# Patient Record
Sex: MALE | Race: BLACK OR AFRICAN AMERICAN | Employment: OTHER | URBAN - METROPOLITAN AREA
[De-identification: names, ages, dates, MRNs, and addresses within clinical notes are randomized per-mention and may not be internally consistent; named-entity substitution may affect disease eponyms.]

---

## 2017-04-24 ENCOUNTER — APPOINTMENT (OUTPATIENT)
Dept: GENERAL RADIOLOGY | Age: 47
End: 2017-04-24
Attending: EMERGENCY MEDICINE
Payer: MEDICARE

## 2017-04-24 ENCOUNTER — HOSPITAL ENCOUNTER (EMERGENCY)
Age: 47
Discharge: HOME | End: 2017-04-24
Attending: EMERGENCY MEDICINE | Admitting: EMERGENCY MEDICINE
Payer: MEDICARE

## 2017-04-24 VITALS
DIASTOLIC BLOOD PRESSURE: 79 MMHG | TEMPERATURE: 98.7 F | RESPIRATION RATE: 16 BRPM | HEART RATE: 72 BPM | OXYGEN SATURATION: 99 % | BODY MASS INDEX: 37.09 KG/M2 | HEIGHT: 74 IN | WEIGHT: 289 LBS | SYSTOLIC BLOOD PRESSURE: 159 MMHG

## 2017-04-24 DIAGNOSIS — R07.9 CHEST PAIN, UNSPECIFIED TYPE: ICD-10-CM

## 2017-04-24 DIAGNOSIS — R56.9 SEIZURE (HCC): Primary | ICD-10-CM

## 2017-04-24 LAB
ALBUMIN SERPL BCP-MCNC: 3.5 G/DL (ref 3.4–5)
ALBUMIN/GLOB SERPL: 0.9 {RATIO} (ref 0.8–1.7)
ALP SERPL-CCNC: 118 U/L (ref 45–117)
ALT SERPL-CCNC: 29 U/L (ref 16–61)
ANION GAP BLD CALC-SCNC: 5 MMOL/L (ref 3–18)
AST SERPL W P-5'-P-CCNC: 28 U/L (ref 15–37)
ATRIAL RATE: 68 BPM
BASOPHILS # BLD AUTO: 0 K/UL (ref 0–0.06)
BASOPHILS # BLD: 0 % (ref 0–2)
BILIRUB SERPL-MCNC: 0.4 MG/DL (ref 0.2–1)
BUN SERPL-MCNC: 10 MG/DL (ref 7–18)
BUN/CREAT SERPL: 12 (ref 12–20)
CALCIUM SERPL-MCNC: 8.4 MG/DL (ref 8.5–10.1)
CALCULATED P AXIS, ECG09: 12 DEGREES
CALCULATED R AXIS, ECG10: -4 DEGREES
CALCULATED T AXIS, ECG11: 2 DEGREES
CHLORIDE SERPL-SCNC: 107 MMOL/L (ref 100–108)
CK MB CFR SERPL CALC: 0.4 % (ref 0–4)
CK MB SERPL-MCNC: 1.1 NG/ML (ref 5–25)
CK SERPL-CCNC: 264 U/L (ref 39–308)
CO2 SERPL-SCNC: 27 MMOL/L (ref 21–32)
CREAT SERPL-MCNC: 0.83 MG/DL (ref 0.6–1.3)
DIAGNOSIS, 93000: NORMAL
DIFFERENTIAL METHOD BLD: ABNORMAL
EOSINOPHIL # BLD: 0.1 K/UL (ref 0–0.4)
EOSINOPHIL NFR BLD: 2 % (ref 0–5)
ERYTHROCYTE [DISTWIDTH] IN BLOOD BY AUTOMATED COUNT: 12.3 % (ref 11.6–14.5)
GLOBULIN SER CALC-MCNC: 3.8 G/DL (ref 2–4)
GLUCOSE SERPL-MCNC: 91 MG/DL (ref 74–99)
HCT VFR BLD AUTO: 41.4 % (ref 36–48)
HGB BLD-MCNC: 13.8 G/DL (ref 13–16)
LITHIUM SERPL-SCNC: <0.2 MMOL/L (ref 0.6–1.2)
LYMPHOCYTES # BLD AUTO: 40 % (ref 21–52)
LYMPHOCYTES # BLD: 1.8 K/UL (ref 0.9–3.6)
MCH RBC QN AUTO: 31.7 PG (ref 24–34)
MCHC RBC AUTO-ENTMCNC: 33.3 G/DL (ref 31–37)
MCV RBC AUTO: 95 FL (ref 74–97)
MONOCYTES # BLD: 0.4 K/UL (ref 0.05–1.2)
MONOCYTES NFR BLD AUTO: 8 % (ref 3–10)
NEUTS SEG # BLD: 2.2 K/UL (ref 1.8–8)
NEUTS SEG NFR BLD AUTO: 50 % (ref 40–73)
P-R INTERVAL, ECG05: 164 MS
PHENYTOIN SERPL-MCNC: 7.2 UG/ML (ref 10–20)
PLATELET # BLD AUTO: 196 K/UL (ref 135–420)
PMV BLD AUTO: 10.7 FL (ref 9.2–11.8)
POTASSIUM SERPL-SCNC: 4 MMOL/L (ref 3.5–5.5)
PROT SERPL-MCNC: 7.3 G/DL (ref 6.4–8.2)
Q-T INTERVAL, ECG07: 408 MS
QRS DURATION, ECG06: 90 MS
QTC CALCULATION (BEZET), ECG08: 433 MS
RBC # BLD AUTO: 4.36 M/UL (ref 4.7–5.5)
SODIUM SERPL-SCNC: 139 MMOL/L (ref 136–145)
TROPONIN I SERPL-MCNC: <0.02 NG/ML (ref 0–0.04)
VENTRICULAR RATE, ECG03: 68 BPM
WBC # BLD AUTO: 4.5 K/UL (ref 4.6–13.2)

## 2017-04-24 PROCEDURE — 96375 TX/PRO/DX INJ NEW DRUG ADDON: CPT

## 2017-04-24 PROCEDURE — 71010 XR CHEST PORT: CPT

## 2017-04-24 PROCEDURE — 93005 ELECTROCARDIOGRAM TRACING: CPT

## 2017-04-24 PROCEDURE — 96366 THER/PROPH/DIAG IV INF ADDON: CPT

## 2017-04-24 PROCEDURE — 82550 ASSAY OF CK (CPK): CPT | Performed by: EMERGENCY MEDICINE

## 2017-04-24 PROCEDURE — 96365 THER/PROPH/DIAG IV INF INIT: CPT

## 2017-04-24 PROCEDURE — 80185 ASSAY OF PHENYTOIN TOTAL: CPT | Performed by: EMERGENCY MEDICINE

## 2017-04-24 PROCEDURE — 80178 ASSAY OF LITHIUM: CPT | Performed by: EMERGENCY MEDICINE

## 2017-04-24 PROCEDURE — 85025 COMPLETE CBC W/AUTO DIFF WBC: CPT | Performed by: EMERGENCY MEDICINE

## 2017-04-24 PROCEDURE — 99283 EMERGENCY DEPT VISIT LOW MDM: CPT

## 2017-04-24 PROCEDURE — 80053 COMPREHEN METABOLIC PANEL: CPT | Performed by: EMERGENCY MEDICINE

## 2017-04-24 PROCEDURE — 74011250636 HC RX REV CODE- 250/636: Performed by: EMERGENCY MEDICINE

## 2017-04-24 PROCEDURE — 74011000258 HC RX REV CODE- 258: Performed by: EMERGENCY MEDICINE

## 2017-04-24 RX ORDER — PHENYTOIN SODIUM 50 MG/ML
500 INJECTION, SOLUTION INTRAMUSCULAR; INTRAVENOUS ONCE
Status: DISCONTINUED | OUTPATIENT
Start: 2017-04-24 | End: 2017-04-24

## 2017-04-24 RX ORDER — KETOROLAC TROMETHAMINE 30 MG/ML
30 INJECTION, SOLUTION INTRAMUSCULAR; INTRAVENOUS
Status: COMPLETED | OUTPATIENT
Start: 2017-04-24 | End: 2017-04-24

## 2017-04-24 RX ORDER — PHENYTOIN SODIUM 100 MG/1
300 CAPSULE, EXTENDED RELEASE ORAL 2 TIMES DAILY
Qty: 60 CAP | Refills: 0 | Status: SHIPPED | OUTPATIENT
Start: 2017-04-24 | End: 2017-05-09 | Stop reason: SDUPTHER

## 2017-04-24 RX ORDER — NAPROXEN 500 MG/1
500 TABLET ORAL 2 TIMES DAILY WITH MEALS
Qty: 20 TAB | Refills: 0 | Status: SHIPPED | OUTPATIENT
Start: 2017-04-24 | End: 2017-05-09 | Stop reason: SDUPTHER

## 2017-04-24 RX ADMIN — KETOROLAC TROMETHAMINE 30 MG: 30 INJECTION, SOLUTION INTRAMUSCULAR at 13:31

## 2017-04-24 RX ADMIN — PHENYTOIN SODIUM 500 MG: 50 INJECTION INTRAMUSCULAR; INTRAVENOUS at 13:31

## 2017-04-24 NOTE — ED TRIAGE NOTES
Witnessed seizure last night lasting a minute last night. Takes medication for seizures daily. C/o of right chest pain.

## 2017-04-24 NOTE — DISCHARGE INSTRUCTIONS
Chest Pain: Care Instructions  Your Care Instructions  There are many things that can cause chest pain. Some are not serious and will get better on their own in a few days. But some kinds of chest pain need more testing and treatment. Your doctor may have recommended a follow-up visit in the next 8 to 12 hours. If you are not getting better, you may need more tests or treatment. Even though your doctor has released you, you still need to watch for any problems. The doctor carefully checked you, but sometimes problems can develop later. If you have new symptoms or if your symptoms do not get better, get medical care right away. If you have worse or different chest pain or pressure that lasts more than 5 minutes or you passed out (lost consciousness), call 911 or seek other emergency help right away. A medical visit is only one step in your treatment. Even if you feel better, you still need to do what your doctor recommends, such as going to all suggested follow-up appointments and taking medicines exactly as directed. This will help you recover and help prevent future problems. How can you care for yourself at home? · Rest until you feel better. · Take your medicine exactly as prescribed. Call your doctor if you think you are having a problem with your medicine. · Do not drive after taking a prescription pain medicine. When should you call for help? Call 911 if:  · You passed out (lost consciousness). · You have severe difficulty breathing. · You have symptoms of a heart attack. These may include:  ¨ Chest pain or pressure, or a strange feeling in your chest.  ¨ Sweating. ¨ Shortness of breath. ¨ Nausea or vomiting. ¨ Pain, pressure, or a strange feeling in your back, neck, jaw, or upper belly or in one or both shoulders or arms. ¨ Lightheadedness or sudden weakness. ¨ A fast or irregular heartbeat.   After you call 911, the  may tell you to chew 1 adult-strength or 2 to 4 low-dose aspirin. Wait for an ambulance. Do not try to drive yourself. Call your doctor today if:  · You have any trouble breathing. · Your chest pain gets worse. · You are dizzy or lightheaded, or you feel like you may faint. · You are not getting better as expected. · You are having new or different chest pain. Where can you learn more? Go to http://richard-luis enrique.info/. Enter A120 in the search box to learn more about \"Chest Pain: Care Instructions. \"  Current as of: May 27, 2016  Content Version: 11.2  © 2949-4779 [employer]. Care instructions adapted under license by [employer] (which disclaims liability or warranty for this information). If you have questions about a medical condition or this instruction, always ask your healthcare professional. [address] any warranty or liability for your use of this information. Seizure: Care Instructions  Your Care Instructions    Seizures are caused by abnormal patterns of electrical signals in the brain. They are different for each person. Seizures can affect movement, speech, vision, or awareness. Some people have only slight shaking of a hand and do not pass out. Other people may pass out and have violent shaking of the whole body. Some people appear to stare into space. They are awake, but they can't respond normally. Later, they may not remember what happened. You may need tests to identify the type and cause of the seizures. A seizure may occur only once, or you may have them more than one time. Taking medicines as directed and following up with your doctor may help keep you from having more seizures. The doctor has checked you carefully, but problems can develop later. If you notice any problems or new symptoms, get medical treatment right away. Follow-up care is a key part of your treatment and safety.  Be sure to make and go to all appointments, and call your doctor if you are having problems. It's also a good idea to know your test results and keep a list of the medicines you take. How can you care for yourself at home? · Be safe with medicines. Take your medicines exactly as prescribed. Call your doctor if you think you are having a problem with your medicine. · Do not do any activity that could be dangerous to you or others until your doctor says it is safe to do so. For example, do not drive a car, operate machinery, swim, or climb ladders. · Be sure that anyone treating you for any health problem knows that you have had a seizure and what medicines you are taking for it. · Identify and avoid things that may make you more likely to have a seizure. These may include lack of sleep, alcohol or drug use, stress, or not eating. · Make sure you go to your follow-up appointment. When should you call for help? Call 911 anytime you think you may need emergency care. For example, call if:  · You have another seizure. · You have more than one seizure in 24 hours. · You have new symptoms, such as trouble walking, speaking, or thinking clearly. Call your doctor now or seek immediate medical care if:  · You are not acting normally. Watch closely for changes in your health, and be sure to contact your doctor if you have any problems. Where can you learn more? Go to http://richard-luis enrique.info/. Enter S966 in the search box to learn more about \"Seizure: Care Instructions. \"  Current as of: October 14, 2016  Content Version: 11.2  © 0295-9938 [employer]. Care instructions adapted under license by [employer] (which disclaims liability or warranty for this information). If you have questions about a medical condition or this instruction, always ask your healthcare professional. [address] any warranty or liability for your use of this information.

## 2017-04-24 NOTE — ED NOTES
Pt reports 9/10 constant, sharp right-sided chest pain that radiates to left rib cage that started yesterday morning  Pt states comfortable level of pain for him is 7/10  Pt reports hx of PE several years ago and was taking lovenox but stopped last year due to \"bleeding\" in his brain     Pt's family at bedside reports seizure yesterday morning and being unbalanced and disoriented after. She reports episode lasted approximately 5 minutes.      She reports last seizure was approximately 2-3 weeks ago but seizure yesterday lasted longer than usual.

## 2017-05-09 ENCOUNTER — OFFICE VISIT (OUTPATIENT)
Dept: NEUROLOGY | Age: 47
End: 2017-05-09

## 2017-05-09 VITALS
TEMPERATURE: 98.7 F | WEIGHT: 301.6 LBS | HEART RATE: 70 BPM | DIASTOLIC BLOOD PRESSURE: 104 MMHG | BODY MASS INDEX: 38.71 KG/M2 | OXYGEN SATURATION: 96 % | RESPIRATION RATE: 14 BRPM | SYSTOLIC BLOOD PRESSURE: 150 MMHG | HEIGHT: 74 IN

## 2017-05-09 DIAGNOSIS — G40.109 PARTIAL SYMPTOMATIC EPILEPSY WITH SIMPLE PARTIAL SEIZURES, NOT INTRACTABLE, WITHOUT STATUS EPILEPTICUS (HCC): Primary | ICD-10-CM

## 2017-05-09 RX ORDER — LITHIUM CARBONATE 600 MG/1
600 CAPSULE ORAL 2 TIMES DAILY WITH MEALS
Qty: 60 CAP | Refills: 0 | Status: SHIPPED | OUTPATIENT
Start: 2017-05-09 | End: 2017-05-10 | Stop reason: SDUPTHER

## 2017-05-09 RX ORDER — LISINOPRIL 10 MG/1
20 TABLET ORAL DAILY
Qty: 60 TAB | Refills: 0 | Status: SHIPPED | OUTPATIENT
Start: 2017-05-09 | End: 2017-05-10 | Stop reason: SDUPTHER

## 2017-05-09 RX ORDER — NAPROXEN 500 MG/1
500 TABLET ORAL 2 TIMES DAILY WITH MEALS
Qty: 20 TAB | Refills: 0 | Status: SHIPPED | OUTPATIENT
Start: 2017-05-09 | End: 2017-05-10 | Stop reason: SDUPTHER

## 2017-05-09 RX ORDER — PHENYTOIN SODIUM 100 MG/1
300 CAPSULE, EXTENDED RELEASE ORAL 2 TIMES DAILY
Qty: 180 CAP | Refills: 11 | Status: SHIPPED | OUTPATIENT
Start: 2017-05-09 | End: 2017-05-10 | Stop reason: SDUPTHER

## 2017-05-09 NOTE — MR AVS SNAPSHOT
Visit Information Date & Time Provider Department Dept. Phone Encounter #  
 5/9/2017  1[phone] [id number] Follow-up Instructions Return in about 4 weeks (around 6/6/2017). Follow-up and Disposition History Upcoming Health Maintenance Date Due DTaP/Tdap/Td series (1 - Tdap) 5/7/1989 INFLUENZA AGE 9 TO ADULT 8/1/2017 Allergies as of 5/9/2017  Review Complete On: 5/9/2017 By: Lydia Trinidad MD  
  
 Severity Noted Reaction Type Reactions Adhesive Tape-silicones  24/90/2208    Rash Haldol [Haloperidol Lactate]  09/07/2012    Unknown (comments) Tylenol [Acetaminophen]  09/07/2012    Nausea and Vomiting Current Immunizations  Never Reviewed No immunizations on file. Not reviewed this visit You Were Diagnosed With   
  
 Codes Comments Partial symptomatic epilepsy with simple partial seizures, not intractable, without status epilepticus ([facility]    -  Primary ICD-10-CM: G40.109 ICD-9-CM: 345.50 Vitals BP Pulse Temp Resp Height(growth percentile) Weight(growth percentile) (!) 150/104 (BP 1 Location: Left arm, BP Patient Position: Sitting) 70 98.7 °F (37.1 °C) (Oral) 14 6' 2\" (1.88 m) 301 lb 9.6 oz (136.8 kg) SpO2 BMI Smoking Status 96% 38.72 kg/m2 Never Smoker Vitals History BMI and BSA Data Body Mass Index Body Surface Area 38.72 kg/m 2 2.67 m 2 Preferred Pharmacy Pharmacy Name Phone [facility] [address] [place] [address] [place] [address] [place] [address] [phone] Your Updated Medication List  
  
   
This list is accurate as of: 5/9/17  1:53 PM.  Always use your most recent med list.  
  
  
  
  
 DILANTIN EXTENDED 100 mg ER capsule Generic drug:  phenytoin ER Take 3 Caps by mouth two (2) times a day. Indications: PREVENT SEIZURES AFTER HEAD TRAUMA OR SURGERY HYDROcodone-acetaminophen 5-325 mg per tablet Commonly known as:  Karole Dakins Take 1-2 tablets PO every 4-6 hours as needed for pain control. If over the counter ibuprofen or acetaminophen was suggested, then only take the vicodin for pain not well controlled with the over the counter medication. lisinopril 10 mg tablet Commonly known as:  Torrie Feil Take 2 Tabs by mouth daily. lithium carbonate 600 mg capsule Take 1 Cap by mouth two (2) times daily (with meals). naproxen 500 mg tablet Commonly known as:  NAPROSYN Take 1 Tab by mouth two (2) times daily (with meals). Prescriptions Sent to Pharmacy Refills DILANTIN EXTENDED 100 mg ER capsule 11 Sig: Take 3 Caps by mouth two (2) times a day. Indications: PREVENT SEIZURES AFTER HEAD TRAUMA OR SURGERY Class: Normal  
 Pharmacy: [address] Ph #: [phone] Route: Oral  
 lisinopril (PRINIVIL, ZESTRIL) 10 mg tablet 0 Sig: Take 2 Tabs by mouth daily. Class: Normal  
 Pharmacy: [address] Ph #: [phone] Route: Oral  
 lithium carbonate 600 mg capsule 0 Sig: Take 1 Cap by mouth two (2) times daily (with meals). Class: Normal  
 Pharmacy: [address] Ph #: [phone] Route: Oral  
 naproxen (NAPROSYN) 500 mg tablet 0 Sig: Take 1 Tab by mouth two (2) times daily (with meals). Class: Normal  
 Pharmacy: [address] Ph #: [phone] Route: Oral  
  
Follow-up Instructions Return in about 4 weeks (around 6/6/2017). To-Do List   
 05/09/2017 Neurology:  EEG AWAKE AND ASLEEP Introducing [facility]! Frances Hemp introduces Imaging3 patient portal. Now you can access parts of your medical record, email your doctor's office, and request medication refills online. 1. In your internet browser, go to [email or web address] 2. Click on the First Time User? Click Here link in the Sign In box. You will see the New Member Sign Up page. 3. Enter your Imaging3 Access Code exactly as it appears below. You will not need to use this code after youve completed the sign-up process. If you do not sign up before the expiration date, you must request a new code. · Imaging3 Access Code: [email or web address] Expires: 7/23/2017 12:15 PM 
 
4. Enter the last four digits of your Social Security Number (xxxx) and Date of Birth (mm/dd/yyyy) as indicated and click Submit. You will be taken to the next sign-up page. 5. Create a Imaging3 ID. This will be your Imaging3 login ID and cannot be changed, so think of one that is secure and easy to remember. 6. Create a Imaging3 password. You can change your password at any time. 7. Enter your Password Reset Question and Answer. This can be used at a later time if you forget your password. 8. Enter your e-mail address. You will receive e-mail notification when new information is available in [address]. 9. Click Sign Up. You can now view and download portions of your medical record. 10. Click the Download Summary menu link to download a portable copy of your medical information. If you have questions, please visit the Frequently Asked Questions section of the Imaging3 website. Remember, Imaging3 is NOT to be used for urgent needs. For medical emergencies, dial 911. Now available from your iPhone and Android! Please provide this summary of care documentation to your next provider. Your primary care clinician is listed as Iraida Booth. If you have any questions after today's visit, please call [phone].
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
## 2017-05-15 ENCOUNTER — HOSPITAL ENCOUNTER (OUTPATIENT)
Dept: NEUROLOGY | Age: 47
Discharge: HOME | End: 2017-05-15
Attending: PSYCHIATRY & NEUROLOGY
Payer: MEDICARE

## 2017-05-15 DIAGNOSIS — G40.109 PARTIAL SYMPTOMATIC EPILEPSY WITH SIMPLE PARTIAL SEIZURES, NOT INTRACTABLE, WITHOUT STATUS EPILEPTICUS (HCC): ICD-10-CM

## 2017-05-15 PROCEDURE — 95819 EEG AWAKE AND ASLEEP: CPT

## 2017-05-17 NOTE — PROCEDURES
[place]    Name:  Jaycee Salcido  MR#:  [id number]  :  1970  Account #:  [de-identified]  Date of Adm:  05/15/2017  Date of Service:  05/15/2017      ELECTROENCEPHALOGRAM NUMBER . CLINICAL: This is an apparently wakeful EEG on this 24-year-old man  with an established history of epilepsy since childhood. This is a result  of trauma and resulting brain surgery, who has generalized tonic-clonic  seizures starting in high school. He apparently had his last seizure a  couple of weeks ago. MEDICATIONS: Include:  1. Dilantin. 2. Naprosyn. 3. Norco.  4. Lithobid. EEG REPORT: The predominant wakeful background consists of 15-  25 microvolts, irregular but symmetrical, 10-11 Hz waves which  attenuate well with eye opening. Bifrontal 3 to 5 microvolts, 16 to 20 Hz  activity is seen which is symmetrical in its occurrence. Step-flash photic stimulation was performed that caused symmetrical  driving between 5 and 12 flashes per second. IMPRESSION: This is a normal wakeful electroencephalogram. No  epileptiform or focal abnormality was appreciated.         MD JUSTUS Garcia / Kiana Valle  D:  2017   13:14  T:  2017   20:19  Job #:  [id number]

## 2017-06-06 ENCOUNTER — TELEPHONE (OUTPATIENT)
Dept: NEUROLOGY | Age: 47
End: 2017-06-06

## 2017-06-06 NOTE — TELEPHONE ENCOUNTER
Patient had to cancel tiffanie'd appt that was 6/7 due to insurance. However he would like to know if he can receive his results of his EEG until he is seen in the office.  Please advise patient at earliest.

## 2017-06-07 NOTE — TELEPHONE ENCOUNTER
Eliezer Navarro MD                  EEG was normal (Routing comment)            Patient informed and verbalized understanding.

## 2018-06-26 ENCOUNTER — HOSPITAL ENCOUNTER (INPATIENT)
Age: 48
LOS: 6 days | Discharge: HOME | DRG: 885 | End: 2018-07-02
Attending: EMERGENCY MEDICINE | Admitting: PSYCHIATRY & NEUROLOGY
Payer: MEDICARE

## 2018-06-26 DIAGNOSIS — G40.109 PARTIAL SYMPTOMATIC EPILEPSY WITH SIMPLE PARTIAL SEIZURES, NOT INTRACTABLE, WITHOUT STATUS EPILEPTICUS (HCC): ICD-10-CM

## 2018-06-26 DIAGNOSIS — R45.851 SUICIDAL IDEATION: Primary | ICD-10-CM

## 2018-06-26 LAB
AMPHET UR QL SCN: NEGATIVE
ANION GAP SERPL CALC-SCNC: 6 MMOL/L (ref 3–18)
APAP SERPL-MCNC: <2 UG/ML (ref 10–30)
APPEARANCE UR: CLEAR
BARBITURATES UR QL SCN: NEGATIVE
BASOPHILS # BLD: 0 K/UL (ref 0–0.06)
BASOPHILS NFR BLD: 1 % (ref 0–2)
BENZODIAZ UR QL: NEGATIVE
BILIRUB UR QL: NEGATIVE
BUN SERPL-MCNC: 13 MG/DL (ref 7–18)
BUN/CREAT SERPL: 17 (ref 12–20)
CALCIUM SERPL-MCNC: 8.2 MG/DL (ref 8.5–10.1)
CANNABINOIDS UR QL SCN: NEGATIVE
CHLORIDE SERPL-SCNC: 108 MMOL/L (ref 100–108)
CO2 SERPL-SCNC: 28 MMOL/L (ref 21–32)
COCAINE UR QL SCN: NEGATIVE
COLOR UR: ABNORMAL
CREAT SERPL-MCNC: 0.78 MG/DL (ref 0.6–1.3)
DIFFERENTIAL METHOD BLD: ABNORMAL
EOSINOPHIL # BLD: 0.1 K/UL (ref 0–0.4)
EOSINOPHIL NFR BLD: 2 % (ref 0–5)
ERYTHROCYTE [DISTWIDTH] IN BLOOD BY AUTOMATED COUNT: 12.5 % (ref 11.6–14.5)
ETHANOL SERPL-MCNC: <3 MG/DL (ref 0–3)
GLUCOSE SERPL-MCNC: 87 MG/DL (ref 74–99)
GLUCOSE UR STRIP.AUTO-MCNC: NEGATIVE MG/DL
HCT VFR BLD AUTO: 40 % (ref 36–48)
HDSCOM,HDSCOM: NORMAL
HGB BLD-MCNC: 13.2 G/DL (ref 13–16)
HGB UR QL STRIP: NEGATIVE
KETONES UR QL STRIP.AUTO: NEGATIVE MG/DL
LEUKOCYTE ESTERASE UR QL STRIP.AUTO: NEGATIVE
LYMPHOCYTES # BLD: 2.1 K/UL (ref 0.9–3.6)
LYMPHOCYTES NFR BLD: 47 % (ref 21–52)
MCH RBC QN AUTO: 31.6 PG (ref 24–34)
MCHC RBC AUTO-ENTMCNC: 33 G/DL (ref 31–37)
MCV RBC AUTO: 95.7 FL (ref 74–97)
METHADONE UR QL: NEGATIVE
MONOCYTES # BLD: 0.3 K/UL (ref 0.05–1.2)
MONOCYTES NFR BLD: 6 % (ref 3–10)
NEUTS SEG # BLD: 1.9 K/UL (ref 1.8–8)
NEUTS SEG NFR BLD: 44 % (ref 40–73)
NITRITE UR QL STRIP.AUTO: NEGATIVE
OPIATES UR QL: NEGATIVE
PCP UR QL: NEGATIVE
PH UR STRIP: 5 [PH] (ref 5–8)
PHENYTOIN SERPL-MCNC: 20.8 UG/ML (ref 10–20)
PLATELET # BLD AUTO: 174 K/UL (ref 135–420)
PMV BLD AUTO: 10.2 FL (ref 9.2–11.8)
POTASSIUM SERPL-SCNC: 4.1 MMOL/L (ref 3.5–5.5)
PROT UR STRIP-MCNC: NEGATIVE MG/DL
RBC # BLD AUTO: 4.18 M/UL (ref 4.7–5.5)
SALICYLATES SERPL-MCNC: <2.8 MG/DL (ref 2.8–20)
SODIUM SERPL-SCNC: 142 MMOL/L (ref 136–145)
SP GR UR REFRACTOMETRY: >1.03 (ref 1–1.03)
UROBILINOGEN UR QL STRIP.AUTO: 1 EU/DL (ref 0.2–1)
WBC # BLD AUTO: 4.3 K/UL (ref 4.6–13.2)

## 2018-06-26 PROCEDURE — 85025 COMPLETE CBC W/AUTO DIFF WBC: CPT | Performed by: PHYSICIAN ASSISTANT

## 2018-06-26 PROCEDURE — 80307 DRUG TEST PRSMV CHEM ANLYZR: CPT | Performed by: EMERGENCY MEDICINE

## 2018-06-26 PROCEDURE — 80048 BASIC METABOLIC PNL TOTAL CA: CPT | Performed by: PHYSICIAN ASSISTANT

## 2018-06-26 PROCEDURE — 80185 ASSAY OF PHENYTOIN TOTAL: CPT | Performed by: EMERGENCY MEDICINE

## 2018-06-26 PROCEDURE — 99284 EMERGENCY DEPT VISIT MOD MDM: CPT

## 2018-06-26 PROCEDURE — 80307 DRUG TEST PRSMV CHEM ANLYZR: CPT | Performed by: PHYSICIAN ASSISTANT

## 2018-06-26 PROCEDURE — 81003 URINALYSIS AUTO W/O SCOPE: CPT | Performed by: PHYSICIAN ASSISTANT

## 2018-06-26 PROCEDURE — 65220000005 HC RM SEMIPRIVATE PSYCH 3 OR 4 BED

## 2018-06-26 RX ORDER — BENZTROPINE MESYLATE 1 MG/1
1 TABLET ORAL
Status: DISCONTINUED | OUTPATIENT
Start: 2018-06-26 | End: 2018-07-02 | Stop reason: HOSPADM

## 2018-06-26 RX ORDER — FLUPHENAZINE HYDROCHLORIDE 5 MG/1
5 TABLET ORAL
Status: DISCONTINUED | OUTPATIENT
Start: 2018-06-26 | End: 2018-07-02 | Stop reason: HOSPADM

## 2018-06-26 RX ORDER — IBUPROFEN 400 MG/1
400 TABLET ORAL
Status: DISCONTINUED | OUTPATIENT
Start: 2018-06-26 | End: 2018-06-27

## 2018-06-26 RX ORDER — TRAZODONE HYDROCHLORIDE 50 MG/1
50 TABLET ORAL
Status: DISCONTINUED | OUTPATIENT
Start: 2018-06-26 | End: 2018-06-29

## 2018-06-26 RX ORDER — HYDROXYZINE PAMOATE 25 MG/1
25 CAPSULE ORAL
Status: DISCONTINUED | OUTPATIENT
Start: 2018-06-26 | End: 2018-07-02 | Stop reason: HOSPADM

## 2018-06-26 RX ORDER — BENZTROPINE MESYLATE 1 MG/ML
1 INJECTION INTRAMUSCULAR; INTRAVENOUS
Status: DISCONTINUED | OUTPATIENT
Start: 2018-06-26 | End: 2018-07-02 | Stop reason: HOSPADM

## 2018-06-26 NOTE — IP AVS SNAPSHOT
[address] [id number] [address] Patient: Merline Velasquez MRN: [id number] XNU:1[birth year]49 About your hospitalization You were admitted on:  June 26, 2018 You last received care in the:  [facility] You were discharged on:  July 2, 2018 Why you were hospitalized Your primary diagnosis was:  Bipolar Disorder, Unspecified (Hcc) Follow-up Information Follow up With Details Comments Contact Info [facility] [id number] 
[phone] Patient has an intake appointment on 8/2/18 @ 1:00pm 
[facility] [address], [address] 
[phone]:VTTI  [phone]:IQJ Discharge Orders None A check brooks indicates which time of day the medication should be taken. My Medications START taking these medications Instructions Each Dose to Equal  
 Morning Noon Evening Bedtime ARIPiprazole 10 mg tablet Commonly known as:  ABILIFY Your last dose was: Your next dose is: Take 1 Tab by mouth daily. Indications: BIPOLAR DISORDER IN REMISSION 10 mg  
    
   
   
   
  
 divalproex  mg tablet Commonly known as:  DEPAKOTE Your last dose was: Your next dose is: Take 3 Tabs by mouth two (2) times a day. Indications: Epilepsy 750 mg  
    
   
   
   
  
 melatonin 3 mg tablet Your last dose was: Your next dose is: Take 2 Tabs by mouth nightly as needed. Indications: Insomnia  
 6 mg CHANGE how you take these medications Instructions Each Dose to Equal  
 Morning Noon Evening Bedtime  
 lisinopril 10 mg tablet Commonly known as:  Ivone Reasons What changed:  when to take this Your last dose was: Your next dose is: Take 2 Tabs by mouth. Indications: hypertension 20 mg CONTINUE taking these medications Instructions Each Dose to Equal  
 Morning Noon Evening Bedtime HYDROcodone-acetaminophen 5-325 mg per tablet Commonly known as:  Hema [place] Your last dose was: Your next dose is: Take 1-2 tablets PO every 4-6 hours as needed for pain control. If over the counter ibuprofen or acetaminophen was suggested, then only take the vicodin for pain not well controlled with the over the counter medication. naproxen 500 mg tablet Commonly known as:  NAPROSYN Your last dose was: Your next dose is: Take 1 Tab by mouth two (2) times daily (with meals). 500 mg  
    
   
   
   
  
  
STOP taking these medications DILANTIN EXTENDED 100 mg ER capsule Generic drug:  phenytoin ER  
   
  
 lithium carbonate 600 mg capsule Where to Get Your Medications Information on where to get these meds will be given to you by the nurse or doctor. ! Ask your nurse or doctor about these medications ARIPiprazole 10 mg tablet  
 divalproex  mg tablet  
 melatonin 3 mg tablet Opioid Education Prescription Opioids: What You Need to Know: 
 
 
 
PATIENT INSTRUCTIONS: 
 
 
  
 
 
The discharge information has been reviewed with the patient. The patient verbalized understanding. Patient armband removed and shredded Xspand Announcement We are excited to announce that we are making your provider's discharge notes available to you in Xspand. You will see these notes when they are completed and signed by the physician that discharged you from your recent hospital stay. If you have any questions or concerns about any information you see in Xspand, please call the Health Information Department where you were seen or reach out to your Primary Care Provider for more information about your plan of care. Introducing [facility]! Griselda Argueta introduces Xspand patient portal. Now you can access parts of your medical record, email your doctor's office, and request medication refills online. 1. In your internet browser, go to [email or web address] 2. Click on the First Time User? Click Here link in the Sign In box. You will see the New Member Sign Up page. 3. Enter your Xspand Access Code exactly as it appears below. You will not need to use this code after youve completed the sign-up process. If you do not sign up before the expiration date, you must request a new code. · Xspand Access Code: [email or web address] Expires: 9/24/2018 11:20 AM 
 
4. Enter the last four digits of your Social Security Number (xxxx) and Date of Birth (mm/dd/yyyy) as indicated and click Submit. You will be taken to the next sign-up page. 5. Create a Xspand ID. This will be your Xspand login ID and cannot be changed, so think of one that is secure and easy to remember. 6. Create a Xspand password. You can change your password at any time. 7. Enter your Password Reset Question and Answer. This can be used at a later time if you forget your password. 8. Enter your e-mail address. You will receive e-mail notification when new information is available in [address] 9. Click Sign Up. You can now view and download portions of your medical record. 10. Click the Download Summary menu link to download a portable copy of your medical information. If you have questions, please visit the Frequently Asked Questions section of the Inte[employer] website. Remember, Lamahui is NOT to be used for urgent needs. For medical emergencies, dial 911. Now available from your iPhone and Android! Introducing Pancho Dutta As a Cherl Grain patient, I wanted to make you aware of our electronic visit tool called Pancho Dutta. Capigami 24/7 allows you to connect within minutes with a medical provider 24 hours a day, seven days a week via a mobile device or tablet or logging into a secure website from your computer. You can access Pancho Tinybopterryfin from anywhere in the [place]. A virtual visit might be right for you when you have a simple condition and feel like you just dont want to get out of bed, or cant get away from work for an appointment, when your regular [facility] provider is not available (evenings, weekends or holidays), or when youre out of town and need minor care. Electronic visits cost only $49 and if the Capigami 24/7 provider determines a prescription is needed to treat your condition, one can be electronically transmitted to a nearby pharmacy*. Please take a moment to enroll today if you have not already done so. The enrollment process is free and takes just a few minutes. To enroll, please download the Cherl Grain 24/NEMOPTIC misbah to your tablet or phone, or visit [email or web address] to enroll on your computer. And, as an [address] [place] [address] patient with a [facility] account, the results of your visits will be scanned into your electronic medical record and your primary care provider will be able to view the scanned results. We urge you to continue to see your regular [facility] provider for your ongoing medical care.   And while your primary care provider may not be the one available when you seek a Pancho Whitlockterryfin virtual visit, the peace of mind you get from getting a real diagnosis real time can be priceless. For more information on Pancho Garciafin, view our Frequently Asked Questions (FAQs) at [email or web address]. Sincerely, 
 
Leydi Cassidy MD 
Chief Medical Officer Andrew Rosales *:  certain medications cannot be prescribed via Pancho Kamleshterryfin Providers Seen During Your Hospitalization Provider Specialty Primary office phone Scottie Cuevas MD Emergency Medicine [phone] Yoseph Brennan MD Emergency Medicine [phone] Heather Chang MD Psychiatry [phone] Your Primary Care Physician (PCP) Primary Care Physician Office Phone Office Fax Marquis Yadav [phone] You are allergic to the following Allergen Reactions Trazodone Other (comments) Priapism Adhesive Tape-Silicones Rash Haldol (Haloperidol Lactate) Unknown (comments) Tylenol (Acetaminophen) Nausea and Vomiting Recent Documentation Height Weight BMI Smoking Status 1.88 m 128.4 kg 36.34 kg/m2 Never Smoker Emergency Contacts Name Discharge Info Relation Home Work Mobile Arina Nuñez DISCHARGE CAREGIVER [3] Daughter [21] [phone] Ann Marie Burden [5] [phone] Patient Belongings The following personal items are in your possession at time of discharge: 
  Dental Appliances: None  Visual Aid: None      Home Medications: None   Jewelry: None       Other Valuables:  (wallet,keys,cellphone,hearphone,,hat,laces) Please provide this summary of care documentation to your next provider. Signatures-by signing, you are acknowledging that this After Visit Summary has been reviewed with you and you have received a copy.   
  
 
  
    
    
 Patient Signature: ____________________________________________________________ Date:  ____________________________________________________________  
  
Aleah Tati Provider Signature:  ____________________________________________________________ Date:  ____________________________________________________________
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
## 2018-06-26 NOTE — ED NOTES
I performed a brief evaluation, including history and physical, of the patient here in triage and I have determined that pt will need further treatment and evaluation from the main side ER physician. I have placed initial orders to help in expediting patients care.      June 26, 2018 at 11:18 AM - EVELIA Duarte

## 2018-06-26 NOTE — BH NOTES
Comprehensive Assessment Form Part 1    Section I - Disposition      The Medical Doctor is in agreement with Psychiatrist disposition because patient is suicidal and depressed. [facility]  The on-call Psychiatrist consulted was Dr. Hao Malcolm  The admitting Psychiatrist will be Dr. Hao Malcolm. The admitting Diagnosis is suicidal ideations and depression. Section II - Integrated Summary  Summary:  Patient is 46year old male presenting to the ED with suicidal ideations, anxiety and depression. Has a medical history of TBI r/t childhood injury, Seizures and PTSD. Patient is alert and oriented x 3. Reports he recently moved back to [place] from [place] and is living with his daughter in a stress situation. He states, \"I have not eaten in 3 days and unable to sleep. I feel anxious and when I get like this I just want to shoot myself. \" Patient does not access to a gun but reports 'I really think I need to be in here. \" Denies homicidal ideations and no hallucinations. Reports no desire to live and then started to list all the family members who have passed away in his life. Denies substance abuse and UDS negative. He says he been well for a while and last inpatient admission was 10 years ago, possibly here. He would like to be admitted to get started back on his medications and reports he has been trying to reconnect to the [facility] since his return to [place]. The patient is deemed competent to provide informed consent. The information is given by the patient. The Chief Complaint is suicidal thoughts , anxiety and depression. The Precipitant Factors are suicidal thoughts and anxiety. .  Previous Hospitalizations: yes, last admission here in 2012. The patient has not previously been in restraints. Current Psychiatrist and/or  is unknown. Lethality Assessment:    The potential for suicide is noted by the following: noted by the following;  vague plan and ideation.   The potential for homicide is not noted.  The patient has not been a perpetrator of sexual or physical abuse. There are not pending charges. The patient is felt to be at risk for self harm or harm to others. The attending nurse was advised . Section III - Psychosocial  The patient's overall mood and attitude is dull and flat. Feelings of helplessness and hopelessness are not observed. Generalized anxiety is not observed. Panic is not observed. Phobias are not observed. Obsessive compulsive tendencies are not observed. Section IV - Mental Status Exam  The patient's appearance shows no evidence of impairment. The patient's behavior shows poor eye contact and is restless. The patient is oriented to time, place, person and situation. The patient's speech shows no evidence of impairment. The patient's mood  is depressed, is anxious and is sad. The range of affect is flat. The patient's thought content  demonstrates no evidence of impairment. The thought process is circumstantial.  The patient's perception demonstrated no changes. The patient's memory shows no evidence of impairment. The patient's appetite shows no evidence of impairment. The patient's sleep has evidence of insomnia. The patient's insight shows no evidence of impairment. The patient's judgement shows no evidence of impairment. Section V - Substance Abuse  The patient is not using substances. The patient has experienced the following withdrawal symptoms,  N/A. Section VI - Living Arrangements  The patient is single. The patient lives with a child/children. The patient has one child age 32. The patient does plan to return home upon discharge. The patient does not have legal issues pending. The patient's source of income comes from disability. Latter day and cultural practices have not been voiced at this time. The patient's greatest support comes from [facility] and this person will be involved with the treatment.     The patient has been in an event described as horrible or outside the realm of ordinary life experience either currently or in the past.  The patient has not been a victim of sexual/physical abuse. Section VII - Other Areas of Clinical Concern  The highest grade achieved is 12th with the overall quality of school experience being described as good. The patient is currently  disabled and speaks [place] as a primary language. The patient has no communication impairments affecting communication. The patient's preference for learning can be described as: can read and write adequately.   The patient's hearing is normal.  The patient's vision is normal .      Megan Sheth RN

## 2018-06-26 NOTE — ED NOTES
TRANSFER - OUT REPORT:    Verbal report given to Linda Salguero RN(name) on Dirk Roll  being transferred to room 106(unit) for routine progression of care       Report consisted of patients Situation, Background, Assessment and   Recommendations(SBAR). Information from the following report(s) ED Summary, MAR and Recent Results was reviewed with the receiving nurse. Lines:       Opportunity for questions and clarification was provided.       Patient transported with:   Registered Nurse
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
## 2018-06-26 NOTE — IP AVS SNAPSHOT
[facility] 
 
 
 [address] Patient: Francie Dunn MRN: [id number] MYY:[birth year] A check brooks indicates which time of day the medication should be taken. My Medications START taking these medications Instructions Each Dose to Equal  
 Morning Noon Evening Bedtime ARIPiprazole 10 mg tablet Commonly known as:  ABILIFY Your last dose was: Your next dose is: Take 1 Tab by mouth daily. Indications: BIPOLAR DISORDER IN REMISSION 10 mg  
    
   
   
   
  
 divalproex  mg tablet Commonly known as:  DEPAKOTE Your last dose was: Your next dose is: Take 3 Tabs by mouth two (2) times a day. Indications: Epilepsy 750 mg  
    
   
   
   
  
 melatonin 3 mg tablet Your last dose was: Your next dose is: Take 2 Tabs by mouth nightly as needed. Indications: Insomnia  
 6 mg CHANGE how you take these medications Instructions Each Dose to Equal  
 Morning Noon Evening Bedtime  
 lisinopril 10 mg tablet Commonly known as:  Carli Calderon What changed:  when to take this Your last dose was: Your next dose is: Take 2 Tabs by mouth. Indications: hypertension 20 mg CONTINUE taking these medications Instructions Each Dose to Equal  
 Morning Noon Evening Bedtime HYDROcodone-acetaminophen 5-325 mg per tablet Commonly known as:  Deepthi Flores Your last dose was: Your next dose is: Take 1-2 tablets PO every 4-6 hours as needed for pain control. If over the counter ibuprofen or acetaminophen was suggested, then only take the vicodin for pain not well controlled with the over the counter medication. naproxen 500 mg tablet Commonly known as:  NAPROSYN Your last dose was: Your next dose is: Take 1 Tab by mouth two (2) times daily (with meals). 500 mg  
    
   
   
   
  
  
STOP taking these medications DILANTIN EXTENDED 100 mg ER capsule Generic drug:  phenytoin ER  
   
  
 lithium carbonate 600 mg capsule Where to Get Your Medications Information on where to get these meds will be given to you by the nurse or doctor. ! Ask your nurse or doctor about these medications ARIPiprazole 10 mg tablet  
 divalproex  mg tablet  
 melatonin 3 mg tablet

## 2018-06-26 NOTE — ED TRIAGE NOTES
Suicidal ideation started a couple of days ago. Has no plan. Living with family members who have mental health issues also.

## 2018-06-27 PROBLEM — F31.9 BIPOLAR DISORDER, UNSPECIFIED (HCC): Chronic | Status: ACTIVE | Noted: 2018-06-26

## 2018-06-27 LAB — PHENYTOIN SERPL-MCNC: 19.9 UG/ML (ref 10–20)

## 2018-06-27 PROCEDURE — 74011250637 HC RX REV CODE- 250/637: Performed by: PSYCHIATRY & NEUROLOGY

## 2018-06-27 PROCEDURE — 80185 ASSAY OF PHENYTOIN TOTAL: CPT | Performed by: PSYCHIATRY & NEUROLOGY

## 2018-06-27 PROCEDURE — 65220000005 HC RM SEMIPRIVATE PSYCH 3 OR 4 BED

## 2018-06-27 PROCEDURE — 36415 COLL VENOUS BLD VENIPUNCTURE: CPT | Performed by: PSYCHIATRY & NEUROLOGY

## 2018-06-27 RX ORDER — ARIPIPRAZOLE 5 MG/1
5 TABLET ORAL DAILY
Status: DISCONTINUED | OUTPATIENT
Start: 2018-06-27 | End: 2018-06-29

## 2018-06-27 RX ORDER — NAPROXEN 250 MG/1
500 TABLET ORAL 2 TIMES DAILY WITH MEALS
Status: DISCONTINUED | OUTPATIENT
Start: 2018-06-27 | End: 2018-07-02 | Stop reason: HOSPADM

## 2018-06-27 RX ORDER — PHENYTOIN SODIUM 100 MG/1
200 CAPSULE, EXTENDED RELEASE ORAL 2 TIMES DAILY
Status: DISCONTINUED | OUTPATIENT
Start: 2018-06-27 | End: 2018-06-29

## 2018-06-27 RX ORDER — PHENYTOIN SODIUM 100 MG/1
100 CAPSULE, EXTENDED RELEASE ORAL 2 TIMES DAILY
Status: DISCONTINUED | OUTPATIENT
Start: 2018-06-27 | End: 2018-06-27

## 2018-06-27 RX ORDER — PHENYTOIN SODIUM 100 MG/1
300 CAPSULE, EXTENDED RELEASE ORAL 2 TIMES DAILY
Status: DISCONTINUED | OUTPATIENT
Start: 2018-06-27 | End: 2018-06-27

## 2018-06-27 RX ADMIN — NAPROXEN 500 MG: 250 TABLET ORAL at 16:48

## 2018-06-27 RX ADMIN — PHENYTOIN SODIUM 200 MG: 100 CAPSULE ORAL at 20:23

## 2018-06-27 RX ADMIN — ARIPIPRAZOLE 5 MG: 5 TABLET ORAL at 13:19

## 2018-06-27 RX ADMIN — Medication: at 20:43

## 2018-06-27 RX ADMIN — NAPROXEN 500 MG: 250 TABLET ORAL at 08:28

## 2018-06-27 NOTE — BH NOTES
Edwinteresita Dollez is not participating in Recreational Therapy. Group time: 1700    Personal goal for participation: fresh air break    Goal orientation: social    Group therapy participation: refuse    Therapeutic interventions reviewed and discussed: Staff encouraged Pt.  To participate in group    Impression of participation: Pt refuse, chose to rest in bed despite staff encouragement
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
## 2018-06-27 NOTE — H&P
[address] [place] [address] Dr PEREZ ADMIT NOTE      Margy Frazier  MR#: [id number]  : 1970  ACCOUNT #: [de-identified]   ADMIT DATE: 2018    REFERRAL SOURCE:  Voluntary. CHIEF COMPLAINT:  Depression and suicidal ideation. 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 The patient states he is currently living with his daughter in a stressful situation. The patient notes that he does not like the location of his daughter's home. The patient denies access to an ownership of firearms. The patient expressed to crisis nursing at the time of admission that he had no desire to live and then began to list the family members who passed away. The patient does have a past history of suicide attempts. The patient endorses depressed mood, anhedonia, guilt, and hopelessness. The patient denies episodic tearfulness, helplessness, and worthlessness. The patient notes his energy level is decreased. The patient currently endorses SI without a plan. The patient discussed past medications which included Prozac and lithium. The patient is uncertain if lithium was helpful because he stated he took that more than 20 years ago. The patient is uncertain if Prozac was helpful. The patient would like to start medications to improve his mood. The patient also states he has anxiety, which he would like treated as well. The patient states protective factors against suicide include, \"life. \"  The patient also states he is no longer using drugs any longer. He notes he has been clean from crack cocaine for 10 years. The patient denies access to and ownership of firearms and weapons.   The patient states supports external to the hospital include the [employer]. The patient denies homicidal ideation, auditory and visual hallucinations. PSYCHIATRIC REVIEW OF SYSTEMS:  DEPRESSION:  Endorses, see HPI. ANXIETY:  The patient endorses excessive worry. IRRITABILITY:  Denies a low threshold for frustration and anger. BIPOLAR SYMPTOMS:  The patient endorses a history of decreased need for sleep associated with increased energy, racing thoughts, rapid speech, and risky behavior. ABUSE/TRAUMA/PTSD:  The patient denied a history of verbal, physical, or sexual abuse. The patient endorses hypervigilance. The patient denies avoidant behavior related to trauma triggers, flashbacks, or nightmares. PSYCHOSIS:  Again, the patient denies AVH. MEDICAL REVIEW OF SYSTEMS:  SLEEP:  Decreased as the patient notes he has sleep apnea and has been without his \"machine. \"  APPETITE:  Decreased. A 10-point review of systems was completed with significant findings in the HPI and mental status exam.    PSYCHIATRIC TREATMENT HISTORY:    SELF-INJURIOUS BEHAVIOR:  The patient endorses a past history of suicidal actions. VIOLENCE/RISK TO OTHERS:  Denies. PREVIOUS PSYCHIATRIC MEDICATION TRIALS:  Prozac, lithium, and trazodone. TRAZODONE CAUSED PRIAPISM AND THE PATIENT STATES HE IS ALLERGIC TO TRAZODONE. PREVIOUS PSYCHIATRIC HOSPITALIZATIONS:  Endorses. CURRENT THERAPIES:  Denies. CURRENT PSYCHIATRIC PROVIDER:  Denies. ALLERGIES:    Allergies   Allergen Reactions    Trazodone Other (comments)     Priapism     Adhesive Tape-Silicones Rash    Haldol [Haloperidol Lactate] Unknown (comments)    Tylenol [Acetaminophen] Nausea and Vomiting        MEDICAL HISTORY:    HISTORY OF NEUROLOGICAL ILLNESSES:  Seizures and a history of brain surgery due to injury at 9years old. HISTORY OF HEAD INJURIES:  Endorses.     MEDICATIONS:    No current facility-administered medications on file prior to encounter. Current Outpatient Prescriptions on File Prior to Encounter   Medication Sig Dispense Refill    DILANTIN EXTENDED 100 mg ER capsule Take 3 Caps by mouth two (2) times a day. Indications: PREVENT SEIZURES AFTER HEAD TRAUMA OR SURGERY 180 Cap 11    naproxen (NAPROSYN) 500 mg tablet Take 1 Tab by mouth two (2) times daily (with meals). 20 Tab 0    lisinopril (PRINIVIL, ZESTRIL) 10 mg tablet Take 2 Tabs by mouth daily. 60 Tab 0    lithium carbonate 600 mg capsule Take 1 Cap by mouth two (2) times daily (with meals). 60 Cap 0    HYDROcodone-acetaminophen (NORCO) 5-325 mg per tablet Take 1-2 tablets PO every 4-6 hours as needed for pain control. If over the counter ibuprofen or acetaminophen was suggested, then only take the vicodin for pain not well controlled with the over the counter medication. 20 Tab 0        SUBSTANCE ABUSE HISTORY:  The patient denies any current substance usage. The patient states he is a recovering cocaine addict and has been clean for 10 years. CONSEQUENCES:  The patient endorses relationship issues due to drug use in the past.    HISTORY OF DETOXIFICATION:  Denies. HISTORY OF SUBSTANCE ABUSE TREATMENT:  The patient has attended substance abuse treatment at [address] [place] [address] in [facility] and [facility]. PSYCHIATRIC FAMILY HISTORY:    Maternal:  Denies. Paternal:  Denies. Alcohol:  Endorses. Drugs:  \"I don't know. \"    FAMILY HISTORY OF SUICIDE:  No.    SOCIAL HISTORY:  LIVING SITUATION:  The patient currently lives with his daughter. EMPLOYMENT:  The patient is currently on disability and not currently employed. EDUCATION:  The patient completed 12th grade. RELATIONSHIPS/CHILDREN:  The patient has 1 daughter and 2 grandchildren. The patient is currently single. LEGAL:  Denies.     SPIRITUALITY/Anglican:  Baptism.    VITAL/LABS:   Vitals:    06/26/18 1313 06/26/18 1629 06/26/18 1900 06/27/18 0924   BP:  (!) 130/91 106/68 (!) 131/92   Pulse: 61 67 60   Resp:  18 20    Temp:  98.2 °F (36.8 °C) 97.7 °F (36.5 °C) 98.6 °F (37 °C)   SpO2: 98%      Weight:       Height:            Results for orders placed or performed during the hospital encounter of 06/26/18   CBC WITH AUTOMATED DIFF   Result Value Ref Range    WBC 4.3 (L) 4.6 - 13.2 K/uL    RBC 4.18 (L) 4.70 - 5.50 M/uL    HGB 13.2 13.0 - 16.0 g/dL    HCT 40.0 36.0 - 48.0 %    MCV 95.7 74.0 - 97.0 FL    MCH 31.6 24.0 - 34.0 PG    MCHC 33.0 31.0 - 37.0 g/dL    RDW 12.5 11.6 - 14.5 %    PLATELET 684 840 - 698 K/uL    MPV 10.2 9.2 - 11.8 FL    NEUTROPHILS 44 40 - 73 %    LYMPHOCYTES 47 21 - 52 %    MONOCYTES 6 3 - 10 %    EOSINOPHILS 2 0 - 5 %    BASOPHILS 1 0 - 2 %    ABS. NEUTROPHILS 1.9 1.8 - 8.0 K/UL    ABS. LYMPHOCYTES 2.1 0.9 - 3.6 K/UL    ABS. MONOCYTES 0.3 0.05 - 1.2 K/UL    ABS. EOSINOPHILS 0.1 0.0 - 0.4 K/UL    ABS.  BASOPHILS 0.0 0.0 - 0.06 K/UL    DF AUTOMATED     METABOLIC PANEL, BASIC   Result Value Ref Range    Sodium 142 136 - 145 mmol/L    Potassium 4.1 3.5 - 5.5 mmol/L    Chloride 108 100 - 108 mmol/L    CO2 28 21 - 32 mmol/L    Anion gap 6 3.0 - 18 mmol/L    Glucose 87 74 - 99 mg/dL    BUN 13 7.0 - 18 MG/DL    Creatinine 0.78 0.6 - 1.3 MG/DL    BUN/Creatinine ratio 17 12 - 20      GFR est AA >60 >60 ml/min/1.73m2    GFR est non-AA >60 >60 ml/min/1.73m2    Calcium 8.2 (L) 8.5 - 10.1 MG/DL   DRUG SCREEN, URINE   Result Value Ref Range    BENZODIAZEPINES NEGATIVE  NEG      BARBITURATES NEGATIVE  NEG      THC (TH-CANNABINOL) NEGATIVE  NEG      OPIATES NEGATIVE  NEG      PCP(PHENCYCLIDINE) NEGATIVE  NEG      COCAINE NEGATIVE  NEG      AMPHETAMINES NEGATIVE  NEG      METHADONE NEGATIVE  NEG      HDSCOM (NOTE)    URINALYSIS W/ RFLX MICROSCOPIC   Result Value Ref Range    Color DARK YELLOW      Appearance CLEAR      Specific gravity >1.030 (H) 1.005 - 1.030    pH (UA) 5.0 5.0 - 8.0      Protein NEGATIVE  NEG mg/dL    Glucose NEGATIVE  NEG mg/dL    Ketone NEGATIVE  NEG mg/dL    Bilirubin NEGATIVE NEG      Blood NEGATIVE  NEG      Urobilinogen 1.0 0.2 - 1.0 EU/dL    Nitrites NEGATIVE  NEG      Leukocyte Esterase NEGATIVE  NEG     ACETAMINOPHEN   Result Value Ref Range    Acetaminophen level <2 (L) 10.0 - 30.0 ug/mL   PHENYTOIN   Result Value Ref Range    Phenytoin 20.8 (H) 10.0 - 20.0 ug/mL   ETHYL ALCOHOL   Result Value Ref Range    ALCOHOL(ETHYL),SERUM <3 0 - 3 MG/DL   SALICYLATE   Result Value Ref Range    Salicylate level <2.0 (L) 2.8 - 20.0 MG/DL   PHENYTOIN   Result Value Ref Range    Phenytoin 19.9 10.0 - 20.0 ug/mL      Lipid panel (Non fasting) (05/16/2018 5:43 AM)  Lipid panel (Non fasting) (05/16/2018 5:43 AM)   Component Value Ref Range   Triglycerides 74 <150 mg/dL   Cholesterol 211 (H) <=200 mg/dL   HDL 63 (H) 40 - 60 mg/dL   LDL Calculated 133 (H) 0 - 100 mg/dL   VLDL Cholesterol Richadr 14.8 12 - 47 mg/dL   Chol/HDL Ratio 3.3 <5.0   Non-HDL Cholesterol 148 (H) 70 - 130 mg/dL   FASTING Unknown       ECG 12 Lead (05/15/2018 11:34 AM)  ECG 12 Lead (05/15/2018 11:34 AM)   Specimen Performing Laboratory     [facility]    [address] [facility] [place], [place] [address]       ECG 12 Lead (05/15/2018 11:34 AM)   Narrative                                   [id number]                                                                                      Test Date:    2018-05-15    Kathya CERDAS              Department:       Patient ID:   [id number]             NZJX:         17    Gender:       M                        Technician:   ILENE    TXJ:          [id number]               Requested By: Sudheer BETH    Order Number: [id number]               Sky MD:   Hilda King                                    Measurements    Intervals                              Axis              Rate:         50                       T:            -1    CA:           156                      IPK:          1    QRSD:         99                       T:            33    RL:           443                                        QTc:          428                                                                   Interpretive Statements    See Interpretation in [facility]      CBC w/ Differential (05/15/2018 11:30 AM)  CBC w/ Differential (05/15/2018 11:30 AM)   Component Value Ref Range   WBC 4.6 4.2 - 9.1 10*9/L   RBC 4.27 (L) 4.63 - 6.08 10*12/L   HGB 14.0 13.7 - 17.5 g/dL   HCT 41.8 40.1 - 51.0 %   MCV 97.9 (H) 79.0 - 92.2 fL   MCH 32.8 (H) 25.7 - 32.2 pg   MCHC 33.5 32.3 - 36.5 g/dL   RDW 12.2 11.6 - 14.4 %   MPV 10.0 9.4 - 12.4 fL   Platelet 926 874 - 038 10*9/L   Neutrophils % 52.2 %   Immature Granulocytes Relative Percent 0.2 %   Lymphocytes % 38.9 %   Monocytes % 7.0 %   Eosinophils % 1.3 %   Basophils % 0.4 %   Absolute Neutrophils 2.4 1.8 - 5.4 10*9/L   Immature Granulocytes Absolute Count 0.0 0.0 - 1.0 10*9/L   Absolute Lymphocytes 1.8 1.3 - 3.6 10*9/L   Absolute Monocytes 0.3 0.3 - 0.8 10*9/L   Absolute Eosinophils 0.1 0.0 - 0.5 10*9/L   Absolute Basophils 0.0 0.0 - 0.1 10*9/L     Comprehensive Metabolic Panel (53/49/1833 11:30 AM)  Comprehensive Metabolic Panel (15/84/8266 11:30 AM)   Component Value Ref Range   Sodium 145 137 - 145 mmol/L   Potassium 4.4 3.5 - 5.1 mmol/L   Chloride 106 98 - 107 mmol/L   CO2 31.0 (H) 22.0 - 30.0 mmol/L   BUN 12 9 - 20 mg/dL   Creatinine 0.68 (L) 0.70 - 1.30 mg/dL   BUN/Creatinine Ratio 18     EGFR MDRD Non Af Amer >=60 >=60 mL/min/1.73m2   EGFR MDRD Af Amer >=60 >=60 mL/min/1.73m2   Anion Gap 8 (L) 9 - 15 mmol/L   Glucose 95 74 - 106 mg/dL   Calcium 9.1 8.4 - 10.2 mg/dL   Albumin 4.0 3.5 - 5.0 g/dL   Total Protein 6.7 6.3 - 8.2 g/dL   Total Bilirubin 0.4 0.2 - 1.3 mg/dL   AST 30 17 - 59 U/L   ALT 38 21 - 72 U/L   Alkaline Phosphatase 85 38 - 126 U/L     Drug Screen, Urine (05/15/2018 11:41 AM)  Drug Screen, Urine (05/15/2018 11:41 AM)   Component Value Ref Range   Amphetamine Screen, Ur NEGATIVE NEGATIVE   Barbiturate Screen, Ur NEGATIVE NEGATIVE Benzodiazepine Screen, Urine NEGATIVE NEGATIVE   Cocaine(Metab. )Screen, Urine NEGATIVE NEGATIVE   Cannabinoid Scrn, Ur NEGATIVE NEGATIVE   PCP Screen, Urine NEGATIVE NEGATIVE   Opiate Scrn, Ur NEGATIVE NEGATIVE   Methadone Screen, Urine NEGATIVE NEGATIVE         MENTAL STATUS EXAMINATION:  APPEARANCE/HYGIENE:  A 47-year-old -American male. Hygiene is fair. Patient has several scars on the back right side of his head. BEHAVIOR/SOCIAL RELATEDNESS:  Bizarre at times, initially guarded, but this improved. MUSCULOSKELETAL:  The patient's gait and station are appropriate. PSYCHOMOTOR:  The patient was calm. Involuntary movements were absent on exam.  Tone was not assessed. SPEECH:  Rate, rhythm, volume, fluency, and articulation are appropriate. MOOD:  Restricted. AFFECT:  Depressed. THOUGHT PROCESS:  Initially vague nut became more linear and goal directed as the interview continued. Patient was perseverative in regards to food and how the unit used to appear when he was last admitted. THOUGHT CONTENT AND PERCEPTUAL DISTURBANCES:  The patient denies delusions, SI, HI, and AVH. SENSORIUM AND COGNITION:  Alert and oriented x4, attention is intact, memory is intact, and language use is appropriate. INSIGHT:  Fair. JUDGMENT:  Fair. Suicide Risk Assessment     Admission  Date/Time: 06/27/18    [x] Admission  [] Discharge     Key Factors:   Current admission precipitated by suicide attempt?   []  Yes     2    [x]  No     1     Suicide Attempt History  [x] Past attempts of high lethality    2 []  Past attempts of low lethality    1 []  No previous attempts       0   Suicidal Ideation [x]  Constant suicidal thoughts      2 []  Intermittent or fleeting suicidal  thoughts  1 []  Denies current suicidal thoughts    0   Suicide Plan   []  Has plan with actual OR potential access to planned method    2 []  Has plan without access to planned method      1 [x]  No plan            0   Plan Lethality [] Highly lethal plan (Carbon monoxide, gun, hanging, jumping)    2 []  Moderate lethality of plan          1 [x]  Low lethality of plan (biting, head banging, superficial scratching, pillow over face)  0   Safety Plan Agreement  []  Unwilling OR unable to agree due to impaired reality testing   2   []  Patient is ambivalent and/or guarded      1 [x]  Reliably agrees        0   Current Morbid Thoughts (reunion fantasies, preoccupations with death) []  Constantly     2     []  Frequently    1 [x]  Rarely    0   Elopement Risk  []  High risk     2 []  Moderate risk    1 [x]   Low risk    0   Symptoms    [x]  Hopeless  []  Helpless  [x]  Anhedonia   [x]  Guilt/shame  []  Anger/rage  [x]  Anxiety  [x]  Insomnia   []  Agitation   []  Impulsivity  [x]  5-6 symptoms present    2 []  3-4 symptoms present    1  []  0-2 symptoms present    0     Total Score: 7  --------------------------------------------------------------------------------------------------------------  Subjective Appraisal of Risk:  []  Patient replies not trustworthy: several non-verbal cues. []  Patient replies questionable: trustworthy: at least 1 non-verbal cue. [x]  Patient replies appear trustworthy. Protective measures (select all that apply):  [x]  Successful past responses to stress  [x]  Spiritual/Anabaptism beliefs  []  Capacity for reality testing  []  Positive therapeutic relationships  []  Social supports/connections  []  Positive coping skills  []  Frustration tolerance/optimism  []  Children or pets in the home  []  Sense of responsibility to family  [x]  Agrees to treatment plan and follow up    High Risk Diagnoses (select all that apply):  [x]  Depression/Bipolar Disorder  []  Dual Diagnosis  []  Cardiovascular Disease  []  Schizophrenia  []  Chronic Pain  []  Epilepsy  []  Cancer  []  Personality Disorder  []  HIV/AIDS  []  Multiple Sclerosis    Dangerousness Assessment (Suicide, homicide, property destruction. ..)    Risk Factors reviewed and risk assessed to be:  [] low  [] low-moderate  [x] moderate   [] moderate-high  [] high     Protection factors reviewed and risk assessed to be:  [] low  [] low-moderate  [x] moderate   [] moderate-high  [] high     Response to treatment and risk assessed to be:  [] low  [] low-moderate  [x] moderate   [] moderate-high  [] high     Support reviewed and risk assessed to be:  [] low  [] low-moderate  [x] moderate   [] moderate-high  [] high     Acceptance of Discharge and outpatient treatment reviewed and risk assessed to be:    [] low  [] low-moderate  [x] moderate   [] moderate-high  [] high   Overall risk assessed to be:  [] low  [] low-moderate  [x] moderate   [] moderate-high  [] high       Assessment and Plan     Psychiatric Diagnoses:   Bipolar disorder, unspecified ([facility] F31.9     Medical Diagnoses:   Patient Active Problem List   Diagnosis Code    Nonintractable epilepsy with simple partial seizures ([facility] G40.109    Bipolar disorder, unspecified ([facility] F31.9     Psychosocial and contextual factors:    1. Living situation   2. Recent move from [place] to [place]   3. Treatment non-compliance due to recent move. Level of impairment/disability: Severe         The patient is a 77-year-old -American male who is currently requiring acute stabilization after developing suicidal ideation in the context of medication noncompliance and a recent move from [place] to [place] where he is currently living with his daughter in a neighborhood that he finds stressful. The patient would like to start medications to help improve his mood and to help improve depression. Due to a history of bipolar disorder, I will not start an antidepressant at this time. The patient is willing to try Abilify 5 mg p.o. q.a.m. for mood stabilization. The patient currently endorses SI without a plan. The patient denies homicidal ideation and auditory and visual hallucinations.     1.  Admit to locked inpatient behavioral health unit. Start milieu, group, art, and occupational therapy. 2.  Bipolar disorder.   - Start Abilify 5 mg p.o. q.a.m.   - Will hold fluoxetine at this time. 3.  Seizure disorder:  Stable. - Admission Dilantin level was 20.8. The patient stated that this level was too high and refused morning Dilantin. Stat repeat trough today was 19.9.   - The patient states that he does not have a neurologist here in [address] [place] [address] and would like to see a neurologist while hospitalized. 4.  Routine labs ordered and reviewed by this provider. 5.  Reviewed instructions, risks, benefits, and side effects. 6.  Start disposition planning; verify upcoming outpatient appointments with therapist and/or psychiatric medication provider. 7.  Tentative discharge date 3-5 days.       MD JORDON Carter/  D: 06/27/2018 11:26     T: 06/27/2018 12:35  JOB #: [id number]

## 2018-06-27 NOTE — BSMART NOTE
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 The patient states he is currently living with his daughter in a stressful situation. The patient notes that he does not like the location of his daughter's home. The patient denies access to an ownership of firearms. The patient expressed to crisis nursing at the time of admission that he had no desire to live and then began to list the family members who passed away. The patient does have a past history of suicide attempts. SW made contact with patient who was focused on interview. Patient addressed his past hospitalizations and addressed the changes in the center. Patient was cooperative and expressed his recent hospitalization is due to feelings of depression. Patient reports he suffers from bipolar disorder and is currently untreated. Patient endorses feelings of suicide in the past.  Denies AVH/HI. Patient reports he is currently residing with his daughter after relocating from [place]. Patient reports he is in the process \"getting myself together\". SW encouraged group activities as well as positive peer interaction. SW will continue to monitor patient during hospitalization consulting with treating psychiatrist to complete disposition.       SOFIYA Calero, LCSW-E    [phone]

## 2018-06-27 NOTE — BSMART NOTE
OCCUPATIONAL THERAPY PROGRESS NOTE  Group Time:  2420  Attendance: The patient attended full group. Participation:  The patient participated with minimal elaboration in the activity. Attention:  The patient was able to focus on the activity. Interaction:  The patient acknowledges others or responds to questions,  with no spontaneous interaction. Participated as asked. Seemed depressed and preoccupied at times with limited interaction.

## 2018-06-27 NOTE — BH NOTES
GROUP THERAPY PROGRESS NOTE    Merline Velasquez is participating in [place].       Group time:30 min     Personal goal for participation: Unit orientation and protocol     Goal orientation: community     Group therapy participation: active     Therapeutic interventions reviewed and discussed: Treatment participation     Impression of participation: Pt.presented as calm, attentive and cooperative while present in group

## 2018-06-27 NOTE — PROGRESS NOTES
conducted an initial consultation and Spiritual Assessment for Eric Faria, who is a 46 y.o.,male. Patients Primary Language is: [place]. According to the patients EMR Evangelical Affiliation is: Unknown. The reason the Patient came to the hospital is:   Patient Active Problem List    Diagnosis Date Noted    Suicidal ideation 06/26/2018    Nonintractable epilepsy with simple partial seizures ([facility] [address] 05/09/2017    Mood disorder ([facility] [address] 11/05/2012        The  provided the following Interventions:  Initiated a relationship of care and support. Explored issues of juan, belief, spirituality. Listened empathically. Provided information about Spiritual Care Services. Offered prayer on patient's behalf. Chart reviewed. The following outcomes where achieved:  Patient shared limited information about life and spiritual journey/beliefs. Patient processed feelings. Patient expressed gratitude for 's visit. Assessment:  Patient seemed sleepy, quiet at times during group. Shared occasionally when prompted; stated had prev been crack cocaine addict and now waking up every day was a sign of hope for him. Plan:  Chaplains will continue to follow and will provide pastoral care on an as needed/requested basis.  recommends bedside caregivers page  on duty if patient shows signs of acute spiritual or emotional distress. Taylor Sharpe.  [facility] [phone]

## 2018-06-28 PROCEDURE — 74011250637 HC RX REV CODE- 250/637: Performed by: PSYCHIATRY & NEUROLOGY

## 2018-06-28 PROCEDURE — 65220000005 HC RM SEMIPRIVATE PSYCH 3 OR 4 BED

## 2018-06-28 RX ORDER — DIVALPROEX SODIUM 250 MG/1
250 TABLET, DELAYED RELEASE ORAL 2 TIMES DAILY
Status: DISCONTINUED | OUTPATIENT
Start: 2018-06-28 | End: 2018-06-29

## 2018-06-28 RX ADMIN — PHENYTOIN SODIUM 200 MG: 100 CAPSULE ORAL at 20:38

## 2018-06-28 RX ADMIN — DIVALPROEX SODIUM 250 MG: 250 TABLET, DELAYED RELEASE ORAL at 20:38

## 2018-06-28 RX ADMIN — Medication: at 08:13

## 2018-06-28 RX ADMIN — TRAZODONE HYDROCHLORIDE 50 MG: 50 TABLET ORAL at 02:51

## 2018-06-28 RX ADMIN — ARIPIPRAZOLE 5 MG: 5 TABLET ORAL at 08:11

## 2018-06-28 RX ADMIN — NAPROXEN 500 MG: 250 TABLET ORAL at 16:43

## 2018-06-28 RX ADMIN — PHENYTOIN SODIUM 200 MG: 100 CAPSULE ORAL at 08:11

## 2018-06-28 RX ADMIN — Medication: at 20:40

## 2018-06-28 RX ADMIN — NAPROXEN 500 MG: 250 TABLET ORAL at 08:11

## 2018-06-28 RX ADMIN — TRAZODONE HYDROCHLORIDE 50 MG: 50 TABLET ORAL at 21:52
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
## 2018-06-28 NOTE — BH NOTES
Patient at desk stating \"I can't take that generic medication (Dilantin). He stated \"It makes me have a seizure\". \"I take a red and white pill not this purple one\". This writer contacted pharmacy and made aware of patient's concern. Pharmacy stated they don't have that particular medication in stock and advised this writer to contact on call psychiatrist. Pharmacist also suggested patient ask someone to bring in his supply from home. Patient stated he doesn't have anyone to bring his medication. This writer was making preparation to contact on call psychiatrist. Patient abruptly stated \"I'll just go ahead and take it\". Patient took medication.

## 2018-06-28 NOTE — BH NOTES
GROUP THERAPY PROGRESS NOTE    Bipin Spence is participating in AA/Emotions Anonymous Group    Group time: 1349-1117    Personal goal for participation:  How to know When to Seek Treatment for Alcoholism. Anyone experiencing emotional difficulties. Goal orientation: active    Group therapy participation: fully participated    Therapeutic interventions reviewed and discussed: When people talk about what is going on in their lives it allows them to release some of their pent up stress. To gain knowledge and support from others who have had or are currently experiencing similar issues. Impression of participation:  [employer] members reviewed a film, then  Discussed the importance of sharing at [facility]to Raphael , help yourself out of depression, leave anxiety behind, and controlling your fears. .  Pt.  Received information  for both programs and shared while in group

## 2018-06-28 NOTE — BSMART NOTE
OCCUPATIONAL THERAPY PROGRESS NOTE  Group Time:  8870  Attendance: The patient attended full group. Participation:  The patient participated fully in the activity. Attention:  The patient was able to focus on the activity. Interaction:  The patient frequently interacts with others. Discussed change in thinking related to remembering he does not control the behavior of others (related somewhat to daughter) and discussed some feelings of \"guilt\" at not being around when she was younger due to his drug use and illness. Some hedging when it was discussed he could practice an honest conversation with her expressing his regrets.

## 2018-06-28 NOTE — BSMART NOTE
SW assessment/Intervention:  Chart reviewed and discussed it reports by treating psychiatrist Anahi Malcom was interviewed by this writer today. Flat, irritable. SW made contact with patient as he remained isolated within the milieu. Patient was attentive, cooperative and focused on purpose of interview. Patient expressed no major issues or concerns. SW encouraged patient to contact his insurance due to his relocation to [place] to continue with medication management as well as counseling services. SW encouraged patient to attend group activities as well as positive peer interaction. Plan:  SW will continue to monitor patient during hospitalization consulting with treating psychiatrist to complete disposition.       SOFIYA Balderas, LCSW-E    [phone]

## 2018-06-28 NOTE — BH NOTES
Merline Velasquez is  participating in Treatment Concerns     Group time: 65    Personal goal for participation: Community announcement    Goal orientation: community    Group therapy participation: fully participated    Therapeutic interventions reviewed and discussed: Staff discussed  Staff discussed the Mental Health programs offered. Unit schedule for groups,  Visiting hours, patient advocate name and phone number and where this information is posted on the unit, etc. Report any maintenance/housekeeping or treatment concerns to staff so it can be addressed by the Treatment Team.    Impression of participation: Pt.did not have any maintenance/housekeeping or treatment concerns to report to staff .

## 2018-06-28 NOTE — BSMART NOTE
SOCIAL WORK GROUP THERAPY PROGRESS NOTE    Group Time:  11am    Group Topic:  Coping Skills    C D Issues    Group Participation:      Pt moderately involved during group discussion but remained attentive. Members discussed handout on the role of \"neurotransmitters\" and how they regulate different aspects of one's moods, cognitions & related behavior. \"Seven Steps\" for taking responsibility for our Happiness was reviewed including commitment to change, self-care, setting limits, goal setting & letting go.

## 2018-06-28 NOTE — BSMART NOTE
ART ACTIVITY PROGRESS NOTE    PATIENT SCHEDULED FOR GROUP AT :  1330    ART ACTIVITY: Paper mosaic    ATTENDANCE : Patient attended the full session. PARTICIPATION LEVEL :  Participates fully in the art process. ATTENTION LEVEL : Able to focus on task. Patient came to group, mainly due to long past history with this therapist. \"I thought you would have retired by now\". Patient engaged in the activity and worked to completion with a very simple and slightly disorganized design. Patient finished up early, chose not to engage in a second one and sat with the group, interacting socially for the rest of the session. Mood appeared calm and reactive.

## 2018-06-28 NOTE — PROGRESS NOTES
Problem: Suicide/Homicide (Adult/Pediatric)  Goal: *STG: Remains safe in hospital  Outcome: Progressing Towards Goal  Denies suicidal ideation. No unsafe behavior noted. Goal: *STG: Attends activities and groups  Outcome: Progressing Towards Goal  Pt attending groups and participating. Goal: *STG/LTG: Complies with medication therapy  Outcome: Progressing Towards Goal  Pt compliant with medication    Comments: Pt visible on the unit interacting with peers and staff appropriately. Discussed with pt his reason for admission. Pt stating he just can't deal with stressors like he use to. Pt currently denies any thoughts of self harm or harm to others. Denies feeling depressed at this time. Pt compliant with medications. Pt initially refused phenytoin stating he is unable to take the generic brand because it will cause him to have a grand mal seizure. Please see med nurse note. Staff will continue to monitor pt for safety and provide a therapeutic/supportive environment.

## 2018-06-28 NOTE — PROGRESS NOTES
Problem: Falls - Risk of  Goal: *Absence of Falls  Document Rita Fall Risk and appropriate interventions in the flowsheet. Will remain free of falls daily during his hospital stay. Outcome: Progressing Towards Goal  Fall Risk Interventions:absent of falls while in hospital daily. Problem: Suicide/Homicide (Adult/Pediatric)  Goal: *STG: Remains safe in hospital  Will remain safe, will not engage in self injurious behaviors daily while in the hospital.   Outcome: Progressing Towards Goal  Remains safe while in hospital daily. Goal: *STG: Attends activities and groups  Will attend/ participate in at least 2-3 groups daily while in the hospital.   Outcome: Progressing Towards Goal  Attends daily while in hospital activities and groups. Comments: Denies SI HI AVH. Offers no complaints. Interacts with staff and peers. Participates in group at 1600. Eats and tolerates evening meal. Gripper socks and 15 minute checks in place for safety. Takes medicines without incident. Will continue to monitor and support.

## 2018-06-28 NOTE — BH NOTES
The patient was fine with being monitored for safety. Mood was calm and social towards both his peers and the staff. Had his vitals checked before receiving his scheduled medications. Participated in all groups and activities. No issues with any negative or out of control behaviors. Talked with his Dr and . Staff will continue to monitor for safety locations.

## 2018-06-28 NOTE — PROGRESS NOTES
[address]  Inpatient Progress Note     Date of Service: 06/28/18  Hospital Day: 2     Subjective/Interval History   06/28/18    Treatment Team Notes:  Notes reviewed and/or discussed and report that Merline Velasquez demonstrated no interval concerns other than initially refusing Dilantin then taking it. Pt states he needs name brand Dilantin and not generic. Please see nursing notes for more details. Pt also asking to remain hospitalized until July 3, 2018 which is when he receives his check. Patient interview: Merline Velasquez was interviewed by this writer today. Flat, irritable. Reports wanting name brand Dilantin based upon his home medications but continues to feel his Dilantin level was too high. C/o a headache and c/o meal-related issues. Pt continues to report SI with a plan to shoot himself. He denies HI and AVH. Objective     Vitals:    06/26/18 1900 06/27/18 0924 06/27/18 2023 06/28/18 0736   BP: 106/68 (!) 131/92 129/90 (!) 141/94   Pulse: 67 60 69 62   Resp: 20      Temp: 97.7 °F (36.5 °C) 98.6 °F (37 °C)  98.1 °F (36.7 °C)   SpO2:       Weight:       Height:            Results for orders placed or performed during the hospital encounter of 06/26/18   CBC WITH AUTOMATED DIFF   Result Value Ref Range    WBC 4.3 (L) 4.6 - 13.2 K/uL    RBC 4.18 (L) 4.70 - 5.50 M/uL    HGB 13.2 13.0 - 16.0 g/dL    HCT 40.0 36.0 - 48.0 %    MCV 95.7 74.0 - 97.0 FL    MCH 31.6 24.0 - 34.0 PG    MCHC 33.0 31.0 - 37.0 g/dL    RDW 12.5 11.6 - 14.5 %    PLATELET 412 783 - 117 K/uL    MPV 10.2 9.2 - 11.8 FL    NEUTROPHILS 44 40 - 73 %    LYMPHOCYTES 47 21 - 52 %    MONOCYTES 6 3 - 10 %    EOSINOPHILS 2 0 - 5 %    BASOPHILS 1 0 - 2 %    ABS. NEUTROPHILS 1.9 1.8 - 8.0 K/UL    ABS. LYMPHOCYTES 2.1 0.9 - 3.6 K/UL    ABS. MONOCYTES 0.3 0.05 - 1.2 K/UL    ABS. EOSINOPHILS 0.1 0.0 - 0.4 K/UL    ABS.  BASOPHILS 0.0 0.0 - 0.06 K/UL    DF AUTOMATED     METABOLIC PANEL, BASIC   Result Value Ref Range    Sodium 142 136 - 145 mmol/L    Potassium 4.1 3.5 - 5.5 mmol/L    Chloride 108 100 - 108 mmol/L    CO2 28 21 - 32 mmol/L    Anion gap 6 3.0 - 18 mmol/L    Glucose 87 74 - 99 mg/dL    BUN 13 7.0 - 18 MG/DL    Creatinine 0.78 0.6 - 1.3 MG/DL    BUN/Creatinine ratio 17 12 - 20      GFR est AA >60 >60 ml/min/1.73m2    GFR est non-AA >60 >60 ml/min/1.73m2    Calcium 8.2 (L) 8.5 - 10.1 MG/DL   DRUG SCREEN, URINE   Result Value Ref Range    BENZODIAZEPINES NEGATIVE  NEG      BARBITURATES NEGATIVE  NEG      THC (TH-CANNABINOL) NEGATIVE  NEG      OPIATES NEGATIVE  NEG      PCP(PHENCYCLIDINE) NEGATIVE  NEG      COCAINE NEGATIVE  NEG      AMPHETAMINES NEGATIVE  NEG      METHADONE NEGATIVE  NEG      HDSCOM (NOTE)    URINALYSIS W/ RFLX MICROSCOPIC   Result Value Ref Range    Color DARK YELLOW      Appearance CLEAR      Specific gravity >1.030 (H) 1.005 - 1.030    pH (UA) 5.0 5.0 - 8.0      Protein NEGATIVE  NEG mg/dL    Glucose NEGATIVE  NEG mg/dL    Ketone NEGATIVE  NEG mg/dL    Bilirubin NEGATIVE  NEG      Blood NEGATIVE  NEG      Urobilinogen 1.0 0.2 - 1.0 EU/dL    Nitrites NEGATIVE  NEG      Leukocyte Esterase NEGATIVE  NEG     ACETAMINOPHEN   Result Value Ref Range    Acetaminophen level <2 (L) 10.0 - 30.0 ug/mL   PHENYTOIN   Result Value Ref Range    Phenytoin 20.8 (H) 10.0 - 20.0 ug/mL   ETHYL ALCOHOL   Result Value Ref Range    ALCOHOL(ETHYL),SERUM <3 0 - 3 MG/DL   SALICYLATE   Result Value Ref Range    Salicylate level <3.2 (L) 2.8 - 20.0 MG/DL   PHENYTOIN   Result Value Ref Range    Phenytoin 19.9 10.0 - 20.0 ug/mL        Mental Status Examination     Appearance/Hygiene 46 y.o.  BLACK OR  male  Hygiene: good   Behavior/Social Relatedness Appropriate  Relatedness is fair   Musculoskeletal Gait/Station: appropriate  Tone (flaccid, cogwheeling, spastic): not assessed  Psychomotor (hyperkinetic, hypokinetic): calm   Involuntary movements (tics, dyskinesias, akathisia, stereotypies): none   Speech Rate, rhythm, volume, fluency and articulation are appropriate   Mood   irritable   Affect    irritable   Thought Process Linear and goal directed   Thought Content and Perceptual Disturbances endorse SI with a plan to shoot himself with a gun. He denies access to and ownership of weapons. Pt denies HI and AVH. Sensorium and Cognition  Grossly intact   Insight  fair   Judgment poor        Assessment/Plan      Psychiatric Diagnoses:   Bipolar disorder, unspecified ([facility] F31.9      Medical Diagnoses:        Patient Active Problem List   Diagnosis Code    Nonintractable epilepsy with simple partial seizures ([facility] [address] G40.109    Bipolar disorder, unspecified ([facility] [address] F31.9      Psychosocial and contextual factors:                         1.  Living situation                        2.  Recent move from [place] to [place]                        3.  Treatment non-compliance due to recent move.      Level of impairment/disability: Moderate-Severe         Mary Beth Richter is a 46 y.o. who is currently unstable. He continues to endorse SI with a plan to shoot himself with a gun. He denies access to and ownership of weapons. Pt denies HI and AVH. 1.  Bipolar disorder   - Continue Abilify as prescribed. 2.  Insomnia   - start melatonin 5mg po QHS/PRN   3.  Seizures:   - Continue Dilantin 200mg po BID   - Neurology to evaluate pt today. - Will speak with pharmacy about obtaining name brand Dilantin. 4.  Reviewed instructions, risks, benefits and side effects of medications  5. Disposition/Discharge Date: self-care/home, 7-2-2018.     Citlalli Jones MD DR. [facility]  Psychiatry

## 2018-06-29 ENCOUNTER — APPOINTMENT (OUTPATIENT)
Dept: CT IMAGING | Age: 48
DRG: 885 | End: 2018-06-29
Attending: PSYCHIATRY & NEUROLOGY
Payer: MEDICARE

## 2018-06-29 PROCEDURE — 70450 CT HEAD/BRAIN W/O DYE: CPT

## 2018-06-29 PROCEDURE — 74011250637 HC RX REV CODE- 250/637: Performed by: PSYCHIATRY & NEUROLOGY

## 2018-06-29 PROCEDURE — 65220000005 HC RM SEMIPRIVATE PSYCH 3 OR 4 BED

## 2018-06-29 RX ORDER — DIVALPROEX SODIUM 250 MG/1
500 TABLET, DELAYED RELEASE ORAL 2 TIMES DAILY
Status: DISCONTINUED | OUTPATIENT
Start: 2018-06-29 | End: 2018-07-01

## 2018-06-29 RX ORDER — LANOLIN ALCOHOL/MO/W.PET/CERES
6 CREAM (GRAM) TOPICAL
Status: DISCONTINUED | OUTPATIENT
Start: 2018-06-29 | End: 2018-07-02 | Stop reason: HOSPADM

## 2018-06-29 RX ORDER — ARIPIPRAZOLE 5 MG/1
10 TABLET ORAL DAILY
Status: DISCONTINUED | OUTPATIENT
Start: 2018-06-30 | End: 2018-07-02 | Stop reason: HOSPADM

## 2018-06-29 RX ORDER — PHENYTOIN SODIUM 100 MG/1
100 CAPSULE, EXTENDED RELEASE ORAL 2 TIMES DAILY
Status: DISCONTINUED | OUTPATIENT
Start: 2018-06-29 | End: 2018-07-01

## 2018-06-29 RX ADMIN — NAPROXEN 500 MG: 250 TABLET ORAL at 08:26

## 2018-06-29 RX ADMIN — MELATONIN TAB 3 MG 6 MG: 3 TAB at 20:18

## 2018-06-29 RX ADMIN — NAPROXEN 500 MG: 250 TABLET ORAL at 16:28

## 2018-06-29 RX ADMIN — ARIPIPRAZOLE 5 MG: 5 TABLET ORAL at 08:26

## 2018-06-29 RX ADMIN — PHENYTOIN SODIUM 100 MG: 100 CAPSULE ORAL at 20:18

## 2018-06-29 RX ADMIN — DIVALPROEX SODIUM 500 MG: 250 TABLET, DELAYED RELEASE ORAL at 20:19

## 2018-06-29 RX ADMIN — Medication: at 08:00

## 2018-06-29 RX ADMIN — PHENYTOIN SODIUM 200 MG: 100 CAPSULE ORAL at 08:26

## 2018-06-29 RX ADMIN — Medication: at 20:23

## 2018-06-29 RX ADMIN — DIVALPROEX SODIUM 250 MG: 250 TABLET, DELAYED RELEASE ORAL at 08:26

## 2018-06-29 NOTE — BSMART NOTE
SW assessment/Intervention:  Chart reviewed and discussed and it reports Denies SI HI AVH. Offers no complaints. Interacts with staff and peers. Participates in group at 1600. Eats and tolerates evening meal. Gripper socks and 15 minute checks in place for safety. Takes medicines without incident. Will continue to monitor and support. SW made contact with patient who continues to remain isolated. Patient is observed reading and is quiet within the milieu. Patient denies SI/HI. Patient denies AVH. SW encouraged patient to continue with group activities as well as positive peer interaction. Plan:  SW will continue to monitor patient during hospitalization consulting with treating psychiatrist to complete disposition.       Saturnino Spatz, MSW, LCSW-E    [phone]

## 2018-06-29 NOTE — BH NOTES
Jamaal Macias is  participating in Treatment Concerns     Group time: 0726    Personal goal for participation: Community announcement    Goal orientation: community    Group therapy participation: fully participated    Therapeutic interventions reviewed and discussed: Staff discussed  Staff discussed the Mental Health programs offered. Unit schedule for groups,  Visiting hours, patient advocate name and phone number and where this information is posted on the unit, etc. Report any maintenance/housekeeping or treatment concerns to staff so it can be addressed by the Treatment Team.    Impression of participation: Pt.did not have any maintenance/housekeeping or treatment concerns to report to staff .

## 2018-06-29 NOTE — PROGRESS NOTES
Re:  Radha Hunt,Follow up visit     6/29/2018 2:10 PM    SSN: [id number]    Subjective:   Alistair Marquez is seen in follow up. He feels well. No side effects with Depakote, no seizures.     Medications:    Current Facility-Administered Medications   Medication Dose Route Frequency Provider Last Rate Last Dose    divalproex DR (DEPAKOTE) tablet 500 mg  500 mg Oral BID Tiffany Escobedo MD        phenytoin ER (DILANTIN ER) ER capsule 100 mg  100 mg Oral BID Tiffany Escobedo MD        naproxen (NAPROSYN) tablet 500 mg  500 mg Oral BID WITH MEALS Armando De MD   500 mg at 06/29/18 8218    ARIPiprazole (ABILIFY) tablet 5 mg  5 mg Oral DAILY Armando De MD   5 mg at 06/29/18 0901    white petrolatum-mineral oil (EUCERIN) cream   Topical BID Armando De MD        traZODone (DESYREL) tablet 50 mg  50 mg Oral QHS PRN Armando De MD   50 mg at 06/28/18 2152    fluPHENAZine (PROLIXIN) tablet 5 mg  5 mg Oral Q6H PRN Armando De MD        benztropine (COGENTIN) tablet 1 mg  1 mg Oral Q6H PRN Armando De MD        benztropine (COGENTIN) injection 1 mg  1 mg IntraMUSCular Q6H PRN Armando De MD        hydrOXYzine pamoate (VISTARIL) capsule 25 mg  25 mg Oral TID PRN Armando De MD           Vital signs:    Visit Vitals    BP (!) 140/93    Pulse 67    Temp 98 °F (36.7 °C)    Resp 19    Ht 6' 2\" (1.88 m)    Wt 128.4 kg (283 lb)    SpO2 98%    BMI 36.34 kg/m2       Review of Systems:   As above otherwise 11 point review of systems negative including;   Constitutional no fever or chills  Skin denies rash or itching  HEENT  Denies tinnitus, hearing lose  Eyes denies diplopia vision lose  Respiratory denies sortness of breath  Cardiovascular denies chest pain, dyspnea on exertion  Gastrointestinal denies nausea, vomiting, diarrhea, constipation  Genitourinary denies incontinence  Musculoskeletal denies joint pain or swelling  Endocrine denies weight change  Hematology denies easy bruising or bleeding   Neurological as above in HPI      Patient Active Problem List   Diagnosis Code    Nonintractable epilepsy with simple partial seizures ([facility] [address] G40.109    Bipolar disorder, unspecified ([facility] [address] F31.9         Objective: The patient is awake, alert, and oriented x 4. Fund of knowledge is adequate. Speech is fluent and memory is intact. Cranial Nerves: II  Visual fields are full to confrontation. III, IV, VI  Extraocular movements are intact. There is no nystagmus. V  Facial sensation is intact to pinprick. VII  Face is symmetrical.  VIII - Hearing is present. IX, X, XII  Palate is symmetrical.   XI - Shoulder shrugging and head turning intact  Motor: The patient moves all four limbs fairly well and symmetrically. Tone is normal. Reflexes are 2+ and symmetrical. Plantars are down going.  Gait is normal.    Final result (Exam End: 6/29/2018  9:07 AM) Open    Study Result   CT scan of head, without intravenous contrast:           INDICATION:     Headache.     History of old head injury.     History of seizure, hypertension, previous brain surgery.           TECHNIQUE:     Helical CT scan with 2.5 mm size thickness, without intravenous contrast, on the  level of base of skull up to the level of vertex.     Coronal and sagittal images are reformatted.     All CT scans at this facility are performed using dose optimization technique as  appropriate to a  performed  examination, to include automated exposer control,  adjustment mA and / or  KV according to patient size (including appropriate  matching  for site specific examination), or use  of iterative  reconstruction  technique.     COMPARISON STUDY: Previous CT scan of head on 11/15/2012.           FINDINGS:           Evidence of previous right-sided temporal parietal craniotomy.     Evidence of focal infarction/encephalomalacia in the right lateral  frontoparietal area, extended to upper posterior aspect of right sylvian  fissure, similar to previous study.     There is no definable new focal abnormality or acute process in brain.     No evidence of intracranial hemorrhages, mass lesion or mass effect.     The study again demonstrates the existing prominent dural calcifications in the  anterior portion of falx cerebri.     Cerebral ventricles are of normal size. The right lateral ventricle is smaller  in size as compared to left lateral ventricle, as also noted previously.     No definable abnormality in the basal ganglia structures, bilateral thalami, or  in the brainstem.     There is evidence of moderate atrophic changes in cerebellum, more pronounced as  compared to previous study.     No diagnostic finding in the visualized portions of both orbits or in visualized  paranasal sinuses.     -----------------------------------------------------     IMPRESSION  IMPRESSION:     No evidence of intracranial hemorrhages or any other definable acute  intracranial abnormality.     Redemonstration of previous right temporoparietal craniotomy.     Redemonstration of focal prominent infarction/encephalomalacia in the lateral  portions of right frontoparietal region, extended to the posterosuperior aspect  of right sylvian fissure, without interval change. I have reviewed the above imagines myself.     CBC:   Lab Results   Component Value Date/Time    WBC 4.3 (L) 06/26/2018 01:37 PM    RBC 4.18 (L) 06/26/2018 01:37 PM    HGB 13.2 06/26/2018 01:37 PM    HCT 40.0 06/26/2018 01:37 PM    PLATELET 808 09/85/7439 01:37 PM     BMP:   Lab Results   Component Value Date/Time    Glucose 87 06/26/2018 01:37 PM    Sodium 142 06/26/2018 01:37 PM    Potassium 4.1 06/26/2018 01:37 PM    Chloride 108 06/26/2018 01:37 PM    CO2 28 06/26/2018 01:37 PM    BUN 13 06/26/2018 01:37 PM    Creatinine 0.78 06/26/2018 01:37 PM    Calcium 8.2 (L) 06/26/2018 01:37 PM     CMP:   Lab Results   Component Value Date/Time    Glucose 87 06/26/2018 01:37 PM    Sodium 142 06/26/2018 01:37 PM    Potassium 4.1 06/26/2018 01:37 PM    Chloride 108 06/26/2018 01:37 PM    CO2 28 06/26/2018 01:37 PM    BUN 13 06/26/2018 01:37 PM    Creatinine 0.78 06/26/2018 01:37 PM    Calcium 8.2 (L) 06/26/2018 01:37 PM    Anion gap 6 06/26/2018 01:37 PM    BUN/Creatinine ratio 17 06/26/2018 01:37 PM    Alk. phosphatase 118 (H) 04/24/2017 10:40 AM    Protein, total 7.3 04/24/2017 10:40 AM    Albumin 3.5 04/24/2017 10:40 AM    Globulin 3.8 04/24/2017 10:40 AM    A-G Ratio 0.9 04/24/2017 10:40 AM     Coagulation:   Lab Results   Component Value Date/Time    Prothrombin time 12.8 11/02/2012 08:15 AM    INR 1.0 11/02/2012 08:15 AM    aPTT 26.7 11/02/2012 08:15 AM     Cardiac markers:   Lab Results   Component Value Date/Time     04/24/2017 10:40 AM    CK-MB Index 0.4 04/24/2017 10:40 AM       Assessment:  Seizures in this man who has risk factors including traumatic brain injury as a child. Seems to be tolerating the switch to Depakote at this time. CT scan was benign, shows old cranio, calcification in occipital regio. Plan: Will increase the Depakote to 500 mg BID and decrease the Dilantin to 100 mg BID. Check Depakote level on Sunday. Sincerely,        Mandi Arnold.  Deon Sandoval M.D.

## 2018-06-29 NOTE — PROGRESS NOTES
Problem: Falls - Risk of  Goal: *Absence of Falls  Document Rita Fall Risk and appropriate interventions in the flowsheet. Will remain free of falls daily during his hospital stay.    Outcome: Progressing Towards Goal  Fall Risk Interventions:absent of falls daily while in hospital                               Problem: Suicide/Homicide (Adult/Pediatric)  Goal: *STG: Remains safe in hospital  Will remain safe, will not engage in self injurious behaviors daily while in the hospital.   Outcome: Progressing Towards Goal  Remains safe daily while in hospital.  Goal: *STG/LTG: Complies with medication therapy  Will be medication compliant daily while in the hospital.   Outcome: Progressing Towards Goal  Complies with medication therapy daily while in hospital.

## 2018-06-29 NOTE — BH NOTES
Pt in milieu most of shift; mood and affect fair; cooperative, compliant, insight into illness fair, goal oriented - \"to keep hope that I will continue to improve and maybe find out what  this growth is inside my head\", interaction good. No behavioral issues during shift, denies intent to harm self/others and A/V hallucinations, involved in no falls this shift - skid resistant footwear utilized and floor kept free of items that could precipitate a fall.

## 2018-06-29 NOTE — PROGRESS NOTES
[address]  Inpatient Progress Note     Date of Service: 06/29/18  Hospital Day: 3     Subjective/Interval History   06/29/18    Treatment Team Notes:  Notes reviewed and/or discussed and report that Gen Reyna demonstrated no interval concerns. Pt was seen by Neurology and their recs are appreciated. The pt explained to Neurology he will be discharged to an detention from the hospital.  Pt currently lives with his daughter. Eating all meals. Pt denies SI, HI and AVH. Patient interview: Gen Reyna was interviewed by this writer today. Flat but less irritable. +SI. Denies HI and AVH. Discussed CT Head results with pt. Objective     Vitals:    06/27/18 0924 06/27/18 2023 06/28/18 0736 06/29/18 0730   BP: (!) 131/92 129/90 (!) 141/94 (!) 140/93   Pulse: 60 69 62 67   Resp:    19   Temp: 98.6 °F (37 °C)  98.1 °F (36.7 °C) 98 °F (36.7 °C)   SpO2:       Weight:       Height:            Results for orders placed or performed during the hospital encounter of 06/26/18   CBC WITH AUTOMATED DIFF   Result Value Ref Range    WBC 4.3 (L) 4.6 - 13.2 K/uL    RBC 4.18 (L) 4.70 - 5.50 M/uL    HGB 13.2 13.0 - 16.0 g/dL    HCT 40.0 36.0 - 48.0 %    MCV 95.7 74.0 - 97.0 FL    MCH 31.6 24.0 - 34.0 PG    MCHC 33.0 31.0 - 37.0 g/dL    RDW 12.5 11.6 - 14.5 %    PLATELET 427 582 - 275 K/uL    MPV 10.2 9.2 - 11.8 FL    NEUTROPHILS 44 40 - 73 %    LYMPHOCYTES 47 21 - 52 %    MONOCYTES 6 3 - 10 %    EOSINOPHILS 2 0 - 5 %    BASOPHILS 1 0 - 2 %    ABS. NEUTROPHILS 1.9 1.8 - 8.0 K/UL    ABS. LYMPHOCYTES 2.1 0.9 - 3.6 K/UL    ABS. MONOCYTES 0.3 0.05 - 1.2 K/UL    ABS. EOSINOPHILS 0.1 0.0 - 0.4 K/UL    ABS.  BASOPHILS 0.0 0.0 - 0.06 K/UL    DF AUTOMATED     METABOLIC PANEL, BASIC   Result Value Ref Range    Sodium 142 136 - 145 mmol/L    Potassium 4.1 3.5 - 5.5 mmol/L    Chloride 108 100 - 108 mmol/L    CO2 28 21 - 32 mmol/L    Anion gap 6 3.0 - 18 mmol/L    Glucose 87 74 - 99 mg/dL    BUN 13 7.0 - 18 MG/DL    Creatinine 0.78 0.6 - 1.3 MG/DL    BUN/Creatinine ratio 17 12 - 20      GFR est AA >60 >60 ml/min/1.73m2    GFR est non-AA >60 >60 ml/min/1.73m2    Calcium 8.2 (L) 8.5 - 10.1 MG/DL   DRUG SCREEN, URINE   Result Value Ref Range    BENZODIAZEPINES NEGATIVE  NEG      BARBITURATES NEGATIVE  NEG      THC (TH-CANNABINOL) NEGATIVE  NEG      OPIATES NEGATIVE  NEG      PCP(PHENCYCLIDINE) NEGATIVE  NEG      COCAINE NEGATIVE  NEG      AMPHETAMINES NEGATIVE  NEG      METHADONE NEGATIVE  NEG      HDSCOM (NOTE)    URINALYSIS W/ RFLX MICROSCOPIC   Result Value Ref Range    Color DARK YELLOW      Appearance CLEAR      Specific gravity >1.030 (H) 1.005 - 1.030    pH (UA) 5.0 5.0 - 8.0      Protein NEGATIVE  NEG mg/dL    Glucose NEGATIVE  NEG mg/dL    Ketone NEGATIVE  NEG mg/dL    Bilirubin NEGATIVE  NEG      Blood NEGATIVE  NEG      Urobilinogen 1.0 0.2 - 1.0 EU/dL    Nitrites NEGATIVE  NEG      Leukocyte Esterase NEGATIVE  NEG     ACETAMINOPHEN   Result Value Ref Range    Acetaminophen level <2 (L) 10.0 - 30.0 ug/mL   PHENYTOIN   Result Value Ref Range    Phenytoin 20.8 (H) 10.0 - 20.0 ug/mL   ETHYL ALCOHOL   Result Value Ref Range    ALCOHOL(ETHYL),SERUM <3 0 - 3 MG/DL   SALICYLATE   Result Value Ref Range    Salicylate level <3.8 (L) 2.8 - 20.0 MG/DL   PHENYTOIN   Result Value Ref Range    Phenytoin 19.9 10.0 - 20.0 ug/mL        Mental Status Examination     Appearance/Hygiene 46 y.o.  BLACK OR  male  Hygiene: good   Behavior/Social Relatedness Appropriate  Relatedness is fair   Musculoskeletal Gait/Station: appropriate  Tone (flaccid, cogwheeling, spastic): not assessed  Psychomotor (hyperkinetic, hypokinetic): calm   Involuntary movements (tics, dyskinesias, akathisia, stereotypies): none   Speech   Rate, rhythm, volume, fluency and articulation are appropriate   Mood   blunted   Affect    blunted   Thought Process Linear and goal directed   Thought Content and Perceptual Disturbances endorse SI with a plan to shoot himself with a gun. He denies access to and ownership of weapons. Pt denies HI and AVH. Sensorium and Cognition  Grossly intact   Insight  fair   Judgment poor        Assessment/Plan      Psychiatric Diagnoses:   Bipolar disorder, unspecified ([facility] [address] F31.9      Medical Diagnoses:        Patient Active Problem List   Diagnosis Code    Nonintractable epilepsy with simple partial seizures ([facility] [address] G40.109    Bipolar disorder, unspecified ([facility] [address] F31.9      Psychosocial and contextual factors:                         1.  Living situation                        2.  Recent move from [place] to [place]                        3.  Treatment non-compliance due to recent move.      Level of impairment/disability: Moderate-Severe         Aurelia Davis is a 46 y.o. who is currently improving. He continues to endorse SI. Pt denies HI and AVH. 1.  Bipolar disorder   - increase Abilify to 10mg po Qam.   2.  Insomnia   - start melatonin 6mg po QHS/PRN   3.  Seizures:   - Per neurology recs. - Did not speak with the pharmacy based upon Neurology recs. 4.  Reviewed instructions, risks, benefits and side effects of medications  5. Disposition/Discharge Date: self-care/home, 7-2-2018.     Berny Denson MD DR. [facility]  Psychiatry

## 2018-06-29 NOTE — BSMART NOTE
OCCUPATIONAL THERAPY PROGRESS NOTE  Group Time:  4879  Attendance: The patient attended full group. Participation:  The patient participated with moderate elaboration in the activity. Attention:  The patient was able to focus on the activity. Interaction:  The patient occasionally  interacts with others. Participated as asked in discussion on stressors and stress management.

## 2018-06-29 NOTE — BH NOTES
GROUP THERAPY PROGRESS NOTE    Tabby Martinez is participating in [employer].      Group time: 30 minutes    Personal goal for participation: discuss guideline compliance, unit issues and community announcements; discuss daily Tx goal(s)                   Goal orientation: community    Group therapy participation: active

## 2018-06-29 NOTE — BSMART NOTE
SOCIAL WORK GROUP THERAPY PROGRESS NOTE    Group Time:  10:15am     Group Topic:  Coping Skills    C D Issues    Group Participation:      Actively participated in group discussion. Very focused. A lot of self disclosure. Members discussed handout on the role of \"neurotransmitters\" and how they regulate different aspects of one's moods, cognitions & related behavior. Discussion included the process of making \"Change\". Pt spoke about his \"recovery\" over the years & impact of thinking negative. Also looked at the typical \"stages of alcohol & drug recovery\" from withdrawal, \"honeymoon\" stage, hitting \"The WALL\", learning new skills, maintaining a \"balanced lifestyle & monitoring for \"relapse\" warning signs.

## 2018-06-30 PROCEDURE — 65220000005 HC RM SEMIPRIVATE PSYCH 3 OR 4 BED

## 2018-06-30 PROCEDURE — 74011250637 HC RX REV CODE- 250/637: Performed by: PSYCHIATRY & NEUROLOGY

## 2018-06-30 RX ADMIN — DIVALPROEX SODIUM 500 MG: 250 TABLET, DELAYED RELEASE ORAL at 08:06

## 2018-06-30 RX ADMIN — ARIPIPRAZOLE 10 MG: 5 TABLET ORAL at 08:06

## 2018-06-30 RX ADMIN — NAPROXEN 500 MG: 250 TABLET ORAL at 08:06

## 2018-06-30 RX ADMIN — PHENYTOIN SODIUM 100 MG: 100 CAPSULE ORAL at 08:06

## 2018-06-30 RX ADMIN — Medication: at 20:59

## 2018-06-30 RX ADMIN — PHENYTOIN SODIUM 100 MG: 100 CAPSULE ORAL at 20:56

## 2018-06-30 RX ADMIN — MELATONIN TAB 3 MG 6 MG: 3 TAB at 21:22

## 2018-06-30 RX ADMIN — NAPROXEN 500 MG: 250 TABLET ORAL at 16:31

## 2018-06-30 RX ADMIN — Medication: at 08:08

## 2018-06-30 RX ADMIN — DIVALPROEX SODIUM 500 MG: 250 TABLET, DELAYED RELEASE ORAL at 20:56

## 2018-06-30 NOTE — PROGRESS NOTES
Problem: Falls - Risk of  Goal: *Absence of Falls  Document Rita Fall Risk and appropriate interventions in the flowsheet. Will remain free of falls daily during his hospital stay. Outcome: Progressing Towards Goal  Fall Risk Interventions:  Pt remains free of falls. Problem: Nutrition Deficit  Goal: *Optimize nutritional status  1. Po intake of meals will meet >75% of patient estimated nutritional needs within the next 7 days. 2. Weight maintenance/loss of 1-2 lbs over the next 7 days. Outcome: Progressing Towards Goal  Pt is eating all meals today. Problem: Seizure Disorder (Adult)  Goal: *STG/LTG: Complies with medication therapy  Patient will take Dilantin medication as prescribed. Outcome: Progressing Towards Goal  Pt takes medication as ordered. Comments: Patient has been in the milieu all morning interacting with staff and peers. Patient is interacting in groups and sharing his personal experiences with peers regarding his poor life choices regarding drugs and alcohol. Patient presents pleasant and smiling and denies SI/HI and AVH today.

## 2018-06-30 NOTE — BH NOTES
GROUP THERAPY PROGRESS NOTE    Charu Morris is participating in Coping Skills Group. Group time: 0930    Personal goal for participation: Community    Goal orientation: personal    Group therapy participation: active    Therapeutic interventions reviewed and discussed: Discussed several issues and topics regarding staying out   of trouble on outside,taking meds a prescribed,where to go for rehab if needed. and how to handle anger. Impression of participation: Pt was active and appropriate during this time and able to give feedback to others    in group.

## 2018-06-30 NOTE — PROGRESS NOTES
Patient appears asleep in bed through the night, no problems. Continue to monitor and check q 15' for safety and provide support as needed with treatment.

## 2018-06-30 NOTE — PROGRESS NOTES
[address]     Physician Daily Progress Note    Patient:  March Grandchild Age:  46 y.o. :  1970     SEX:  male MRN:  [id number] CSN:  [id number]    Admit Date:  2018 Attending:  Gerry De La Cruz MD    Subjective: The patient is a 59-year-old -American male with a history of  Bipolar disorder and polysubstance use disorder, currently in remission. He  Was admitted after expressing suicidal ideations with  plan to shoot himself in the head. He states he just from [place] to [place] and was living with his daughter and sleeping on her couch. States he was under too much stresses in [place] due to relationship  Issues. He reports that Abilify is helping is helping with mood.      Current Medications:    Current Facility-Administered Medications   Medication Dose Route Frequency Provider Last Rate Last Dose    divalproex DR (DEPAKOTE) tablet 500 mg  500 mg Oral BID Nabeel Quiroga MD   500 mg at 18 9534    phenytoin ER (DILANTIN ER) ER capsule 100 mg  100 mg Oral BID Nabeel Quiroga MD   100 mg at 18 4371    melatonin tablet 6 mg  6 mg Oral QHS PRN Gerry De La Cruz MD   6 mg at 18    ARIPiprazole (ABILIFY) tablet 10 mg  10 mg Oral DAILY Gerry De La Cruz MD   10 mg at 18 3446    naproxen (NAPROSYN) tablet 500 mg  500 mg Oral BID WITH MEALS Gerry De La Cruz MD   500 mg at 18 1631    white petrolatum-mineral oil (EUCERIN) cream   Topical BID Gerry De La Cruz MD        fluPHENAZine (PROLIXIN) tablet 5 mg  5 mg Oral Q6H PRN Gerry De La Cruz MD        benztropine (COGENTIN) tablet 1 mg  1 mg Oral Q6H PRN Gerry De La Cruz MD        benztropine (COGENTIN) injection 1 mg  1 mg IntraMUSCular Q6H PRN Gerry De La Cruz MD        hydrOXYzine pamoate (VISTARIL) capsule 25 mg  25 mg Oral TID PRN Gerry De La Cruz MD           Compliant with medication:  Yes      Side effects from medications:  No Mental Status Exam      Appearance    General Behavior   Pleasant and cooperative     Speech form and content,  Language  Associations  Form of Thought   Normal flow and volume  TP : Logical, goal oriented   Mood, Affect  Self-Attitude  Vital Sense  SI/HI/PDW   Euthymic  No SI, HI, hopelessness   Abnormal Perceptions and illusions   Denies     Delusions   None   Anxiety    Denies   COGNITION Intelligence Abstraction   Intact   Judgement Insight     Improved        Diagnoses/Impressions:       Psychiatric:    Principal Problem:    Bipolar disorder, unspecified ([facility] (6/26/2018) POA: Yes            Medical:     Visit Vitals    /87 (BP 1 Location: Right arm, BP Patient Position: Sitting)    Pulse 66    Temp 97.8 °F (36.6 °C)    Resp 18    Ht 6' 2\" (1.88 m)    Wt 128.4 kg (283 lb)    SpO2 98%    BMI 36.34 kg/m2       No lab exists for component: 24H    Recommendations/Plan:      []  Dangerous and will not contract for safety in the community    []  Response to medications is not adequate    []  Appropriate disposition not finalized    []  Collateral history needed to determine safety    [x]  Continue current medications and follow MSE for sxs improvement    []  Medication changes as follows:     [x]  Continue to build rapport    [x]  Supportive psychotherapy    [x]  Insight oriented therapy    [x]  Group attendance/processing    [x]  Relapse prevention      [x]  Somatic Management    [x]  Disposition planning                                   Darrian Hunt MD               6/30/2018          5:32 PM

## 2018-06-30 NOTE — BH NOTES
GROUP THERAPY PROGRESS NOTE    Alistair Marquez is participating in [employer] [facility]. Group time: 1 hour    Personal goal for participation: share thoughts and opinions    Goal orientation: social    Group therapy participation: active    Impression of participation: good. Patient interacted with peers appropriately.

## 2018-07-01 LAB — VALPROATE SERPL-MCNC: 35 UG/ML (ref 50–100)

## 2018-07-01 PROCEDURE — 65220000005 HC RM SEMIPRIVATE PSYCH 3 OR 4 BED

## 2018-07-01 PROCEDURE — 74011250637 HC RX REV CODE- 250/637: Performed by: PSYCHIATRY & NEUROLOGY

## 2018-07-01 PROCEDURE — 80164 ASSAY DIPROPYLACETIC ACD TOT: CPT | Performed by: PSYCHIATRY & NEUROLOGY

## 2018-07-01 PROCEDURE — 36415 COLL VENOUS BLD VENIPUNCTURE: CPT | Performed by: PSYCHIATRY & NEUROLOGY

## 2018-07-01 RX ORDER — DIVALPROEX SODIUM 250 MG/1
750 TABLET, DELAYED RELEASE ORAL 2 TIMES DAILY
Status: DISCONTINUED | OUTPATIENT
Start: 2018-07-01 | End: 2018-07-02 | Stop reason: HOSPADM

## 2018-07-01 RX ADMIN — PHENYTOIN SODIUM 100 MG: 100 CAPSULE ORAL at 08:13

## 2018-07-01 RX ADMIN — HYDROXYZINE PAMOATE 25 MG: 25 CAPSULE ORAL at 20:58

## 2018-07-01 RX ADMIN — ARIPIPRAZOLE 10 MG: 5 TABLET ORAL at 08:13

## 2018-07-01 RX ADMIN — DIVALPROEX SODIUM 500 MG: 250 TABLET, DELAYED RELEASE ORAL at 08:13

## 2018-07-01 RX ADMIN — NAPROXEN 500 MG: 250 TABLET ORAL at 08:13

## 2018-07-01 RX ADMIN — Medication: at 08:13

## 2018-07-01 RX ADMIN — Medication: at 21:03

## 2018-07-01 RX ADMIN — DIVALPROEX SODIUM 750 MG: 250 TABLET, DELAYED RELEASE ORAL at 20:58

## 2018-07-01 RX ADMIN — NAPROXEN 500 MG: 250 TABLET ORAL at 16:47

## 2018-07-01 RX ADMIN — MELATONIN TAB 3 MG 6 MG: 3 TAB at 21:25

## 2018-07-01 NOTE — PROGRESS NOTES
Re:  Justus Hunt,Follow up visit     7/1/2018 1:47 PM    SSN: [id number]    Subjective:   Anastacia Acharya is seen in follow up. He feels well. No side effects with Depakote, no seizures.     Medications:    Current Facility-Administered Medications   Medication Dose Route Frequency Provider Last Rate Last Dose    divalproex DR (DEPAKOTE) tablet 500 mg  500 mg Oral BID Mercedes Velazquez MD   500 mg at 07/01/18 0813    phenytoin ER (DILANTIN ER) ER capsule 100 mg  100 mg Oral BID Mercedes Velazquez MD   100 mg at 07/01/18 0813    melatonin tablet 6 mg  6 mg Oral QHS PRN Azalia Sharma MD   6 mg at 06/30/18 2122    ARIPiprazole (ABILIFY) tablet 10 mg  10 mg Oral DAILY Azalia Sharma MD   10 mg at 07/01/18 0813    naproxen (NAPROSYN) tablet 500 mg  500 mg Oral BID WITH MEALS Azalia Sharma MD   500 mg at 07/01/18 0813    white petrolatum-mineral oil (EUCERIN) cream   Topical BID Azalia Sharma MD        fluPHENAZine (PROLIXIN) tablet 5 mg  5 mg Oral Q6H PRN Azalia Sharma MD        benztropine (COGENTIN) tablet 1 mg  1 mg Oral Q6H PRN Azalia Sharma MD        benztropine (COGENTIN) injection 1 mg  1 mg IntraMUSCular Q6H PRN Azalia Sharma MD        hydrOXYzine pamoate (VISTARIL) capsule 25 mg  25 mg Oral TID PRN Azalia Sharma MD           Vital signs:    Visit Vitals    /85 (BP 1 Location: Right arm, BP Patient Position: Sitting)    Pulse 60    Temp 98.5 °F (36.9 °C)    Resp 18    Ht 6' 2\" (1.88 m)    Wt 128.4 kg (283 lb)    SpO2 98%    BMI 36.34 kg/m2       Review of Systems:   As above otherwise 11 point review of systems negative including;   Constitutional no fever or chills  Skin denies rash or itching  HEENT  Denies tinnitus, hearing lose  Eyes denies diplopia vision lose  Respiratory denies sortness of breath  Cardiovascular denies chest pain, dyspnea on exertion  Gastrointestinal denies nausea, vomiting, diarrhea, constipation  Genitourinary denies incontinence  Musculoskeletal denies joint pain or swelling  Endocrine denies weight change  Hematology denies easy bruising or bleeding   Neurological as above in HPI      Patient Active Problem List   Diagnosis Code    Nonintractable epilepsy with simple partial seizures ([facility] G40.109    B[facility] disorder, unspecified ([facility] F31.9         Objective: The patient is awake, alert, and oriented x 4. Fund of knowledge is adequate. Speech is fluent and memory is intact. Cranial Nerves: II  Visual fields are full to confrontation. III, IV, VI  Extraocular movements are intact. There is no nystagmus. V  Facial sensation is intact to pinprick. VII  Face is symmetrical.  VIII - Hearing is present. IX, X, XII  Palate is symmetrical.   XI - Shoulder shrugging and head turning intact  Motor: The patient moves all four limbs fairly well and symmetrically. Tone is normal. Reflexes are 2+ and symmetrical. Plantars are down going. Gait is normal.    CBC:   Lab Results   Component Value Date/Time    WBC 4.3 (L) 06/26/2018 01:37 PM    RBC 4.18 (L) 06/26/2018 01:37 PM    HGB 13.2 06/26/2018 01:37 PM    HCT 40.0 06/26/2018 01:37 PM    PLATELET 515 17/46/9182 01:37 PM     BMP:   Lab Results   Component Value Date/Time    Glucose 87 06/26/2018 01:37 PM    Sodium 142 06/26/2018 01:37 PM    Potassium 4.1 06/26/2018 01:37 PM    Chloride 108 06/26/2018 01:37 PM    CO2 28 06/26/2018 01:37 PM    BUN 13 06/26/2018 01:37 PM    Creatinine 0.78 06/26/2018 01:37 PM    Calcium 8.2 (L) 06/26/2018 01:37 PM     CMP:   Lab Results   Component Value Date/Time    Glucose 87 06/26/2018 01:37 PM    Sodium 142 06/26/2018 01:37 PM    Potassium 4.1 06/26/2018 01:37 PM    Chloride 108 06/26/2018 01:37 PM    CO2 28 06/26/2018 01:37 PM    BUN 13 06/26/2018 01:37 PM    Creatinine 0.78 06/26/2018 01:37 PM    Calcium 8.2 (L) 06/26/2018 01:37 PM    Anion gap 6 06/26/2018 01:37 PM    BUN/Creatinine ratio 17 06/26/2018 01:37 PM    Alk.  phosphatase 118 (H) 04/24/2017 10:40 AM    Protein, total 7.3 04/24/2017 10:40 AM    Albumin 3.5 04/24/2017 10:40 AM    Globulin 3.8 04/24/2017 10:40 AM    A-G Ratio 0.9 04/24/2017 10:40 AM     Coagulation:   Lab Results   Component Value Date/Time    Prothrombin time 12.8 11/02/2012 08:15 AM    INR 1.0 11/02/2012 08:15 AM    aPTT 26.7 11/02/2012 08:15 AM     Cardiac markers:   Lab Results   Component Value Date/Time     04/24/2017 10:40 AM    CK-MB Index 0.4 04/24/2017 10:40 AM       Assessment:  Seizures in this man who has risk factors including traumatic brain injury as a child. Seems to be tolerating the switch to Depakote at this time. CT scan was benign, shows old cranio, calcification in occipital regio. Plan: Will increase the Depakote to 750 mg BID and discontinue the Dilantin. Check Depakote level on Tuesday or as outpatient. Sincerely,        Marycruz Quintero.  Hector Wong M.D.

## 2018-07-01 NOTE — PROGRESS NOTES
Problem: Falls - Risk of  Goal: *Absence of Falls  Document Rita Fall Risk and appropriate interventions in the flowsheet. Will remain free of falls daily during his hospital stay. Outcome: Progressing Towards Goal  Fall Risk Interventions:  Pt remains free of falls. Problem: Suicide/Homicide (Adult/Pediatric)  Goal: *STG: Remains safe in hospital  Will remain safe, will not engage in self injurious behaviors daily while in the hospital.   Outcome: Progressing Towards Goal  Pt remains safe. Goal: *STG/LTG: Complies with medication therapy  Will be medication compliant daily while in the hospital.   Outcome: Progressing Towards Goal  Pt takes medications as ordered. Comments: Patient spent the morning in the milieu interacting with peers and staff. Patient was playing chess with peers and talking and laughing. Since lunch patient has been in his room resting but is compliant with medications. Patient denies SI/HI and AVH.

## 2018-07-01 NOTE — PROGRESS NOTES
[address]     Physician Daily Progress Note    Patient:  Wilfredo Arenas Age:  46 y.o. :  1970     SEX:  male MRN:  [id number] CSN:  [id number]    Admit Date:  2018 Attending:  Deanna Philippe MD    Subjective: The patient is a 56-year-old -American male with a history of  Bipolar disorder and polysubstance use disorder, currently in remission. He reports low mood. He is worried about his housing. He  Was admitted after expressing suicidal ideations with  plan to shoot himself in the head. He states he just from [place] to [place] and was living with his daughter and sleeping on her couch. States he was under too much stresses in [place] due to relationship  Issues. He is currently on  Abilify, Depakote and Dilantin. VPA level today is 35.       Current Medications:    Current Facility-Administered Medications   Medication Dose Route Frequency Provider Last Rate Last Dose    divalproex DR (DEPAKOTE) tablet 500 mg  500 mg Oral BID Sandra Garcia MD   500 mg at 18    phenytoin ER (DILANTIN ER) ER capsule 100 mg  100 mg Oral BID Sandra Garcia MD   100 mg at 18    melatonin tablet 6 mg  6 mg Oral QHS PRN Deanna Philippe MD   6 mg at 18    ARIPiprazole (ABILIFY) tablet 10 mg  10 mg Oral DAILY Deanna Philippe MD   10 mg at 18    naproxen (NAPROSYN) tablet 500 mg  500 mg Oral BID WITH MEALS Deanna Philippe MD   500 mg at 18 08    white petrolatum-mineral oil (EUCERIN) cream   Topical BID Deanna Philippe MD        fluPHENAZine (PROLIXIN) tablet 5 mg  5 mg Oral Q6H PRN Deanna Philippe MD        benztropine (COGENTIN) tablet 1 mg  1 mg Oral Q6H PRN Deanna Philippe MD        benztropine (COGENTIN) injection 1 mg  1 mg IntraMUSCular Q6H PRN Deanna Philippe MD        hydrOXYzine pamoate (VISTARIL) capsule 25 mg  25 mg Oral TID PRN Deanna Philippe MD Compliant with medication:  Yes      Side effects from medications:  No     Mental Status Exam    Appearance    General Behavior   Pleasant and cooperative     Speech form and content,  Language  Associations  Form of Thought   Normal flow and volume  TP : Logical, goal oriented   Mood, Affect  Self-Attitude  Vital Sense  SI/HI/PDW   Depressed   No SI, HI, hopelessness   Abnormal Perceptions and illusions   Denies     Delusions   None   Anxiety    Denies   COGNITION Intelligence Abstraction   Intact   Judgement Insight    Limited          Diagnoses/Impressions:     Psychiatric:    Principal Problem:    Bipolar disorder, unspecified ([facility] (6/26/2018) POA: Yes            Medical:     Visit Vitals    /85 (BP 1 Location: Right arm, BP Patient Position: Sitting)    Pulse 60    Temp 98.5 °F (36.9 °C)    Resp 18    Ht 6' 2\" (1.88 m)    Wt 128.4 kg (283 lb)    SpO2 98%    BMI 36.34 kg/m2       No lab exists for component: 24H    Recommendations/Plan:      []  Dangerous and will not contract for safety in the community    []  Response to medications is not adequate    []  Appropriate disposition not finalized    []  Collateral history needed to determine safety    [x]  Continue current medications and follow MSE for sxs improvement    []  Medication changes as follows:     [x]  Continue to build rapport    [x]  Supportive psychotherapy    []  Insight oriented therapy    [x]  Group attendance/processing    [x]  Relapse prevention      [x]  Somatic Management    [x]  Disposition planning                                   Darrian Valenzuela MD               7/1/2018          10:13 AM

## 2018-07-01 NOTE — BH NOTES
GROUP THERAPY PROGRESS NOTE    Heike Lepe is participating in Relaxation Group      Group time: 1 hour    Personal goal for participation: Dealing With Issues    Goal orientation: personal    Group therapy participation: active    Therapeutic interventions reviewed and discussed: Discussed dealing with issues appropriately without    acting out behavior by talking with someone about the issues, go for a walk,do some reading ,etc.    Impression of participation: Pt was quite  responsive and appropriate in interaction and feedback. Santo Negrete

## 2018-07-01 NOTE — BH NOTES
Patient is pleasant and cooperative throughout the shift, spending the entire evening watching TV and talking with peers out in the day area. Patient had no visitors this evening. Patient has been medication compliant, ate 100% of meals and snacks and wore non-slip footwear throughout the shift. Patient had no falls this evening.

## 2018-07-02 VITALS
HEIGHT: 74 IN | DIASTOLIC BLOOD PRESSURE: 91 MMHG | WEIGHT: 283 LBS | OXYGEN SATURATION: 98 % | SYSTOLIC BLOOD PRESSURE: 129 MMHG | BODY MASS INDEX: 36.32 KG/M2 | RESPIRATION RATE: 18 BRPM | TEMPERATURE: 98.1 F | HEART RATE: 61 BPM

## 2018-07-02 PROCEDURE — 74011250637 HC RX REV CODE- 250/637: Performed by: PSYCHIATRY & NEUROLOGY

## 2018-07-02 RX ORDER — LISINOPRIL 10 MG/1
20 TABLET ORAL
Qty: 60 TAB | Refills: 0 | Status: SHIPPED | COMMUNITY
Start: 2018-07-02 | End: 2019-04-10

## 2018-07-02 RX ORDER — LANOLIN ALCOHOL/MO/W.PET/CERES
6 CREAM (GRAM) TOPICAL
Qty: 30 TAB | Refills: 0 | Status: SHIPPED | OUTPATIENT
Start: 2018-07-02 | End: 2019-10-24 | Stop reason: ALTCHOICE

## 2018-07-02 RX ORDER — ARIPIPRAZOLE 10 MG/1
10 TABLET ORAL DAILY
Qty: 30 TAB | Refills: 0 | Status: ON HOLD | OUTPATIENT
Start: 2018-07-03 | End: 2019-04-10 | Stop reason: SDUPTHER

## 2018-07-02 RX ORDER — DIVALPROEX SODIUM 250 MG/1
750 TABLET, DELAYED RELEASE ORAL 2 TIMES DAILY
Qty: 180 TAB | Refills: 0 | Status: SHIPPED | OUTPATIENT
Start: 2018-07-02 | End: 2018-08-13

## 2018-07-02 RX ADMIN — NAPROXEN 500 MG: 250 TABLET ORAL at 08:31

## 2018-07-02 RX ADMIN — DIVALPROEX SODIUM 750 MG: 250 TABLET, DELAYED RELEASE ORAL at 08:31

## 2018-07-02 RX ADMIN — ARIPIPRAZOLE 10 MG: 5 TABLET ORAL at 08:31

## 2018-07-02 NOTE — BH NOTES
GROUP THERAPY PROGRESS NOTE    Mele Arshad is participating in Recreational Therapy. Group time: 30 minutes    Therapeutic interventions reviewed and discussed:  Overall unit rules and schedule for the day, opened the floor to questions. Patients played a group card game and watched a funny TV show. Impression of participation: Patient actively participated.

## 2018-07-02 NOTE — DISCHARGE SUMMARY
[facility]  Inpatient Psychiatry   Discharge Summary     Admit date: 6/26/2018    Discharge date and time: 7/2/2018 11:36 AM    Discharge Physician: Pieter Hoffman MD    DISCHARGE DIAGNOSES     Psychiatric Diagnoses:   Patient Active Problem List   Diagnosis Code    Nonintractable epilepsy with simple partial seizures ([facility] G40.109    Bipolar disorder, unspecified ([facility] F31.9       Level of impairment/disability: mild     HOSPITAL COURSE     The patient is a 78-year-old -American male with a history of hypertension, seizure, 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 The patient states he is currently living with his daughter in a stressful situation. The patient notes that he does not like the location of his daughter's home. The patient denies access to an ownership of firearms. The patient expressed to crisis nursing at the time of admission that he had no desire to live and then began to list the family members who passed away. The patient does have a past history of suicide attempts. The pt was restarted on Abilify which was increased to 10mg po QD. He was not restarted on Lithium which was a medication he took in the distant past.  He tolerated Abilify well without medications side effects. The pt felt he was depressed initially but an antidepressant was not started as antidepressants do not tend to work very well in bipolar depression. Neurology was consulted due to seizure disorder. Neurology started Depakote ER and increased it to 750mg po BID. At that time, Phenytoin was discontinued. Neurology ordered a CT head without contrast with the results listed below. The pt left the hospital prior to having a follow-up appt with neurology scheduled.      The pt was not a behavioral concern while hospitalized. he attended groups, was medication compliant and behaviorally appropriate. Pt denied medication side effects including TD, EPS, akathisia and mood derangements. On 7/2/2018 the pt was deemed psychiatrically stable and discharged to home. The pt denied SI, HI and AVH. DISPOSITION/FOLLOW-UP     Disposition: home     Follow-up Appointments: Follow-up Information     Follow up With Details Comments [address] [place] [address]., MD   [address] [place] [address]  [phone]         Patient has an intake appointment on 8/2/18 @ 1:00pm  [address]  [phone]:OLQG  [phone]:RHQ            MEDICATION CHANGES   Outpatient medications:  No current facility-administered medications on file prior to encounter. Current Outpatient Prescriptions on File Prior to Encounter   Medication Sig Dispense Refill    naproxen (NAPROSYN) 500 mg tablet Take 1 Tab by mouth two (2) times daily (with meals). 20 Tab 0    HYDROcodone-acetaminophen (NORCO) 5-325 mg per tablet Take 1-2 tablets PO every 4-6 hours as needed for pain control. If over the counter ibuprofen or acetaminophen was suggested, then only take the vicodin for pain not well controlled with the over the counter medication.  20 Tab 0         Medications discontinued during hospitalization:  Medications Discontinued During This Encounter   Medication Reason    ibuprofen (MOTRIN) tablet 400 mg     phenytoin ER (DILANTIN ER) ER capsule 300 mg     phenytoin ER (DILANTIN ER) ER capsule 100 mg     divalproex DR (DEPAKOTE) tablet 250 mg     phenytoin ER (DILANTIN ER) ER capsule 200 mg     traZODone (DESYREL) tablet 50 mg     ARIPiprazole (ABILIFY) tablet 5 mg     divalproex DR (DEPAKOTE) tablet 500 mg     phenytoin ER (DILANTIN ER) ER capsule 100 mg     lisinopril (PRINIVIL, ZESTRIL) 10 mg tablet     DILANTIN EXTENDED 100 mg ER capsule Discontinued at Discharge    lithium carbonate 600 mg capsule Discontinued at Discharge         Discharged medication:  Discharge Medication List as of 7/2/2018 11:14 AM      START taking these medications    Details   ARIPiprazole (ABILIFY) 10 mg tablet Take 1 Tab by mouth daily. Indications: BIPOLAR DISORDER IN REMISSION, Print, Disp-30 Tab, R-0      divalproex DR (DEPAKOTE) 250 mg tablet Take 3 Tabs by mouth two (2) times a day. Indications: Epilepsy, Print, Disp-180 Tab, R-0      melatonin 3 mg tablet Take 2 Tabs by mouth nightly as needed. Indications: Insomnia, Print, Disp-30 Tab, R-0         CONTINUE these medications which have CHANGED    Details   lisinopril (PRINIVIL, ZESTRIL) 10 mg tablet Take 2 Tabs by mouth. Indications: hypertension, Historical Med, Disp-60 Tab, R-0         CONTINUE these medications which have NOT CHANGED    Details   naproxen (NAPROSYN) 500 mg tablet Take 1 Tab by mouth two (2) times daily (with meals). , Normal, Disp-20 Tab, R-0      HYDROcodone-acetaminophen (NORCO) 5-325 mg per tablet Take 1-2 tablets PO every 4-6 hours as needed for pain control. If over the counter ibuprofen or acetaminophen was suggested, then only take the vicodin for pain not well controlled with the over the counter medication. Print, Disp-20 Tab, R-0         STOP taking these medications       DILANTIN EXTENDED 100 mg ER capsule Comments:   Reason for Stopping:         lithium carbonate 600 mg capsule Comments:   Reason for Stopping:               Instructions, risks (black box warning), benefits and side effects (EPS, TD, NMS) were discussed in detail prior to discharge. Patient denied any adverse medication side effects prior to discharge.        LABS/IMAGING DURING ADMISSION     Results for orders placed or performed during the hospital encounter of 06/26/18   CBC WITH AUTOMATED DIFF   Result Value Ref Range    WBC 4.3 (L) 4.6 - 13.2 K/uL    RBC 4.18 (L) 4.70 - 5.50 M/uL    HGB 13.2 13.0 - 16.0 g/dL    HCT 40.0 36.0 - 48.0 %    MCV 95.7 74.0 - 97.0 FL    MCH 31.6 24.0 - 34.0 PG    MCHC 33.0 31.0 - 37.0 g/dL    RDW 12.5 11.6 - 14.5 %    PLATELET 308 881 - 539 K/uL    MPV 10.2 9.2 - 11.8 FL    NEUTROPHILS 44 40 - 73 %    LYMPHOCYTES 47 21 - 52 %    MONOCYTES 6 3 - 10 %    EOSINOPHILS 2 0 - 5 %    BASOPHILS 1 0 - 2 %    ABS. NEUTROPHILS 1.9 1.8 - 8.0 K/UL    ABS. LYMPHOCYTES 2.1 0.9 - 3.6 K/UL    ABS. MONOCYTES 0.3 0.05 - 1.2 K/UL    ABS. EOSINOPHILS 0.1 0.0 - 0.4 K/UL    ABS.  BASOPHILS 0.0 0.0 - 0.06 K/UL    DF AUTOMATED     METABOLIC PANEL, BASIC   Result Value Ref Range    Sodium 142 136 - 145 mmol/L    Potassium 4.1 3.5 - 5.5 mmol/L    Chloride 108 100 - 108 mmol/L    CO2 28 21 - 32 mmol/L    Anion gap 6 3.0 - 18 mmol/L    Glucose 87 74 - 99 mg/dL    BUN 13 7.0 - 18 MG/DL    Creatinine 0.78 0.6 - 1.3 MG/DL    BUN/Creatinine ratio 17 12 - 20      GFR est AA >60 >60 ml/min/1.73m2    GFR est non-AA >60 >60 ml/min/1.73m2    Calcium 8.2 (L) 8.5 - 10.1 MG/DL   DRUG SCREEN, URINE   Result Value Ref Range    BENZODIAZEPINES NEGATIVE  NEG      BARBITURATES NEGATIVE  NEG      THC (TH-CANNABINOL) NEGATIVE  NEG      OPIATES NEGATIVE  NEG      PCP(PHENCYCLIDINE) NEGATIVE  NEG      COCAINE NEGATIVE  NEG      AMPHETAMINES NEGATIVE  NEG      METHADONE NEGATIVE  NEG      HDSCOM (NOTE)    URINALYSIS W/ RFLX MICROSCOPIC   Result Value Ref Range    Color DARK YELLOW      Appearance CLEAR      Specific gravity >1.030 (H) 1.005 - 1.030    pH (UA) 5.0 5.0 - 8.0      Protein NEGATIVE  NEG mg/dL    Glucose NEGATIVE  NEG mg/dL    Ketone NEGATIVE  NEG mg/dL    Bilirubin NEGATIVE  NEG      Blood NEGATIVE  NEG      Urobilinogen 1.0 0.2 - 1.0 EU/dL    Nitrites NEGATIVE  NEG      Leukocyte Esterase NEGATIVE  NEG     ACETAMINOPHEN   Result Value Ref Range    Acetaminophen level <2 (L) 10.0 - 30.0 ug/mL   PHENYTOIN   Result Value Ref Range    Phenytoin 20.8 (H) 10.0 - 20.0 ug/mL   ETHYL ALCOHOL   Result Value Ref Range ALCOHOL(ETHYL),SERUM <3 0 - 3 MG/DL   SALICYLATE   Result Value Ref Range    Salicylate level <4.3 (L) 2.8 - 20.0 MG/DL   PHENYTOIN   Result Value Ref Range    Phenytoin 19.9 10.0 - 20.0 ug/mL   VALPROIC ACID   Result Value Ref Range    Valproic acid 35 (L) 50 - 100 ug/ml      6- CT Head without contrast  IMPRESSION:     No evidence of intracranial hemorrhages or any other definable acute  intracranial abnormality.     Redemonstration of previous right temporoparietal craniotomy.     Redemonstration of focal prominent infarction/encephalomalacia in the lateral  portions of right frontoparietal region, extended to the posterosuperior aspect  of right sylvian fissure, without interval change. DISCHARGE MENTAL STATUS EVALUATION     Appearance/Hygiene 46 y.o. BLACK OR  male  Hygiene: good    Attitude/Behavior/Social Relatedness Appropriate   Musculoskeletal Gait/Station: appropriate  Tone (flaccid, cogwheeling, spastic): not assessed  Psychomotor (hyperkinetic, hypokinetic): calm  Involuntary movements (tics, dyskinesias, akathisia, stereotypies): none   Speech   Rate, rhythm, volume, fluency and articulation are appropriate   Mood   euthymic   Affect    euthymic   Thought Process Linear and goal directed   Thought Content and Perceptual Disturbances Denies self-injurious behavior (SIB), suicidal ideation (SI), aggressive behavior or homicidal ideation (HI)    Denies auditory and visual hallucinations   Sensorium and Cognition  Grossly intact   Insight  fair   Judgment fair       SUICIDE RISK ASSESSMENT     [] Admission  [x] Discharge     Key Factors:   Current admission precipitated by suicide attempt?   []  Yes     2    [x]  No     1     Suicide Attempt History  [x] Past attempts of high lethality    2 []  Past attempts of low lethality    1 []  No previous attempts       0   Suicidal Ideation []  Constant suicidal thoughts      2 []  Intermittent or fleeting suicidal  thoughts  1 [x]  Denies current suicidal thoughts    0   Suicide Plan   []  Has plan with actual OR potential access to planned method    2 []  Has plan without access to planned method      1 [x]  No plan            0   Plan Lethality []  Highly lethal plan (Carbon monoxide, gun, hanging, jumping)    2 []  Moderate lethality of plan          1 [x]  Low lethality of plan (biting, head banging, superficial scratching, pillow over face)  0   Safety Plan Agreement  []  Unwilling OR unable to agree due to impaired reality testing   2   []  Patient is ambivalent and/or guarded      1 [x]  Reliably agrees        0   Current Morbid Thoughts (reunion fantasies, preoccupations with death) []  Constantly     2     []  Frequently    1 [x]  Rarely    0   Elopement Risk  []  High risk     2 []  Moderate risk    1 [x]   Low risk    0   Symptoms    []  Hopeless  []  Helpless  []  Anhedonia   []  Guilt/shame  []  Anger/rage  []  Anxiety  []  Insomnia   []  Agitation   []  Impulsivity  []  5-6 symptoms present    2 []  3-4 symptoms present    1  [x]  0-2 symptoms present    0     Scoring Key:  10 or higher = Imminent Risk (consider 1:1)  4 - 9 = Moderate Risk (consider q 15 minute observation)Attended alcohol, tobacco, prescription and other drug psychoeducation group.   0 - 3 = Low Risk (consider q 30 minute observation)    Total Score: 3  ------------------------------------------------------------------------------------------------------------------  PLEASE ADDRESS THE FOLLOWING 5 ISSUES     Physician's Subjective Appraisal of Risk (check one):  []  Patient replies not trustworthy: several non-verbal cues. []  Patient replies questionable: trustworthy: at least 1 non-verbal cue. [x]  Patient replies appear trustworthy. Family History of Suicide?    []  Yes  [x]  No    Protective measures (select all that apply):  [x]  Successful past responses to stress  [x]  Spiritual/Anglican beliefs  [x]  Capacity for reality testing  [x]  Positive therapeutic relationships  [x]  Social supports/connections  [x]  Positive coping skills  [x]  Frustration tolerance/optimism  []  Children or pets in the home  []  Sense of responsibility to family  [x]  Agrees to treatment plan and follow up    Others (list):    High Risk Diagnoses (select all that apply):  [x]  Depression/Bipolar Disorder  []  Dual Diagnosis  []  Cardiovascular Disease  []  Schizophrenia  []  Chronic Pain  []  Epilepsy  []  Cancer  []  Personality Disorder  []  HIV/AIDS  []  Multiple Sclerosis    Dangerousness Assessment (Suicide, homicide, property destruction. ..)    Risk Factors reviewed and risk assessed to be:  [] low  [x] low-moderate  [] moderate   [] moderate-high  [] high     Protection factors reviewed and risk assessed to be:  [x] low  [] low-moderate  [] moderate   [] moderate-high  [] high     Response to treatment and risk assessed to be:  [x] low  [] low-moderate  [] moderate   [] moderate-high  [] high     Support reviewed and risk assessed to be:  [x] low  [] low-moderate  [] moderate   [] moderate-high  [] high     Acceptance of Discharge and outpatient treatment reviewed and risk assessed to be:    [x] low  [] low-moderate  [] moderate   [] moderate-high  [] high   Overall risk assessed to be:  [x] low  [x] low-moderate  [] moderate   [] moderate-high  [] high     Completion of discharge was greater than 30 minutes. Over 50% of today's discharge was geared towards counseling and coordination of care.           Grery De La Cruz MD  Psychiatry  [facility]

## 2018-07-02 NOTE — DISCHARGE INSTRUCTIONS
BEHAVIORAL HEALTH NURSING DISCHARGE NOTE      The following personal items collected during your admission are returned to you:   Dental Appliance: Dental Appliances: None  Vision: Visual Aid: None  Hearing Aid:    Jewelry: Jewelry: None  Clothing:    Other Valuables: Other Valuables:  (wallet,keys,cellphone,hearphone,,hat,laces)  Valuables sent to safe:        PATIENT INSTRUCTIONS:             The discharge information has been reviewed with the patient. The patient verbalized understanding.         Patient armband removed and shredded

## 2018-07-02 NOTE — BH NOTES
Patient was at table playing chest with peer. Patient removed himself from table and stated \"I don't have to take this BS\". This writer asked patient what was wrong. He would not give elaborate . Supervisor asked patient what was going on. He stated \"Call the doctor I want to go home\". Supervisor spoke with patient. He agreed to stay until tomorrow. Patient administered 50 mg Vistaril at 2058. He denied suicidal/homicidal ideations and AV hallucinations. He ate dinner, snacks and medication compliant. Nursing will provide a safe and supportive environment.

## 2018-07-23 ENCOUNTER — HOSPITAL ENCOUNTER (EMERGENCY)
Age: 48
Discharge: LEFT BEFORE DISCHARGE | End: 2018-07-23
Attending: EMERGENCY MEDICINE
Payer: MEDICAID

## 2018-07-23 VITALS
HEART RATE: 65 BPM | TEMPERATURE: 98.3 F | SYSTOLIC BLOOD PRESSURE: 134 MMHG | RESPIRATION RATE: 18 BRPM | OXYGEN SATURATION: 99 % | DIASTOLIC BLOOD PRESSURE: 88 MMHG

## 2018-07-23 PROCEDURE — 75810000275 HC EMERGENCY DEPT VISIT NO LEVEL OF CARE

## 2018-07-23 NOTE — ED NOTES
I performed a brief evaluation, including history and physical, of the patient here in triage and I have determined that pt will need further treatment and evaluation from the main side ER physician. I have placed initial orders to help in expediting patients care.      July 23, 2018 at 12:09 PM - Francisca Stewart        Visit Vitals    /88    Pulse 65    Temp 98.3 °F (36.8 °C)    Resp 18    SpO2 99%

## 2018-08-13 ENCOUNTER — HOSPITAL ENCOUNTER (EMERGENCY)
Age: 48
Discharge: HOME | End: 2018-08-13
Attending: EMERGENCY MEDICINE
Payer: MEDICARE

## 2018-08-13 VITALS
SYSTOLIC BLOOD PRESSURE: 142 MMHG | BODY MASS INDEX: 36.46 KG/M2 | OXYGEN SATURATION: 99 % | RESPIRATION RATE: 20 BRPM | TEMPERATURE: 97.8 F | WEIGHT: 284 LBS | HEART RATE: 61 BPM | DIASTOLIC BLOOD PRESSURE: 96 MMHG

## 2018-08-13 DIAGNOSIS — Z76.0 MEDICATION REFILL: Primary | ICD-10-CM

## 2018-08-13 DIAGNOSIS — G40.909 SEIZURE DISORDER (HCC): ICD-10-CM

## 2018-08-13 PROCEDURE — 99282 EMERGENCY DEPT VISIT SF MDM: CPT

## 2018-08-13 RX ORDER — DIVALPROEX SODIUM 250 MG/1
750 TABLET, DELAYED RELEASE ORAL 2 TIMES DAILY
Qty: 180 TAB | Refills: 0 | Status: SHIPPED | OUTPATIENT
Start: 2018-08-13 | End: 2018-11-27

## 2018-08-13 NOTE — DISCHARGE INSTRUCTIONS
Epilepsy: Care Instructions  Your Care Instructions    Epilepsy is a common condition that causes repeated seizures. The seizures are caused by bursts of electrical activity in the brain that aren't normal. Seizures may cause problems with muscle control, movement, speech, vision, or awareness. They can be scary. Epilepsy affects each person differently. Some people have only a few seizures. Others get them more often. If you know what triggers a seizure, you may be able to avoid having one. You can take medicines to control and reduce seizures. You and your doctor will need to find the right combination, schedule, and dose of medicine. This may take time and careful changes. Seizures may get worse and happen more often over time. Follow-up care is a key part of your treatment and safety. Be sure to make and go to all appointments, and call your doctor if you are having problems. It's also a good idea to know your test results and keep a list of the medicines you take. How can you care for yourself at home? · Be safe with medicines. Take your medicines exactly as prescribed. Call your doctor if you think you are having a problem with your medicine. · Make a treatment plan with your doctor. Be sure to follow your plan. · Try to identify and avoid things that may make you more likely to have a seizure. These may include:  ¨ Not getting enough sleep. ¨ Using drugs or alcohol. ¨ Being emotionally stressed. ¨ Skipping meals. · Keep a record of any seizures you have. Note the date, time of day, and any details about the seizure that you can remember. Your doctor can use this information to plan or adjust your medicine or other treatment. · Be sure that any doctor treating you for another condition knows that you have epilepsy. Each doctor should know what medicines you are taking, if any. · Wear a medical ID bracelet. You can buy this at most [employer].  If you have a seizure that leaves you unconscious or unable to speak for yourself, this bracelet will let those who are treating you know that you have epilepsy. · Talk to your doctor about whether it is safe for you to do certain activities, such as drive or swim. When should you call for help? Call 911 anytime you think you may need emergency care. For example, call if:    · A seizure does not stop as it normally does.     · You have new symptoms such as:  ¨ Numbness, tingling, or weakness on one side of your body or face. ¨ Vision changes. ¨ Trouble speaking or thinking clearly.    Call your doctor now or seek immediate medical care if:    · You have a fever.     · You have a severe headache.    Watch closely for changes in your health, and be sure to contact your doctor if:    · The normal pattern or features of your seizures change. Where can you learn more? Go to http://richard-luis enrique.info/. Obed Carlson in the search box to learn more about \"Epilepsy: Care Instructions. \"  Current as of: October 9, 2017  Content Version: 11.7  © 4211-2435 Healthwise, [employer]. Care instructions adapted under license by [employer] (which disclaims liability or warranty for this information). If you have questions about a medical condition or this instruction, always ask your healthcare professional. [address] any warranty or liability for your use of this information.
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
## 2018-09-17 ENCOUNTER — HOSPITAL ENCOUNTER (OUTPATIENT)
Dept: LAB | Age: 48
Discharge: HOME | End: 2018-09-17
Payer: MEDICARE

## 2018-09-17 DIAGNOSIS — I10 ESSENTIAL (PRIMARY) HYPERTENSION: ICD-10-CM

## 2018-09-17 DIAGNOSIS — R56.9 CONVULSION (HCC): ICD-10-CM

## 2018-09-17 LAB
25(OH)D3 SERPL-MCNC: 18.8 NG/ML (ref 30–100)
ALBUMIN SERPL-MCNC: 3.1 G/DL (ref 3.4–5)
ALBUMIN/GLOB SERPL: 1 {RATIO} (ref 0.8–1.7)
ALP SERPL-CCNC: 70 U/L (ref 45–117)
ALT SERPL-CCNC: 27 U/L (ref 16–61)
ANION GAP SERPL CALC-SCNC: 7 MMOL/L (ref 3–18)
AST SERPL-CCNC: 26 U/L (ref 15–37)
BASOPHILS # BLD: 0 K/UL (ref 0–0.1)
BASOPHILS NFR BLD: 1 % (ref 0–2)
BILIRUB SERPL-MCNC: 0.6 MG/DL (ref 0.2–1)
BUN SERPL-MCNC: 16 MG/DL (ref 7–18)
BUN/CREAT SERPL: 17 (ref 12–20)
CALCIUM SERPL-MCNC: 9 MG/DL (ref 8.5–10.1)
CHLORIDE SERPL-SCNC: 106 MMOL/L (ref 100–108)
CHOLEST SERPL-MCNC: 260 MG/DL
CO2 SERPL-SCNC: 31 MMOL/L (ref 21–32)
CREAT SERPL-MCNC: 0.96 MG/DL (ref 0.6–1.3)
DIFFERENTIAL METHOD BLD: ABNORMAL
EOSINOPHIL # BLD: 0.1 K/UL (ref 0–0.4)
EOSINOPHIL NFR BLD: 3 % (ref 0–5)
ERYTHROCYTE [DISTWIDTH] IN BLOOD BY AUTOMATED COUNT: 13 % (ref 11.6–14.5)
FOLATE SERPL-MCNC: 11.4 NG/ML (ref 3.1–17.5)
GLOBULIN SER CALC-MCNC: 3.2 G/DL (ref 2–4)
GLUCOSE SERPL-MCNC: 108 MG/DL (ref 74–99)
HCT VFR BLD AUTO: 43.6 % (ref 36–48)
HDLC SERPL-MCNC: 66 MG/DL (ref 40–60)
HDLC SERPL: 3.9 {RATIO} (ref 0–5)
HGB BLD-MCNC: 14.2 G/DL (ref 13–16)
LDLC SERPL CALC-MCNC: 161 MG/DL (ref 0–100)
LIPID PROFILE,FLP: ABNORMAL
LYMPHOCYTES # BLD: 2 K/UL (ref 0.9–3.6)
LYMPHOCYTES NFR BLD: 38 % (ref 21–52)
MCH RBC QN AUTO: 31.6 PG (ref 24–34)
MCHC RBC AUTO-ENTMCNC: 32.6 G/DL (ref 31–37)
MCV RBC AUTO: 97.1 FL (ref 74–97)
MONOCYTES # BLD: 0.5 K/UL (ref 0.05–1.2)
MONOCYTES NFR BLD: 10 % (ref 3–10)
NEUTS SEG # BLD: 2.6 K/UL (ref 1.8–8)
NEUTS SEG NFR BLD: 48 % (ref 40–73)
PLATELET # BLD AUTO: 217 K/UL (ref 135–420)
PMV BLD AUTO: 10.9 FL (ref 9.2–11.8)
POTASSIUM SERPL-SCNC: 4.2 MMOL/L (ref 3.5–5.5)
PROT SERPL-MCNC: 6.3 G/DL (ref 6.4–8.2)
RBC # BLD AUTO: 4.49 M/UL (ref 4.7–5.5)
SODIUM SERPL-SCNC: 144 MMOL/L (ref 136–145)
TRIGL SERPL-MCNC: 165 MG/DL (ref ?–150)
VIT B12 SERPL-MCNC: 1000 PG/ML (ref 211–911)
VLDLC SERPL CALC-MCNC: 33 MG/DL
WBC # BLD AUTO: 5.3 K/UL (ref 4.6–13.2)

## 2018-09-17 PROCEDURE — 80053 COMPREHEN METABOLIC PANEL: CPT | Performed by: INTERNAL MEDICINE

## 2018-09-17 PROCEDURE — 82306 VITAMIN D 25 HYDROXY: CPT | Performed by: INTERNAL MEDICINE

## 2018-09-17 PROCEDURE — 82652 VIT D 1 25-DIHYDROXY: CPT | Performed by: INTERNAL MEDICINE

## 2018-09-17 PROCEDURE — 80061 LIPID PANEL: CPT | Performed by: INTERNAL MEDICINE

## 2018-09-17 PROCEDURE — 36415 COLL VENOUS BLD VENIPUNCTURE: CPT | Performed by: INTERNAL MEDICINE

## 2018-09-17 PROCEDURE — 82607 VITAMIN B-12: CPT | Performed by: INTERNAL MEDICINE

## 2018-09-17 PROCEDURE — 85025 COMPLETE CBC W/AUTO DIFF WBC: CPT | Performed by: INTERNAL MEDICINE

## 2018-09-19 LAB — 1,25(OH)2D3 SERPL-MCNC: 71.2 PG/ML (ref 19.9–79.3)

## 2018-10-24 ENCOUNTER — HOSPITAL ENCOUNTER (EMERGENCY)
Age: 48
Discharge: HOME | End: 2018-10-24
Attending: EMERGENCY MEDICINE
Payer: MEDICAID

## 2018-10-24 ENCOUNTER — APPOINTMENT (OUTPATIENT)
Dept: GENERAL RADIOLOGY | Age: 48
End: 2018-10-24
Attending: PHYSICIAN ASSISTANT
Payer: MEDICAID

## 2018-10-24 VITALS
SYSTOLIC BLOOD PRESSURE: 140 MMHG | RESPIRATION RATE: 18 BRPM | HEART RATE: 66 BPM | TEMPERATURE: 97.9 F | DIASTOLIC BLOOD PRESSURE: 91 MMHG | OXYGEN SATURATION: 98 %

## 2018-10-24 DIAGNOSIS — J90 PLEURAL EFFUSION: Primary | ICD-10-CM

## 2018-10-24 DIAGNOSIS — R07.89 OTHER CHEST PAIN: ICD-10-CM

## 2018-10-24 LAB
ANION GAP SERPL CALC-SCNC: 5 MMOL/L (ref 3–18)
ATRIAL RATE: 70 BPM
BASOPHILS # BLD: 0 K/UL (ref 0–0.1)
BASOPHILS NFR BLD: 0 % (ref 0–2)
BNP SERPL-MCNC: 38 PG/ML (ref 0–900)
BUN SERPL-MCNC: 15 MG/DL (ref 7–18)
BUN/CREAT SERPL: 16 (ref 12–20)
CALCIUM SERPL-MCNC: 8.1 MG/DL (ref 8.5–10.1)
CALCULATED P AXIS, ECG09: 16 DEGREES
CALCULATED R AXIS, ECG10: 2 DEGREES
CALCULATED T AXIS, ECG11: 13 DEGREES
CHLORIDE SERPL-SCNC: 109 MMOL/L (ref 100–108)
CK MB CFR SERPL CALC: 1.3 % (ref 0–4)
CK MB SERPL-MCNC: 4 NG/ML (ref 5–25)
CK SERPL-CCNC: 318 U/L (ref 39–308)
CO2 SERPL-SCNC: 29 MMOL/L (ref 21–32)
CREAT SERPL-MCNC: 0.91 MG/DL (ref 0.6–1.3)
D DIMER PPP FEU-MCNC: 0.33 UG/ML(FEU)
DIAGNOSIS, 93000: NORMAL
DIFFERENTIAL METHOD BLD: ABNORMAL
EOSINOPHIL # BLD: 0.1 K/UL (ref 0–0.4)
EOSINOPHIL NFR BLD: 2 % (ref 0–5)
ERYTHROCYTE [DISTWIDTH] IN BLOOD BY AUTOMATED COUNT: 12.4 % (ref 11.6–14.5)
GLUCOSE SERPL-MCNC: 79 MG/DL (ref 74–99)
HCT VFR BLD AUTO: 42.4 % (ref 36–48)
HGB BLD-MCNC: 14.2 G/DL (ref 13–16)
LYMPHOCYTES # BLD: 1.7 K/UL (ref 0.9–3.6)
LYMPHOCYTES NFR BLD: 29 % (ref 21–52)
MCH RBC QN AUTO: 31.9 PG (ref 24–34)
MCHC RBC AUTO-ENTMCNC: 33.5 G/DL (ref 31–37)
MCV RBC AUTO: 95.3 FL (ref 74–97)
MONOCYTES # BLD: 0.6 K/UL (ref 0.05–1.2)
MONOCYTES NFR BLD: 10 % (ref 3–10)
NEUTS SEG # BLD: 3.5 K/UL (ref 1.8–8)
NEUTS SEG NFR BLD: 59 % (ref 40–73)
P-R INTERVAL, ECG05: 152 MS
PLATELET # BLD AUTO: 196 K/UL (ref 135–420)
PMV BLD AUTO: 10.5 FL (ref 9.2–11.8)
POTASSIUM SERPL-SCNC: 4.5 MMOL/L (ref 3.5–5.5)
Q-T INTERVAL, ECG07: 408 MS
QRS DURATION, ECG06: 88 MS
QTC CALCULATION (BEZET), ECG08: 440 MS
RBC # BLD AUTO: 4.45 M/UL (ref 4.7–5.5)
SODIUM SERPL-SCNC: 143 MMOL/L (ref 136–145)
TROPONIN I SERPL-MCNC: <0.02 NG/ML (ref 0–0.04)
VENTRICULAR RATE, ECG03: 70 BPM
WBC # BLD AUTO: 5.9 K/UL (ref 4.6–13.2)

## 2018-10-24 PROCEDURE — 82550 ASSAY OF CK (CPK): CPT | Performed by: PHYSICIAN ASSISTANT

## 2018-10-24 PROCEDURE — 85379 FIBRIN DEGRADATION QUANT: CPT | Performed by: PHYSICIAN ASSISTANT

## 2018-10-24 PROCEDURE — 85025 COMPLETE CBC W/AUTO DIFF WBC: CPT | Performed by: PHYSICIAN ASSISTANT

## 2018-10-24 PROCEDURE — 96374 THER/PROPH/DIAG INJ IV PUSH: CPT

## 2018-10-24 PROCEDURE — 93005 ELECTROCARDIOGRAM TRACING: CPT

## 2018-10-24 PROCEDURE — 71045 X-RAY EXAM CHEST 1 VIEW: CPT

## 2018-10-24 PROCEDURE — 80048 BASIC METABOLIC PNL TOTAL CA: CPT | Performed by: PHYSICIAN ASSISTANT

## 2018-10-24 PROCEDURE — 83880 ASSAY OF NATRIURETIC PEPTIDE: CPT | Performed by: EMERGENCY MEDICINE

## 2018-10-24 PROCEDURE — 74011250636 HC RX REV CODE- 250/636: Performed by: EMERGENCY MEDICINE

## 2018-10-24 PROCEDURE — 99285 EMERGENCY DEPT VISIT HI MDM: CPT

## 2018-10-24 PROCEDURE — 74011250637 HC RX REV CODE- 250/637: Performed by: EMERGENCY MEDICINE

## 2018-10-24 RX ORDER — IBUPROFEN 600 MG/1
600 TABLET ORAL ONCE
Status: COMPLETED | OUTPATIENT
Start: 2018-10-24 | End: 2018-10-24

## 2018-10-24 RX ORDER — FUROSEMIDE 10 MG/ML
20 INJECTION INTRAMUSCULAR; INTRAVENOUS
Status: COMPLETED | OUTPATIENT
Start: 2018-10-24 | End: 2018-10-24

## 2018-10-24 RX ORDER — FUROSEMIDE 20 MG/1
20 TABLET ORAL DAILY
Qty: 14 TAB | Refills: 0 | Status: SHIPPED | OUTPATIENT
Start: 2018-10-24 | End: 2018-11-07

## 2018-10-24 RX ORDER — POTASSIUM CHLORIDE 1.5 G/1.77G
20 POWDER, FOR SOLUTION ORAL DAILY
Qty: 30 EACH | Refills: 0 | Status: ON HOLD | OUTPATIENT
Start: 2018-10-24 | End: 2019-04-10 | Stop reason: SDUPTHER

## 2018-10-24 RX ADMIN — FUROSEMIDE 20 MG: 10 INJECTION, SOLUTION INTRAMUSCULAR; INTRAVENOUS at 15:47

## 2018-10-24 RX ADMIN — IBUPROFEN 600 MG: 600 TABLET ORAL at 15:47

## 2018-10-24 NOTE — DISCHARGE INSTRUCTIONS
Musculoskeletal Chest Pain: Care Instructions  Your Care Instructions    Chest pain is not always a sign that something is wrong with your heart or that you have another serious problem. The doctor thinks your chest pain is caused by strained muscles or ligaments, inflamed chest cartilage, or another problem in your chest, rather than by your heart. You may need more tests to find the cause of your chest pain. Follow-up care is a key part of your treatment and safety. Be sure to make and go to all appointments, and call your doctor if you are having problems. It's also a good idea to know your test results and keep a list of the medicines you take. How can you care for yourself at home? · Take pain medicines exactly as directed. ? If the doctor gave you a prescription medicine for pain, take it as prescribed. ? If you are not taking a prescription pain medicine, ask your doctor if you can take an over-the-counter medicine. · Rest and protect the sore area. · Stop, change, or take a break from any activity that may be causing your pain or soreness. · Put ice or a cold pack on the sore area for 10 to 20 minutes at a time. Try to do this every 1 to 2 hours for the next 3 days (when you are awake) or until the swelling goes down. Put a thin cloth between the ice and your skin. · After 2 or 3 days, apply a heating pad set on low or a warm cloth to the area that hurts. Some doctors suggest that you go back and forth between hot and cold. · Do not wrap or tape your ribs for support. This may cause you to take smaller breaths, which could increase your risk of lung problems. · Mentholated creams such as Bengay or Icy Hot may soothe sore muscles. Follow the instructions on the package. · Follow your doctor's instructions for exercising. · Gentle stretching and massage may help you get better faster. Stretch slowly to the point just before pain begins, and hold the stretch for at least 15 to 30 seconds.  Do this 3 or 4 times a day. Stretch just after you have applied heat. · As your pain gets better, slowly return to your normal activities. Any increased pain may be a sign that you need to rest a while longer. When should you call for help? Call 911 anytime you think you may need emergency care. For example, call if:    · You have chest pain or pressure. This may occur with:  ? Sweating. ? Shortness of breath. ? Nausea or vomiting. ? Pain that spreads from the chest to the neck, jaw, or one or both shoulders or arms. ? Dizziness or lightheadedness. ? A fast or uneven pulse. After calling 911, chew 1 adult-strength aspirin. Wait for an ambulance. Do not try to drive yourself.     · You have sudden chest pain and shortness of breath, or you cough up blood.    Call your doctor now or seek immediate medical care if:    · You have any trouble breathing.     · Your chest pain gets worse.     · Your chest pain occurs consistently with exercise and is relieved by rest.    Watch closely for changes in your health, and be sure to contact your doctor if:    · Your chest pain does not get better after 1 week. Where can you learn more? Go to http://richard-luis enrique.info/. Enter V293 in the search box to learn more about \"Musculoskeletal Chest Pain: Care Instructions. \"  Current as of: November 20, 2017  Content Version: 11.8  © 4924-6527 [employer]. Care instructions adapted under license by [employer] (which disclaims liability or warranty for this information). If you have questions about a medical condition or this instruction, always ask your healthcare professional. [place] [address] any warranty or liability for your use of this information. Learning About a Pleural Effusion  What is it? A pleural effusion (say \"PLER-collin mb-CXOH-euwf\") is the buildup of fluid in the space between tissues lining the lungs and the chest wall.  Because of the fluid buildup, the lungs may not be able to expand completely. This can make it hard to breathe. A pleural empyema (say \"nx-ie-RH-muh\") is a problem that can happen with pleural effusion. Bacteria or other infections cause pus to form in the pleural fluid. But most pleural effusions don't become infected. What causes it? A pleural effusion has many causes. They include pneumonia, cancer, inflammation of the tissues around the lungs, and heart failure. What are the symptoms? Symptoms of a pleural effusion may include:  · Trouble breathing. · Shortness of breath. · Chest pain. · Fever. · A cough. A minor pleural effusion may not cause any symptoms. How is it diagnosed? A pleural effusion is usually diagnosed with an X-ray and a physical exam. The doctor listens to the airflow in your lungs. How is it treated? A pleural effusion can be treated by removing fluid from the space between the tissues around the lungs. This is done with a needle that's put into the chest (thoracentesis). A small amount of the fluid may be sent to a lab to find out what is causing the buildup of fluid. Removing the fluid may help to relieve symptoms, such as shortness of breath and chest pain. It can help the lungs to expand more fully. If the pleural effusion doesn't get better, a catheter may be placed in the chest. This is a flexible tube that allows fluid to drain from the lungs. The catheter stays in the chest until the doctor removes it. Some people may get a treatment that removes the fluid and then puts a medicine into the chest cavity. This helps to prevent too much fluid from building up again. A minor pleural effusion often goes away on its own. Doctors may need to treat the condition that is causing the pleural effusion. For example, you may get medicines to treat pneumonia or congestive heart failure. When the condition is treated, the effusion usually goes away. For a pleural empyema, the pus needs to be drained.  It may drain from a flexible tube placed in the chest. Or you may have surgery to drain it. You also will get antibiotics. Follow-up care is a key part of your treatment and safety. Be sure to make and go to all appointments, and call your doctor if you are having problems. It's also a good idea to know your test results and keep a list of the medicines you take. Where can you learn more? Go to http://richard-luis enrique.info/. Enter A920 in the search box to learn more about \"Learning About a Pleural Effusion. \"  Current as of: June 18, 2018  Content Version: 11.8  © 7640-3288 Healthwise, [employer]. Care instructions adapted under license by [employer] (which disclaims liability or warranty for this information). If you have questions about a medical condition or this instruction, always ask your healthcare professional. [address] any warranty or liability for your use of this information.
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
## 2018-10-24 NOTE — ED TRIAGE NOTES
C/o epigastric pain X 2 days. Denies SOb difficulty breathing.  Pt states that he has HX of blood clots in lungs in past

## 2018-10-24 NOTE — ED TRIAGE NOTES
C/o 2-3 days of pleuritic chest pain, worse with working out. H/o PE, has been off Lovenox x 1 year. I performed a brief evaluation, including history and physical, of the patient here in triage and I have determined that pt will need further treatment and evaluation from the main side ER physician. I have placed initial orders to help in expediting patients care. October 24, 2018 at 1:10 PM - Jaden Carrington PA-C Visit Vitals BP (!) 163/106 (BP 1 Location: Left arm, BP Patient Position: Sitting) Pulse 69 Temp 97.9 °F (36.6 °C) Resp 20 SpO2 96%

## 2018-11-26 ENCOUNTER — APPOINTMENT (OUTPATIENT)
Dept: CT IMAGING | Age: 48
End: 2018-11-26
Attending: EMERGENCY MEDICINE
Payer: MEDICARE

## 2018-11-26 ENCOUNTER — APPOINTMENT (OUTPATIENT)
Dept: GENERAL RADIOLOGY | Age: 48
End: 2018-11-26
Attending: EMERGENCY MEDICINE
Payer: MEDICARE

## 2018-11-26 ENCOUNTER — HOSPITAL ENCOUNTER (EMERGENCY)
Age: 48
Discharge: HOME | End: 2018-11-26
Attending: EMERGENCY MEDICINE
Payer: MEDICARE

## 2018-11-26 VITALS
BODY MASS INDEX: 30.17 KG/M2 | OXYGEN SATURATION: 95 % | TEMPERATURE: 98.5 F | HEART RATE: 65 BPM | WEIGHT: 235 LBS | DIASTOLIC BLOOD PRESSURE: 109 MMHG | SYSTOLIC BLOOD PRESSURE: 182 MMHG

## 2018-11-26 DIAGNOSIS — G43.909 MIGRAINE WITHOUT STATUS MIGRAINOSUS, NOT INTRACTABLE, UNSPECIFIED MIGRAINE TYPE: Primary | ICD-10-CM

## 2018-11-26 DIAGNOSIS — R07.9 CHEST PAIN, UNSPECIFIED TYPE: ICD-10-CM

## 2018-11-26 LAB
ALBUMIN SERPL-MCNC: 3.3 G/DL (ref 3.4–5)
ALBUMIN/GLOB SERPL: 0.8 {RATIO} (ref 0.8–1.7)
ALP SERPL-CCNC: 80 U/L (ref 45–117)
ALT SERPL-CCNC: 32 U/L (ref 16–61)
ANION GAP SERPL CALC-SCNC: 4 MMOL/L (ref 3–18)
ANION GAP SERPL CALC-SCNC: 6 MMOL/L (ref 3–18)
AST SERPL-CCNC: 50 U/L (ref 15–37)
BASOPHILS # BLD: 0 K/UL (ref 0–0.1)
BASOPHILS # BLD: 0 K/UL (ref 0–0.1)
BASOPHILS NFR BLD: 1 % (ref 0–2)
BASOPHILS NFR BLD: 1 % (ref 0–2)
BILIRUB SERPL-MCNC: 0.4 MG/DL (ref 0.2–1)
BNP SERPL-MCNC: 88 PG/ML (ref 0–900)
BUN SERPL-MCNC: 13 MG/DL (ref 7–18)
BUN SERPL-MCNC: 13 MG/DL (ref 7–18)
BUN/CREAT SERPL: 14 (ref 12–20)
BUN/CREAT SERPL: 14 (ref 12–20)
CALCIUM SERPL-MCNC: 8.5 MG/DL (ref 8.5–10.1)
CALCIUM SERPL-MCNC: 8.6 MG/DL (ref 8.5–10.1)
CHLORIDE SERPL-SCNC: 106 MMOL/L (ref 100–108)
CHLORIDE SERPL-SCNC: 107 MMOL/L (ref 100–108)
CK MB CFR SERPL CALC: 0.2 % (ref 0–4)
CK MB SERPL-MCNC: 1.6 NG/ML (ref 5–25)
CK SERPL-CCNC: 719 U/L (ref 39–308)
CO2 SERPL-SCNC: 27 MMOL/L (ref 21–32)
CO2 SERPL-SCNC: 30 MMOL/L (ref 21–32)
CREAT SERPL-MCNC: 0.93 MG/DL (ref 0.6–1.3)
CREAT SERPL-MCNC: 0.96 MG/DL (ref 0.6–1.3)
DIFFERENTIAL METHOD BLD: ABNORMAL
DIFFERENTIAL METHOD BLD: NORMAL
EOSINOPHIL # BLD: 0.1 K/UL (ref 0–0.4)
EOSINOPHIL # BLD: 0.1 K/UL (ref 0–0.4)
EOSINOPHIL NFR BLD: 1 % (ref 0–5)
EOSINOPHIL NFR BLD: 2 % (ref 0–5)
ERYTHROCYTE [DISTWIDTH] IN BLOOD BY AUTOMATED COUNT: 12.5 % (ref 11.6–14.5)
ERYTHROCYTE [DISTWIDTH] IN BLOOD BY AUTOMATED COUNT: 12.6 % (ref 11.6–14.5)
GLOBULIN SER CALC-MCNC: 4 G/DL (ref 2–4)
GLUCOSE SERPL-MCNC: 81 MG/DL (ref 74–99)
GLUCOSE SERPL-MCNC: 84 MG/DL (ref 74–99)
HCT VFR BLD AUTO: 44.3 % (ref 36–48)
HCT VFR BLD AUTO: 46.7 % (ref 36–48)
HGB BLD-MCNC: 14.6 G/DL (ref 13–16)
HGB BLD-MCNC: 14.6 G/DL (ref 13–16)
LYMPHOCYTES # BLD: 2.2 K/UL (ref 0.9–3.6)
LYMPHOCYTES # BLD: 2.2 K/UL (ref 0.9–3.6)
LYMPHOCYTES NFR BLD: 43 % (ref 21–52)
LYMPHOCYTES NFR BLD: 44 % (ref 21–52)
MCH RBC QN AUTO: 29.9 PG (ref 24–34)
MCH RBC QN AUTO: 31.8 PG (ref 24–34)
MCHC RBC AUTO-ENTMCNC: 31.3 G/DL (ref 31–37)
MCHC RBC AUTO-ENTMCNC: 33 G/DL (ref 31–37)
MCV RBC AUTO: 95.7 FL (ref 74–97)
MCV RBC AUTO: 96.5 FL (ref 74–97)
MONOCYTES # BLD: 0.5 K/UL (ref 0.05–1.2)
MONOCYTES # BLD: 0.6 K/UL (ref 0.05–1.2)
MONOCYTES NFR BLD: 11 % (ref 3–10)
MONOCYTES NFR BLD: 9 % (ref 3–10)
NEUTS SEG # BLD: 2.3 K/UL (ref 1.8–8)
NEUTS SEG # BLD: 2.3 K/UL (ref 1.8–8)
NEUTS SEG NFR BLD: 44 % (ref 40–73)
NEUTS SEG NFR BLD: 44 % (ref 40–73)
PLATELET # BLD AUTO: 189 K/UL (ref 135–420)
PLATELET # BLD AUTO: NORMAL K/UL (ref 135–420)
PMV BLD AUTO: 10.7 FL (ref 9.2–11.8)
POTASSIUM SERPL-SCNC: 4.2 MMOL/L (ref 3.5–5.5)
POTASSIUM SERPL-SCNC: 4.4 MMOL/L (ref 3.5–5.5)
PROT SERPL-MCNC: 7.3 G/DL (ref 6.4–8.2)
RBC # BLD AUTO: 4.59 M/UL (ref 4.7–5.5)
RBC # BLD AUTO: 4.88 M/UL (ref 4.7–5.5)
SODIUM SERPL-SCNC: 140 MMOL/L (ref 136–145)
SODIUM SERPL-SCNC: 140 MMOL/L (ref 136–145)
TROPONIN I SERPL-MCNC: <0.02 NG/ML (ref 0–0.04)
VALPROATE SERPL-MCNC: 89 UG/ML (ref 50–100)
WBC # BLD AUTO: 5.1 K/UL (ref 4.6–13.2)
WBC # BLD AUTO: 5.1 K/UL (ref 4.6–13.2)

## 2018-11-26 PROCEDURE — 74011250636 HC RX REV CODE- 250/636: Performed by: EMERGENCY MEDICINE

## 2018-11-26 PROCEDURE — 96375 TX/PRO/DX INJ NEW DRUG ADDON: CPT

## 2018-11-26 PROCEDURE — 93005 ELECTROCARDIOGRAM TRACING: CPT

## 2018-11-26 PROCEDURE — 94762 N-INVAS EAR/PLS OXIMTRY CONT: CPT

## 2018-11-26 PROCEDURE — 74011250637 HC RX REV CODE- 250/637: Performed by: EMERGENCY MEDICINE

## 2018-11-26 PROCEDURE — 90471 IMMUNIZATION ADMIN: CPT

## 2018-11-26 PROCEDURE — 80164 ASSAY DIPROPYLACETIC ACD TOT: CPT

## 2018-11-26 PROCEDURE — 85025 COMPLETE CBC W/AUTO DIFF WBC: CPT

## 2018-11-26 PROCEDURE — 99282 EMERGENCY DEPT VISIT SF MDM: CPT

## 2018-11-26 PROCEDURE — 82553 CREATINE MB FRACTION: CPT

## 2018-11-26 PROCEDURE — 70450 CT HEAD/BRAIN W/O DYE: CPT

## 2018-11-26 PROCEDURE — 90715 TDAP VACCINE 7 YRS/> IM: CPT | Performed by: EMERGENCY MEDICINE

## 2018-11-26 PROCEDURE — 83880 ASSAY OF NATRIURETIC PEPTIDE: CPT

## 2018-11-26 PROCEDURE — 96365 THER/PROPH/DIAG IV INF INIT: CPT

## 2018-11-26 PROCEDURE — 80053 COMPREHEN METABOLIC PANEL: CPT

## 2018-11-26 PROCEDURE — 96366 THER/PROPH/DIAG IV INF ADDON: CPT

## 2018-11-26 PROCEDURE — 71046 X-RAY EXAM CHEST 2 VIEWS: CPT

## 2018-11-26 RX ORDER — MAGNESIUM SULFATE HEPTAHYDRATE 40 MG/ML
2 INJECTION, SOLUTION INTRAVENOUS
Status: COMPLETED | OUTPATIENT
Start: 2018-11-26 | End: 2018-11-26

## 2018-11-26 RX ORDER — KETOROLAC TROMETHAMINE 30 MG/ML
15 INJECTION, SOLUTION INTRAMUSCULAR; INTRAVENOUS ONCE
Status: COMPLETED | OUTPATIENT
Start: 2018-11-26 | End: 2018-11-26

## 2018-11-26 RX ORDER — IBUPROFEN 600 MG/1
600 TABLET ORAL EVERY 6 HOURS
Qty: 56 TAB | Refills: 0 | Status: SHIPPED | OUTPATIENT
Start: 2018-11-26 | End: 2018-12-10

## 2018-11-26 RX ORDER — ACETAMINOPHEN 325 MG/1
650 TABLET ORAL
Qty: 20 TAB | Refills: 0 | Status: SHIPPED | OUTPATIENT
Start: 2018-11-26 | End: 2019-04-10

## 2018-11-26 RX ORDER — ACETAMINOPHEN 500 MG
1000 TABLET ORAL ONCE
Status: COMPLETED | OUTPATIENT
Start: 2018-11-26 | End: 2018-11-26

## 2018-11-26 RX ORDER — BUTALBITAL, ACETAMINOPHEN AND CAFFEINE 300; 40; 50 MG/1; MG/1; MG/1
2 CAPSULE ORAL
Qty: 30 CAP | Refills: 0 | Status: SHIPPED | OUTPATIENT
Start: 2018-11-26 | End: 2019-04-07 | Stop reason: ALTCHOICE

## 2018-11-26 RX ORDER — PROCHLORPERAZINE EDISYLATE 5 MG/ML
10 INJECTION INTRAMUSCULAR; INTRAVENOUS ONCE
Status: COMPLETED | OUTPATIENT
Start: 2018-11-26 | End: 2018-11-26

## 2018-11-26 RX ADMIN — ACETAMINOPHEN 1000 MG: 500 TABLET ORAL at 17:41

## 2018-11-26 RX ADMIN — PROCHLORPERAZINE EDISYLATE 10 MG: 5 INJECTION INTRAMUSCULAR; INTRAVENOUS at 17:54

## 2018-11-26 RX ADMIN — MAGNESIUM SULFATE HEPTAHYDRATE 2 G: 40 INJECTION, SOLUTION INTRAVENOUS at 18:03

## 2018-11-26 RX ADMIN — TETANUS TOXOID, REDUCED DIPHTHERIA TOXOID AND ACELLULAR PERTUSSIS VACCINE, ADSORBED 0.5 ML: 5; 2.5; 8; 8; 2.5 SUSPENSION INTRAMUSCULAR at 17:45

## 2018-11-26 RX ADMIN — KETOROLAC TROMETHAMINE 15 MG: 30 INJECTION, SOLUTION INTRAMUSCULAR at 17:51

## 2018-11-26 NOTE — ED TRIAGE NOTES
Pt presents for evaluation of seizure, right sided chest pain, and migraine headache. Pt states that his seizure occurred yesterday and has left toe pain trauma and facial swelling from falling. Pt takes  Depakote for seizures.
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
## 2018-11-27 ENCOUNTER — APPOINTMENT (OUTPATIENT)
Dept: GENERAL RADIOLOGY | Age: 48
End: 2018-11-27
Attending: EMERGENCY MEDICINE
Payer: MEDICARE

## 2018-11-27 ENCOUNTER — HOSPITAL ENCOUNTER (EMERGENCY)
Age: 48
Discharge: HOME | End: 2018-11-27
Attending: EMERGENCY MEDICINE
Payer: MEDICARE

## 2018-11-27 VITALS
BODY MASS INDEX: 38.17 KG/M2 | WEIGHT: 307 LBS | RESPIRATION RATE: 18 BRPM | DIASTOLIC BLOOD PRESSURE: 87 MMHG | HEIGHT: 75 IN | OXYGEN SATURATION: 96 % | TEMPERATURE: 98.8 F | HEART RATE: 78 BPM | SYSTOLIC BLOOD PRESSURE: 142 MMHG

## 2018-11-27 DIAGNOSIS — S99.922A TOE INJURY, LEFT, INITIAL ENCOUNTER: ICD-10-CM

## 2018-11-27 DIAGNOSIS — R56.9 SEIZURE (HCC): Primary | ICD-10-CM

## 2018-11-27 LAB
ALBUMIN SERPL-MCNC: 3.1 G/DL (ref 3.4–5)
ALBUMIN/GLOB SERPL: 0.8 {RATIO} (ref 0.8–1.7)
ALP SERPL-CCNC: 78 U/L (ref 45–117)
ALT SERPL-CCNC: 29 U/L (ref 16–61)
ANION GAP SERPL CALC-SCNC: 8 MMOL/L (ref 3–18)
AST SERPL-CCNC: 29 U/L (ref 15–37)
BASOPHILS # BLD: 0 K/UL (ref 0–0.1)
BASOPHILS NFR BLD: 0 % (ref 0–2)
BILIRUB SERPL-MCNC: 0.3 MG/DL (ref 0.2–1)
BUN SERPL-MCNC: 14 MG/DL (ref 7–18)
BUN/CREAT SERPL: 14 (ref 12–20)
CALCIUM SERPL-MCNC: 8.2 MG/DL (ref 8.5–10.1)
CHLORIDE SERPL-SCNC: 109 MMOL/L (ref 100–108)
CK MB CFR SERPL CALC: ABNORMAL % (ref 0–4)
CK MB SERPL-MCNC: <1 NG/ML (ref 5–25)
CK SERPL-CCNC: 435 U/L (ref 39–308)
CO2 SERPL-SCNC: 25 MMOL/L (ref 21–32)
CREAT SERPL-MCNC: 1.02 MG/DL (ref 0.6–1.3)
DIFFERENTIAL METHOD BLD: ABNORMAL
EOSINOPHIL # BLD: 0 K/UL (ref 0–0.4)
EOSINOPHIL NFR BLD: 1 % (ref 0–5)
ERYTHROCYTE [DISTWIDTH] IN BLOOD BY AUTOMATED COUNT: 12.6 % (ref 11.6–14.5)
GLOBULIN SER CALC-MCNC: 3.9 G/DL (ref 2–4)
GLUCOSE SERPL-MCNC: 110 MG/DL (ref 74–99)
HCT VFR BLD AUTO: 41.2 % (ref 36–48)
HGB BLD-MCNC: 13.7 G/DL (ref 13–16)
LYMPHOCYTES # BLD: 1.4 K/UL (ref 0.9–3.6)
LYMPHOCYTES NFR BLD: 18 % (ref 21–52)
MCH RBC QN AUTO: 31.7 PG (ref 24–34)
MCHC RBC AUTO-ENTMCNC: 33.3 G/DL (ref 31–37)
MCV RBC AUTO: 95.4 FL (ref 74–97)
MONOCYTES # BLD: 0.7 K/UL (ref 0.05–1.2)
MONOCYTES NFR BLD: 9 % (ref 3–10)
NEUTS SEG # BLD: 5.5 K/UL (ref 1.8–8)
NEUTS SEG NFR BLD: 72 % (ref 40–73)
PLATELET # BLD AUTO: 185 K/UL (ref 135–420)
PMV BLD AUTO: 11.1 FL (ref 9.2–11.8)
POTASSIUM SERPL-SCNC: 3.5 MMOL/L (ref 3.5–5.5)
PROT SERPL-MCNC: 7 G/DL (ref 6.4–8.2)
RBC # BLD AUTO: 4.32 M/UL (ref 4.7–5.5)
SODIUM SERPL-SCNC: 142 MMOL/L (ref 136–145)
TROPONIN I SERPL-MCNC: <0.02 NG/ML (ref 0–0.04)
WBC # BLD AUTO: 7.6 K/UL (ref 4.6–13.2)

## 2018-11-27 PROCEDURE — 80053 COMPREHEN METABOLIC PANEL: CPT

## 2018-11-27 PROCEDURE — 73660 X-RAY EXAM OF TOE(S): CPT

## 2018-11-27 PROCEDURE — 85025 COMPLETE CBC W/AUTO DIFF WBC: CPT

## 2018-11-27 PROCEDURE — 99281 EMR DPT VST MAYX REQ PHY/QHP: CPT

## 2018-11-27 PROCEDURE — 82553 CREATINE MB FRACTION: CPT

## 2018-11-27 RX ORDER — DIVALPROEX SODIUM 250 MG/1
750 TABLET, DELAYED RELEASE ORAL 2 TIMES DAILY
Qty: 180 TAB | Refills: 0 | Status: SHIPPED | OUTPATIENT
Start: 2018-11-27 | End: 2019-02-06

## 2018-11-27 RX ORDER — DIVALPROEX SODIUM 250 MG/1
750 TABLET, DELAYED RELEASE ORAL
Status: ACTIVE | OUTPATIENT
Start: 2018-11-27 | End: 2018-11-28
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
## 2018-11-27 NOTE — DISCHARGE INSTRUCTIONS

## 2018-11-27 NOTE — ED TRIAGE NOTES
Pt. States \"they said I had a seizure again last night and I hit my toe\" observed abrasions to toes on left foot and redness to right eye. Pt. Also c/o feeling \"tired and disoriented\".

## 2018-11-27 NOTE — DISCHARGE INSTRUCTIONS
Musculoskeletal Chest Pain: Care Instructions  Your Care Instructions    Chest pain is not always a sign that something is wrong with your heart or that you have another serious problem. The doctor thinks your chest pain is caused by strained muscles or ligaments, inflamed chest cartilage, or another problem in your chest, rather than by your heart. You may need more tests to find the cause of your chest pain. Follow-up care is a key part of your treatment and safety. Be sure to make and go to all appointments, and call your doctor if you are having problems. It's also a good idea to know your test results and keep a list of the medicines you take. How can you care for yourself at home? · Take pain medicines exactly as directed. ? If the doctor gave you a prescription medicine for pain, take it as prescribed. ? If you are not taking a prescription pain medicine, ask your doctor if you can take an over-the-counter medicine. · Rest and protect the sore area. · Stop, change, or take a break from any activity that may be causing your pain or soreness. · Put ice or a cold pack on the sore area for 10 to 20 minutes at a time. Try to do this every 1 to 2 hours for the next 3 days (when you are awake) or until the swelling goes down. Put a thin cloth between the ice and your skin. · After 2 or 3 days, apply a heating pad set on low or a warm cloth to the area that hurts. Some doctors suggest that you go back and forth between hot and cold. · Do not wrap or tape your ribs for support. This may cause you to take smaller breaths, which could increase your risk of lung problems. · Mentholated creams such as Bengay or Icy Hot may soothe sore muscles. Follow the instructions on the package. · Follow your doctor's instructions for exercising. · Gentle stretching and massage may help you get better faster. Stretch slowly to the point just before pain begins, and hold the stretch for at least 15 to 30 seconds.  Do this 3 or 4 times a day. Stretch just after you have applied heat. · As your pain gets better, slowly return to your normal activities. Any increased pain may be a sign that you need to rest a while longer. When should you call for help? Call 911 anytime you think you may need emergency care. For example, call if:    · You have chest pain or pressure. This may occur with:  ? Sweating. ? Shortness of breath. ? Nausea or vomiting. ? Pain that spreads from the chest to the neck, jaw, or one or both shoulders or arms. ? Dizziness or lightheadedness. ? A fast or uneven pulse. After calling 911, chew 1 adult-strength aspirin. Wait for an ambulance. Do not try to drive yourself.     · You have sudden chest pain and shortness of breath, or you cough up blood.    Call your doctor now or seek immediate medical care if:    · You have any trouble breathing.     · Your chest pain gets worse.     · Your chest pain occurs consistently with exercise and is relieved by rest.    Watch closely for changes in your health, and be sure to contact your doctor if:    · Your chest pain does not get better after 1 week. Where can you learn more? Go to http://richard-luis enrique.info/. Enter V293 in the search box to learn more about \"Musculoskeletal Chest Pain: Care Instructions. \"  Current as of: November 20, 2017  Content Version: 11.8  © 6753-4873 [employer]. Care instructions adapted under license by [employer] (which disclaims liability or warranty for this information). If you have questions about a medical condition or this instruction, always ask your healthcare professional. [place] [address] any warranty or liability for your use of this information. Migraine Headache: Care Instructions  Your Care Instructions  Migraines are painful, throbbing headaches that often start on one side of the head.  They may cause nausea and vomiting and make you sensitive to light, sound, or smell. Without treatment, migraines can last from 4 hours to a few days. Medicines can help prevent migraines or stop them after they have started. Your doctor can help you find which ones work best for you. Follow-up care is a key part of your treatment and safety. Be sure to make and go to all appointments, and call your doctor if you are having problems. It's also a good idea to know your test results and keep a list of the medicines you take. How can you care for yourself at home? · Do not drive if you have taken a prescription pain medicine. · Rest in a quiet, dark room until your headache is gone. Close your eyes, and try to relax or go to sleep. Don't watch TV or read. · Put a cold, moist cloth or cold pack on the painful area for 10 to 20 minutes at a time. Put a thin cloth between the cold pack and your skin. · Use a warm, moist towel or a heating pad set on low to relax tight shoulder and neck muscles. · Have someone gently massage your neck and shoulders. · Take your medicines exactly as prescribed. Call your doctor if you think you are having a problem with your medicine. You will get more details on the specific medicines your doctor prescribes. · Be careful not to take pain medicine more often than the instructions allow. You could get worse or more frequent headaches when the medicine wears off. To prevent migraines  · Keep a headache diary so you can figure out what triggers your headaches. Avoiding triggers may help you prevent headaches. Record when each headache began, how long it lasted, and what the pain was like. (Was it throbbing, aching, stabbing, or dull?) Write down any other symptoms you had with the headache, such as nausea, flashing lights or dark spots, or sensitivity to bright light or loud noise. Note if the headache occurred near your period. List anything that might have triggered the headache.  Triggers may include certain foods (chocolate, cheese, wine) or odors, smoke, bright light, stress, or lack of sleep. · If your doctor has prescribed medicine for your migraines, take it as directed. You may have medicine that you take only when you get a migraine and medicine that you take all the time to help prevent migraines. ? If your doctor has prescribed medicine for when you get a headache, take it at the first sign of a migraine, unless your doctor has given you other instructions. ? If your doctor has prescribed medicine to prevent migraines, take it exactly as prescribed. Call your doctor if you think you are having a problem with your medicine. · Find healthy ways to deal with stress. Migraines are most common during or right after stressful times. Take time to relax before and after you do something that has caused a migraine in the past.  · Try to keep your muscles relaxed by keeping good posture. Check your jaw, face, neck, and shoulder muscles for tension. Try to relax them. When you sit at a desk, change positions often. And make sure to stretch for 30 seconds each hour. · Get plenty of sleep and exercise. · Eat meals on a regular schedule. Avoid foods and drinks that often trigger migraines. These include chocolate, alcohol (especially red wine and port), aspartame, monosodium glutamate (MSG), and some additives found in foods (such as hot dogs, cordero, cold cuts, aged cheeses, and pickled foods). · Limit caffeine. Don't drink too much coffee, tea, or soda. But don't quit caffeine suddenly. That can also give you migraines. · Do not smoke or allow others to smoke around you. If you need help quitting, talk to your doctor about stop-smoking programs and medicines. These can increase your chances of quitting for good. · If you are taking birth control pills or hormone therapy, talk to your doctor about whether they are triggering your migraines. When should you call for help? Call 911 anytime you think you may need emergency care.  For example, call if:    · You have signs of a stroke. These may include:  ? Sudden numbness, paralysis, or weakness in your face, arm, or leg, especially on only one side of your body. ? Sudden vision changes. ? Sudden trouble speaking. ? Sudden confusion or trouble understanding simple statements. ? Sudden problems with walking or balance. ? A sudden, severe headache that is different from past headaches.    Call your doctor now or seek immediate medical care if:    · You have new or worse nausea and vomiting.     · You have a new or higher fever.     · Your headache gets much worse.    Watch closely for changes in your health, and be sure to contact your doctor if:    · You are not getting better after 2 days (48 hours). Where can you learn more? Go to http://richard-luis enrique.info/. Enter J233 in the search box to learn more about \"Migraine Headache: Care Instructions. \"  Current as of: June 4, 2018  Content Version: 11.8  © 6013-0289 [employer]. Care instructions adapted under license by [employer] (which disclaims liability or warranty for this information). If you have questions about a medical condition or this instruction, always ask your healthcare professional. [place] [address] any warranty or liability for your use of this information.

## 2018-11-28 LAB
ATRIAL RATE: 71 BPM
CALCULATED P AXIS, ECG09: 1 DEGREES
CALCULATED R AXIS, ECG10: -3 DEGREES
CALCULATED T AXIS, ECG11: -6 DEGREES
DIAGNOSIS, 93000: NORMAL
P-R INTERVAL, ECG05: 148 MS
Q-T INTERVAL, ECG07: 412 MS
QRS DURATION, ECG06: 88 MS
QTC CALCULATION (BEZET), ECG08: 447 MS
VENTRICULAR RATE, ECG03: 71 BPM

## 2018-12-20 ENCOUNTER — DOCUMENTATION ONLY (OUTPATIENT)
Dept: NEUROLOGY | Age: 48
End: 2018-12-20

## 2019-02-06 ENCOUNTER — OFFICE VISIT (OUTPATIENT)
Dept: NEUROLOGY | Age: 49
End: 2019-02-06

## 2019-02-06 VITALS
HEART RATE: 81 BPM | OXYGEN SATURATION: 97 % | BODY MASS INDEX: 39.17 KG/M2 | SYSTOLIC BLOOD PRESSURE: 162 MMHG | HEIGHT: 75 IN | WEIGHT: 315 LBS | TEMPERATURE: 97.7 F | DIASTOLIC BLOOD PRESSURE: 90 MMHG | RESPIRATION RATE: 22 BRPM

## 2019-02-06 DIAGNOSIS — G40.109 PARTIAL SYMPTOMATIC EPILEPSY WITH SIMPLE PARTIAL SEIZURES, NOT INTRACTABLE, WITHOUT STATUS EPILEPTICUS (HCC): Primary | ICD-10-CM

## 2019-02-06 RX ORDER — DIVALPROEX SODIUM 250 MG/1
TABLET, DELAYED RELEASE ORAL
Qty: 90 TAB | Refills: 11 | Status: ON HOLD | OUTPATIENT
Start: 2019-02-06 | End: 2019-04-10 | Stop reason: SDUPTHER

## 2019-02-06 NOTE — LETTER
2/6/2019 2:17 PM 
 
Patient:  Sharmaine Howell YOB: 1970 Date of Visit: 2/6/2019 Dear Dejuan Minor., MD 
[address] [facility] [address] [place] [id number] VIA Facsimile: [phone] 
 : Thank you for referring Mr. Linda Carmona to me for evaluation/treatment. Below are the relevant portions of my assessment and plan of care. If you have questions, please do not hesitate to call me. I look forward to following Mr. Vinayak Montero along with you.  
 
 
 
Sincerely, 
 
 
Kolton Barrera MD

## 2019-02-06 NOTE — PROGRESS NOTES
Re:  Wu Hunt,Follow up visit     2/6/2019 2:08 PM    SSN: [id number]    Subjective:   Anita Hilliard returns after a nearly 2 year absence. He has epilepsy from a childhood head trauma and brain surgery. He's currently taking Depakote 500 mg in the morning and 250 mg HS. He's been taking this dose for the last 2 months without any seizures. Medications:    Current Outpatient Medications   Medication Sig Dispense Refill    divalproex DR (DEPAKOTE) 250 mg tablet Take 3 Tabs by mouth two (2) times a day. 180 Tab 0    butalbital-acetaminophen-caff (FIORICET) -40 mg per capsule Take 2 Caps by mouth every eight (8) hours as needed for Pain. 30 Cap 0    acetaminophen (TYLENOL) 325 mg tablet Take 2 Tabs by mouth every four (4) hours as needed for Pain. 20 Tab 0    potassium chloride (KLOR-CON) 20 mEq packet Take 1 Packet by mouth daily. 30 Each 0    lisinopril (PRINIVIL, ZESTRIL) 10 mg tablet Take 2 Tabs by mouth. Indications: hypertension 60 Tab 0    ARIPiprazole (ABILIFY) 10 mg tablet Take 1 Tab by mouth daily. Indications: BIPOLAR DISORDER IN REMISSION 30 Tab 0    melatonin 3 mg tablet Take 2 Tabs by mouth nightly as needed. Indications: Insomnia 30 Tab 0    naproxen (NAPROSYN) 500 mg tablet Take 1 Tab by mouth two (2) times daily (with meals). 20 Tab 0    HYDROcodone-acetaminophen (NORCO) 5-325 mg per tablet Take 1-2 tablets PO every 4-6 hours as needed for pain control. If over the counter ibuprofen or acetaminophen was suggested, then only take the vicodin for pain not well controlled with the over the counter medication.  20 Tab 0       Vital signs:    Visit Vitals  /90 (BP 1 Location: Left arm, BP Patient Position: Sitting)   Pulse 81   Temp 97.7 °F (36.5 °C) (Oral)   Resp 22   Ht 6' 3\" (1.905 m)   Wt 156.2 kg (344 lb 6.4 oz)   SpO2 97%   BMI 43.05 kg/m²       Review of Systems:   As above otherwise 11 point review of systems negative including;   Constitutional no fever or chills  Skin denies rash or itching  HEENT  Denies tinnitus, hearing lose  Eyes denies diplopia vision lose  Respiratory denies sortness of breath  Cardiovascular denies chest pain, dyspnea on exertion  Gastrointestinal denies nausea, vomiting, diarrhea, constipation  Genitourinary denies incontinence  Musculoskeletal denies joint pain or swelling  Endocrine denies weight change  Hematology denies easy bruising or bleeding   Neurological as above in HPI      Patient Active Problem List   Diagnosis Code    Nonintractable epilepsy with simple partial seizures ([facility] [address] G40.109    Bipolar disorder, unspecified ([facility] [address] F31.9         Objective: The patient is awake, alert, and oriented x 4. Fund of knowledge is adequate. Speech is fluent and memory is intact. Cranial Nerves: II - Visual fields are full to confrontation. III, IV, VI - Extraocular movements are intact. There is no nystagmus. V - Facial sensation is intact to pinprick. VII - Face is symmetrical.  VIII - Hearing is present. IX, X, XII - Palate is symmetrical.   XI - Shoulder shrugging and head turning intact  Motor: The patient moves all four limbs fairly well and symmetrically. Tone is normal. Reflexes are 2+ and symmetrical. Plantars are down going.  Gait is normal.    CBC:   Lab Results   Component Value Date/Time    WBC 7.6 11/27/2018 05:30 PM    RBC 4.32 (L) 11/27/2018 05:30 PM    HGB 13.7 11/27/2018 05:30 PM    HCT 41.2 11/27/2018 05:30 PM    PLATELET 770 93/68/6269 05:30 PM     BMP:   Lab Results   Component Value Date/Time    Glucose 110 (H) 11/27/2018 05:30 PM    Sodium 142 11/27/2018 05:30 PM    Potassium 3.5 11/27/2018 05:30 PM    Chloride 109 (H) 11/27/2018 05:30 PM    CO2 25 11/27/2018 05:30 PM    BUN 14 11/27/2018 05:30 PM    Creatinine 1.02 11/27/2018 05:30 PM    Calcium 8.2 (L) 11/27/2018 05:30 PM     CMP:   Lab Results   Component Value Date/Time    Glucose 110 (H) 11/27/2018 05:30 PM    Sodium 142 11/27/2018 05:30 PM Potassium 3.5 11/27/2018 05:30 PM    Chloride 109 (H) 11/27/2018 05:30 PM    CO2 25 11/27/2018 05:30 PM    BUN 14 11/27/2018 05:30 PM    Creatinine 1.02 11/27/2018 05:30 PM    Calcium 8.2 (L) 11/27/2018 05:30 PM    Anion gap 8 11/27/2018 05:30 PM    BUN/Creatinine ratio 14 11/27/2018 05:30 PM    Alk. phosphatase 78 11/27/2018 05:30 PM    Protein, total 7.0 11/27/2018 05:30 PM    Albumin 3.1 (L) 11/27/2018 05:30 PM    Globulin 3.9 11/27/2018 05:30 PM    A-G Ratio 0.8 11/27/2018 05:30 PM     Coagulation:   Lab Results   Component Value Date/Time    Prothrombin time 12.8 11/02/2012 08:15 AM    INR 1.0 11/02/2012 08:15 AM    aPTT 26.7 11/02/2012 08:15 AM     Cardiac markers:   Lab Results   Component Value Date/Time     (H) 11/27/2018 05:30 PM    CK-MB Index  11/27/2018 05:30 PM     CALCULATION NOT PERFORMED WHEN RESULT IS BELOW LINEAR LIMIT     11/26/2018  1:07 PM - [facility]     Component Value Flag Ref Range Units Status   Valproic acid 89   50 - 100 ug/ml Final         Assessment:  Epilepsy in this man who has risk factors including childhood head trauma. Plan:  Continue current RX in that he has good seizure control and his drug level was surprisingly high for such a low does of the drug. Return here in 3 months. Sincerely,        Yony Gutierres M.D.

## 2019-02-06 NOTE — PROGRESS NOTES
Monet Shepherd is a 46 y.o. male in today for follow-up to discuss seizures and EEG results; last seen 5/2017. Learning assessment previously completed; primary language is [place]. 1. Have you been to the ER, urgent care clinic since your last visit? Hospitalized since your last visit? Yes Reason for visit: ED, 7/23/18, 8/13/18, 10/24/18, 11/26/18 and 11/27/2018    2. Have you seen or consulted any other health care providers outside of the [address] [place] [address] since your last visit? Include any pap smears or colon screening.  No

## 2019-04-07 ENCOUNTER — HOSPITAL ENCOUNTER (INPATIENT)
Age: 49
LOS: 3 days | Discharge: HOME | DRG: 101 | End: 2019-04-10
Attending: EMERGENCY MEDICINE | Admitting: INTERNAL MEDICINE
Payer: MEDICARE

## 2019-04-07 ENCOUNTER — APPOINTMENT (OUTPATIENT)
Dept: CT IMAGING | Age: 49
DRG: 101 | End: 2019-04-07
Attending: EMERGENCY MEDICINE
Payer: MEDICARE

## 2019-04-07 ENCOUNTER — APPOINTMENT (OUTPATIENT)
Dept: GENERAL RADIOLOGY | Age: 49
DRG: 101 | End: 2019-04-07
Attending: EMERGENCY MEDICINE
Payer: MEDICARE

## 2019-04-07 ENCOUNTER — APPOINTMENT (OUTPATIENT)
Dept: VASCULAR SURGERY | Age: 49
DRG: 101 | End: 2019-04-07
Attending: INTERNAL MEDICINE
Payer: MEDICARE

## 2019-04-07 ENCOUNTER — APPOINTMENT (OUTPATIENT)
Dept: MRI IMAGING | Age: 49
DRG: 101 | End: 2019-04-07
Attending: INTERNAL MEDICINE
Payer: MEDICARE

## 2019-04-07 DIAGNOSIS — R56.9 SEIZURE (HCC): ICD-10-CM

## 2019-04-07 DIAGNOSIS — G40.109 PARTIAL SYMPTOMATIC EPILEPSY WITH SIMPLE PARTIAL SEIZURES, NOT INTRACTABLE, WITHOUT STATUS EPILEPTICUS (HCC): ICD-10-CM

## 2019-04-07 DIAGNOSIS — S09.90XA INJURY OF HEAD, INITIAL ENCOUNTER: ICD-10-CM

## 2019-04-07 DIAGNOSIS — I48.91 ATRIAL FIBRILLATION WITH RVR (HCC): Primary | ICD-10-CM

## 2019-04-07 DIAGNOSIS — G44.1 OTHER VASCULAR HEADACHE: ICD-10-CM

## 2019-04-07 DIAGNOSIS — G40.909 RECURRENT SEIZURES (HCC): ICD-10-CM

## 2019-04-07 PROBLEM — E66.01 MORBID OBESITY (HCC): Status: ACTIVE | Noted: 2019-04-07

## 2019-04-07 PROBLEM — I10 HYPERTENSION: Status: ACTIVE | Noted: 2019-04-07

## 2019-04-07 PROBLEM — M79.606 LEG PAIN: Status: ACTIVE | Noted: 2019-04-07

## 2019-04-07 LAB
ALBUMIN SERPL-MCNC: 3.2 G/DL (ref 3.4–5)
ALBUMIN SERPL-MCNC: 3.3 G/DL (ref 3.4–5)
ALBUMIN/GLOB SERPL: 0.9 {RATIO} (ref 0.8–1.7)
ALBUMIN/GLOB SERPL: 0.9 {RATIO} (ref 0.8–1.7)
ALP SERPL-CCNC: 90 U/L (ref 45–117)
ALP SERPL-CCNC: 98 U/L (ref 45–117)
ALT SERPL-CCNC: 31 U/L (ref 16–61)
ALT SERPL-CCNC: 31 U/L (ref 16–61)
AMPHET UR QL SCN: NEGATIVE
ANION GAP SERPL CALC-SCNC: 12 MMOL/L (ref 3–18)
ANION GAP SERPL CALC-SCNC: 9 MMOL/L (ref 3–18)
AST SERPL-CCNC: 24 U/L (ref 15–37)
AST SERPL-CCNC: 28 U/L (ref 15–37)
ATRIAL RATE: 147 BPM
ATRIAL RATE: 69 BPM
BARBITURATES UR QL SCN: NEGATIVE
BASOPHILS # BLD: 0 K/UL (ref 0–0.1)
BASOPHILS NFR BLD: 0 % (ref 0–2)
BENZODIAZ UR QL: NEGATIVE
BILIRUB SERPL-MCNC: 0.2 MG/DL (ref 0.2–1)
BILIRUB SERPL-MCNC: 0.3 MG/DL (ref 0.2–1)
BUN SERPL-MCNC: 10 MG/DL (ref 7–18)
BUN SERPL-MCNC: 12 MG/DL (ref 7–18)
BUN/CREAT SERPL: 11 (ref 12–20)
BUN/CREAT SERPL: 12 (ref 12–20)
CALCIUM SERPL-MCNC: 8.3 MG/DL (ref 8.5–10.1)
CALCIUM SERPL-MCNC: 8.4 MG/DL (ref 8.5–10.1)
CALCULATED P AXIS, ECG09: 104 DEGREES
CALCULATED R AXIS, ECG10: 11 DEGREES
CALCULATED R AXIS, ECG10: 6 DEGREES
CALCULATED T AXIS, ECG11: 171 DEGREES
CALCULATED T AXIS, ECG11: 9 DEGREES
CANNABINOIDS UR QL SCN: NEGATIVE
CHLORIDE SERPL-SCNC: 107 MMOL/L (ref 100–108)
CHLORIDE SERPL-SCNC: 109 MMOL/L (ref 100–108)
CK MB CFR SERPL CALC: 0.9 % (ref 0–4)
CK MB CFR SERPL CALC: 1.8 % (ref 0–4)
CK MB SERPL-MCNC: 1.2 NG/ML (ref 5–25)
CK MB SERPL-MCNC: 3 NG/ML (ref 5–25)
CK SERPL-CCNC: 134 U/L (ref 39–308)
CK SERPL-CCNC: 171 U/L (ref 39–308)
CO2 SERPL-SCNC: 22 MMOL/L (ref 21–32)
CO2 SERPL-SCNC: 27 MMOL/L (ref 21–32)
COCAINE UR QL SCN: NEGATIVE
CREAT SERPL-MCNC: 0.86 MG/DL (ref 0.6–1.3)
CREAT SERPL-MCNC: 1.05 MG/DL (ref 0.6–1.3)
DIAGNOSIS, 93000: NORMAL
DIAGNOSIS, 93000: NORMAL
DIFFERENTIAL METHOD BLD: ABNORMAL
EOSINOPHIL # BLD: 0.1 K/UL (ref 0–0.4)
EOSINOPHIL NFR BLD: 2 % (ref 0–5)
ERYTHROCYTE [DISTWIDTH] IN BLOOD BY AUTOMATED COUNT: 12.8 % (ref 11.6–14.5)
ETHANOL SERPL-MCNC: <3 MG/DL (ref 0–3)
GLOBULIN SER CALC-MCNC: 3.6 G/DL (ref 2–4)
GLOBULIN SER CALC-MCNC: 3.8 G/DL (ref 2–4)
GLUCOSE SERPL-MCNC: 110 MG/DL (ref 74–99)
GLUCOSE SERPL-MCNC: 128 MG/DL (ref 74–99)
HCT VFR BLD AUTO: 43.7 % (ref 36–48)
HDSCOM,HDSCOM: NORMAL
HGB BLD-MCNC: 14.4 G/DL (ref 13–16)
LYMPHOCYTES # BLD: 3.3 K/UL (ref 0.9–3.6)
LYMPHOCYTES NFR BLD: 46 % (ref 21–52)
MAGNESIUM SERPL-MCNC: 2.4 MG/DL (ref 1.6–2.6)
MCH RBC QN AUTO: 31.7 PG (ref 24–34)
MCHC RBC AUTO-ENTMCNC: 33 G/DL (ref 31–37)
MCV RBC AUTO: 96.3 FL (ref 74–97)
METHADONE UR QL: NEGATIVE
MONOCYTES # BLD: 0.4 K/UL (ref 0.05–1.2)
MONOCYTES NFR BLD: 6 % (ref 3–10)
NEUTS SEG # BLD: 3.2 K/UL (ref 1.8–8)
NEUTS SEG NFR BLD: 46 % (ref 40–73)
OPIATES UR QL: NEGATIVE
P-R INTERVAL, ECG05: 160 MS
PCP UR QL: NEGATIVE
PLATELET # BLD AUTO: 193 K/UL (ref 135–420)
PMV BLD AUTO: 11.3 FL (ref 9.2–11.8)
POTASSIUM SERPL-SCNC: 3.9 MMOL/L (ref 3.5–5.5)
POTASSIUM SERPL-SCNC: 3.9 MMOL/L (ref 3.5–5.5)
PROT SERPL-MCNC: 6.8 G/DL (ref 6.4–8.2)
PROT SERPL-MCNC: 7.1 G/DL (ref 6.4–8.2)
Q-T INTERVAL, ECG07: 306 MS
Q-T INTERVAL, ECG07: 368 MS
QRS DURATION, ECG06: 86 MS
QRS DURATION, ECG06: 86 MS
QTC CALCULATION (BEZET), ECG08: 394 MS
QTC CALCULATION (BEZET), ECG08: 470 MS
RBC # BLD AUTO: 4.54 M/UL (ref 4.7–5.5)
SODIUM SERPL-SCNC: 143 MMOL/L (ref 136–145)
SODIUM SERPL-SCNC: 143 MMOL/L (ref 136–145)
TROPONIN I SERPL-MCNC: 0.05 NG/ML (ref 0–0.04)
TROPONIN I SERPL-MCNC: 0.12 NG/ML (ref 0–0.04)
TSH SERPL DL<=0.05 MIU/L-ACNC: 0.86 UIU/ML (ref 0.36–3.74)
VALPROATE SERPL-MCNC: 68 UG/ML (ref 50–100)
VENTRICULAR RATE, ECG03: 142 BPM
VENTRICULAR RATE, ECG03: 69 BPM
WBC # BLD AUTO: 7 K/UL (ref 4.6–13.2)

## 2019-04-07 PROCEDURE — 96365 THER/PROPH/DIAG IV INF INIT: CPT

## 2019-04-07 PROCEDURE — 74011000250 HC RX REV CODE- 250

## 2019-04-07 PROCEDURE — 96375 TX/PRO/DX INJ NEW DRUG ADDON: CPT

## 2019-04-07 PROCEDURE — 71045 X-RAY EXAM CHEST 1 VIEW: CPT

## 2019-04-07 PROCEDURE — 74011250637 HC RX REV CODE- 250/637: Performed by: EMERGENCY MEDICINE

## 2019-04-07 PROCEDURE — 74011250636 HC RX REV CODE- 250/636

## 2019-04-07 PROCEDURE — 74011000258 HC RX REV CODE- 258: Performed by: EMERGENCY MEDICINE

## 2019-04-07 PROCEDURE — 74011250636 HC RX REV CODE- 250/636: Performed by: EMERGENCY MEDICINE

## 2019-04-07 PROCEDURE — 96376 TX/PRO/DX INJ SAME DRUG ADON: CPT

## 2019-04-07 PROCEDURE — 96361 HYDRATE IV INFUSION ADD-ON: CPT

## 2019-04-07 PROCEDURE — 96372 THER/PROPH/DIAG INJ SC/IM: CPT

## 2019-04-07 PROCEDURE — 99285 EMERGENCY DEPT VISIT HI MDM: CPT

## 2019-04-07 PROCEDURE — 80164 ASSAY DIPROPYLACETIC ACD TOT: CPT

## 2019-04-07 PROCEDURE — 74011250636 HC RX REV CODE- 250/636: Performed by: INTERNAL MEDICINE

## 2019-04-07 PROCEDURE — 80053 COMPREHEN METABOLIC PANEL: CPT

## 2019-04-07 PROCEDURE — 80307 DRUG TEST PRSMV CHEM ANLYZR: CPT

## 2019-04-07 PROCEDURE — 96366 THER/PROPH/DIAG IV INF ADDON: CPT

## 2019-04-07 PROCEDURE — 65660000004 HC RM CVT STEPDOWN

## 2019-04-07 PROCEDURE — 85025 COMPLETE CBC W/AUTO DIFF WBC: CPT

## 2019-04-07 PROCEDURE — 93970 EXTREMITY STUDY: CPT

## 2019-04-07 PROCEDURE — 74011000250 HC RX REV CODE- 250: Performed by: EMERGENCY MEDICINE

## 2019-04-07 PROCEDURE — 36415 COLL VENOUS BLD VENIPUNCTURE: CPT

## 2019-04-07 PROCEDURE — 83735 ASSAY OF MAGNESIUM: CPT

## 2019-04-07 PROCEDURE — 82550 ASSAY OF CK (CPK): CPT

## 2019-04-07 PROCEDURE — 93005 ELECTROCARDIOGRAM TRACING: CPT

## 2019-04-07 PROCEDURE — 84443 ASSAY THYROID STIM HORMONE: CPT

## 2019-04-07 PROCEDURE — 74011250637 HC RX REV CODE- 250/637: Performed by: INTERNAL MEDICINE

## 2019-04-07 PROCEDURE — 70450 CT HEAD/BRAIN W/O DYE: CPT

## 2019-04-07 RX ORDER — AMIODARONE HYDROCHLORIDE 150 MG/3ML
150 INJECTION, SOLUTION INTRAVENOUS
Status: COMPLETED | OUTPATIENT
Start: 2019-04-07 | End: 2019-04-07

## 2019-04-07 RX ORDER — ENOXAPARIN SODIUM 150 MG/ML
1 INJECTION SUBCUTANEOUS EVERY 12 HOURS
Status: DISCONTINUED | OUTPATIENT
Start: 2019-04-07 | End: 2019-04-10 | Stop reason: HOSPADM

## 2019-04-07 RX ORDER — DIVALPROEX SODIUM 250 MG/1
250 TABLET, DELAYED RELEASE ORAL EVERY 8 HOURS
Status: CANCELLED | OUTPATIENT
Start: 2019-04-07

## 2019-04-07 RX ORDER — ASPIRIN 325 MG
325 TABLET ORAL
Status: COMPLETED | OUTPATIENT
Start: 2019-04-07 | End: 2019-04-07

## 2019-04-07 RX ORDER — IBUPROFEN 200 MG
400 TABLET ORAL ONCE
Status: COMPLETED | OUTPATIENT
Start: 2019-04-07 | End: 2019-04-07

## 2019-04-07 RX ORDER — DILTIAZEM HYDROCHLORIDE 5 MG/ML
20 INJECTION INTRAVENOUS ONCE
Status: COMPLETED | OUTPATIENT
Start: 2019-04-07 | End: 2019-04-07

## 2019-04-07 RX ORDER — ENOXAPARIN SODIUM 150 MG/ML
140 INJECTION SUBCUTANEOUS
Status: COMPLETED | OUTPATIENT
Start: 2019-04-07 | End: 2019-04-07

## 2019-04-07 RX ORDER — KETAMINE HYDROCHLORIDE 50 MG/ML
70 INJECTION, SOLUTION INTRAMUSCULAR; INTRAVENOUS ONCE
Status: COMPLETED | OUTPATIENT
Start: 2019-04-07 | End: 2019-04-07

## 2019-04-07 RX ORDER — DIGOXIN 0.25 MG/ML
350 INJECTION INTRAMUSCULAR; INTRAVENOUS
Status: COMPLETED | OUTPATIENT
Start: 2019-04-07 | End: 2019-04-07

## 2019-04-07 RX ORDER — ONDANSETRON 2 MG/ML
INJECTION INTRAMUSCULAR; INTRAVENOUS
Status: DISPENSED
Start: 2019-04-07 | End: 2019-04-07

## 2019-04-07 RX ORDER — GUAIFENESIN 100 MG/5ML
81 LIQUID (ML) ORAL DAILY
Status: DISCONTINUED | OUTPATIENT
Start: 2019-04-08 | End: 2019-04-10 | Stop reason: HOSPADM

## 2019-04-07 RX ORDER — LORAZEPAM 2 MG/ML
2 INJECTION INTRAMUSCULAR
Status: DISCONTINUED | OUTPATIENT
Start: 2019-04-07 | End: 2019-04-10 | Stop reason: HOSPADM

## 2019-04-07 RX ORDER — ADENOSINE 3 MG/ML
INJECTION, SOLUTION INTRAVENOUS
Status: COMPLETED
Start: 2019-04-07 | End: 2019-04-07

## 2019-04-07 RX ORDER — HYDROCODONE BITARTRATE AND ACETAMINOPHEN 5; 325 MG/1; MG/1
2 TABLET ORAL
Status: DISCONTINUED | OUTPATIENT
Start: 2019-04-07 | End: 2019-04-08 | Stop reason: SINTOL

## 2019-04-07 RX ORDER — SODIUM CHLORIDE 0.9 % (FLUSH) 0.9 %
5-40 SYRINGE (ML) INJECTION AS NEEDED
Status: DISCONTINUED | OUTPATIENT
Start: 2019-04-07 | End: 2019-04-10 | Stop reason: HOSPADM

## 2019-04-07 RX ORDER — ACETAMINOPHEN 325 MG/1
650 TABLET ORAL
Status: DISCONTINUED | OUTPATIENT
Start: 2019-04-07 | End: 2019-04-08 | Stop reason: SINTOL

## 2019-04-07 RX ORDER — NITROGLYCERIN 0.4 MG/1
0.4 TABLET SUBLINGUAL
Status: COMPLETED | OUTPATIENT
Start: 2019-04-07 | End: 2019-04-07

## 2019-04-07 RX ORDER — SODIUM CHLORIDE 0.9 % (FLUSH) 0.9 %
5-40 SYRINGE (ML) INJECTION EVERY 8 HOURS
Status: DISCONTINUED | OUTPATIENT
Start: 2019-04-07 | End: 2019-04-10 | Stop reason: HOSPADM

## 2019-04-07 RX ORDER — LANOLIN ALCOHOL/MO/W.PET/CERES
3 CREAM (GRAM) TOPICAL
Status: DISCONTINUED | OUTPATIENT
Start: 2019-04-07 | End: 2019-04-10 | Stop reason: HOSPADM

## 2019-04-07 RX ORDER — DILTIAZEM HYDROCHLORIDE 5 MG/ML
INJECTION INTRAVENOUS
Status: COMPLETED
Start: 2019-04-07 | End: 2019-04-07

## 2019-04-07 RX ORDER — LISINOPRIL 20 MG/1
20 TABLET ORAL DAILY
Status: DISCONTINUED | OUTPATIENT
Start: 2019-04-08 | End: 2019-04-08

## 2019-04-07 RX ORDER — MORPHINE SULFATE 4 MG/ML
4 INJECTION INTRAVENOUS
Status: COMPLETED | OUTPATIENT
Start: 2019-04-07 | End: 2019-04-07

## 2019-04-07 RX ORDER — ADENOSINE 3 MG/ML
6 INJECTION, SOLUTION INTRAVENOUS
Status: COMPLETED | OUTPATIENT
Start: 2019-04-07 | End: 2019-04-07

## 2019-04-07 RX ORDER — FUROSEMIDE 10 MG/ML
20 INJECTION INTRAMUSCULAR; INTRAVENOUS ONCE
Status: ACTIVE | OUTPATIENT
Start: 2019-04-07 | End: 2019-04-08

## 2019-04-07 RX ORDER — DIVALPROEX SODIUM 250 MG/1
500 TABLET, DELAYED RELEASE ORAL EVERY 12 HOURS
Status: DISCONTINUED | OUTPATIENT
Start: 2019-04-08 | End: 2019-04-10 | Stop reason: HOSPADM

## 2019-04-07 RX ORDER — DIVALPROEX SODIUM 250 MG/1
500 TABLET, DELAYED RELEASE ORAL EVERY 12 HOURS
Status: DISCONTINUED | OUTPATIENT
Start: 2019-04-07 | End: 2019-04-07

## 2019-04-07 RX ORDER — DILTIAZEM HYDROCHLORIDE 5 MG/ML
20 INJECTION INTRAVENOUS
Status: COMPLETED | OUTPATIENT
Start: 2019-04-07 | End: 2019-04-07

## 2019-04-07 RX ORDER — MORPHINE SULFATE 2 MG/ML
2 INJECTION, SOLUTION INTRAMUSCULAR; INTRAVENOUS
Status: DISCONTINUED | OUTPATIENT
Start: 2019-04-07 | End: 2019-04-10 | Stop reason: HOSPADM

## 2019-04-07 RX ORDER — FENTANYL CITRATE 50 UG/ML
100 INJECTION, SOLUTION INTRAMUSCULAR; INTRAVENOUS
Status: DISCONTINUED | OUTPATIENT
Start: 2019-04-07 | End: 2019-04-07

## 2019-04-07 RX ADMIN — MORPHINE SULFATE 2 MG: 2 INJECTION, SOLUTION INTRAMUSCULAR; INTRAVENOUS at 17:16

## 2019-04-07 RX ADMIN — DILTIAZEM HYDROCHLORIDE 20 MG: 5 INJECTION INTRAVENOUS at 06:20

## 2019-04-07 RX ADMIN — NITROGLYCERIN 0.4 MG: 0.4 TABLET SUBLINGUAL at 07:26

## 2019-04-07 RX ADMIN — ASPIRIN 325 MG: 325 TABLET ORAL at 09:02

## 2019-04-07 RX ADMIN — DILTIAZEM HYDROCHLORIDE 20 MG: 5 INJECTION INTRAVENOUS at 07:19

## 2019-04-07 RX ADMIN — MORPHINE SULFATE 4 MG: 4 INJECTION INTRAVENOUS at 08:03

## 2019-04-07 RX ADMIN — AMIODARONE HYDROCHLORIDE 1 MG/MIN: 1.8 INJECTION, SOLUTION INTRAVENOUS at 09:50

## 2019-04-07 RX ADMIN — DIGOXIN 350 MCG: 0.25 INJECTION INTRAMUSCULAR; INTRAVENOUS at 09:31

## 2019-04-07 RX ADMIN — ENOXAPARIN SODIUM 140 MG: 150 INJECTION SUBCUTANEOUS at 09:25

## 2019-04-07 RX ADMIN — ADENOSINE 6 MG: 3 INJECTION, SOLUTION INTRAVENOUS at 09:32

## 2019-04-07 RX ADMIN — KETAMINE HYDROCHLORIDE 70 MG: 50 INJECTION, SOLUTION INTRAMUSCULAR; INTRAVENOUS at 09:36

## 2019-04-07 RX ADMIN — SODIUM CHLORIDE 1000 ML: 9 INJECTION, SOLUTION INTRAVENOUS at 06:08

## 2019-04-07 RX ADMIN — IBUPROFEN 400 MG: 200 TABLET, FILM COATED ORAL at 21:51

## 2019-04-07 RX ADMIN — ADENOSINE 6 MG: 3 INJECTION INTRAVENOUS at 09:32

## 2019-04-07 RX ADMIN — AMIODARONE HYDROCHLORIDE 150 MG: 50 INJECTION, SOLUTION INTRAVENOUS at 09:57

## 2019-04-07 RX ADMIN — ENOXAPARIN SODIUM 140 MG: 150 INJECTION SUBCUTANEOUS at 21:52

## 2019-04-07 RX ADMIN — SODIUM CHLORIDE 5 MG/HR: 900 INJECTION, SOLUTION INTRAVENOUS at 06:23
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
## 2019-04-07 NOTE — PROGRESS NOTES
PT arrived via medic. Pts roommate witness patient having seizure. Pt has a history of seizures and is compliant with medications. Once placed on monitor, medics noted Afib with RVR, which is a new onset for the patient.  Medics placed 20g to right wrist. Pt on 2L, Afiv with RVR in 140's, /80, RR 18

## 2019-04-07 NOTE — ED NOTES
TRANSFER - OUT REPORT: 
 
Verbal report given to Kristine Chakraborty on Pierrette Apley  being transferred to [facility] for routine progression of care Report consisted of patients Situation, Background, Assessment and  
Recommendations(SBAR). Information from the following report(s) SBAR was reviewed with the receiving nurse. Lines:  
Peripheral IV 04/07/19 Right Wrist (Active) Site Assessment Clean, dry, & intact 4/7/2019  6:32 AM  
Phlebitis Assessment 0 4/7/2019  6:32 AM  
Infiltration Assessment 0 4/7/2019  6:32 AM  
Dressing Status Clean, dry, & intact 4/7/2019  6:32 AM  
Dressing Type Transparent 4/7/2019  6:32 AM  
Hub Color/Line Status Flushed 4/7/2019  6:32 AM  
   
Peripheral IV 04/07/19 Left Antecubital (Active) Site Assessment Clean, dry, & intact 4/7/2019  6:33 AM  
Phlebitis Assessment 0 4/7/2019  6:33 AM  
Infiltration Assessment 0 4/7/2019  6:33 AM  
Dressing Status Clean, dry, & intact 4/7/2019  6:33 AM  
Dressing Type Transparent 4/7/2019  6:33 AM  
Hub Color/Line Status Flushed 4/7/2019  6:33 AM  
  
 
Opportunity for questions and clarification was provided. Patient transported with: 
 "[employer]"

## 2019-04-07 NOTE — PROGRESS NOTES
MRI Safety Screening form needs to be filled out and FAXED to [phone] MRI can be scheduled. If unable to acquire information from patient  [facility] must be contacted, or screening xrays will need to be ordered.    
IF pt is Claustrophobic or will need Pain Meds please have these ordered in advance to help facilitate MRI exam.

## 2019-04-07 NOTE — ED NOTES
6:30 AM :Pt care assumed from Dr. Rustam Page , ED provider. Pt complaint(s), current treatment plan, progression and available diagnostic results have been discussed thoroughly. Intended Disposition: admit Pending diagnostic reports and/or labs (please list): labs, CT 
 
6647 on reevaluation patient continues to have A. fib with RVR with a heart rate between 101 30. Patient also complained of chest pain therefore nitroglycerin was ordered. For patient's A. fib we ordered 20 mg of Cardizem IV and increase the infusion to 10 mg/hour. 0745 patient states that the pain did not improve with nitroglycerin therefore at this point morphine 4 mg IV ordered. 0815 patient's heart rate continues to be elevated despite Cardizem 10 mg/hr. I discussed the case with Dr. Herlinda Sue who agrees with starting digoxin. He also recommended fluids but 1 L already given. And he requests increasing the Cardizem which will put at 12.5 mg/h. Digoxin 8 mcg/kg divided into 3 doses given which is one load of 350 mcg IV at this time. 0900 CT head is negative Lovenox 140 mg subcutaneous and aspirin 324 mg p.o. ordered. I discussed case with Dr. Deluna Members who will admit patient. 0930 Dr Priscilla White in ED and evaluated patient. He continues to have chest pain and his heart rate remains at 137. Will use adenosine to see if patient was in a flutter versus A. fib versus SVT. Patient's rhythm strip after using adenosine showed patient was in a flutter with variable block. Since patient continued to have chest pain we then cardioverted patient in the emergency department with Dr. Herlinda Sue at bedside. We gave Pain dose of ketamine 70 mg IV (0.5mg/kg) for pain and then cardioverted at 150 J. Patient was converted to sinus rhythm repeat EKG was done and adenosine 150 mg IV bolus then 1 mg/h infusion ordered. Disposition:  
Admitted 1. Atrial fibrillation with RVR ([facility] 2. Recurrent seizures ([facility] [address] 3. Seizure ([facility] [address] 4. Injury of head, initial encounter Follow-up Information None Patient's Medications Start Taking No medications on file Continue Taking ACETAMINOPHEN (TYLENOL) 325 MG TABLET    Take 2 Tabs by mouth every four (4) hours as needed for Pain. ARIPIPRAZOLE (ABILIFY) 10 MG TABLET    Take 1 Tab by mouth daily. Indications: BIPOLAR DISORDER IN REMISSION  
 BUTALBITAL-ACETAMINOPHEN-CAFF (FIORICET) -40 MG PER CAPSULE    Take 2 Caps by mouth every eight (8) hours as needed for Pain. DIVALPROEX DR (DEPAKOTE) 250 MG TABLET    Take 2 tabs in the morning and 1 tab in the evening HYDROCODONE-ACETAMINOPHEN (NORCO) 5-325 MG PER TABLET    Take 1-2 tablets PO every 4-6 hours as needed for pain control. If over the counter ibuprofen or acetaminophen was suggested, then only take the vicodin for pain not well controlled with the over the counter medication. LISINOPRIL (PRINIVIL, ZESTRIL) 10 MG TABLET    Take 2 Tabs by mouth. Indications: hypertension MELATONIN 3 MG TABLET    Take 2 Tabs by mouth nightly as needed. Indications: Insomnia NAPROXEN (NAPROSYN) 500 MG TABLET    Take 1 Tab by mouth two (2) times daily (with meals). POTASSIUM CHLORIDE (KLOR-CON) 20 MEQ PACKET    Take 1 Packet by mouth daily. These Medications have changed No medications on file Stop Taking No medications on file
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
## 2019-04-08 ENCOUNTER — HOSPITAL ENCOUNTER (INPATIENT)
Dept: CT IMAGING | Age: 49
Discharge: HOME | DRG: 101 | End: 2019-04-08
Attending: INTERNAL MEDICINE
Payer: MEDICARE

## 2019-04-08 ENCOUNTER — APPOINTMENT (OUTPATIENT)
Dept: NON INVASIVE DIAGNOSTICS | Age: 49
DRG: 101 | End: 2019-04-08
Attending: INTERNAL MEDICINE
Payer: MEDICARE

## 2019-04-08 LAB
BASOPHILS # BLD: 0 K/UL (ref 0–0.1)
BASOPHILS NFR BLD: 0 % (ref 0–2)
CK SERPL-CCNC: 162 U/L (ref 39–308)
DIFFERENTIAL METHOD BLD: ABNORMAL
ECHO AO ROOT DIAM: 4.79 CM
ECHO LA AREA 4C: 17.4 CM2
ECHO LA VOL 2C: 70.4 ML (ref 18–58)
ECHO LA VOL 4C: 45.59 ML (ref 18–58)
ECHO LA VOL BP: 63.97 ML (ref 18–58)
ECHO LA VOL/BSA BIPLANE: 24.2 ML/M2 (ref 16–28)
ECHO LA VOLUME INDEX A2C: 26.63 ML/M2 (ref 16–28)
ECHO LA VOLUME INDEX A4C: 17.25 ML/M2 (ref 16–28)
ECHO LV INTERNAL DIMENSION DIASTOLIC: 5.49 CM (ref 4.2–5.9)
ECHO LV INTERNAL DIMENSION SYSTOLIC: 3.59 CM
ECHO LV IVSD: 1.38 CM (ref 0.6–1)
ECHO LV MASS 2D: 391.5 G (ref 88–224)
ECHO LV MASS INDEX 2D: 148.1 G/M2 (ref 49–115)
ECHO LV POSTERIOR WALL DIASTOLIC: 1.34 CM (ref 0.6–1)
ECHO LVOT DIAM: 2.5 CM
ECHO LVOT PEAK GRADIENT: 2.9 MMHG
ECHO LVOT PEAK VELOCITY: 85.61 CM/S
ECHO LVOT VTI: 15.29 CM
ECHO MV A VELOCITY: 63.66 CM/S
ECHO MV E DECELERATION TIME (DT): 130 MS
ECHO MV E VELOCITY: 65.12 CM/S
ECHO MV E/A RATIO: 1.02
EOSINOPHIL # BLD: 0.1 K/UL (ref 0–0.4)
EOSINOPHIL NFR BLD: 2 % (ref 0–5)
ERYTHROCYTE [DISTWIDTH] IN BLOOD BY AUTOMATED COUNT: 13.2 % (ref 11.6–14.5)
HCT VFR BLD AUTO: 40.7 % (ref 36–48)
HGB BLD-MCNC: 13.2 G/DL (ref 13–16)
LYMPHOCYTES # BLD: 2.4 K/UL (ref 0.9–3.6)
LYMPHOCYTES NFR BLD: 42 % (ref 21–52)
MAGNESIUM SERPL-MCNC: 2.4 MG/DL (ref 1.6–2.6)
MCH RBC QN AUTO: 31.4 PG (ref 24–34)
MCHC RBC AUTO-ENTMCNC: 32.4 G/DL (ref 31–37)
MCV RBC AUTO: 96.9 FL (ref 74–97)
MONOCYTES # BLD: 0.4 K/UL (ref 0.05–1.2)
MONOCYTES NFR BLD: 7 % (ref 3–10)
NEUTS SEG # BLD: 2.8 K/UL (ref 1.8–8)
NEUTS SEG NFR BLD: 49 % (ref 40–73)
PLATELET # BLD AUTO: 202 K/UL (ref 135–420)
PMV BLD AUTO: 11.5 FL (ref 9.2–11.8)
RBC # BLD AUTO: 4.2 M/UL (ref 4.7–5.5)
WBC # BLD AUTO: 5.8 K/UL (ref 4.6–13.2)

## 2019-04-08 PROCEDURE — 65660000004 HC RM CVT STEPDOWN

## 2019-04-08 PROCEDURE — 74011250636 HC RX REV CODE- 250/636: Performed by: INTERNAL MEDICINE

## 2019-04-08 PROCEDURE — 83735 ASSAY OF MAGNESIUM: CPT

## 2019-04-08 PROCEDURE — 74011636320 HC RX REV CODE- 636/320: Performed by: INTERNAL MEDICINE

## 2019-04-08 PROCEDURE — 74011250637 HC RX REV CODE- 250/637: Performed by: INTERNAL MEDICINE

## 2019-04-08 PROCEDURE — 85025 COMPLETE CBC W/AUTO DIFF WBC: CPT

## 2019-04-08 PROCEDURE — 36415 COLL VENOUS BLD VENIPUNCTURE: CPT

## 2019-04-08 PROCEDURE — 82550 ASSAY OF CK (CPK): CPT

## 2019-04-08 PROCEDURE — C8929 TTE W OR WO FOL WCON,DOPPLER: HCPCS

## 2019-04-08 PROCEDURE — 71275 CT ANGIOGRAPHY CHEST: CPT

## 2019-04-08 PROCEDURE — 94762 N-INVAS EAR/PLS OXIMTRY CONT: CPT

## 2019-04-08 PROCEDURE — 74011000258 HC RX REV CODE- 258: Performed by: PSYCHIATRY & NEUROLOGY

## 2019-04-08 PROCEDURE — 74011250636 HC RX REV CODE- 250/636: Performed by: PSYCHIATRY & NEUROLOGY

## 2019-04-08 RX ORDER — AMLODIPINE BESYLATE 5 MG/1
5 TABLET ORAL DAILY
Status: DISCONTINUED | OUTPATIENT
Start: 2019-04-08 | End: 2019-04-09

## 2019-04-08 RX ORDER — TRAMADOL HYDROCHLORIDE 50 MG/1
50 TABLET ORAL
Status: DISCONTINUED | OUTPATIENT
Start: 2019-04-08 | End: 2019-04-09

## 2019-04-08 RX ORDER — IBUPROFEN 200 MG
200 TABLET ORAL
Status: DISCONTINUED | OUTPATIENT
Start: 2019-04-08 | End: 2019-04-10 | Stop reason: HOSPADM

## 2019-04-08 RX ORDER — LISINOPRIL 40 MG/1
40 TABLET ORAL DAILY
Status: DISCONTINUED | OUTPATIENT
Start: 2019-04-08 | End: 2019-04-10 | Stop reason: HOSPADM

## 2019-04-08 RX ORDER — NIFEDIPINE 10 MG/1
10 CAPSULE ORAL
Status: COMPLETED | OUTPATIENT
Start: 2019-04-08 | End: 2019-04-08

## 2019-04-08 RX ADMIN — IOPAMIDOL 100 ML: 755 INJECTION, SOLUTION INTRAVENOUS at 14:34

## 2019-04-08 RX ADMIN — DIVALPROEX SODIUM 500 MG: 250 TABLET, DELAYED RELEASE ORAL at 21:45

## 2019-04-08 RX ADMIN — DIVALPROEX SODIUM 500 MG: 250 TABLET, DELAYED RELEASE ORAL at 00:34

## 2019-04-08 RX ADMIN — NIFEDIPINE 10 MG: 10 CAPSULE, LIQUID FILLED ORAL at 05:51

## 2019-04-08 RX ADMIN — Medication 10 ML: at 15:46

## 2019-04-08 RX ADMIN — MORPHINE SULFATE 2 MG: 2 INJECTION, SOLUTION INTRAMUSCULAR; INTRAVENOUS at 11:41

## 2019-04-08 RX ADMIN — ENOXAPARIN SODIUM 140 MG: 150 INJECTION SUBCUTANEOUS at 21:43

## 2019-04-08 RX ADMIN — ASPIRIN 81 MG 81 MG: 81 TABLET ORAL at 08:20

## 2019-04-08 RX ADMIN — PERFLUTREN 2 ML: 6.52 INJECTION, SUSPENSION INTRAVENOUS at 16:00

## 2019-04-08 RX ADMIN — AMLODIPINE BESYLATE 5 MG: 5 TABLET ORAL at 19:40

## 2019-04-08 RX ADMIN — Medication 10 ML: at 22:29

## 2019-04-08 RX ADMIN — TRAMADOL HYDROCHLORIDE 50 MG: 50 TABLET, FILM COATED ORAL at 22:28

## 2019-04-08 RX ADMIN — IBUPROFEN 200 MG: 200 TABLET, FILM COATED ORAL at 09:17

## 2019-04-08 RX ADMIN — LISINOPRIL 40 MG: 40 TABLET ORAL at 08:20

## 2019-04-08 RX ADMIN — Medication 10 ML: at 00:34

## 2019-04-08 RX ADMIN — SODIUM CHLORIDE 500 MG: 900 INJECTION, SOLUTION INTRAVENOUS at 15:46

## 2019-04-08 RX ADMIN — ENOXAPARIN SODIUM 140 MG: 150 INJECTION SUBCUTANEOUS at 08:21

## 2019-04-08 RX ADMIN — DIVALPROEX SODIUM 500 MG: 250 TABLET, DELAYED RELEASE ORAL at 08:20

## 2019-04-08 RX ADMIN — Medication 10 ML: at 05:22
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
## 2019-04-08 NOTE — NURSE NAVIGATOR
Reason for Admission:  Atrial fibrillation with RVR ([facility] [I48.91] Seizure ([facility] [R56.9] Head injury [S09.90XA] New onset a-fib ([facility] [I48.91] RRAT Score:    14 Plan for utilizing home health: To be determined Likelihood of Readmission:   Moderate Do you (patient/family) have any concerns for transition/discharge?  no 
 
Transition of Care Plan:    
 
Initial assessment completed with patient. Cognitive status of patient: oriented to time, place, person and situation. Face sheet information confirmed:  yes. The patient designates Kristi Murphy, girlfriend,  to participate in his discharge plan and to receive any needed information. This patient lives alone in a single family home. Patient is able to navigate steps as needed. Prior to hospitalization, patient was considered to be independent with ADLs/IADLS : yes , however doesn't drive due to seizures. Patient has a current ACP document on file: yes The girlfriend will be available to transport patient home upon discharge. The patient does not have any DME in his home at this time. Patient is not currently active with home health. Patient has stayed in a skilled nursing facility or rehab. Was  stay within last 60 days : no. This patient is on dialysis :no 
 
List of available Home Health agencies were provided. Freedom of choice signed: no. Currently, the discharge plan is Home vs Home with  Place [facility]. The patient states that he can obtain his medications from the pharmacy, and take his medications as directed. Patient's current insurance is [employer] Kristi Murphy girlfriend ph : [phone] Sister [facility] [phone] Care Management Interventions PCP Verified by CM: Yes Last Visit to PCP: 04/04/19 Mode of Transport at Discharge: Self Physical Therapy Consult: No 
Occupational Therapy Consult: No 
 Current Support Network: Lives Alone Confirm Follow Up Transport: Family Plan discussed with Pt/Family/Caregiver: Yes Discharge Location Discharge Placement: Home with family assistance Audra Contreras. Julianna, IVANN, RN Care Management [phone]

## 2019-04-08 NOTE — PROGRESS NOTES
[facility] Hospitalist Group Progress Note Patient: Jailyn Sheridan Age: 46 y.o. : 1970 MR#: [id number] [id number]: [id number] Date/Time: 2019 Subjective:  
 
Review of systems No CP  
NO NVD No SOB  NO Cough Assessment/Plan: 1. Paroxysmal afib  
 
2 Seizure disorder 3 Bipolar disorder 4 Morbid obesity PLAN  
- Follow up with cardiology and neurology - S/p Cardioversion Case discussed with:  []Patient  [] Family ( In room with patient )    []Nursing  []Case Management DVT Prophylaxis:  []Lovenox  []Hep SQ  []SCDs  []Coumadin   []On Heparin gtt Objective:  
VS:  
Visit Vitals BP (!) 144/95 Pulse 68 Temp 98.8 °F (37.1 °C) Resp 17 Ht 6' 3\" (1.905 m) Wt 140.6 kg (310 lb) SpO2 92% BMI 38.75 kg/m² Tmax/24hrs: Temp (24hrs), Av.8 °F (37.1 °C), Min:98.6 °F (37 °C), Max:98.9 °F (37.2 °C) IOBRIEF Intake/Output Summary (Last 24 hours) at 2019 1512 Last data filed at 2019 1015 Gross per 24 hour Intake  Output 1100 ml Net -1100 ml General:  Alert, cooperative, no acute distress Cardiovascular: S1S2 - regular , No Murmur Pulmonary: Equal expansion , No Use of accessory muscles , No Rales No Rhonchi GI:  +BS in all four quadrants, soft, non-tender Extremities:  No edema; 2+ dorsalis pedis pulses bilaterally Neuro: Alert and oriented X 2. Medications:  
Current Facility-Administered Medications Medication Dose Route Frequency  lisinopril (PRINIVIL, ZESTRIL) tablet 40 mg  40 mg Oral DAILY  ibuprofen (MOTRIN) tablet 200 mg  200 mg Oral Q6H PRN  
 methylPREDNISolone (PF) (Solu-MEDROL) 500 mg in 0.9% sodium chloride 100 mL IVPB  500 mg IntraVENous ONCE  perflutren lipid microspheres (DEFINITY) contrast injection 2 mL  2 mL IntraVENous CARD ONCE  
 acetaminophen (TYLENOL) tablet 650 mg  650 mg Oral Q4H PRN  
  HYDROcodone-acetaminophen (NORCO) 5-325 mg per tablet 2 Tab  2 Tab Oral Q4H PRN  
 melatonin tablet 3 mg  3 mg Oral QHS PRN  
 sodium chloride (NS) flush 5-40 mL  5-40 mL IntraVENous Q8H  
 sodium chloride (NS) flush 5-40 mL  5-40 mL IntraVENous PRN  
 enoxaparin (LOVENOX) injection 140 mg  1 mg/kg SubCUTAneous Q12H  aspirin chewable tablet 81 mg  81 mg Oral DAILY  LORazepam (ATIVAN) injection 2 mg  2 mg IntraVENous Q2H PRN  
 morphine injection 2 mg  2 mg IntraVENous Q4H PRN  
 divalproex DR (DEPAKOTE) tablet 500 mg  500 mg Oral Q12H Labs:   
Recent Labs 04/08/19 
6907 04/07/19 
8478 WBC 5.8 7.0 HGB 13.2 14.4 HCT 40.7 43.7  193 Recent Labs 04/08/19 
5194 04/07/19 
1845 04/07/19 
3229 NA  --  143 143 K  --  3.9 3.9 CL  --  107 109* CO2  --  27 22 GLU  --  110* 128* BUN  --  10 12 CREA  --  0.86 1.05  
CA  --  8.3* 8.4* MG 2.4  --  2.4 ALB  --  3.2* 3.3* SGOT  --  28 24 ALT  --  31 31 Signed By: Marianna Morley MD   
 April 8, 2019

## 2019-04-08 NOTE — PROGRESS NOTES
Neurology Progress Note Patient ID: Nighat Deng [id number] 
20 y.o. 
1970 Subjective:  
  
Patient with hx of epilepsy secondary to prior right fronto-parietal and temporal lobe  mass secondary to craniectomy, seen today for follow up for seizures. The cause of his breaktrue seizures seems to be due to change in his meds. He is doing well since admission, but developed a constant headache , frontal and temporal, not associated with light sensitivity or nausea. This started after the seizure event. He is not somnolent, no diplopia, the HA is not positional, no new focal neuro deficits. He was found to possible  have afib with RVR with this admission but cardiology found SVT and underwent cardioversion. Current Facility-Administered Medications Medication Dose Route Frequency  lisinopril (PRINIVIL, ZESTRIL) tablet 40 mg  40 mg Oral DAILY  ibuprofen (MOTRIN) tablet 200 mg  200 mg Oral Q6H PRN  
 methylPREDNISolone (PF) (Solu-MEDROL) 500 mg in 0.9% sodium chloride 100 mL IVPB  500 mg IntraVENous ONCE  
 acetaminophen (TYLENOL) tablet 650 mg  650 mg Oral Q4H PRN  
 HYDROcodone-acetaminophen (NORCO) 5-325 mg per tablet 2 Tab  2 Tab Oral Q4H PRN  
 melatonin tablet 3 mg  3 mg Oral QHS PRN  
 sodium chloride (NS) flush 5-40 mL  5-40 mL IntraVENous Q8H  
 sodium chloride (NS) flush 5-40 mL  5-40 mL IntraVENous PRN  
 enoxaparin (LOVENOX) injection 140 mg  1 mg/kg SubCUTAneous Q12H  aspirin chewable tablet 81 mg  81 mg Oral DAILY  LORazepam (ATIVAN) injection 2 mg  2 mg IntraVENous Q2H PRN  
 morphine injection 2 mg  2 mg IntraVENous Q4H PRN  
 divalproex DR (DEPAKOTE) tablet 500 mg  500 mg Oral Q12H Objective: Active hospital medications were reviewed Lab results and neuroradiology studies from the last 24 hours were reviewed. Prior to Admission medications Medication Sig Start Date End Date Taking? Authorizing Provider divalproex DR (DEPAKOTE) 250 mg tablet Take 2 tabs in the morning and 1 tab in the evening 2/6/19   Mackenzie Womack MD  
acetaminophen (TYLENOL) 325 mg tablet Take 2 Tabs by mouth every four (4) hours as needed for Pain. 11/26/18   Adelaida Waterman MD  
potassium chloride (KLOR-CON) 20 mEq packet Take 1 Packet by mouth daily. 10/24/18   Adelaida Waterman MD  
lisinopril (PRINIVIL, ZESTRIL) 10 mg tablet Take 2 Tabs by mouth. Indications: hypertension 7/2/18   Gerda Ken MD  
ARIPiprazole (ABILIFY) 10 mg tablet Take 1 Tab by mouth daily. Indications: BIPOLAR DISORDER IN REMISSION 7/3/18   Gerda Ken MD  
melatonin 3 mg tablet Take 2 Tabs by mouth nightly as needed. Indications: Insomnia 7/2/18   Gerda Ken MD  
naproxen (NAPROSYN) 500 mg tablet Take 1 Tab by mouth two (2) times daily (with meals). 5/10/17   Mackenzie Womack MD  
HYDROcodone-acetaminophen (NORCO) 5-325 mg per tablet Take 1-2 tablets PO every 4-6 hours as needed for pain control. If over the counter ibuprofen or acetaminophen was suggested, then only take the vicodin for pain not well controlled with the over the counter medication. 11/15/12   Helder Singh MD  
 
Patient Vitals for the past 8 hrs: 
 BP Temp Pulse Resp SpO2 Height Weight 04/08/19 1510 (!) 144/95     6' 3\" (1.905 m) 140.6 kg (310 lb) 04/08/19 1205 (!) 144/95 98.8 °F (37.1 °C) 68 17 92 %   [id number] 1901 - 04/08 0700 In: -  
Out: 400 [Urine:400] 04/06 1901 - 04/08 0700 In: -  
Out: 400 [Urine:400]RESULTRCNT(24h)Active Problems: 
  Seizure ([facility] [address] (4/7/2019) Head injury (4/7/2019) Atrial fibrillation with RVR ([facility] [address] (4/7/2019) New onset a-fib ([facility] [address] (4/7/2019) Leg pain (4/7/2019) Morbid obesity ([facility] [address] (4/7/2019) Hypertension (4/7/2019) Additional comments:I reviewed the patient's new clinical lab test results. and I reviewed the patients new imaging test results.   
 
General Exam 
 No acute distress, mucous membranes normal color and hydration status Neurologic Exam 
 
Mental status:  Alert, oriented to person, place, time and circumstance Language: normal fluency and comprehension No visual spatial neglect or overt apraxia Cranial nerves: PERRL, Extraocular movements intact and full, face sensation intact V1-3 bilat,  face symmetric to movement, Tongue midline with normal strength, palat symmetric Motor: strength 5/5 throughout, No pronator drift Gait: not tested Assessment:  
 
Mele Arshad is a 46 y.o. with hx of epilepsy was admitted with breakthrough seizures , now stable on Depakote, level is therapeutic, with ongoing headache. I suspect the headaches are secondary to his seizures and will improve overtime. Will try one dose of solumedrol 500 mg IV and see how he does. Plan: - Solumedrol 500 mg x1 for headache - Depakote PO  500 mg BID  
- If he refuses the brain MRI, that's fine  
- will see his again tiomorrow Signed: 
Elisabeth Adams MD 
Adult Neurologist 
4/8/2019 
4:14 PM

## 2019-04-08 NOTE — PROGRESS NOTES
Bedside and Verbal shift change report given to Sabas Vaughn RN (oncoming nurse) by Narayan Barton RN (offgoing nurse). Report included the following information SBAR, Kardex, ED Summary, Procedure Summary, Intake/Output, MAR, Recent Results, Med Rec Status, Cardiac Rhythm sinus rhythm, Alarm Parameters  and Quality Measures.

## 2019-04-09 PROCEDURE — 74011250636 HC RX REV CODE- 250/636: Performed by: PSYCHIATRY & NEUROLOGY

## 2019-04-09 PROCEDURE — 74011636637 HC RX REV CODE- 636/637: Performed by: PSYCHIATRY & NEUROLOGY

## 2019-04-09 PROCEDURE — 74011250636 HC RX REV CODE- 250/636: Performed by: INTERNAL MEDICINE

## 2019-04-09 PROCEDURE — 74011250637 HC RX REV CODE- 250/637: Performed by: PSYCHIATRY & NEUROLOGY

## 2019-04-09 PROCEDURE — 74011250637 HC RX REV CODE- 250/637: Performed by: INTERNAL MEDICINE

## 2019-04-09 PROCEDURE — 74011250637 HC RX REV CODE- 250/637: Performed by: PHYSICIAN ASSISTANT

## 2019-04-09 PROCEDURE — 94762 N-INVAS EAR/PLS OXIMTRY CONT: CPT

## 2019-04-09 PROCEDURE — 65660000004 HC RM CVT STEPDOWN

## 2019-04-09 RX ORDER — TOPIRAMATE 25 MG/1
50 TABLET ORAL
Status: DISCONTINUED | OUTPATIENT
Start: 2019-04-09 | End: 2019-04-09

## 2019-04-09 RX ORDER — TOPIRAMATE 25 MG/1
50 TABLET ORAL
Status: DISCONTINUED | OUTPATIENT
Start: 2019-04-09 | End: 2019-04-10 | Stop reason: HOSPADM

## 2019-04-09 RX ORDER — MAGNESIUM SULFATE HEPTAHYDRATE 40 MG/ML
2 INJECTION, SOLUTION INTRAVENOUS ONCE
Status: COMPLETED | OUTPATIENT
Start: 2019-04-09 | End: 2019-04-09

## 2019-04-09 RX ORDER — METOPROLOL TARTRATE 25 MG/1
25 TABLET, FILM COATED ORAL EVERY 12 HOURS
Status: DISCONTINUED | OUTPATIENT
Start: 2019-04-09 | End: 2019-04-09

## 2019-04-09 RX ORDER — METOPROLOL TARTRATE 25 MG/1
25 TABLET, FILM COATED ORAL EVERY 12 HOURS
Status: DISCONTINUED | OUTPATIENT
Start: 2019-04-09 | End: 2019-04-10 | Stop reason: HOSPADM

## 2019-04-09 RX ORDER — PROMETHAZINE HYDROCHLORIDE 25 MG/1
25 TABLET ORAL
Status: DISCONTINUED | OUTPATIENT
Start: 2019-04-09 | End: 2019-04-10 | Stop reason: HOSPADM

## 2019-04-09 RX ORDER — PREDNISONE 20 MG/1
60 TABLET ORAL
Status: DISCONTINUED | OUTPATIENT
Start: 2019-04-09 | End: 2019-04-10 | Stop reason: HOSPADM

## 2019-04-09 RX ADMIN — ASPIRIN 81 MG 81 MG: 81 TABLET ORAL at 09:21

## 2019-04-09 RX ADMIN — DIVALPROEX SODIUM 500 MG: 250 TABLET, DELAYED RELEASE ORAL at 20:58

## 2019-04-09 RX ADMIN — METOPROLOL TARTRATE 25 MG: 25 TABLET ORAL at 20:59

## 2019-04-09 RX ADMIN — AMLODIPINE BESYLATE 5 MG: 5 TABLET ORAL at 09:21

## 2019-04-09 RX ADMIN — MAGNESIUM SULFATE IN WATER 2 G: 40 INJECTION, SOLUTION INTRAVENOUS at 10:26

## 2019-04-09 RX ADMIN — ENOXAPARIN SODIUM 140 MG: 150 INJECTION SUBCUTANEOUS at 09:21

## 2019-04-09 RX ADMIN — PROMETHAZINE HYDROCHLORIDE 25 MG: 25 TABLET ORAL at 11:48

## 2019-04-09 RX ADMIN — Medication 10 ML: at 15:51

## 2019-04-09 RX ADMIN — LISINOPRIL 40 MG: 40 TABLET ORAL at 09:21

## 2019-04-09 RX ADMIN — METOPROLOL TARTRATE 25 MG: 25 TABLET ORAL at 10:27

## 2019-04-09 RX ADMIN — Medication 10 ML: at 05:17

## 2019-04-09 RX ADMIN — Medication 10 ML: at 21:02

## 2019-04-09 RX ADMIN — TRAMADOL HYDROCHLORIDE 50 MG: 50 TABLET, FILM COATED ORAL at 04:25

## 2019-04-09 RX ADMIN — TOPIRAMATE 50 MG: 25 TABLET, FILM COATED ORAL at 17:39

## 2019-04-09 RX ADMIN — DIVALPROEX SODIUM 500 MG: 250 TABLET, DELAYED RELEASE ORAL at 09:21

## 2019-04-09 RX ADMIN — ENOXAPARIN SODIUM 140 MG: 150 INJECTION SUBCUTANEOUS at 20:59

## 2019-04-09 RX ADMIN — PREDNISONE 60 MG: 20 TABLET ORAL at 10:26

## 2019-04-09 RX ADMIN — MORPHINE SULFATE 2 MG: 2 INJECTION, SOLUTION INTRAMUSCULAR; INTRAVENOUS at 15:51

## 2019-04-09 NOTE — PROGRESS NOTES
Bedside and Verbal shift change report given to Josh Real RN by Jonathan Salguero RN. Report included the following information SBAR, Kardex, ED Summary, Procedure Summary, Intake/Output, MAR, Recent Results, Med Rec Status, Cardiac Rhythm sinus rhythm, Alarm Parameters  and Quality Measures.

## 2019-04-09 NOTE — PROGRESS NOTES
[facility] Hospitalist Group Progress Note Patient: Denisha Choi Age: 46 y.o. : 1970 MR#: [id number] SSN: [id number] Date/Time: 2019 Subjective:  
 
Review of systems No CP  
NO NVD No SOB  NO Cough Assessment/Plan: 1. Paroxysmal afib  
 
2 Seizure disorder 3 Bipolar disorder 4 Morbid obesity  
 
5 HTN  
 
6 H/o TBI ( traumatic brain injury ) 7 cephalgia PLAN  
 
- Acceptable HR - NSR  
- will need anticoagulation - he prefers Lovenox - D/W Cardiology - Add BB  
- For headache - Start prednisone 60 mg qam x5 days top break this HA. - start topiramate for HA's prevention at 50 mg and increase after one week with 50mg per dose up to 200 mg qpm . Side effects discussed. Needs to take it with 8 oz water. Mg 400 mg BID may also help with headache and side effects from topiramate 
 
  - NST per cardiology in AM - Home after that Case discussed with:  []Patient  [] Family ( In room with patient )    []Nursing  []Case Management DVT Prophylaxis:  []Lovenox  []Hep SQ  []SCDs  []Coumadin   []On Heparin gtt Objective:  
VS:  
Visit Vitals /84 Pulse 78 Temp 98.9 °F (37.2 °C) Resp 20 Ht 6' 3\" (1.905 m) Wt 140.6 kg (310 lb) SpO2 93% BMI 38.75 kg/m² Tmax/24hrs: Temp (24hrs), Av.8 °F (37.1 °C), Min:98.4 °F (36.9 °C), Max:99.3 °F (37.4 °C) IOBRIEF Intake/Output Summary (Last 24 hours) at 2019 1101 Last data filed at 2019 1039 Gross per 24 hour Intake 240 ml Output 2225 ml Net -1985 ml General:  Alert, cooperative, no acute distress Cardiovascular: S1S2 - regular , No Murmur Pulmonary: Equal expansion , No Use of accessory muscles , No Rales No Rhonchi GI:  +BS in all four quadrants, soft, non-tender Extremities:  No edema; 2+ dorsalis pedis pulses bilaterally Neuro: Alert and oriented X 2. Medications:  
Current Facility-Administered Medications Medication Dose Route Frequency  magnesium sulfate 2 g/50 ml IVPB (premix or compounded)  2 g IntraVENous ONCE  predniSONE (DELTASONE) tablet 60 mg  60 mg Oral DAILY WITH BREAKFAST  promethazine (PHENERGAN) tablet 25 mg  25 mg Oral Q6H PRN  
 topiramate (TOPAMAX) tablet 50 mg  50 mg Oral PCD  metoprolol tartrate (LOPRESSOR) tablet 25 mg  25 mg Oral Q12H  
 lisinopril (PRINIVIL, ZESTRIL) tablet 40 mg  40 mg Oral DAILY  ibuprofen (MOTRIN) tablet 200 mg  200 mg Oral Q6H PRN  
 melatonin tablet 3 mg  3 mg Oral QHS PRN  
 sodium chloride (NS) flush 5-40 mL  5-40 mL IntraVENous Q8H  
 sodium chloride (NS) flush 5-40 mL  5-40 mL IntraVENous PRN  
 enoxaparin (LOVENOX) injection 140 mg  1 mg/kg SubCUTAneous Q12H  aspirin chewable tablet 81 mg  81 mg Oral DAILY  LORazepam (ATIVAN) injection 2 mg  2 mg IntraVENous Q2H PRN  
 morphine injection 2 mg  2 mg IntraVENous Q4H PRN  
 divalproex DR (DEPAKOTE) tablet 500 mg  500 mg Oral Q12H Labs:   
Recent Labs 04/08/19 
2322 04/07/19 
1879 WBC 5.8 7.0 HGB 13.2 14.4 HCT 40.7 43.7  193 Recent Labs 04/08/19 
9972 04/07/19 
1845 04/07/19 
6498 NA  --  143 143 K  --  3.9 3.9 CL  --  107 109* CO2  --  27 22 GLU  --  110* 128* BUN  --  10 12 CREA  --  0.86 1.05  
CA  --  8.3* 8.4* MG 2.4  --  2.4 ALB  --  3.2* 3.3* SGOT  --  28 24 ALT  --  31 31 Signed By: Mannie Dunn MD   
 April 9, 2019

## 2019-04-09 NOTE — PROGRESS NOTES
Neurology Progress Note Patient ID: Arcelia Obrien [id number] 
14 y.o. 
1970 Subjective:  
  
Patient is seen today as follow up for epilepsy and headache. He states the seizures are well controlled, had none since admission. The headache is not better, the Solumedrol gave some minimal improved only. He received tramadol and that was effective, but when wears off the headache re-occurs. He is not aware this could lower the seizures treshhold. Otherwise has no new complaints. He is undergoing eval by cardiology for SVT. He doesn't want to have a brain MRI and I do not strongly suggest it at this time he is stable clinically . Current Facility-Administered Medications Medication Dose Route Frequency  magnesium sulfate 2 g/50 ml IVPB (premix or compounded)  2 g IntraVENous ONCE  predniSONE (DELTASONE) tablet 60 mg  60 mg Oral DAILY WITH BREAKFAST  promethazine (PHENERGAN) tablet 25 mg  25 mg Oral Q6H PRN  
 topiramate (TOPAMAX) tablet 50 mg  50 mg Oral PCD  metoprolol tartrate (LOPRESSOR) tablet 25 mg  25 mg Oral Q12H  
 lisinopril (PRINIVIL, ZESTRIL) tablet 40 mg  40 mg Oral DAILY  ibuprofen (MOTRIN) tablet 200 mg  200 mg Oral Q6H PRN  
 amLODIPine (NORVASC) tablet 5 mg  5 mg Oral DAILY  melatonin tablet 3 mg  3 mg Oral QHS PRN  
 sodium chloride (NS) flush 5-40 mL  5-40 mL IntraVENous Q8H  
 sodium chloride (NS) flush 5-40 mL  5-40 mL IntraVENous PRN  
 enoxaparin (LOVENOX) injection 140 mg  1 mg/kg SubCUTAneous Q12H  aspirin chewable tablet 81 mg  81 mg Oral DAILY  LORazepam (ATIVAN) injection 2 mg  2 mg IntraVENous Q2H PRN  
 morphine injection 2 mg  2 mg IntraVENous Q4H PRN  
 divalproex DR (DEPAKOTE) tablet 500 mg  500 mg Oral Q12H Objective: Active hospital medications were reviewed Lab results and neuroradiology studies from the last 24 hours were reviewed. Prior to Admission medications Medication Sig Start Date End Date Taking? Authorizing Provider  
divalproex DR (DEPAKOTE) 250 mg tablet Take 2 tabs in the morning and 1 tab in the evening 2/6/19   García Hawley MD  
acetaminophen (TYLENOL) 325 mg tablet Take 2 Tabs by mouth every four (4) hours as needed for Pain. 11/26/18   Sandro Waterman MD  
potassium chloride (KLOR-CON) 20 mEq packet Take 1 Packet by mouth daily. 10/24/18   Sandro Waterman MD  
lisinopril (PRINIVIL, ZESTRIL) 10 mg tablet Take 2 Tabs by mouth. Indications: hypertension 7/2/18   Angeles Rodriguez MD  
ARIPiprazole (ABILIFY) 10 mg tablet Take 1 Tab by mouth daily. Indications: BIPOLAR DISORDER IN REMISSION 7/3/18   Angeles Rodriguez MD  
melatonin 3 mg tablet Take 2 Tabs by mouth nightly as needed. Indications: Insomnia 7/2/18   Angeles Rodriguez MD  
naproxen (NAPROSYN) 500 mg tablet Take 1 Tab by mouth two (2) times daily (with meals). 5/10/17   García Hawley MD  
HYDROcodone-acetaminophen (NORCO) 5-325 mg per tablet Take 1-2 tablets PO every 4-6 hours as needed for pain control. If over the counter ibuprofen or acetaminophen was suggested, then only take the vicodin for pain not well controlled with the over the counter medication. 11/15/12   Jacob Loera MD  
 
Patient Vitals for the past 8 hrs: 
 BP Temp Pulse Resp SpO2  
04/09/19 0751 (!) 157/91 98.6 °F (37 °C) 86 19 94 % 04/09/19 0626 139/90  83 18 93 % 04/09/19 0423 (!) 156/110 98.4 °F (36.9 °C) 82 18 95 % [id number] 1901 - 04/09 0700 In: -  
Out: 2975 [OGHNE:8684] 04/07 1901 - 04/09 0700 In: -  
Out: 2975 [Urine:2975]RESULTRCNT(24h)Active Problems: 
  Seizure ([address] (4/7/2019) Head injury (4/7/2019) Atrial fibrillation with RVR ([facility] [address] (4/7/2019) New onset a-fib ([address] (4/7/2019) Leg pain (4/7/2019) Morbid obesity ([facility] [address] (4/7/2019) Hypertension (4/7/2019) Additional comments:I reviewed the patient's new clinical lab test results.   
 
General Exam 
 No acute distress, mucous membranes normal color and hydration status Neurologic Exam 
 
Mental status:  Alert, oriented to person, place, time and circumstance Language: normal fluency and comprehension No visual spatial neglect or overt apraxia Cranial nerves: PERRL, Extraocular movements intact and full, face symmetric to movement, Tongue midline with normal strength, palat symmetric Motor: moves all 4 extremities symmetric. Assessment:  
 
Corby Pryor is a 46 y.o. man with with hx of epilepsy was admitted with breakthrough seizures , now stable on Depakote, level is therapeutic, still with ongoing headaches, suspect to be secondary to recent seizures versus medications induced . I rec to stop the tramadol - due to seizures, causes rebound HA. I am aware he cannot take NSAID's - is on therapeutic Lovenox,  Has allergy to tylenol. Plan:  
- prn for headache: Phenergan 25 mg q6 hrs . Also, Mg sulfate 2 grams, will start today  
- Start prednisone 60 mg qam x5 days top break this HA. - start topiramate for HA's prevention at 50 mg and increase after one week with 50mg per dose up to 200 mg qpm . Side effects discussed. Needs to take it with 8 oz water. Mg 400 mg BID may also help with headache and side effects from topiramate 
- stop tramadol  
- stop ASA while is on lovenox anticoagulation dose and resume after Lovenox will be discontinued Signed: 
Winnie Pagan MD 
Adult Neurologist 
4/9/2019 
10:31 AM

## 2019-04-09 NOTE — PROGRESS NOTES
Problem: Falls - Risk of 
Goal: *Absence of Falls Description Document David Mello Fall Risk and appropriate interventions in the flowsheet.  
Outcome: Progressing Towards Goal

## 2019-04-09 NOTE — ADT AUTH CERT NOTES
PN FOR 3/9 AND REQUESTED CLINICAL CLARIFICATION by Jaime Acosta  
 
   
Review Status Review Entered In Primary 4/9/2019 14:48  
   
Criteria Review (X) Medication for ventricular rate control 4/8/2019 12:52:41 EDT by Jaime Acosta DILTIAZEM DRIP [id number] - [id number] AMIODARONE INFUSION AFTER BOLUS NOT STARTED  
  
  
CARDIOLOGY PN 4/9 
  
-SVT, likely A.flutter 2:1 AV block with ongoing chest pain 8-9/10, refractory to diltiazem, digoxin, and subsequent hypotension.  No h/o A.fib/flutter/SVT. -Normal EF 60% by echo this admission. 
-Seizure x 2 after recent decrease in seizure meds -h/o traumatic brain injury as a child as cause for seizures -h/o HTN 
  
No primary cardiologist 
  
  
Plan: 
  
Remains in sinus rhythm with stable BP. Still with some CP which appears to have musculoskeletal component, troponin indeterminate, given risk factors will plan stress test tomorrow, patient had breakfast today. Appreciate ongoing neurology involvement, MRI results are pending, anticoagulation is recommended for at least 30 days s/p cardioversion, cardiology is OK with lovenox, NOAC or warfarin, will defer to neurology team given previous neurologic intolerance to [facility]. Starting BB, continued on ASA, check lipid panel and start statin if indicated. 
  
  
Assessment: 
  
Ly Da Silva is a 46 y.o. man with with hx of epilepsy was admitted with breakthrough seizures , now stable on Depakote, level is therapeutic, still with ongoing headaches, suspect to be secondary to recent seizures versus medications induced .  
I rec to stop the tramadol - due to seizures, causes rebound HA.  
  
I am aware he cannot take NSAID's - is on therapeutic Lovenox,  Has allergy to tylenol. Plan: 
- prn for headache: Phenergan 25 mg q6 hrs . Also, Mg sulfate 2 grams, will start today  
- Start prednisone 60 mg qam x5 days top break this HA.  
- start topiramate for HA's prevention at 50 mg and increase after one week with 50mg per dose up to 200 mg qpm . Side effects discussed. Needs to take it with 8 oz water. Mg 400 mg BID may also help with headache and side effects from topiramate 
- stop tramadol  
- stop ASA while is on lovenox anticoagulation dose and resume after Lovenox will be discontinued  
  
  
Assessment/Plan: 1. Paroxysmal afib  
  
2 Seizure disorder  
  
3 Bipolar disorder  
  
4 Morbid obesity  
  
5 HTN  
  
6 H/o TBI ( traumatic brain injury )  
  
7 cephalgia  
  
PLAN  
  
- Acceptable HR - NSR  
- will need anticoagulation - he prefers Lovenox - D/W Cardiology - Add BB  
- For headache - Start prednisone 60 mg qam x5 days top break this HA. - start topiramate for HA's prevention at 50 mg and increase after one week with 50mg per dose up to 200 mg qpm . Side effects discussed. Needs to take it with 8 oz water. Mg 400 mg BID may also help with headache and side effects from topiramate 
  
  - NST per cardiology in AM - Home after that  
  
  
   
   
Atrial Fibrillation - Care Day 2 (4/8/2019) by Aravind Mcnulty  
 
   
Review Status Review Entered Completed 4/9/2019 14:35  
   
Criteria Review Care Day: 2 Care Date: 4/8/2019 Level of Care: Step Down Guideline Day 2 Clinical Status  
(X) * Hemodynamic stability 4/9/2019 14:34:57 EDT by Aravind Mcnulty   
vs 144/95. p 68. r 17. sat 92% t 98.8   
  
(X) * Sinus rhythm or acceptable ventricular rate   
(X) * No evidence of myocardial ischemia   
(X) * Mental status at baseline ( ) * Anticoagulation regimen for next level of care established 4/9/2019 14:34:57 EDT by Aravind Mcnulty   
getting neuro opinion on oral anticoagualtion ( ) * Discharge plans and education understood Activity ( ) * Ambulatory Routes  
(X) * Oral hydration, diet, and medications 4/9/2019 14:34:57 EDT by Aravind Mcnulty   
cardiac diet. Interventions (X) Cardiac monitoring 4/9/2019 14:34:57 EDT by Aravind Mcnulty nsr 76s Medications (X) Anticoagulation[E] 4/9/2019 14:34:57 EDT by Pamela Alvarado   
lovenox 140mg sc q12h. * Milestone Additional Notes 4/8/2019 ATTENDING PN:  
Assessment/Plan: 1. Paroxysmal afib   
   
2 Seizure disorder   
   
3 Bipolar disorder   
   
4 Morbid obesity   
   
PLAN   
- Follow up with cardiology and neurology   
   
- S/p Cardioversion   
   
MEDS: ASA 81MG PO DAILY. DEPAKOTE 500MG PO Q12H. MOTRIN 200MG PO Q6 PRN X1. LISINOPRIL 40MG PO DAILY. MORPHINE 2MG IV Q4H PRN CHEST PAIN X1. SOLUMEDROL 500MG IV X1. PROCARDIA 10MG PO X1. NORVASC 5MG PO DAILY. TRAMADOL 50MG PO Q6 H PRN X1.  
   
CTA CHEST:   
IMPRESSION:  
Borderline nondiagnostic for pulmonary embolus. No definite central pulmonary  
embolus to the proximal lobar pulmonary artery level. More distal evaluation is  
not possible.  
   
 Atelectasis in the lungs. Some groundglass in the inferior right upper lobe  
could be atelectasis although early infiltrate is not entirely excluded. No  
dense consolidative pneumonia or pulmonary edema. MRI BRAIN: PT REFUSED  
  
ECHO:  
Interpretation Summary · Calculated left ventricular ejection fraction is 60%. Visually measured ejection fraction. Left ventricular mild concentric hypertrophy. Age-appropriate left ventricular diastolic function. · Mild aortic root dilatation. Moderate sinuses of Valsalva dilatation. · Mild aortic valve sclerosis. DUPLEX RESULTS:  
   
Right Lower Venous The common femoral, proximal femoral, mid femoral, distal femoral, popliteal, posterior tibial, peroneal and saphenous femoral junction vein(s) were imaged in the transverse and longitudinal planes. The vessels showed normal color filling and compressibility. Doppler interrogation of the veins showed phasic and spontaneous flow. Left Lower Venous The common femoral, saphenous femoral junction, proximal femoral, mid femoral, distal femoral, popliteal and posterior tibial vein(s) were imaged in the transverse and longitudinal planes. The vessels showed normal color filling and compressibility. Doppler interrogation of the veins showed phasic and spontaneous flow

## 2019-04-09 NOTE — PROGRESS NOTES
[facility] - Progress Note Admit Date: 4/7/2019 Assessment:  
 
Hospital Problems  Date Reviewed: 11/5/2012 Codes Class Noted POA Seizure ([facility] [address] ICD-10-CM: R56.9 ICD-9-CM: 780.39  4/7/2019 Unknown Head injury ICD-10-CM: S09. 90XA ICD-9-CM: 959.01  4/7/2019 Unknown Atrial fibrillation with RVR (HCC) ICD-10-CM: I48.91 
ICD-9-CM: 427.31  4/7/2019 Unknown New onset a-fib [facility]) ICD-10-CM: I48.91 
ICD-9-CM: 427.31  4/7/2019 Unknown Leg pain ICD-10-CM: M79.606 ICD-9-CM: 729.5  4/7/2019 Unknown Morbid obesity ([facility] [address] ICD-10-CM: E66.01 
ICD-9-CM: 278.01  4/7/2019 Unknown Hypertension ICD-10-CM: I10 
ICD-9-CM: 401.9  4/7/2019 Unknown  
   
  
 
 
  
  
-SVT, likely A.flutter 2:1 AV block with ongoing chest pain 8-9/10, refractory to diltiazem, digoxin, and subsequent hypotension.  No h/o A.fib/flutter/SVT. -Normal EF 60% by echo this admission. 
-Seizure x 2 after recent decrease in seizure meds -h/o traumatic brain injury as a child as cause for seizures -h/o HTN 
  
No primary cardiologist 
 
 
Plan:  
 
Remains in sinus rhythm with stable BP. Still with some CP which appears to have musculoskeletal component, troponin indeterminate, given risk factors will plan stress test tomorrow, patient had breakfast today. Appreciate ongoing neurology involvement, MRI results are pending, anticoagulation is recommended for at least 30 days s/p cardioversion, cardiology is OK with lovenox, NOAC or warfarin, will defer to neurology team given previous neurologic intolerance to [address]. Starting BB, continued on ASA, check lipid panel and start statin if indicated. Subjective: Intermittent chest discomfort overnight, worse with deep inspiration and palpation. Objective:  
  
Patient Vitals for the past 8 hrs: 
 Temp Pulse Resp BP SpO2  
04/09/19 0751 98.6 °F (37 °C) 86 19 (!) 157/91 94 % 04/09/19 0626  83 18 139/90 93 % 04/09/19 0423 98.4 °F (36.9 °C) 82 18 (!) 156/110 95 % Patient Vitals for the past 96 hrs: 
 Weight 04/08/19 1510 140.6 kg (310 lb) 04/07/19 0633 140.6 kg (310 lb) Intake/Output Summary (Last 24 hours) at 4/9/2019 0940 Last data filed at 4/9/2019 3072 Gross per 24 hour Intake  Output 2925 ml Net -2925 ml Physical Exam: 
General:  alert, cooperative, no distress, appears stated age Neck:  no JVD Lungs:  clear to auscultation bilaterally Heart:  regular rate and rhythm, S1, S2 normal, no murmur, click, rub or gallop Abdomen:  abdomen is soft without significant tenderness, masses, organomegaly or guarding Extremities:  extremities normal, atraumatic, no cyanosis or edema Data Review:  
 
Labs: Results:  
   
Chemistry Recent Labs 04/08/19 
3385 04/07/19 
1845 04/07/19 
9434 GLU  --  110* 128* NA  --  143 143 K  --  3.9 3.9 CL  --  107 109* CO2  --  27 22 BUN  --  10 12 CREA  --  0.86 1.05  
CA  --  8.3* 8.4* MG 2.4  --  2.4 AGAP  --  9 12 BUCR  --  12 11* AP  --  90 98 TP  --  6.8 7.1 ALB  --  3.2* 3.3*  
GLOB  --  3.6 3.8 AGRAT  --  0.9 0.9  
  
CBC w/Diff Recent Labs 04/08/19 
1513 04/07/19 
3823 WBC 5.8 7.0  
RBC 4.20* 4.54* HGB 13.2 14.4 HCT 40.7 43.7  193 GRANS 49 46 LYMPH 42 46 EOS 2 2 Cardiac Enzymes No results found for: CPK, CK, CKMMB, CKMB, RCK3, CKMBT, CKNDX, CKND1, NOHELIA, TROPT, TROIQ, KATHI, TROPT, TNIPOC, BNP, BNPP Coagulation No results for input(s): PTP, INR, APTT in the last 72 hours. No lab exists for component: INREXT Lipid Panel Lab Results Component Value Date/Time  Cholesterol, total 260 (H) 09/17/2018 08:20 AM  
 HDL Cholesterol 66 (H) 09/17/2018 08:20 AM  
 LDL, calculated 161 (H) 09/17/2018 08:20 AM  
 VLDL, calculated 33 09/17/2018 08:20 AM  
 Triglyceride 165 (H) 09/17/2018 08:20 AM  
 CHOL/HDL Ratio 3.9 09/17/2018 08:20 AM  
  
 BNP No results found for: BNP, BNPP, XBNPT Liver Enzymes Recent Labs 04/07/19 
1845 TP 6.8 ALB 3.2* AP 90 SGOT 28 Digoxin Thyroid Studies Lab Results Component Value Date/Time TSH 0.86 04/07/2019 06:45 PM  
    
 
Signed By: EVELIA John April 9, 2019

## 2019-04-10 ENCOUNTER — APPOINTMENT (OUTPATIENT)
Dept: NON INVASIVE DIAGNOSTICS | Age: 49
DRG: 101 | End: 2019-04-10
Attending: PHYSICIAN ASSISTANT
Payer: MEDICARE

## 2019-04-10 VITALS
BODY MASS INDEX: 38.54 KG/M2 | HEIGHT: 75 IN | TEMPERATURE: 98.8 F | HEART RATE: 53 BPM | RESPIRATION RATE: 17 BRPM | WEIGHT: 310 LBS | SYSTOLIC BLOOD PRESSURE: 128 MMHG | OXYGEN SATURATION: 93 % | DIASTOLIC BLOOD PRESSURE: 92 MMHG

## 2019-04-10 LAB
CHOLEST SERPL-MCNC: 237 MG/DL
HDLC SERPL-MCNC: 61 MG/DL (ref 40–60)
HDLC SERPL: 3.9 {RATIO} (ref 0–5)
LDLC SERPL CALC-MCNC: 161.8 MG/DL (ref 0–100)
LIPID PROFILE,FLP: ABNORMAL
STRESS BASELINE DIAS BP: 83 MMHG
STRESS BASELINE HR: 57 BPM
STRESS BASELINE SYS BP: 140 MMHG
STRESS ESTIMATED WORKLOAD: 1 METS
STRESS EXERCISE DUR MIN: NORMAL
STRESS PEAK DIAS BP: 104 MMHG
STRESS PEAK SYS BP: 176 MMHG
STRESS PERCENT HR ACHIEVED: 51 %
STRESS POST PEAK HR: 86 BPM
STRESS RATE PRESSURE PRODUCT: NORMAL BPM*MMHG
STRESS TARGET HR: 170 BPM
TRIGL SERPL-MCNC: 71 MG/DL (ref ?–150)
VLDLC SERPL CALC-MCNC: 14.2 MG/DL

## 2019-04-10 PROCEDURE — 74011250637 HC RX REV CODE- 250/637: Performed by: INTERNAL MEDICINE

## 2019-04-10 PROCEDURE — 74011636637 HC RX REV CODE- 636/637: Performed by: PSYCHIATRY & NEUROLOGY

## 2019-04-10 PROCEDURE — 74011250636 HC RX REV CODE- 250/636: Performed by: INTERNAL MEDICINE

## 2019-04-10 PROCEDURE — 36415 COLL VENOUS BLD VENIPUNCTURE: CPT

## 2019-04-10 PROCEDURE — 80061 LIPID PANEL: CPT

## 2019-04-10 PROCEDURE — A9500 TC99M SESTAMIBI: HCPCS

## 2019-04-10 RX ORDER — LISINOPRIL 40 MG/1
40 TABLET ORAL DAILY
Qty: 15 TAB | Refills: 0 | Status: SHIPPED | OUTPATIENT
Start: 2019-04-11 | End: 2019-10-24 | Stop reason: ALTCHOICE

## 2019-04-10 RX ORDER — SODIUM CHLORIDE 9 MG/ML
250 INJECTION, SOLUTION INTRAVENOUS ONCE
Status: COMPLETED | OUTPATIENT
Start: 2019-04-10 | End: 2019-04-10

## 2019-04-10 RX ORDER — HYDROCODONE BITARTRATE AND ACETAMINOPHEN 5; 325 MG/1; MG/1
1 TABLET ORAL
Qty: 10 TAB | Refills: 0 | Status: SHIPPED | OUTPATIENT
Start: 2019-04-10 | End: 2019-04-13

## 2019-04-10 RX ORDER — TOPIRAMATE 50 MG/1
50 TABLET, FILM COATED ORAL
Qty: 10 TAB | Refills: 0 | Status: SHIPPED | OUTPATIENT
Start: 2019-04-10 | End: 2019-08-09

## 2019-04-10 RX ORDER — METOPROLOL TARTRATE 25 MG/1
25 TABLET, FILM COATED ORAL EVERY 12 HOURS
Qty: 60 TAB | Refills: 0 | Status: SHIPPED | OUTPATIENT
Start: 2019-04-10 | End: 2019-10-24 | Stop reason: ALTCHOICE

## 2019-04-10 RX ORDER — POTASSIUM CHLORIDE 1.5 G/1.77G
20 POWDER, FOR SOLUTION ORAL DAILY
Qty: 10 PACKET | Refills: 0 | Status: SHIPPED | OUTPATIENT
Start: 2019-04-10 | End: 2019-10-24 | Stop reason: ALTCHOICE

## 2019-04-10 RX ORDER — ATORVASTATIN CALCIUM 20 MG/1
20 TABLET, FILM COATED ORAL DAILY
Qty: 20 TAB | Refills: 0 | Status: SHIPPED | OUTPATIENT
Start: 2019-04-10 | End: 2019-10-24 | Stop reason: ALTCHOICE

## 2019-04-10 RX ORDER — DIVALPROEX SODIUM 250 MG/1
TABLET, DELAYED RELEASE ORAL
Qty: 90 TAB | Refills: 0 | Status: SHIPPED | OUTPATIENT
Start: 2019-04-10 | End: 2019-04-25 | Stop reason: SDUPTHER

## 2019-04-10 RX ORDER — ARIPIPRAZOLE 10 MG/1
10 TABLET ORAL DAILY
Qty: 30 TAB | Refills: 0 | Status: ON HOLD | OUTPATIENT
Start: 2019-04-10 | End: 2021-06-11

## 2019-04-10 RX ORDER — PREDNISONE 20 MG/1
20 TABLET ORAL 2 TIMES DAILY
Qty: 6 TAB | Refills: 0 | Status: SHIPPED | OUTPATIENT
Start: 2019-04-10 | End: 2019-04-13

## 2019-04-10 RX ORDER — GUAIFENESIN 100 MG/5ML
81 LIQUID (ML) ORAL DAILY
Qty: 30 TAB | Refills: 0 | Status: SHIPPED | OUTPATIENT
Start: 2019-04-11 | End: 2019-10-24 | Stop reason: ALTCHOICE

## 2019-04-10 RX ORDER — ENOXAPARIN SODIUM 150 MG/ML
150 INJECTION SUBCUTANEOUS EVERY 12 HOURS
Qty: 60 SYRINGE | Refills: 0 | Status: SHIPPED | OUTPATIENT
Start: 2019-04-10 | End: 2019-10-24 | Stop reason: ALTCHOICE

## 2019-04-10 RX ADMIN — METOPROLOL TARTRATE 25 MG: 25 TABLET ORAL at 09:52

## 2019-04-10 RX ADMIN — ASPIRIN 81 MG 81 MG: 81 TABLET ORAL at 09:52

## 2019-04-10 RX ADMIN — ENOXAPARIN SODIUM 140 MG: 150 INJECTION SUBCUTANEOUS at 09:00

## 2019-04-10 RX ADMIN — SODIUM CHLORIDE 250 ML: 900 INJECTION, SOLUTION INTRAVENOUS at 08:50

## 2019-04-10 RX ADMIN — Medication 10 ML: at 05:37

## 2019-04-10 RX ADMIN — PREDNISONE 60 MG: 20 TABLET ORAL at 09:52

## 2019-04-10 RX ADMIN — DIVALPROEX SODIUM 500 MG: 250 TABLET, DELAYED RELEASE ORAL at 09:52

## 2019-04-10 RX ADMIN — IBUPROFEN 200 MG: 200 TABLET, FILM COATED ORAL at 09:52

## 2019-04-10 RX ADMIN — REGADENOSON 0.4 MG: 0.08 INJECTION, SOLUTION INTRAVENOUS at 08:50

## 2019-04-10 RX ADMIN — LISINOPRIL 40 MG: 40 TABLET ORAL at 09:53

## 2019-04-10 NOTE — PROGRESS NOTES
Problem: Falls - Risk of 
Goal: *Absence of Falls Description Document [facility] Fall Risk and appropriate interventions in the flowsheet.  
Outcome: Progressing Towards Goal

## 2019-04-10 NOTE — DISCHARGE SUMMARY
Discharge Summary     Patient ID:  Kings Ascencio  [id number]  46 y.o.  1970  Body mass index is 38.75 kg/m². PCP on record: Bharati Mustafa MD    Admit date: 2019  Discharge date and time: 4/10/2019    Discharge Diagnoses:                                           1. Paroxysmal afib      2 Seizure disorder      3 Bipolar disorder      4 Morbid obesity      5 HTN      6 H/o TBI ( traumatic brain injury )      7 cephalgia              Consults: Cardiology and Neurology          Hospital Course by problems:  1. Paroxysmal afib   -SVT, likely A.flutter 2:1 AV block with ongoing chest pain 8-9/10, refractory to diltiazem, digoxin, and subsequent hypotension.  No h/o A.fib/flutter/SVT. -Normal EF 60% by echo this admission.  - s/p Cardioversion   - patient's preference is Lovenox for LTAC - at least for 30 days        2 Seizure disorder - continue Depakot 500 mg po BiD      3 Bipolar disorder      4 Morbid obesity - weight reduction with life style modification      5 HTN - stable      6 H/o TBI ( traumatic brain injury )      7 cephalgia - on short course of steroids , avoid NSAIDS as he is on lovenox      8 hyperlipidemia - start Lipitor adjust meds as out patient         Patient seen and examined by me on discharge day.   Pertinent Findings:  Patient is Alert Awake and oriented   HEENT - NAD    RS - Clear , no rales no rhonchi   CVS - regular rhythm and rate acceptable    abd - benign, BS present , no Distension   EXT - no edema , no calf tenderness   Neuro - alert and awake , grossly motor and sensory intact         Significant Diagnostic Studies:  NUCLEAR CARDIAC SPECT STRESS & REST   Order# [id number]   Reading physician: Jordi Zhang MD Ordering physician: EVELIA Peck Study date: 4/10/19   Patient Information     Patient Name  Kings Ascencio MRN  [id number] Sex  Male     1970 (46 y.o.)   Reason for Exam   Priority: Routine   CP   Vitals     Weight Height BSA (calculated - sq m) BP   140.6 kg (310 lb) 6' 3\" (1.905 m) 2.73 sq meters 140/83   Reading Providers      Reading Role Read Date   Carl Blankenship MD ECG [place], SPECT [place] 4/10/2019   Procedure Conclusion     Nuclear Stress Test     Negative myocardial perfusion imaging. Myocardial perfusion imaging supports a low risk stress test.   There is no prior study available for comparison. .   Interpretation Summary     · Left ventricular perfusion is normal.  · Gated SPECT: Left ventricular function post-stress was normal. Calculated ejection fraction is 56%. The TID ratio is 1.03.  · Negative myocardial perfusion imaging. Myocardial perfusion imaging supports a low risk stress test.      Stress Findings     ECG Baseline ECG: Sinus bradycardia, non-specific ST-T wave abnormalities. There were no arrhythmias during stress. Further increase in resting changes inf/lat ST depression   There were no arrhythmias during recovery. ECG is non-diagnostic due to resting ST abnormalities. Stress Findings A pharmacological stress test was performed using regadenoson with low-level exercise. The patient reached the end of the protocol. Non-limiting chest pain   Blood pressure demonstrated a hypertensive response to exercise.    Stress Measurements     Baseline Vitals   Baseline HR 57 BPM      Baseline  mmHg      Stress Base Diastolic BP 83 mmHg       Peak Stress Vitals   Post peak HR 86 BPM      Stress Base Systolic  mmHg      Stress Base Diastolic  mmHg      Estimated workload 1 METS      Stress Rate Pressure Product 15,136 BPM*mmHg       Exercise Data   Target  bpm      Percent HR 51 %      Exercise duration time 00:04:00          Stage Data     Phase Stage Speed Heart Rate BP Grade % METS Comment   Infusion DOSE 1 0 mph 58 bpm 140/83 0 % 1 METS    Infusion DOSE 1 0 mph 82 bpm  0 % 1 METS 00:04 Lexiscan injection 0.4 mg; 00:28 Sestamibi injection   Infusion DOSE 2 0 mph 82 bpm 176/104 0 % 1 METS    Infusion DOSE 3 0 mph 77 bpm 166/99 0 % 1 METS    Infusion DOSE 4 0 mph 75 bpm 171/102 0 % 1 METS    POSTINFSN  0 mph 73 bpm 164/98 0 % 1 METS    POSTINFSN  0 mph 73 bpm 164/98 0 % 1 METS    [place] Waveforms      Open stress waveform images   Nuclear Stress Findings     Nuclear Stress Function Left ventricular function post-stress was normal. Wall motion was normal at stress. Calculated ejection fraction is 56%. The stress end diastolic cavity size is mildly enlarged. The TID ratio is 1.03. Myocardial wall thickness appears to be increased, consistent with left ventricular hypertrophy. Nuclear Perfusion Left ventricular perfusion is normal.   Nuclear Study Quality Overall image quality is good. Procedure Staff     Technologist/Clinician: Claude Nicole  Supporting Staff: Eugene Martinez  Performing Physician/Midlevel: Kranthi Weathers   PACS Images      Show images for NUCLEAR CARDIAC STRESS TEST   Signed         Pertinent Lab Data:  Recent Labs     04/08/19  0528   WBC 5.8   HGB 13.2   HCT 40.7        Recent Labs     04/08/19  0528 04/07/19  1845   NA  --  143   K  --  3.9   CL  --  107   CO2  --  27   GLU  --  110*   BUN  --  10   CREA  --  0.86   CA  --  8.3*   MG 2.4  --    ALB  --  3.2*   SGOT  --  28   ALT  --  31       DISCHARGE MEDICATIONS:   @  Current Discharge Medication List      START taking these medications    Details   aspirin 81 mg chewable tablet Take 1 Tab by mouth daily. Qty: 30 Tab, Refills: 0      enoxaparin (LOVENOX) injection 150 mg by SubCUTAneous route every twelve (12) hours. Qty: 60 Syringe, Refills: 0      metoprolol tartrate (LOPRESSOR) 25 mg tablet Take 1 Tab by mouth every twelve (12) hours. Qty: 60 Tab, Refills: 0      predniSONE (DELTASONE) 20 mg tablet Take 20 mg by mouth two (2) times a day for 3 days. Qty: 6 Tab, Refills: 0      topiramate (TOPAMAX) 50 mg tablet Take 1 Tab by mouth daily (after dinner).   Qty: 10 Tab, Refills: 0 atorvastatin (LIPITOR) 20 mg tablet Take 1 Tab by mouth daily. Qty: 20 Tab, Refills: 0         CONTINUE these medications which have CHANGED    Details   ARIPiprazole (ABILIFY) 10 mg tablet Take 1 Tab by mouth daily. Indications: Bipolar Disorder in Remission  Qty: 30 Tab, Refills: 0      HYDROcodone-acetaminophen (NORCO) 5-325 mg per tablet Take 1 Tab by mouth every eight (8) hours as needed for Pain for up to 3 days. Max Daily Amount: 3 Tabs. 1 tab po prn for headache  Qty: 10 Tab, Refills: 0    Associated Diagnoses: Other vascular headache      potassium chloride (KLOR-CON) 20 mEq pack Take 1 Packet by mouth daily. Qty: 10 Packet, Refills: 0      divalproex DR (DEPAKOTE) 250 mg tablet Take 2 tabs in the morning and 1 tab in the evening  Indications: epilepsy, Epilepsy  Qty: 90 Tab, Refills: 0    Associated Diagnoses: Partial symptomatic epilepsy with simple partial seizures, not intractable, without status epilepticus (HCC)      lisinopril (PRINIVIL, ZESTRIL) 40 mg tablet Take 1 Tab by mouth daily. Indications: high blood pressure  Qty: 15 Tab, Refills: 0         CONTINUE these medications which have NOT CHANGED    Details   melatonin 3 mg tablet Take 2 Tabs by mouth nightly as needed. Indications: Insomnia  Qty: 30 Tab, Refills: 0         STOP taking these medications       acetaminophen (TYLENOL) 325 mg tablet Comments:   Reason for Stopping:         butalbital-acetaminophen-caff (FIORICET) -40 mg per capsule Comments:   Reason for Stopping:         naproxen (NAPROSYN) 500 mg tablet Comments:   Reason for Stopping:                 My Recommended Diet, Activity, Wound Care, and follow-up labs are listed in the patient's Discharge Insturctions which I have personally completed and reviewed.       Disposition:     [x] Home with family     [] [facility] PT/RN   [] SNF/NH   [] Inpatient Rehab/[facility]YAD  Condition at Discharge:  Stable    Follow up with:   PCP : Sylvia Parra MD      Please follow-up tests/labs that are still pendin.  None  2.    >30 minutes spent coordinating this discharge (review instructions/follow-up, prescriptions, preparing report for sign off)    Signed:  Gladys Cornejo MD  4/10/2019  2:16 PM

## 2019-04-10 NOTE — PROGRESS NOTES
DISCHARGE SUMMARY from Nurse PATIENT INSTRUCTIONS: 
 
 
F-face looks uneven A-arms unable to move or move unevenly S-speech slurred or non-existent T-time-call 911 as soon as signs and symptoms begin-DO NOT go Back to bed or wait to see if you get better-TIME IS BRAIN. Warning Signs of HEART ATTACK Call 911 if you have these symptoms: 
? Chest discomfort. Most heart attacks involve discomfort in the center of the chest that lasts more than a few minutes, or that goes away and comes back. It can feel like uncomfortable pressure, squeezing, fullness, or pain. ? Discomfort in other areas of the upper body. Symptoms can include pain or discomfort in one or both arms, the back, neck, jaw, or stomach. ? Shortness of breath with or without chest discomfort. ? Other signs may include breaking out in a cold sweat, nausea, or lightheadedness. Don't wait more than five minutes to call [address]! Fast action can save your life. Calling 911 is almost always the fastest way to get lifesaving treatment. Emergency Medical Services staff can begin treatment when they arrive  up to an hour sooner than if someone gets to the hospital by car. The discharge information has been reviewed with the patient. The patient verbalized understanding. Discharge medications reviewed with the patient and appropriate educational materials and side effects teaching were provided. ___________________________________________________________________________________________________________________________________

## 2019-04-10 NOTE — PROGRESS NOTES
Patient was given 10.85 milliCuries of 99mTc-Sestamibi for the resting images. Patient was also given 33.0 milliCuries of 99mTc-Sestamibi for the stress images. Injected with 0.4mg Lexiscan. Patient's armband was left on and sent back to room.

## 2019-04-10 NOTE — PROGRESS NOTES
Neurology Progress Note Patient ID: Sean Manzanares [id number] 
89 y.o. 
1970 Subjective:  
  
Patient seen briefly when about to leave this afternoon. He is doing better, headache somewhat improved . Has questions about some of the  discharge meds - was given percocet prn headache, ASA 81 mg but also is on Lovenox anticoagulation dose. Current Facility-Administered Medications Medication Dose Route Frequency  predniSONE (DELTASONE) tablet 60 mg  60 mg Oral DAILY WITH BREAKFAST  promethazine (PHENERGAN) tablet 25 mg  25 mg Oral Q6H PRN  
 topiramate (TOPAMAX) tablet 50 mg  50 mg Oral PCD  metoprolol tartrate (LOPRESSOR) tablet 25 mg  25 mg Oral Q12H  
 lisinopril (PRINIVIL, ZESTRIL) tablet 40 mg  40 mg Oral DAILY  ibuprofen (MOTRIN) tablet 200 mg  200 mg Oral Q6H PRN  
 melatonin tablet 3 mg  3 mg Oral QHS PRN  
 sodium chloride (NS) flush 5-40 mL  5-40 mL IntraVENous Q8H  
 sodium chloride (NS) flush 5-40 mL  5-40 mL IntraVENous PRN  
 enoxaparin (LOVENOX) injection 140 mg  1 mg/kg SubCUTAneous Q12H  aspirin chewable tablet 81 mg  81 mg Oral DAILY  LORazepam (ATIVAN) injection 2 mg  2 mg IntraVENous Q2H PRN  
 morphine injection 2 mg  2 mg IntraVENous Q4H PRN  
 divalproex DR (DEPAKOTE) tablet 500 mg  500 mg Oral Q12H Objective: Active hospital medications were reviewed Lab results and neuroradiology studies from the last 24 hours were reviewed. Prior to Admission medications Medication Sig Start Date End Date Taking? Authorizing Provider ARIPiprazole (ABILIFY) 10 mg tablet Take 1 Tab by mouth daily. Indications: Bipolar Disorder in Remission 4/10/19  Yes Kori Rey MD  
HYDROcodone-acetaminophen [facility]) 5-325 mg per tablet Take 1 Tab by mouth every eight (8) hours as needed for Pain for up to 3 days. Max Daily Amount: 3 Tabs.  1 tab po prn for headache 4/10/19 4/13/19 Yes Kori Rey MD  
 potassium chloride (KLOR-CON) 20 mEq pack Take 1 Packet by mouth daily. 4/10/19  Yes Belem Neri MD  
divalproex DR (DEPAKOTE) 250 mg tablet Take 2 tabs in the morning and 1 tab in the evening  Indications: epilepsy, Epilepsy 4/10/19  Yes Belem Neri MD  
lisinopril (PRINIVIL, ZESTRIL) 40 mg tablet Take 1 Tab by mouth daily. Indications: high blood pressure 4/11/19  Yes Belem Neri MD  
aspirin 81 mg chewable tablet Take 1 Tab by mouth daily. 4/11/19  Yes Belem Neri MD  
enoxaparin (LOVENOX) injection 150 mg by SubCUTAneous route every twelve (12) hours. 4/10/19  Yes Belem Neri MD  
metoprolol tartrate (LOPRESSOR) 25 mg tablet Take 1 Tab by mouth every twelve (12) hours. 4/10/19  Yes Belem Neri MD  
predniSONE (DELTASONE) 20 mg tablet Take 20 mg by mouth two (2) times a day for 3 days. 4/10/19 4/13/19 Yes Belem Neri MD  
topiramate (TOPAMAX) 50 mg tablet Take 1 Tab by mouth daily (after dinner). 4/10/19  Yes Belem Neri MD  
atorvastatin (LIPITOR) 20 mg tablet Take 1 Tab by mouth daily. 4/10/19  Yes Belem Neri MD  
acetaminophen (TYLENOL) 325 mg tablet Take 2 Tabs by mouth every four (4) hours as needed for Pain. 11/26/18   Xiao Waterman MD  
lisinopril (PRINIVIL, ZESTRIL) 10 mg tablet Take 2 Tabs by mouth. Indications: hypertension 7/2/18   Jose Salas MD  
melatonin 3 mg tablet Take 2 Tabs by mouth nightly as needed. Indications: Insomnia 7/2/18   Jose Salas MD  
naproxen (NAPROSYN) 500 mg tablet Take 1 Tab by mouth two (2) times daily (with meals). 5/10/17   Fatemeh Velazquez MD  
 
Patient Vitals for the past 8 hrs: 
 BP Temp Pulse Resp SpO2 Height Weight 04/10/19 1120 (!) 128/92 98.8 °F (37.1 °C) (!) 53 17 93 %    
04/10/19 0927 140/83     6' 3\" (1.905 m) 140.6 kg (310 lb) [id number] 1901 - 04/10 0700 In: 1010 [P.O.:960; I.V.:50] Out: 2317 [Urine:2575] 04/08 1901 - 04/10 0700 In: 1010 [P.O.:960; I.V.:50] Out: 6476 [Urine:2575]RESULTRCNT(24h)Active Problems: 
  Seizure ([facility] [address] (4/7/2019) Head injury (4/7/2019) Atrial fibrillation with RVR ([address] (4/7/2019) New onset a-fib ([address] (4/7/2019) Leg pain (4/7/2019) Morbid obesity ([facility] [address] (4/7/2019) Hypertension (4/7/2019) Additional comments:I reviewed the patient's new clinical lab test results. General Exam 
No acute distress, mucous membranes normal color and hydration status Neurologic Exam 
 
Mental status:  Alert, oriented to person, place, time and circumstance Language: normal fluency and comprehension No visual spatial neglect or overt apraxia Cranial nerves: Extraocular movements intact and full, face symmetric to movement Motor: moves all 4 extremities Gait: normal 
 
 
Assessment:  
 
Koko Valdez is a 46 y.o. man with with hx of epilepsy was admitted with breakthrough seizures , now stable on Depakote, who developed daily headaches, now improved and was discharged home. He is allergic to tylenol - therefore cannot take percocet; also, I don't rec opioids for someone with headaches and hx of epilepsy, therefore I told him not to take Percocet. I also don't rec ASA and Lovenox anticoagulation dose at the same time due to increase risk of bleeding. Also, pt did not have a stroke with this admission. Plan: F/u with Dr La Nena Lloyd as well as PCM, Psychiatrist 
- continue Depakote 500 mg bid 
- continue topiramate 50 mg qpm and increase dose up to 200 mg /day for headaches if tolerates it 
- if not on abilify do not start it until discussing with psychiatrist 
- do not take ASA while on Lovenox Signed: 
Tony Mcbride MD 
Adult Neurologist 
4/10/2019 
4:24 PM

## 2019-04-10 NOTE — NURSE NAVIGATOR
Patient and girlfriend requested [facility] to be done. Will complete today. Discharge order noted for patient. Orders reviewed. No needs identified at this time. CM remains available if needed. Girlfriend here to transport patient home. Brennon Awad. Julianna, IVANN, RN Care Management [phone]

## 2019-04-10 NOTE — PROGRESS NOTES
[facility] - Progress Note Admit Date: 4/7/2019 Assessment:  
 
Hospital Problems  Date Reviewed: 11/5/2012 Codes Class Noted POA Seizure ([facility] [address] ICD-10-CM: R56.9 ICD-9-CM: 780.39  4/7/2019 Unknown Head injury ICD-10-CM: S09. 90XA ICD-9-CM: 959.01  4/7/2019 Unknown Atrial fibrillation with RVR (HCC) ICD-10-CM: I48.91 
ICD-9-CM: 427.31  4/7/2019 Unknown New onset a-fib [facility]) ICD-10-CM: I48.91 
ICD-9-CM: 427.31  4/7/2019 Unknown Leg pain ICD-10-CM: M79.606 ICD-9-CM: 729.5  4/7/2019 Unknown Morbid obesity ([facility] [address] ICD-10-CM: E66.01 
ICD-9-CM: 278.01  4/7/2019 Unknown Hypertension ICD-10-CM: I10 
ICD-9-CM: 401.9  4/7/2019 Unknown  
   
  
 
 
  
  
-SVT, likely A.flutter 2:1 AV block with ongoing chest pain 8-9/10, refractory to diltiazem, digoxin, and subsequent hypotension.  No h/o A.fib/flutter/SVT. -Indeterminate troponin likely demand in setting of above. Normal EF 60% by echo this admission. Nuclear stress this AM. 
-Seizure x 2 after recent decrease in seizure meds -h/o traumatic brain injury as a child as cause for seizures -h/o HTN 
  
No primary cardiologist 
  
 
Plan:  
 
Continues to have atypical CP with musculoskeletal component. Will proceed with nuclear stress test this AM. Continue BB for rate control. Appreciate neurology evaluation, will need at least 30 days anticoagulation post cardioversion if OK with neurology team, patient tells me lovenox is what he tolerates. Subjective:  
 
Seen in stress lab. Continues to have atypical CP with musculoskeletal component. Sinus on monitor. Objective:  
  
Patient Vitals for the past 8 hrs: 
 Temp Pulse Resp BP SpO2  
04/10/19 0927    140/83   
04/10/19 0733 98.2 °F (36.8 °C) (!) 53 17 125/87 93 % 04/10/19 0400 98.2 °F (36.8 °C) (!) 59 17 112/81 91 % Patient Vitals for the past 96 hrs: 
 Weight 04/10/19 0927 140.6 kg (310 lb) 04/08/19 1510 140.6 kg (310 lb) 04/07/19 0633 140.6 kg (310 lb) Intake/Output Summary (Last 24 hours) at 4/10/2019 6807 Last data filed at 4/10/2019 6023 Gross per 24 hour Intake 1010 ml Output 1050 ml Net -40 ml Physical Exam: 
General:  alert, cooperative, no distress, appears stated age Neck:  no JVD Lungs:  clear to auscultation bilaterally Heart:  regular rate and rhythm, S1, S2 normal, no murmur, click, rub or gallop Abdomen:  abdomen is soft without significant tenderness, masses, organomegaly or guarding Extremities:  extremities normal, atraumatic, no cyanosis or edema Data Review:  
 
Labs: Results:  
   
Chemistry Recent Labs 04/08/19 
0528 04/07/19 
1845 GLU  --  110* NA  --  143 K  --  3.9 CL  --  107 CO2  --  27 BUN  --  10  
CREA  --  0.86 CA  --  8.3*  
MG 2.4  --   
AGAP  --  9  
BUCR  --  12  
AP  --  90  
TP  --  6.8 ALB  --  3.2*  
GLOB  --  3.6 AGRAT  --  0.9 CBC w/Diff Recent Labs 04/08/19 
2173 WBC 5.8  
RBC 4.20* HGB 13.2 HCT 40.7  GRANS 49 LYMPH 42 EOS 2 Cardiac Enzymes No results found for: CPK, CK, CKMMB, CKMB, RCK3, CKMBT, CKNDX, CKND1, NOHELIA, TROPT, TROIQ, KATHI, TROPT, TNIPOC, BNP, BNPP Coagulation No results for input(s): PTP, INR, APTT in the last 72 hours. No lab exists for component: INREXT Lipid Panel Lab Results Component Value Date/Time Cholesterol, total 237 (H) 04/10/2019 05:33 AM  
 HDL Cholesterol 61 (H) 04/10/2019 05:33 AM  
 LDL, calculated 161.8 (H) 04/10/2019 05:33 AM  
 VLDL, calculated 14.2 04/10/2019 05:33 AM  
 Triglyceride 71 04/10/2019 05:33 AM  
 CHOL/HDL Ratio 3.9 04/10/2019 05:33 AM  
  
BNP No results found for: BNP, BNPP, XBNPT Liver Enzymes Recent Labs 04/07/19 
1845 TP 6.8 ALB 3.2* AP 90 SGOT 28 Digoxin Thyroid Studies Lab Results Component Value Date/Time TSH 0.86 04/07/2019 06:45 PM  
    
 
Signed By: EVELIA Muhammad April 10, 2019

## 2019-04-25 DIAGNOSIS — G40.109 PARTIAL SYMPTOMATIC EPILEPSY WITH SIMPLE PARTIAL SEIZURES, NOT INTRACTABLE, WITHOUT STATUS EPILEPTICUS (HCC): ICD-10-CM

## 2019-04-25 RX ORDER — DIVALPROEX SODIUM 250 MG/1
TABLET, DELAYED RELEASE ORAL
Qty: 90 TAB | Refills: 0 | Status: SHIPPED | OUTPATIENT
Start: 2019-04-25 | End: 2019-06-05

## 2019-04-25 NOTE — TELEPHONE ENCOUNTER
Requested Prescriptions     Pending Prescriptions Disp Refills    divalproex DR (DEPAKOTE) 250 mg tablet 90 Tab 0     Sig: Take 2 tabs in the morning and 1 tab in the evening  Indications: epilepsy, Epilepsy     Pt states that he was originally taking 5 pills in the am. Pt also states that he is out of medication.

## 2019-05-22 ENCOUNTER — APPOINTMENT (OUTPATIENT)
Dept: GENERAL RADIOLOGY | Age: 49
End: 2019-05-22
Attending: PHYSICIAN ASSISTANT
Payer: MEDICARE

## 2019-05-22 ENCOUNTER — HOSPITAL ENCOUNTER (EMERGENCY)
Age: 49
Discharge: LEFT BEFORE DISCHARGE | End: 2019-05-22
Attending: EMERGENCY MEDICINE
Payer: MEDICARE

## 2019-05-22 VITALS
WEIGHT: 295 LBS | OXYGEN SATURATION: 97 % | HEART RATE: 83 BPM | TEMPERATURE: 98.1 F | HEIGHT: 74 IN | BODY MASS INDEX: 37.86 KG/M2 | DIASTOLIC BLOOD PRESSURE: 89 MMHG | RESPIRATION RATE: 20 BRPM | SYSTOLIC BLOOD PRESSURE: 151 MMHG

## 2019-05-22 LAB
ATRIAL RATE: 79 BPM
CALCULATED P AXIS, ECG09: 25 DEGREES
CALCULATED R AXIS, ECG10: 13 DEGREES
CALCULATED T AXIS, ECG11: 18 DEGREES
DIAGNOSIS, 93000: NORMAL
P-R INTERVAL, ECG05: 148 MS
Q-T INTERVAL, ECG07: 418 MS
QRS DURATION, ECG06: 90 MS
QTC CALCULATION (BEZET), ECG08: 479 MS
VENTRICULAR RATE, ECG03: 79 BPM

## 2019-05-22 PROCEDURE — 93005 ELECTROCARDIOGRAM TRACING: CPT

## 2019-05-22 PROCEDURE — 71046 X-RAY EXAM CHEST 2 VIEWS: CPT

## 2019-05-22 PROCEDURE — 75810000275 HC EMERGENCY DEPT VISIT NO LEVEL OF CARE

## 2019-05-22 NOTE — ED TRIAGE NOTES
Pt arrived through triage with c/o chest pain and seizure that occurred \"the other night. \" Pt states hx of seizures and states he is compliant with his Depakote.

## 2019-05-22 NOTE — ED NOTES
I performed a brief evaluation, including history and physical, of the patient here in triage and I have determined that pt will need further treatment and evaluation from the main side ER physician. I have placed initial orders to help in expediting patients care.      May 22, 2019 at 3:24 PM - Aidee Muse PA-C        Visit Vitals  /89 (BP 1 Location: Left arm, BP Patient Position: At rest)   Pulse 83   Temp 98.1 °F (36.7 °C)   Resp 20   Ht 6' 2\" (1.88 m)   Wt 133.8 kg (295 lb)   SpO2 97%   BMI 37.88 kg/m²

## 2019-06-05 ENCOUNTER — OFFICE VISIT (OUTPATIENT)
Dept: NEUROLOGY | Age: 49
End: 2019-06-05

## 2019-06-05 VITALS
RESPIRATION RATE: 22 BRPM | DIASTOLIC BLOOD PRESSURE: 66 MMHG | WEIGHT: 315 LBS | SYSTOLIC BLOOD PRESSURE: 112 MMHG | BODY MASS INDEX: 40.43 KG/M2 | HEART RATE: 145 BPM | TEMPERATURE: 98.9 F | HEIGHT: 74 IN | OXYGEN SATURATION: 95 %

## 2019-06-05 DIAGNOSIS — G40.109 PARTIAL SYMPTOMATIC EPILEPSY WITH SIMPLE PARTIAL SEIZURES, NOT INTRACTABLE, WITHOUT STATUS EPILEPTICUS (HCC): ICD-10-CM

## 2019-06-05 RX ORDER — DIVALPROEX SODIUM 500 MG/1
500 TABLET, DELAYED RELEASE ORAL 2 TIMES DAILY
Qty: 60 TAB | Refills: 10 | Status: SHIPPED | OUTPATIENT
Start: 2019-06-05 | End: 2019-08-09 | Stop reason: SDUPTHER

## 2019-06-05 NOTE — PROGRESS NOTES
Apolinar Starkey is a 44 y.o. male in today for follow-up on Epilepsy and seizures. 1. Have you been to the ER, urgent care clinic since your last visit? Hospitalized since your last visit? Yes Reason for visit: 5/22/2019 [facility] ED, 4/7/2019 [facility] admission seizure    2. Have you seen or consulted any other health care providers outside of the [address] [place] [address] since your last visit? Include any pap smears or colon screening.  No

## 2019-06-05 NOTE — LETTER
6/5/19 Patient: Anastacia Acharya YOB: 1970 Date of Visit: 6/5/2019 Chencho Chen MD 
[address] [facility] [address] [place] [id number] VIA Facsimile: [phone] Dear Chencho Chen MD, Thank you for referring Mr. Deepthi Christianson to i  for evaluation. My notes for this consultation are attached. If you have questions, please do not hesitate to call me. I look forward to following your patient along with you.  
 
 
Sincerely, 
 
Mercedes Velazquez MD
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
## 2019-07-22 ENCOUNTER — HOSPITAL ENCOUNTER (EMERGENCY)
Age: 49
Discharge: HOME | End: 2019-07-22
Attending: EMERGENCY MEDICINE | Admitting: EMERGENCY MEDICINE
Payer: MEDICARE

## 2019-07-22 ENCOUNTER — APPOINTMENT (OUTPATIENT)
Dept: CT IMAGING | Age: 49
End: 2019-07-22
Attending: EMERGENCY MEDICINE
Payer: MEDICARE

## 2019-07-22 VITALS
HEART RATE: 53 BPM | DIASTOLIC BLOOD PRESSURE: 61 MMHG | OXYGEN SATURATION: 98 % | SYSTOLIC BLOOD PRESSURE: 118 MMHG | TEMPERATURE: 98.4 F | RESPIRATION RATE: 21 BRPM

## 2019-07-22 DIAGNOSIS — R07.81 PLEURITIC CHEST PAIN: Primary | ICD-10-CM

## 2019-07-22 LAB
ALBUMIN SERPL-MCNC: 3.6 G/DL (ref 3.4–5)
ALBUMIN/GLOB SERPL: 0.9 {RATIO} (ref 0.8–1.7)
ALP SERPL-CCNC: 99 U/L (ref 45–117)
ALT SERPL-CCNC: 30 U/L (ref 16–61)
ANION GAP SERPL CALC-SCNC: 5 MMOL/L (ref 3–18)
AST SERPL-CCNC: 24 U/L (ref 10–38)
ATRIAL RATE: 55 BPM
BASOPHILS # BLD: 0 K/UL (ref 0–0.1)
BASOPHILS NFR BLD: 0 % (ref 0–2)
BILIRUB SERPL-MCNC: 0.4 MG/DL (ref 0.2–1)
BUN SERPL-MCNC: 14 MG/DL (ref 7–18)
BUN/CREAT SERPL: 16 (ref 12–20)
CALCIUM SERPL-MCNC: 8.8 MG/DL (ref 8.5–10.1)
CALCULATED P AXIS, ECG09: 78 DEGREES
CALCULATED R AXIS, ECG10: 15 DEGREES
CALCULATED T AXIS, ECG11: 19 DEGREES
CHLORIDE SERPL-SCNC: 106 MMOL/L (ref 100–111)
CK MB CFR SERPL CALC: NORMAL % (ref 0–4)
CK MB SERPL-MCNC: <1 NG/ML (ref 5–25)
CK SERPL-CCNC: 111 U/L (ref 39–308)
CO2 SERPL-SCNC: 29 MMOL/L (ref 21–32)
CREAT SERPL-MCNC: 0.89 MG/DL (ref 0.6–1.3)
DIAGNOSIS, 93000: NORMAL
DIFFERENTIAL METHOD BLD: ABNORMAL
EOSINOPHIL # BLD: 0.1 K/UL (ref 0–0.4)
EOSINOPHIL NFR BLD: 1 % (ref 0–5)
ERYTHROCYTE [DISTWIDTH] IN BLOOD BY AUTOMATED COUNT: 12.5 % (ref 11.6–14.5)
GLOBULIN SER CALC-MCNC: 4.1 G/DL (ref 2–4)
GLUCOSE SERPL-MCNC: 85 MG/DL (ref 74–99)
HCT VFR BLD AUTO: 42.5 % (ref 36–48)
HGB BLD-MCNC: 14.3 G/DL (ref 13–16)
LYMPHOCYTES # BLD: 2 K/UL (ref 0.9–3.6)
LYMPHOCYTES NFR BLD: 36 % (ref 21–52)
MAGNESIUM SERPL-MCNC: 2.1 MG/DL (ref 1.6–2.6)
MCH RBC QN AUTO: 32.2 PG (ref 24–34)
MCHC RBC AUTO-ENTMCNC: 33.6 G/DL (ref 31–37)
MCV RBC AUTO: 95.7 FL (ref 74–97)
MONOCYTES # BLD: 0.6 K/UL (ref 0.05–1.2)
MONOCYTES NFR BLD: 10 % (ref 3–10)
NEUTS SEG # BLD: 2.9 K/UL (ref 1.8–8)
NEUTS SEG NFR BLD: 53 % (ref 40–73)
P-R INTERVAL, ECG05: 172 MS
PLATELET # BLD AUTO: 206 K/UL (ref 135–420)
PMV BLD AUTO: 11.1 FL (ref 9.2–11.8)
POTASSIUM SERPL-SCNC: 3.9 MMOL/L (ref 3.5–5.5)
PROT SERPL-MCNC: 7.7 G/DL (ref 6.4–8.2)
Q-T INTERVAL, ECG07: 466 MS
QRS DURATION, ECG06: 90 MS
QTC CALCULATION (BEZET), ECG08: 445 MS
RBC # BLD AUTO: 4.44 M/UL (ref 4.7–5.5)
SODIUM SERPL-SCNC: 140 MMOL/L (ref 136–145)
TROPONIN I SERPL-MCNC: <0.02 NG/ML (ref 0–0.04)
TROPONIN I SERPL-MCNC: <0.02 NG/ML (ref 0–0.04)
VENTRICULAR RATE, ECG03: 55 BPM
WBC # BLD AUTO: 5.5 K/UL (ref 4.6–13.2)

## 2019-07-22 PROCEDURE — 71275 CT ANGIOGRAPHY CHEST: CPT

## 2019-07-22 PROCEDURE — 80053 COMPREHEN METABOLIC PANEL: CPT

## 2019-07-22 PROCEDURE — 84484 ASSAY OF TROPONIN QUANT: CPT

## 2019-07-22 PROCEDURE — 85025 COMPLETE CBC W/AUTO DIFF WBC: CPT

## 2019-07-22 PROCEDURE — 74011250637 HC RX REV CODE- 250/637: Performed by: EMERGENCY MEDICINE

## 2019-07-22 PROCEDURE — 93005 ELECTROCARDIOGRAM TRACING: CPT

## 2019-07-22 PROCEDURE — 82550 ASSAY OF CK (CPK): CPT

## 2019-07-22 PROCEDURE — 74011636320 HC RX REV CODE- 636/320: Performed by: EMERGENCY MEDICINE

## 2019-07-22 PROCEDURE — 99285 EMERGENCY DEPT VISIT HI MDM: CPT

## 2019-07-22 PROCEDURE — 83735 ASSAY OF MAGNESIUM: CPT

## 2019-07-22 RX ORDER — GUAIFENESIN 100 MG/5ML
162 LIQUID (ML) ORAL
Status: COMPLETED | OUTPATIENT
Start: 2019-07-22 | End: 2019-07-22

## 2019-07-22 RX ADMIN — ASPIRIN 81 MG 162 MG: 81 TABLET ORAL at 14:26

## 2019-07-22 RX ADMIN — IOPAMIDOL 80 ML: 755 INJECTION, SOLUTION INTRAVENOUS at 17:20
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
## 2019-07-22 NOTE — DISCHARGE INSTRUCTIONS

## 2019-07-22 NOTE — ED TRIAGE NOTES
Patient arrived from home via EMS and sent by home health. Patient complaining of chest discomfort and generalized weakness.  Patient [id number]

## 2019-08-07 ENCOUNTER — OFFICE VISIT (OUTPATIENT)
Dept: ORTHOPEDIC SURGERY | Facility: CLINIC | Age: 49
End: 2019-08-07

## 2019-08-07 VITALS
WEIGHT: 315 LBS | HEART RATE: 80 BPM | SYSTOLIC BLOOD PRESSURE: 142 MMHG | HEIGHT: 74 IN | TEMPERATURE: 99.2 F | OXYGEN SATURATION: 97 % | BODY MASS INDEX: 40.43 KG/M2 | DIASTOLIC BLOOD PRESSURE: 82 MMHG | RESPIRATION RATE: 18 BRPM

## 2019-08-07 DIAGNOSIS — M65.332 TRIGGER MIDDLE FINGER OF LEFT HAND: Primary | ICD-10-CM
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
## 2019-08-09 ENCOUNTER — OFFICE VISIT (OUTPATIENT)
Dept: NEUROLOGY | Age: 49
End: 2019-08-09

## 2019-08-09 VITALS
RESPIRATION RATE: 18 BRPM | OXYGEN SATURATION: 94 % | SYSTOLIC BLOOD PRESSURE: 132 MMHG | TEMPERATURE: 98.8 F | HEIGHT: 74 IN | BODY MASS INDEX: 40.43 KG/M2 | WEIGHT: 315 LBS | HEART RATE: 62 BPM | DIASTOLIC BLOOD PRESSURE: 90 MMHG

## 2019-08-09 DIAGNOSIS — G40.109 PARTIAL SYMPTOMATIC EPILEPSY WITH SIMPLE PARTIAL SEIZURES, NOT INTRACTABLE, WITHOUT STATUS EPILEPTICUS (HCC): Primary | ICD-10-CM

## 2019-08-09 RX ORDER — DIVALPROEX SODIUM 500 MG/1
500 TABLET, DELAYED RELEASE ORAL 2 TIMES DAILY
Qty: 60 TAB | Refills: 11 | Status: SHIPPED | OUTPATIENT
Start: 2019-08-09 | End: 2019-11-25

## 2019-08-09 NOTE — PROGRESS NOTES
Heather Landaverde is a 44 y.o. male in today for follow-up on Epilepsy. 1. Have you been to the ER, urgent care clinic since your last visit? Hospitalized since your last visit? Yes Reason for visit: 7/22/2019, [facility] ED, generalized body aches    2. Have you seen or consulted any other health care providers outside of the [address] [place] [address] since your last visit? Include any pap smears or colon screening.  No

## 2019-08-09 NOTE — LETTER
8/9/19 Patient: Shahnaz Robins YOB: 1970 Date of Visit: 8/9/2019 Eugenio Morris MD 
[address] [address] VIA Facsimile: [phone] Dear Eugenio Morris MD, Thank you for referring Mr. Carrie Chapin to [facility] for evaluation. My notes for this consultation are attached. If you have questions, please do not hesitate to call me. I look forward to following your patient along with you.  
 
 
Sincerely, 
 
Slaena Phillip MD
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
## 2019-10-16 ENCOUNTER — HOSPITAL ENCOUNTER (EMERGENCY)
Age: 49
Discharge: HOME | End: 2019-10-16
Attending: EMERGENCY MEDICINE
Payer: MEDICARE

## 2019-10-16 ENCOUNTER — APPOINTMENT (OUTPATIENT)
Dept: CT IMAGING | Age: 49
End: 2019-10-16
Attending: EMERGENCY MEDICINE
Payer: MEDICARE

## 2019-10-16 VITALS
TEMPERATURE: 99.4 F | DIASTOLIC BLOOD PRESSURE: 98 MMHG | SYSTOLIC BLOOD PRESSURE: 132 MMHG | RESPIRATION RATE: 19 BRPM | OXYGEN SATURATION: 96 % | WEIGHT: 310 LBS | BODY MASS INDEX: 39.8 KG/M2 | HEART RATE: 144 BPM

## 2019-10-16 DIAGNOSIS — R56.9 SEIZURE (HCC): Primary | ICD-10-CM

## 2019-10-16 LAB
ALBUMIN SERPL-MCNC: 3.7 G/DL (ref 3.4–5)
ALBUMIN/GLOB SERPL: 0.9 {RATIO} (ref 0.8–1.7)
ALP SERPL-CCNC: 110 U/L (ref 45–117)
ALT SERPL-CCNC: 26 U/L (ref 16–61)
ANION GAP SERPL CALC-SCNC: 5 MMOL/L (ref 3–18)
AST SERPL-CCNC: 24 U/L (ref 10–38)
BASOPHILS # BLD: 0 K/UL (ref 0–0.1)
BASOPHILS NFR BLD: 0 % (ref 0–2)
BILIRUB SERPL-MCNC: 0.2 MG/DL (ref 0.2–1)
BUN SERPL-MCNC: 13 MG/DL (ref 7–18)
BUN/CREAT SERPL: 13 (ref 12–20)
CALCIUM SERPL-MCNC: 8.9 MG/DL (ref 8.5–10.1)
CHLORIDE SERPL-SCNC: 106 MMOL/L (ref 100–111)
CO2 SERPL-SCNC: 28 MMOL/L (ref 21–32)
CREAT SERPL-MCNC: 0.99 MG/DL (ref 0.6–1.3)
DIFFERENTIAL METHOD BLD: ABNORMAL
EOSINOPHIL # BLD: 0 K/UL (ref 0–0.4)
EOSINOPHIL NFR BLD: 0 % (ref 0–5)
ERYTHROCYTE [DISTWIDTH] IN BLOOD BY AUTOMATED COUNT: 12.7 % (ref 11.6–14.5)
GLOBULIN SER CALC-MCNC: 4.3 G/DL (ref 2–4)
GLUCOSE BLD STRIP.AUTO-MCNC: 101 MG/DL (ref 70–110)
GLUCOSE SERPL-MCNC: 110 MG/DL (ref 74–99)
HCT VFR BLD AUTO: 42.9 % (ref 36–48)
HGB BLD-MCNC: 14.3 G/DL (ref 13–16)
LYMPHOCYTES # BLD: 1.7 K/UL (ref 0.9–3.6)
LYMPHOCYTES NFR BLD: 22 % (ref 21–52)
MCH RBC QN AUTO: 31.6 PG (ref 24–34)
MCHC RBC AUTO-ENTMCNC: 33.3 G/DL (ref 31–37)
MCV RBC AUTO: 94.9 FL (ref 74–97)
MONOCYTES # BLD: 0.8 K/UL (ref 0.05–1.2)
MONOCYTES NFR BLD: 11 % (ref 3–10)
NEUTS SEG # BLD: 5.1 K/UL (ref 1.8–8)
NEUTS SEG NFR BLD: 67 % (ref 40–73)
PLATELET # BLD AUTO: 234 K/UL (ref 135–420)
PMV BLD AUTO: 11 FL (ref 9.2–11.8)
POTASSIUM SERPL-SCNC: 4 MMOL/L (ref 3.5–5.5)
PROT SERPL-MCNC: 8 G/DL (ref 6.4–8.2)
RBC # BLD AUTO: 4.52 M/UL (ref 4.7–5.5)
SODIUM SERPL-SCNC: 139 MMOL/L (ref 136–145)
VALPROATE SERPL-MCNC: 51 UG/ML (ref 50–100)
WBC # BLD AUTO: 7.7 K/UL (ref 4.6–13.2)

## 2019-10-16 PROCEDURE — 90715 TDAP VACCINE 7 YRS/> IM: CPT | Performed by: EMERGENCY MEDICINE

## 2019-10-16 PROCEDURE — 85025 COMPLETE CBC W/AUTO DIFF WBC: CPT

## 2019-10-16 PROCEDURE — 74011250637 HC RX REV CODE- 250/637: Performed by: EMERGENCY MEDICINE

## 2019-10-16 PROCEDURE — 82962 GLUCOSE BLOOD TEST: CPT

## 2019-10-16 PROCEDURE — 70450 CT HEAD/BRAIN W/O DYE: CPT

## 2019-10-16 PROCEDURE — 93005 ELECTROCARDIOGRAM TRACING: CPT

## 2019-10-16 PROCEDURE — 80053 COMPREHEN METABOLIC PANEL: CPT

## 2019-10-16 PROCEDURE — 99284 EMERGENCY DEPT VISIT MOD MDM: CPT

## 2019-10-16 PROCEDURE — 90471 IMMUNIZATION ADMIN: CPT

## 2019-10-16 PROCEDURE — 74011250636 HC RX REV CODE- 250/636: Performed by: EMERGENCY MEDICINE

## 2019-10-16 PROCEDURE — 80164 ASSAY DIPROPYLACETIC ACD TOT: CPT

## 2019-10-16 RX ORDER — METOPROLOL TARTRATE 5 MG/5ML
5 INJECTION INTRAVENOUS
Status: DISCONTINUED | OUTPATIENT
Start: 2019-10-16 | End: 2019-10-16

## 2019-10-16 RX ORDER — METOPROLOL TARTRATE 25 MG/1
25 TABLET, FILM COATED ORAL
Status: COMPLETED | OUTPATIENT
Start: 2019-10-16 | End: 2019-10-16

## 2019-10-16 RX ORDER — METOPROLOL TARTRATE 25 MG/1
25 TABLET, FILM COATED ORAL 2 TIMES DAILY
Qty: 30 TAB | Refills: 0 | Status: SHIPPED | OUTPATIENT
Start: 2019-10-16 | End: 2019-10-24 | Stop reason: SDUPTHER

## 2019-10-16 RX ADMIN — TETANUS TOXOID, REDUCED DIPHTHERIA TOXOID AND ACELLULAR PERTUSSIS VACCINE, ADSORBED 0.5 ML: 5; 2.5; 8; 8; 2.5 SUSPENSION INTRAMUSCULAR at 20:41

## 2019-10-16 RX ADMIN — METOPROLOL TARTRATE 25 MG: 25 TABLET ORAL at 21:46

## 2019-10-16 RX ADMIN — METOPROLOL TARTRATE 25 MG: 25 TABLET ORAL at 21:34

## 2019-10-16 NOTE — ED TRIAGE NOTES
Per EMS, Patient was at home and had just finished eating wings and fries when he had a witnessed tonic clonic seizure. Patient is compliant with medications. He has a small laceration to the bridge of his nose.
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
## 2019-10-17 LAB
ATRIAL RATE: 138 BPM
CALCULATED P AXIS, ECG09: 59 DEGREES
CALCULATED R AXIS, ECG10: 13 DEGREES
CALCULATED T AXIS, ECG11: -118 DEGREES
DIAGNOSIS, 93000: NORMAL
P-R INTERVAL, ECG05: 128 MS
Q-T INTERVAL, ECG07: 268 MS
QRS DURATION, ECG06: 90 MS
QTC CALCULATION (BEZET), ECG08: 406 MS
VENTRICULAR RATE, ECG03: 138 BPM

## 2019-10-17 NOTE — DISCHARGE INSTRUCTIONS
Patient Education        Seizure: Care Instructions  Your Care Instructions    Seizures are caused by abnormal patterns of electrical signals in the brain. They are different for each person. Seizures can affect movement, speech, vision, or awareness. Some people have only slight shaking of a hand and do not pass out. Other people may pass out and have violent shaking of the whole body. Some people appear to stare into space. They are awake, but they can't respond normally. Later, they may not remember what happened. You may need tests to identify the type and cause of the seizures. A seizure may occur only once, or you may have them more than one time. Taking medicines as directed and following up with your doctor may help keep you from having more seizures. The doctor has checked you carefully, but problems can develop later. If you notice any problems or new symptoms, get medical treatment right away. Follow-up care is a key part of your treatment and safety. Be sure to make and go to all appointments, and call your doctor if you are having problems. It's also a good idea to know your test results and keep a list of the medicines you take. How can you care for yourself at home? · Be safe with medicines. Take your medicines exactly as prescribed. Call your doctor if you think you are having a problem with your medicine. · Do not do any activity that could be dangerous to you or others until your doctor says it is safe to do so. For example, do not drive a car, operate machinery, swim, or climb ladders. · Be sure that anyone treating you for any health problem knows that you have had a seizure and what medicines you are taking for it. · Identify and avoid things that may make you more likely to have a seizure. These may include lack of sleep, alcohol or drug use, stress, or not eating. · Make sure you go to your follow-up appointment. When should you call for help?   Call 911 anytime you think you may need emergency care. For example, call if:    · You have another seizure.     · You have more than one seizure in 24 hours.     · You have new symptoms, such as trouble walking, speaking, or thinking clearly.    Call your doctor now or seek immediate medical care if:    · You are not acting normally.    Watch closely for changes in your health, and be sure to contact your doctor if you have any problems. Where can you learn more? Go to http://richard-luis enrique.info/. Enter L986 in the search box to learn more about \"Seizure: Care Instructions. \"  Current as of: March 28, 2019  Content Version: 12.2  © 1019-3438 [employer]. Care instructions adapted under license by [employer] (which disclaims liability or warranty for this information). If you have questions about a medical condition or this instruction, always ask your healthcare professional. [address] any warranty or liability for your use of this information.

## 2019-10-17 NOTE — ROUTINE PROCESS
Pt educated on the importance of getting his Lopressor refilled. Dr. Reinoso Brood aware of pt's heart rate. Pt states he is aware of his heart rate being elevated and he has an appointment with Dr. Lamine Horn next week. Pt is A & O X 3, follows commands, no distress noted. Pt able to ambulated to car without any distress.

## 2019-10-17 NOTE — PROGRESS NOTES
conducted an initial consultation and Spiritual Assessment for Ihsan Pino, who is a 44 y.o.,male. Patients Primary Language is: [place]. According to the patients EMR Gnosticist Affiliation is: [place]. The reason the Patient came to the hospital is:   Patient Active Problem List    Diagnosis Date Noted    Seizure [facility]) 04/07/2019    Head injury 04/07/2019    [facility] fibrillation with RVR [facility] 04/07/2019    New onset a-fib ([facility] 04/07/2019    Leg pain 04/07/2019    Morbid obesity ([facility] 04/07/2019    Hypertension 04/07/2019    Bipolar disorder, unspecified ([facility] 06/26/2018    Nonintractable epilepsy with simple partial seizures ([facility] 05/09/2017        The  provided the following Interventions:  Initiated a relationship of care and support. Listened empathically as patient shared that having an episode of Erica Bravo brought him to the emergency room. Provided information about Spiritual Care Services. [place] prayer and assurance of continued prayers on patient's behalf. Chart reviewed. The following outcomes where achieved:  Patient shared limited information about both his medical narrative and spiritual journey/beliefs. Patient processed feeling about current hospitalization. Patient expressed gratitude for 's visit. Assessment:  Patient does not have any Christianity/cultural needs that will affect patients preferences in health care. There are no spiritual or Christianity issues which require intervention at this time. Plan:  Chaplains will continue to follow and will provide pastoral care on an as needed/requested basis.  recommends bedside caregivers page  on duty if patient shows signs of acute spiritual or emotional distress.     [address] [facility]   [phone]

## 2019-10-24 ENCOUNTER — OFFICE VISIT (OUTPATIENT)
Dept: CARDIOLOGY CLINIC | Age: 49
End: 2019-10-24

## 2019-10-24 VITALS
WEIGHT: 315 LBS | HEART RATE: 123 BPM | BODY MASS INDEX: 40.43 KG/M2 | SYSTOLIC BLOOD PRESSURE: 132 MMHG | DIASTOLIC BLOOD PRESSURE: 76 MMHG | HEIGHT: 74 IN | OXYGEN SATURATION: 97 %

## 2019-10-24 DIAGNOSIS — R56.9 SEIZURE (HCC): Primary | ICD-10-CM

## 2019-10-24 DIAGNOSIS — I10 ESSENTIAL HYPERTENSION: ICD-10-CM

## 2019-10-24 DIAGNOSIS — I48.92 ATRIAL FLUTTER, UNSPECIFIED TYPE (HCC): ICD-10-CM

## 2019-10-24 DIAGNOSIS — I48.91 ATRIAL FIBRILLATION WITH RVR (HCC): ICD-10-CM

## 2019-10-24 RX ORDER — METOPROLOL TARTRATE 25 MG/1
25 TABLET, FILM COATED ORAL 2 TIMES DAILY
Qty: 30 TAB | Refills: 0 | Status: SHIPPED | OUTPATIENT
Start: 2019-10-24 | End: 2019-11-08

## 2019-10-24 RX ORDER — LISINOPRIL 40 MG/1
40 TABLET ORAL DAILY
Qty: 30 TAB | Refills: 6 | Status: SHIPPED | OUTPATIENT
Start: 2019-10-24 | End: 2021-01-07 | Stop reason: SDUPTHER

## 2019-10-24 NOTE — PROGRESS NOTES
Kimani Hadley presents today for   Chief Complaint   Patient presents with    New Patient     referred by PCP for afib/a flutter     Chest Pain     intermittent sharp, middle    Swelling     mild    Shortness of Breath     exertion       Kimani Hadley preferred language for health care discussion is english/other. Is someone accompanying this pt? no    Is the patient using any DME equipment during [address]? no    Depression Screening:  3 most recent PHQ Screens 10/24/2019   Little interest or pleasure in doing things Not at all   Feeling down, depressed, irritable, or hopeless Not at all   Total Score PHQ 2 0       Learning Assessment:  Learning Assessment 10/24/2019   PRIMARY LEARNER Patient   PRIMARY LANGUAGE ENGLISH   LEARNER PREFERENCE PRIMARY DEMONSTRATION   ANSWERED BY Patient   RELATIONSHIP SELF       Abuse Screening:  Abuse Screening Questionnaire 10/24/2019   Do you ever feel afraid of your partner? N   Are you in a relationship with someone who physically or mentally threatens you? N   Is it safe for you to go home? Y       Fall Risk  Fall Risk Assessment, last 12 mths 10/24/2019   Able to walk? Yes   Fall in past 12 months? No       Pt currently taking Anticoagulant therapy? no    Coordination of Care:  1. Have you been to the ER, urgent care clinic since your last visit? Hospitalized since your last visit? 10/16 for seizure; 4/7 - 4/10 for PAF and seizure     2. Have you seen or consulted any other health care providers outside of the [address] [place] [address] since your last visit? Include any pap smears or colon screening.  no

## 2019-10-24 NOTE — PATIENT INSTRUCTIONS
[facility] Patient  EP Instructions 1. You are scheduled to have a DAMARI FLUTTER ABLATION  on  11/11/2019 , at 9:15AM.    Please check in at 8:15AM. 2. Please go to [facility] and park in the outpatient parking lot that is located around to the back of the hospital and enter through the [facility] building. Once you enter through the [facility] check in with the  there. The  will either give you directions or assist you in getting to the cath holding area. 3.  You are not to eat or drink anything after midnight the night before your procedure. 4. Please continue to take your medications with a small sip of water on the morning of the procedurE. 5. If you are diabetic, do not take your insulin/sugar pill the morning of the procedure. 6. We encourage families to wait in the waiting room on the first floor while the procedure is being done. The Doctor will come out and talk with you as soon as the procedure is over. 7. There is the possibility that you may spend the night in the hospital, depending on the results of the procedure. This will be determined after the procedure is done. 8.   If you or your family have any questions, please call our office Monday-Friday 9:00am  
      -4:30 pm , at [phone], and ask to speak to one of the nurses.

## 2019-10-24 NOTE — LETTER
10/24/2019 4:08 PM 
 
 
Josephine Barajas 
[id number] 
1970 Insurance:  [employer]                 Auth # _____________________ Proc Date: Mon 11/11                Proc Time:  9:15 Performing MD : Dr. [facility]:  [facility]                                            PCP Dr. Lam Parkinson Scheduled with:  Janet-email                                                        Date:10/24/2019 HP: 10/24      EKG: 10/24    Labs:______  CXR: _______  Orders:  10/24 Special Instructions:  _____________________________________________________ 
______________________________________________________________________ 
______________________________________________________________________ Date Faxed:   ______________   Pages Faxed: ___________________ The materials enclosed with this facsimile transmission are private and confidential and are the property of the sender. If you are not the intended recipient, be advised that any unauthorized use, disclosure, copying, distribution, or the taking of any action in reliance on the contents of this telecopied information is strictly prohibited. If you have received this in error, please immediately notify the sender via telephone to arrange for return of the forwarded documentation.
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
## 2019-10-30 ENCOUNTER — TELEPHONE (OUTPATIENT)
Dept: CARDIOLOGY CLINIC | Age: 49
End: 2019-10-30

## 2019-10-30 NOTE — TELEPHONE ENCOUNTER
Kiera Ulloa from Dr SINGH [facility] ([phone]) office called for a peer to peer concerning  Brian Poisson cardiac ablation
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
## 2019-11-09 ENCOUNTER — ANESTHESIA EVENT (OUTPATIENT)
Dept: CARDIAC CATH/INVASIVE PROCEDURES | Age: 49
End: 2019-11-09
Payer: MEDICARE

## 2019-11-09 RX ORDER — SODIUM CHLORIDE 0.9 % (FLUSH) 0.9 %
5-40 SYRINGE (ML) INJECTION AS NEEDED
Status: CANCELLED | OUTPATIENT
Start: 2019-11-09

## 2019-11-09 RX ORDER — SODIUM CHLORIDE 0.9 % (FLUSH) 0.9 %
5-40 SYRINGE (ML) INJECTION EVERY 8 HOURS
Status: CANCELLED | OUTPATIENT
Start: 2019-11-09

## 2019-11-09 RX ORDER — SODIUM CHLORIDE, SODIUM LACTATE, POTASSIUM CHLORIDE, CALCIUM CHLORIDE 600; 310; 30; 20 MG/100ML; MG/100ML; MG/100ML; MG/100ML
75 INJECTION, SOLUTION INTRAVENOUS CONTINUOUS
Status: CANCELLED | OUTPATIENT
Start: 2019-11-09 | End: 2019-11-10

## 2019-11-11 ENCOUNTER — HOSPITAL ENCOUNTER (OUTPATIENT)
Dept: NON INVASIVE DIAGNOSTICS | Age: 49
Discharge: HOME | End: 2019-11-11
Payer: MEDICARE

## 2019-11-11 ENCOUNTER — ANESTHESIA (OUTPATIENT)
Dept: CARDIAC CATH/INVASIVE PROCEDURES | Age: 49
End: 2019-11-11
Payer: MEDICARE

## 2019-11-11 ENCOUNTER — HOSPITAL ENCOUNTER (OUTPATIENT)
Age: 49
Setting detail: OUTPATIENT SURGERY
Discharge: HOME | End: 2019-11-11
Attending: INTERNAL MEDICINE | Admitting: INTERNAL MEDICINE
Payer: MEDICARE

## 2019-11-11 VITALS
TEMPERATURE: 98.8 F | RESPIRATION RATE: 22 BRPM | DIASTOLIC BLOOD PRESSURE: 76 MMHG | SYSTOLIC BLOOD PRESSURE: 151 MMHG | OXYGEN SATURATION: 99 % | HEART RATE: 64 BPM

## 2019-11-11 VITALS — BODY MASS INDEX: 38.54 KG/M2 | WEIGHT: 310 LBS | HEIGHT: 75 IN

## 2019-11-11 DIAGNOSIS — I48.92 ATRIAL FLUTTER (HCC): ICD-10-CM

## 2019-11-11 DIAGNOSIS — I48.91 NEW ONSET A-FIB (HCC): Primary | ICD-10-CM

## 2019-11-11 DIAGNOSIS — Z98.890 H/O CARDIAC RADIOFREQUENCY ABLATION: ICD-10-CM

## 2019-11-11 LAB
ANION GAP SERPL CALC-SCNC: 6 MMOL/L (ref 3–18)
ATRIAL RATE: 63 BPM
BUN SERPL-MCNC: 14 MG/DL (ref 7–18)
BUN/CREAT SERPL: 16 (ref 12–20)
CALCIUM SERPL-MCNC: 8.5 MG/DL (ref 8.5–10.1)
CALCULATED P AXIS, ECG09: 30 DEGREES
CALCULATED R AXIS, ECG10: 14 DEGREES
CALCULATED T AXIS, ECG11: -6 DEGREES
CHLORIDE SERPL-SCNC: 108 MMOL/L (ref 100–111)
CO2 SERPL-SCNC: 30 MMOL/L (ref 21–32)
CREAT SERPL-MCNC: 0.86 MG/DL (ref 0.6–1.3)
DIAGNOSIS, 93000: NORMAL
ERYTHROCYTE [DISTWIDTH] IN BLOOD BY AUTOMATED COUNT: 12.8 % (ref 11.6–14.5)
GLUCOSE SERPL-MCNC: 92 MG/DL (ref 74–99)
HCT VFR BLD AUTO: 40.4 % (ref 36–48)
HGB BLD-MCNC: 13.3 G/DL (ref 13–16)
MCH RBC QN AUTO: 31.6 PG (ref 24–34)
MCHC RBC AUTO-ENTMCNC: 32.9 G/DL (ref 31–37)
MCV RBC AUTO: 96 FL (ref 74–97)
P-R INTERVAL, ECG05: 180 MS
PLATELET # BLD AUTO: 207 K/UL (ref 135–420)
PMV BLD AUTO: 11.2 FL (ref 9.2–11.8)
POTASSIUM SERPL-SCNC: 4.3 MMOL/L (ref 3.5–5.5)
Q-T INTERVAL, ECG07: 434 MS
QRS DURATION, ECG06: 102 MS
QTC CALCULATION (BEZET), ECG08: 444 MS
RBC # BLD AUTO: 4.21 M/UL (ref 4.7–5.5)
SODIUM SERPL-SCNC: 144 MMOL/L (ref 136–145)
VENTRICULAR RATE, ECG03: 63 BPM
WBC # BLD AUTO: 5.5 K/UL (ref 4.6–13.2)

## 2019-11-11 PROCEDURE — C1732 CATH, EP, DIAG/ABL, 3D/VECT: HCPCS | Performed by: INTERNAL MEDICINE

## 2019-11-11 PROCEDURE — 77030035291 HC TBNG PMP SMARTABLATE J&J -B: Performed by: INTERNAL MEDICINE

## 2019-11-11 PROCEDURE — 93621 COMP EP EVL L PAC&REC C SINS: CPT | Performed by: INTERNAL MEDICINE

## 2019-11-11 PROCEDURE — 93005 ELECTROCARDIOGRAM TRACING: CPT

## 2019-11-11 PROCEDURE — 93653 COMPRE EP EVAL TX SVT: CPT | Performed by: INTERNAL MEDICINE

## 2019-11-11 PROCEDURE — 77030027107 HC PTCH EXT REF CARTO3 J&J -F: Performed by: INTERNAL MEDICINE

## 2019-11-11 PROCEDURE — 74011250636 HC RX REV CODE- 250/636: Performed by: INTERNAL MEDICINE

## 2019-11-11 PROCEDURE — 93325 DOPPLER ECHO COLOR FLOW MAPG: CPT

## 2019-11-11 PROCEDURE — C1730 CATH, EP, 19 OR FEW ELECT: HCPCS | Performed by: INTERNAL MEDICINE

## 2019-11-11 PROCEDURE — 76060000033 HC ANESTHESIA 1 TO 1.5 HR: Performed by: INTERNAL MEDICINE

## 2019-11-11 PROCEDURE — 80048 BASIC METABOLIC PNL TOTAL CA: CPT

## 2019-11-11 PROCEDURE — 74011000250 HC RX REV CODE- 250: Performed by: INTERNAL MEDICINE

## 2019-11-11 PROCEDURE — C1894 INTRO/SHEATH, NON-LASER: HCPCS | Performed by: INTERNAL MEDICINE

## 2019-11-11 PROCEDURE — 74011000250 HC RX REV CODE- 250: Performed by: ANESTHESIOLOGY

## 2019-11-11 PROCEDURE — 85027 COMPLETE CBC AUTOMATED: CPT

## 2019-11-11 PROCEDURE — 74011250636 HC RX REV CODE- 250/636: Performed by: ANESTHESIOLOGY

## 2019-11-11 PROCEDURE — 77030018729 HC ELECTRD DEFIB PAD CARD -B: Performed by: INTERNAL MEDICINE

## 2019-11-11 PROCEDURE — 93613 INTRACARDIAC EPHYS 3D MAPG: CPT | Performed by: INTERNAL MEDICINE

## 2019-11-11 RX ORDER — SODIUM CHLORIDE 0.9 % (FLUSH) 0.9 %
5-40 SYRINGE (ML) INJECTION EVERY 8 HOURS
Status: DISCONTINUED | OUTPATIENT
Start: 2019-11-11 | End: 2019-11-15 | Stop reason: HOSPADM

## 2019-11-11 RX ORDER — ENOXAPARIN SODIUM 150 MG/ML
150 INJECTION SUBCUTANEOUS EVERY 12 HOURS
Status: DISCONTINUED | OUTPATIENT
Start: 2019-11-11 | End: 2019-11-11 | Stop reason: HOSPADM

## 2019-11-11 RX ORDER — PROPOFOL 10 MG/ML
VIAL (ML) INTRAVENOUS
Status: DISCONTINUED | OUTPATIENT
Start: 2019-11-11 | End: 2019-11-11 | Stop reason: HOSPADM

## 2019-11-11 RX ORDER — LIDOCAINE HYDROCHLORIDE 10 MG/ML
INJECTION, SOLUTION EPIDURAL; INFILTRATION; INTRACAUDAL; PERINEURAL AS NEEDED
Status: DISCONTINUED | OUTPATIENT
Start: 2019-11-11 | End: 2019-11-11 | Stop reason: HOSPADM

## 2019-11-11 RX ORDER — SODIUM CHLORIDE 9 MG/ML
INJECTION, SOLUTION INTRAVENOUS
Status: DISCONTINUED | OUTPATIENT
Start: 2019-11-11 | End: 2019-11-11 | Stop reason: HOSPADM

## 2019-11-11 RX ORDER — METOPROLOL SUCCINATE 25 MG/1
25 TABLET, EXTENDED RELEASE ORAL DAILY
COMMUNITY
End: 2020-01-05

## 2019-11-11 RX ORDER — SODIUM CHLORIDE 0.9 % (FLUSH) 0.9 %
5-40 SYRINGE (ML) INJECTION AS NEEDED
Status: DISCONTINUED | OUTPATIENT
Start: 2019-11-11 | End: 2019-11-15 | Stop reason: HOSPADM

## 2019-11-11 RX ORDER — SODIUM CHLORIDE, SODIUM LACTATE, POTASSIUM CHLORIDE, CALCIUM CHLORIDE 600; 310; 30; 20 MG/100ML; MG/100ML; MG/100ML; MG/100ML
75 INJECTION, SOLUTION INTRAVENOUS CONTINUOUS
Status: DISCONTINUED | OUTPATIENT
Start: 2019-11-11 | End: 2019-11-12 | Stop reason: HOSPADM

## 2019-11-11 RX ORDER — ENOXAPARIN SODIUM 100 MG/ML
150 INJECTION SUBCUTANEOUS
Status: COMPLETED | OUTPATIENT
Start: 2019-11-11 | End: 2019-11-11

## 2019-11-11 RX ORDER — OXYCODONE AND ACETAMINOPHEN 5; 325 MG/1; MG/1
1 TABLET ORAL
Status: DISCONTINUED | OUTPATIENT
Start: 2019-11-11 | End: 2019-11-11 | Stop reason: HOSPADM

## 2019-11-11 RX ORDER — LIDOCAINE HYDROCHLORIDE 20 MG/ML
INJECTION, SOLUTION EPIDURAL; INFILTRATION; INTRACAUDAL; PERINEURAL AS NEEDED
Status: DISCONTINUED | OUTPATIENT
Start: 2019-11-11 | End: 2019-11-11 | Stop reason: HOSPADM

## 2019-11-11 RX ORDER — PROPOFOL 10 MG/ML
INJECTION, EMULSION INTRAVENOUS AS NEEDED
Status: DISCONTINUED | OUTPATIENT
Start: 2019-11-11 | End: 2019-11-11 | Stop reason: HOSPADM

## 2019-11-11 RX ORDER — MIDAZOLAM HYDROCHLORIDE 1 MG/ML
INJECTION, SOLUTION INTRAMUSCULAR; INTRAVENOUS AS NEEDED
Status: DISCONTINUED | OUTPATIENT
Start: 2019-11-11 | End: 2019-11-11 | Stop reason: HOSPADM

## 2019-11-11 RX ADMIN — PROPOFOL 100 MG: 10 INJECTION, EMULSION INTRAVENOUS at 09:37

## 2019-11-11 RX ADMIN — ENOXAPARIN SODIUM 150 MG: 100 INJECTION SUBCUTANEOUS at 13:00

## 2019-11-11 RX ADMIN — MIDAZOLAM HYDROCHLORIDE 1 MG: 2 INJECTION, SOLUTION INTRAMUSCULAR; INTRAVENOUS at 09:35

## 2019-11-11 RX ADMIN — PROPOFOL 25 MCG/KG/MIN: 10 INJECTION, EMULSION INTRAVENOUS at 09:36

## 2019-11-11 RX ADMIN — MIDAZOLAM HYDROCHLORIDE 1 MG: 2 INJECTION, SOLUTION INTRAMUSCULAR; INTRAVENOUS at 09:40

## 2019-11-11 RX ADMIN — PROPOFOL 50 MG: 10 INJECTION, EMULSION INTRAVENOUS at 09:41

## 2019-11-11 RX ADMIN — LIDOCAINE HYDROCHLORIDE 50 MG: 20 INJECTION, SOLUTION EPIDURAL; INFILTRATION; INTRACAUDAL; PERINEURAL at 09:36

## 2019-11-11 RX ADMIN — SODIUM CHLORIDE: 9 INJECTION, SOLUTION INTRAVENOUS at 09:30

## 2019-11-11 NOTE — ANESTHESIA PREPROCEDURE EVALUATION
Relevant Problems   No relevant active problems       Anesthetic History   No history of anesthetic complications            Review of Systems / Medical History  Patient summary reviewed, nursing notes reviewed and pertinent labs reviewed    Pulmonary        Sleep apnea: CPAP           Neuro/Psych     seizures (History of traumatic brain injury and craniotomy)    Psychiatric history     Cardiovascular    Hypertension        Dysrhythmias : atrial flutter        Comments: 04/2019 stress neg    04/2019 ECHO  Calculated left ventricular ejection fraction is 60%.    GI/Hepatic/Renal  Within defined limits              Endo/Other        Morbid obesity     Other Findings   Comments: Benzodiazepines, Opiates, Cocaine         Physical Exam    Airway  Mallampati: III  TM Distance: 4 - 6 cm  Neck ROM: normal range of motion   Mouth opening: Normal     Cardiovascular    Rhythm: regular           Dental  No notable dental hx       Pulmonary  Breath sounds clear to auscultation               Abdominal  GI exam deferred       Other Findings            Anesthetic Plan    ASA: 3  Anesthesia type: MAC            Anesthetic plan and risks discussed with: Patient

## 2019-11-11 NOTE — Clinical Note
Bilateral groin clipped, prepped with ChloraPrep and draped. Wet prep solution applied at: 948. Wet prep solution dried at: 952. Wet prep elapsed drying time: 4 mins.

## 2019-11-11 NOTE — ANESTHESIA POSTPROCEDURE EVALUATION
Procedure(s):  ABLATION A-FLUTTER/with DAMARI prior  Lt Atrial Pace & Record During Ep Study. MAC    Anesthesia Post Evaluation      Multimodal analgesia: multimodal analgesia used between 6 hours prior to anesthesia start to PACU discharge  Patient location during evaluation: PACU  Patient participation: complete - patient participated  Level of consciousness: awake and alert  Pain management: adequate  Airway patency: patent  Anesthetic complications: no  Cardiovascular status: acceptable and hemodynamically stable  Respiratory status: acceptable  Hydration status: acceptable  Post anesthesia nausea and vomiting:  controlled      Vitals Value Taken Time   BP     Temp     Pulse 64 11/11/2019 11:54 AM   Resp 13 11/11/2019 11:54 AM   SpO2 98 % 11/11/2019 11:54 AM   Vitals shown include unvalidated device data.

## 2019-11-11 NOTE — DISCHARGE INSTRUCTIONS
DISCHARGE SUMMARY from Nurse    PATIENT INSTRUCTIONS:    After general anesthesia or intravenous sedation, for 24 hours or while taking prescription Narcotics:  · Limit your activities  · Do not drive and operate hazardous machinery  · Do not make important personal or business decisions  · Do  not drink alcoholic beverages  · If you have not urinated within 8 hours after discharge, please contact your surgeon on call. Report the following to your surgeon:  · Excessive pain, swelling, redness or odor of or around the surgical area  · Temperature over 100.5  · Nausea and vomiting lasting longer than 4 hours or if unable to take medications  · Any signs of decreased circulation or nerve impairment to extremity: change in color, persistent  numbness, tingling, coldness or increase pain  · Any questions    What to do at Home:  Recommended activity: No lifting, Driving, or Strenuous exercise for 24 hours. If you experience any of the following symptoms bleeding,swelling,acute pain or numbness, fever, please follow up with Dr. Rocio Murguia MD.    *  Please give a list of your current medications to your Primary Care Provider. *  Please update this list whenever your medications are discontinued, doses are      changed, or new medications (including over-the-counter products) are added. *  Please carry medication information at all times in case of emergency situations. These are general instructions for a healthy lifestyle:    No smoking/ No tobacco products/ Avoid exposure to second hand smoke  Surgeon General's Warning:  Quitting smoking now greatly reduces serious risk to your health.     Obesity, smoking, and sedentary lifestyle greatly increases your risk for illness    A healthy diet, regular physical exercise & weight monitoring are important for maintaining a healthy lifestyle    You may be retaining fluid if you have a history of heart failure or if you experience any of the following symptoms: Weight gain of 3 pounds or more overnight or 5 pounds in a week, increased swelling in our hands or feet or shortness of breath while lying flat in bed. Please call your doctor as soon as you notice any of these symptoms; do not wait until your next office visit. The discharge information has been reviewed with the patient. The patient verbalized understanding. Discharge medications reviewed with the patient and appropriate educational materials and side effects teaching were provided. ___________________________________________________________________________________________________________________________________Disposition:  When discharged to home will need follow-up in my office 4 weeks. Please call my office at [phone] if any questions or concerns. Restrictions:  No driving for at least 24 hours after surgery. No heavy lifting > 10 lbs or prolonged standing, walking, or running x 1 week. OK to shower today over incision and remove dressing. Monitor groin for any signs of bleeding and please contact office at [phone] if any issues. There may be some mild bruising which is not unusual.  If any new symptoms develop, such as chest pain, dyspnea, dizziness, please contact office at [phone] immediately. ABLATION DISCHARGE INSTRUCTIONS    It is normal to feel tired the first couple days. Take it easy and follow the physicians instructions. CHECK THE CATHETER INSERTION SITE DAILY:  You may shower 24 hours after the procedure, remove the bandage during showering. Wash with soap and water and pat dry. Gentle cleaning of the site with soap and water is sufficient, cover with a dry clean dressing or bandage. Do not apply creams or powders to the area. Do not sit in a bathtub or pool of water for 7 days or until wound has completely healed. Temporary bruising and discomfort is normal and may last a few weeks.   You may have a  formation of a small lump at the site which may last up to 6 weeks. CALL THE PHYSICIAN:  If the site becomes red, swollen or feels warm to the touch  If there is bleeding or drainage or if there is unusual pain at the groin or down the leg. If there is any bleeding, lie down, apply pressure or have someone apply pressure with a clean cloth until the bleeding stops. If the bleeding continues, call 911 to be transported to the hospital.  DO NOT DRIVE YOURSELF, [place] [phone]. Activity:      For the first 24-48 hours or as instructed by the physician:  No lifting, pushing or pulling over 10 pounds and no straining the insertion site. Do not life grocery bags or the garbage can, do not run the vacuum  or  for 7 days. Start with short walks as in the hospital and gradually increase as tolerated each day. It is recommended to walk 30 minutes 5-7 days per week. Follow your physicians instructions on activity. Avoid walking outside in extremes of heat or cold. Walk inside when it is cold and windy or hot and humid. Things to keep in mind:  No driving for at least 24 hours, or as designated by your physician. Limit the number of times you go up and down the stairs  Take rests and pace yourself with activity. Be careful and do not strain with bowel movements. Medications: Take all medications as prescribed  Call your physician if you have any questions  Keep an updated list of your medications with you at all times and give a list to your physician and pharmacist    Signs and Symptoms:  Be cautious of symptoms of angina or recurrent symptoms such as chest discomfort, unusual shortness of breath or fatigue, palpitations. After Care: Follow up with your physician as instructed. Follow a heart healthy diet with proper portion control, daily stress management, daily exercise, blood pressure and cholesterol control , and smoking cessation.   Patient armband removed and shredded  MyChart Activation    Thank you for requesting access to [employer]. Please follow the instructions below to securely access and download your online medical record. [employer] allows you to send messages to your doctor, view your test results, renew your prescriptions, schedule appointments, and more. How Do I Sign Up? 1. In your internet browser, go to [email or web address]. 2. Click on the First Time User? Click Here link in the Sign In box. You will see the New Member Sign Up page. 3. Enter your [employer] Access Code exactly as it appears below. You will not need to use this code after youve completed the sign-up process. If you do not sign up before the expiration date, you must request a new code. [employer] Access Code: [email or web address]  Expires: 2019  4:33 PM (This is the date your [employer] access code will )    4. Enter the last four digits of your Social Security Number (xxxx) and Date of Birth (mm/dd/yyyy) as indicated and click Submit. You will be taken to the next sign-up page. 5. Create a [employer] ID. This will be your [employer] login ID and cannot be changed, so think of one that is secure and easy to remember. 6. Create a [employer] password. You can change your password at any time. 7. Enter your Password Reset Question and Answer. This can be used at a later time if you forget your password. 8. Enter your e-mail address. You will receive e-mail notification when new information is available in [address]. 9. Click Sign Up. You can now view and download portions of your medical record. 10. Click the Download Summary menu link to download a portable copy of your medical information. Additional Information    If you have questions, please visit the Frequently Asked Questions section of the [employer] website at [email or web address] Remember, [employer] is NOT to be used for urgent needs. For medical emergencies, dial 911.

## 2019-11-11 NOTE — Clinical Note
TRANSFER - IN REPORT:  
 
Verbal report received from: Reanna Davis RN. Report consisted of patient's Situation, Background, Assessment and  
Recommendations(SBAR). Opportunity for questions and clarification was provided. Assessment completed upon patient's arrival to unit and care assumed. Patient transported with a Cardiac Cath Tech / Patient Care Tech.

## 2019-11-11 NOTE — Clinical Note
TRANSFER - OUT REPORT:  
 
Verbal report given to: Reanna Davis RN. Report consisted of patient's Situation, Background, Assessment and  
Recommendations(SBAR). Opportunity for questions and clarification was provided. Patient transported with a Cardiac Cath Tech / Patient Care Tech. Patient transported to: [address]

## 2019-11-11 NOTE — PROGRESS NOTES
A+OX4, denied any complaints, right groin dressing intact, no bleeding or swelling. Ambulatory without any difficulty. Discharge information reviewed. Escorted to car, tot his friend for transport.

## 2019-11-12 ENCOUNTER — TELEPHONE (OUTPATIENT)
Dept: CARDIOLOGY CLINIC | Age: 49
End: 2019-11-12

## 2019-11-12 NOTE — TELEPHONE ENCOUNTER
Patient is scheduled to see Dr. Herbert Tirado on 12/19/19. He is stating that he is having a little pain when he walks and asked that a nurse call him. Spoke with patient to get him scheduled for follow up appointment. He asked that I call [facility]  to set the appointment . Nia Arora As she is his transportation. LMOM at # listed for her. Patient needs to see Dr. Herbert Tirado in 4-6 weeks. Joe Willis  Male, 44 y.o., 1970  Weight:   310 lb (140.6 kg)  MRN:   [id number]  Phone:   [phone] (M)  PCP:   Mary Kraft MD  Primary CvgNicola Minks MEDICARE/[employer] MEDICARE O  Last Appt With Me  Next Appt With Me  None  Next Appt  2/7/20 - Ronen Keller NP - [facility] NEUROLOGY  Message   Received: Sandra Puckett   Message Contents   MD Gamaliel Pretty             Sp ablation today, see chayito 4-6 weeks.

## 2019-11-21 RX ORDER — ENOXAPARIN SODIUM 150 MG/ML
150 INJECTION SUBCUTANEOUS EVERY 12 HOURS
Qty: 60 SYRINGE | Refills: 0 | Status: SHIPPED | OUTPATIENT
Start: 2019-11-21 | End: 2019-12-21

## 2019-11-25 ENCOUNTER — HOSPITAL ENCOUNTER (EMERGENCY)
Age: 49
Discharge: HOME | End: 2019-11-25
Attending: EMERGENCY MEDICINE
Payer: MEDICARE

## 2019-11-25 ENCOUNTER — APPOINTMENT (OUTPATIENT)
Dept: GENERAL RADIOLOGY | Age: 49
End: 2019-11-25
Attending: EMERGENCY MEDICINE
Payer: MEDICARE

## 2019-11-25 VITALS
HEART RATE: 87 BPM | RESPIRATION RATE: 26 BRPM | OXYGEN SATURATION: 93 % | DIASTOLIC BLOOD PRESSURE: 81 MMHG | SYSTOLIC BLOOD PRESSURE: 135 MMHG | TEMPERATURE: 98.7 F

## 2019-11-25 DIAGNOSIS — G40.109 PARTIAL SYMPTOMATIC EPILEPSY WITH SIMPLE PARTIAL SEIZURES, NOT INTRACTABLE, WITHOUT STATUS EPILEPTICUS (HCC): ICD-10-CM

## 2019-11-25 DIAGNOSIS — G40.909 RECURRENT SEIZURES (HCC): Primary | ICD-10-CM

## 2019-11-25 DIAGNOSIS — R60.9 EDEMA, UNSPECIFIED TYPE: ICD-10-CM

## 2019-11-25 LAB
ALBUMIN SERPL-MCNC: 3.4 G/DL (ref 3.4–5)
ALBUMIN/GLOB SERPL: 0.9 {RATIO} (ref 0.8–1.7)
ALP SERPL-CCNC: 100 U/L (ref 45–117)
ALT SERPL-CCNC: 21 U/L (ref 16–61)
AMPHET UR QL SCN: NEGATIVE
ANION GAP SERPL CALC-SCNC: 10 MMOL/L (ref 3–18)
APPEARANCE UR: CLEAR
APTT PPP: 23.5 SEC (ref 23–36.4)
AST SERPL-CCNC: 18 U/L (ref 10–38)
ATRIAL RATE: 92 BPM
BACTERIA URNS QL MICRO: ABNORMAL /HPF
BARBITURATES UR QL SCN: NEGATIVE
BASOPHILS # BLD: 0 K/UL (ref 0–0.1)
BASOPHILS NFR BLD: 0 % (ref 0–2)
BENZODIAZ UR QL: NEGATIVE
BILIRUB SERPL-MCNC: 0.3 MG/DL (ref 0.2–1)
BILIRUB UR QL: NEGATIVE
BNP SERPL-MCNC: 53 PG/ML (ref 0–900)
BUN SERPL-MCNC: 16 MG/DL (ref 7–18)
BUN/CREAT SERPL: 15 (ref 12–20)
CALCIUM SERPL-MCNC: 8.2 MG/DL (ref 8.5–10.1)
CALCULATED P AXIS, ECG09: -25 DEGREES
CALCULATED R AXIS, ECG10: 10 DEGREES
CALCULATED T AXIS, ECG11: -74 DEGREES
CANNABINOIDS UR QL SCN: NEGATIVE
CHLORIDE SERPL-SCNC: 107 MMOL/L (ref 100–111)
CK MB CFR SERPL CALC: NORMAL % (ref 0–4)
CK MB SERPL-MCNC: <1 NG/ML (ref 5–25)
CK SERPL-CCNC: 136 U/L (ref 39–308)
CO2 SERPL-SCNC: 24 MMOL/L (ref 21–32)
COCAINE UR QL SCN: NEGATIVE
COLOR UR: YELLOW
CREAT SERPL-MCNC: 1.05 MG/DL (ref 0.6–1.3)
DIAGNOSIS, 93000: NORMAL
DIFFERENTIAL METHOD BLD: ABNORMAL
EOSINOPHIL # BLD: 0.1 K/UL (ref 0–0.4)
EOSINOPHIL NFR BLD: 2 % (ref 0–5)
EPITH CASTS URNS QL MICRO: ABNORMAL /LPF (ref 0–5)
ERYTHROCYTE [DISTWIDTH] IN BLOOD BY AUTOMATED COUNT: 13.2 % (ref 11.6–14.5)
ETHANOL SERPL-MCNC: <3 MG/DL (ref 0–3)
GLOBULIN SER CALC-MCNC: 4 G/DL (ref 2–4)
GLUCOSE SERPL-MCNC: 117 MG/DL (ref 74–99)
GLUCOSE UR STRIP.AUTO-MCNC: NEGATIVE MG/DL
HCT VFR BLD AUTO: 42.7 % (ref 36–48)
HDSCOM,HDSCOM: NORMAL
HGB BLD-MCNC: 14 G/DL (ref 13–16)
HGB UR QL STRIP: NEGATIVE
INR PPP: 1 (ref 0.8–1.2)
KETONES UR QL STRIP.AUTO: ABNORMAL MG/DL
LEUKOCYTE ESTERASE UR QL STRIP.AUTO: NEGATIVE
LYMPHOCYTES # BLD: 3.9 K/UL (ref 0.9–3.6)
LYMPHOCYTES NFR BLD: 53 % (ref 21–52)
MAGNESIUM SERPL-MCNC: 2.4 MG/DL (ref 1.6–2.6)
MCH RBC QN AUTO: 31.8 PG (ref 24–34)
MCHC RBC AUTO-ENTMCNC: 32.8 G/DL (ref 31–37)
MCV RBC AUTO: 97 FL (ref 74–97)
METHADONE UR QL: NEGATIVE
MONOCYTES # BLD: 0.4 K/UL (ref 0.05–1.2)
MONOCYTES NFR BLD: 6 % (ref 3–10)
MUCOUS THREADS URNS QL MICRO: ABNORMAL /LPF
NEUTS SEG # BLD: 2.8 K/UL (ref 1.8–8)
NEUTS SEG NFR BLD: 39 % (ref 40–73)
NITRITE UR QL STRIP.AUTO: NEGATIVE
OPIATES UR QL: NEGATIVE
P-R INTERVAL, ECG05: 118 MS
PCP UR QL: NEGATIVE
PH UR STRIP: 5 [PH] (ref 5–8)
PHENYTOIN SERPL-MCNC: 2 UG/ML (ref 10–20)
PLATELET # BLD AUTO: 218 K/UL (ref 135–420)
PMV BLD AUTO: 10.7 FL (ref 9.2–11.8)
POTASSIUM SERPL-SCNC: 3.7 MMOL/L (ref 3.5–5.5)
PROT SERPL-MCNC: 7.4 G/DL (ref 6.4–8.2)
PROT UR STRIP-MCNC: 30 MG/DL
PROTHROMBIN TIME: 12.6 SEC (ref 11.5–15.2)
Q-T INTERVAL, ECG07: 346 MS
QRS DURATION, ECG06: 86 MS
QTC CALCULATION (BEZET), ECG08: 427 MS
RBC # BLD AUTO: 4.4 M/UL (ref 4.7–5.5)
RBC #/AREA URNS HPF: ABNORMAL /HPF (ref 0–5)
SODIUM SERPL-SCNC: 141 MMOL/L (ref 136–145)
SP GR UR REFRACTOMETRY: 1.02 (ref 1–1.03)
TROPONIN I SERPL-MCNC: <0.02 NG/ML (ref 0–0.04)
UROBILINOGEN UR QL STRIP.AUTO: 0.2 EU/DL (ref 0.2–1)
VALPROATE SERPL-MCNC: 54 UG/ML (ref 50–100)
VENTRICULAR RATE, ECG03: 92 BPM
WBC # BLD AUTO: 7.2 K/UL (ref 4.6–13.2)
WBC URNS QL MICRO: ABNORMAL /HPF (ref 0–4)

## 2019-11-25 PROCEDURE — 83880 ASSAY OF NATRIURETIC PEPTIDE: CPT

## 2019-11-25 PROCEDURE — 99285 EMERGENCY DEPT VISIT HI MDM: CPT

## 2019-11-25 PROCEDURE — 81001 URINALYSIS AUTO W/SCOPE: CPT

## 2019-11-25 PROCEDURE — 85730 THROMBOPLASTIN TIME PARTIAL: CPT

## 2019-11-25 PROCEDURE — 80053 COMPREHEN METABOLIC PANEL: CPT

## 2019-11-25 PROCEDURE — 80307 DRUG TEST PRSMV CHEM ANLYZR: CPT

## 2019-11-25 PROCEDURE — 80185 ASSAY OF PHENYTOIN TOTAL: CPT

## 2019-11-25 PROCEDURE — 85025 COMPLETE CBC W/AUTO DIFF WBC: CPT

## 2019-11-25 PROCEDURE — 93005 ELECTROCARDIOGRAM TRACING: CPT

## 2019-11-25 PROCEDURE — 82550 ASSAY OF CK (CPK): CPT

## 2019-11-25 PROCEDURE — 71046 X-RAY EXAM CHEST 2 VIEWS: CPT

## 2019-11-25 PROCEDURE — 80164 ASSAY DIPROPYLACETIC ACD TOT: CPT

## 2019-11-25 PROCEDURE — 74011250636 HC RX REV CODE- 250/636: Performed by: EMERGENCY MEDICINE

## 2019-11-25 PROCEDURE — 96374 THER/PROPH/DIAG INJ IV PUSH: CPT

## 2019-11-25 PROCEDURE — 85610 PROTHROMBIN TIME: CPT

## 2019-11-25 PROCEDURE — 74011250637 HC RX REV CODE- 250/637: Performed by: EMERGENCY MEDICINE

## 2019-11-25 PROCEDURE — 83735 ASSAY OF MAGNESIUM: CPT

## 2019-11-25 RX ORDER — DIVALPROEX SODIUM 250 MG/1
750 TABLET, DELAYED RELEASE ORAL 2 TIMES DAILY
Qty: 90 TAB | Refills: 0 | Status: SHIPPED | OUTPATIENT
Start: 2019-11-25 | End: 2019-12-10

## 2019-11-25 RX ORDER — DIVALPROEX SODIUM 250 MG/1
750 TABLET, DELAYED RELEASE ORAL
Status: COMPLETED | OUTPATIENT
Start: 2019-11-25 | End: 2019-11-25

## 2019-11-25 RX ORDER — FUROSEMIDE 10 MG/ML
20 INJECTION INTRAMUSCULAR; INTRAVENOUS
Status: COMPLETED | OUTPATIENT
Start: 2019-11-25 | End: 2019-11-25

## 2019-11-25 RX ADMIN — FUROSEMIDE 20 MG: 10 INJECTION, SOLUTION INTRAMUSCULAR; INTRAVENOUS at 09:38

## 2019-11-25 RX ADMIN — POTASSIUM BICARBONATE 40 MEQ: 782 TABLET, EFFERVESCENT ORAL at 09:38

## 2019-11-25 RX ADMIN — DIVALPROEX SODIUM 750 MG: 250 TABLET, DELAYED RELEASE ORAL at 10:16

## 2019-11-25 NOTE — DISCHARGE INSTRUCTIONS
Patient Education        Leg and Ankle Edema: Care Instructions  Your Care Instructions  Swelling in the legs, ankles, and feet is called edema. It is common after you sit or stand for a while. Long plane flights or car rides often cause swelling in the legs and feet. You may also have swelling if you have to stand for long periods of time at your job. Problems with the veins in the legs (varicose veins) and changes in hormones can also cause swelling. Sometimes the swelling in the ankles and feet is caused by a more serious problem, such as heart failure, infection, blood clots, or liver or kidney disease. Follow-up care is a key part of your treatment and safety. Be sure to make and go to all appointments, and call your doctor if you are having problems. It's also a good idea to know your test results and keep a list of the medicines you take. How can you care for yourself at home? · If your doctor gave you medicine, take it as prescribed. Call your doctor if you think you are having a problem with your medicine. · Whenever you are resting, raise your legs up. Try to keep the swollen area higher than the level of your heart. · Take breaks from standing or sitting in one position. ? Walk around to increase the blood flow in your lower legs. ? Move your feet and ankles often while you stand, or tighten and relax your leg muscles. · Wear support stockings. Put them on in the morning, before swelling gets worse. · Eat a balanced diet. Lose weight if you need to. · Limit the amount of salt (sodium) in your diet. Salt holds fluid in the body and may increase swelling. When should you call for help? Call 911 anytime you think you may need emergency care. For example, call if:    · You have symptoms of a blood clot in your lung (called a pulmonary embolism). These may include:  ? Sudden chest pain. ? Trouble breathing. ?  Coughing up blood.    Call your doctor now or seek immediate medical care if:    · You have signs of a blood clot, such as:  ? Pain in your calf, back of the knee, thigh, or groin. ? Redness and swelling in your leg or groin.     · You have symptoms of infection, such as:  ? Increased pain, swelling, warmth, or redness. ? Red streaks or pus. ? A fever.    Watch closely for changes in your health, and be sure to contact your doctor if:    · Your swelling is getting worse.     · You have new or worsening pain in your legs.     · You do not get better as expected. Where can you learn more? Go to http://richard-luis enrique.info/. Enter G378 in the search box to learn more about \"Leg and Ankle Edema: Care Instructions. \"  Current as of: June 26, 2019  Content Version: 12.2  © 0814-5686 [employer]. Care instructions adapted under license by [employer] (which disclaims liability or warranty for this information). If you have questions about a medical condition or this instruction, always ask your healthcare professional. [place] [address] any warranty or liability for your use of this information. Patient Education        Epilepsy: Care Instructions  Your Care Instructions    Epilepsy is a common condition that causes repeated seizures. The seizures are caused by bursts of electrical activity in the brain that aren't normal. Seizures may cause problems with muscle control, movement, speech, vision, or awareness. They can be scary. Epilepsy affects each person differently. Some people have only a few seizures. Others get them more often. If you know what triggers a seizure, you may be able to avoid having one. You can take medicines to control and reduce seizures. You and your doctor will need to find the right combination, schedule, and dose of medicine. This may take time and careful changes. Seizures may get worse and happen more often over time. Follow-up care is a key part of your treatment and safety.  Be sure to make and go to all appointments, and call your doctor if you are having problems. It's also a good idea to know your test results and keep a list of the medicines you take. How can you care for yourself at home? · Be safe with medicines. Take your medicines exactly as prescribed. Call your doctor if you think you are having a problem with your medicine. · Make a treatment plan with your doctor. Be sure to follow your plan. · Try to identify and avoid things that may make you more likely to have a seizure. These may include:  ? Not getting enough sleep. ? Using drugs or alcohol. ? Being emotionally stressed. ? Skipping meals. · Keep a record of any seizures you have. Note the date, time of day, and any details about the seizure that you can remember. Your doctor can use this information to plan or adjust your medicine or other treatment. · Be sure that any doctor treating you for another condition knows that you have epilepsy. Each doctor should know what medicines you are taking, if any. · Wear a medical ID bracelet. You can buy this at most [employer]. If you have a seizure that leaves you unconscious or unable to speak for yourself, this bracelet will let those who are treating you know that you have epilepsy. · Talk to your doctor about whether it is safe for you to do certain activities, such as drive or swim. When should you call for help? Call 911 anytime you think you may need emergency care. For example, call if:    · A seizure does not stop as it normally does.     · You have new symptoms such as:  ? Numbness, tingling, or weakness on one side of your body or face. ? Vision changes. ? Trouble speaking or thinking clearly.    Call your doctor now or seek immediate medical care if:    · You have a fever.     · You have a severe headache.    Watch closely for changes in your health, and be sure to contact your doctor if:    · The normal pattern or features of your seizures change. Where can you learn more?   Go to http://richard-luis enrique.info/. Nick Riley in the search box to learn more about \"Epilepsy: Care Instructions. \"  Current as of: March 28, 2019  Content Version: 12.2  © 7591-0101 [employer]. Care instructions adapted under license by [employer] (which disclaims liability or warranty for this information). If you have questions about a medical condition or this instruction, always ask your healthcare professional. [place] [address] any warranty or liability for your use of this information.

## 2019-11-25 NOTE — ED TRIAGE NOTES
Pt arrived via ems from home, per medic the roommate heard a thump in the house and found roommate on the floor. Pt has a history of seizures.
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
## 2019-12-18 ENCOUNTER — HOSPITAL ENCOUNTER (OUTPATIENT)
Dept: LAB | Age: 49
Discharge: HOME | End: 2019-12-18
Payer: MEDICAID

## 2019-12-18 LAB
ALBUMIN SERPL-MCNC: 3.3 G/DL (ref 3.4–5)
ALBUMIN/GLOB SERPL: 0.8 {RATIO} (ref 0.8–1.7)
ALP SERPL-CCNC: 105 U/L (ref 45–117)
ALT SERPL-CCNC: 20 U/L (ref 16–61)
ANION GAP SERPL CALC-SCNC: 5 MMOL/L (ref 3–18)
AST SERPL-CCNC: 18 U/L (ref 10–38)
BILIRUB SERPL-MCNC: 0.6 MG/DL (ref 0.2–1)
BUN SERPL-MCNC: 12 MG/DL (ref 7–18)
BUN/CREAT SERPL: 14 (ref 12–20)
CALCIUM SERPL-MCNC: 9.1 MG/DL (ref 8.5–10.1)
CHLORIDE SERPL-SCNC: 106 MMOL/L (ref 100–111)
CHOLEST SERPL-MCNC: 209 MG/DL
CO2 SERPL-SCNC: 32 MMOL/L (ref 21–32)
CREAT SERPL-MCNC: 0.86 MG/DL (ref 0.6–1.3)
ERYTHROCYTE [DISTWIDTH] IN BLOOD BY AUTOMATED COUNT: 13.1 % (ref 11.6–14.5)
GLOBULIN SER CALC-MCNC: 3.9 G/DL (ref 2–4)
GLUCOSE SERPL-MCNC: 80 MG/DL (ref 74–99)
HCT VFR BLD AUTO: 42.8 % (ref 36–48)
HDLC SERPL-MCNC: 56 MG/DL (ref 40–60)
HDLC SERPL: 3.7 {RATIO} (ref 0–5)
HGB BLD-MCNC: 13.8 G/DL (ref 13–16)
LDLC SERPL CALC-MCNC: 126.2 MG/DL (ref 0–100)
LIPID PROFILE,FLP: ABNORMAL
MCH RBC QN AUTO: 31.4 PG (ref 24–34)
MCHC RBC AUTO-ENTMCNC: 32.2 G/DL (ref 31–37)
MCV RBC AUTO: 97.3 FL (ref 74–97)
PLATELET # BLD AUTO: 211 K/UL (ref 135–420)
PMV BLD AUTO: 11.5 FL (ref 9.2–11.8)
POTASSIUM SERPL-SCNC: 4.1 MMOL/L (ref 3.5–5.5)
PROT SERPL-MCNC: 7.2 G/DL (ref 6.4–8.2)
RBC # BLD AUTO: 4.4 M/UL (ref 4.7–5.5)
SODIUM SERPL-SCNC: 143 MMOL/L (ref 136–145)
TRIGL SERPL-MCNC: 134 MG/DL (ref ?–150)
VLDLC SERPL CALC-MCNC: 26.8 MG/DL
WBC # BLD AUTO: 6.4 K/UL (ref 4.6–13.2)

## 2019-12-18 PROCEDURE — 85027 COMPLETE CBC AUTOMATED: CPT

## 2019-12-18 PROCEDURE — 80061 LIPID PANEL: CPT

## 2019-12-18 PROCEDURE — 36415 COLL VENOUS BLD VENIPUNCTURE: CPT

## 2019-12-18 PROCEDURE — 80053 COMPREHEN METABOLIC PANEL: CPT

## 2019-12-19 ENCOUNTER — OFFICE VISIT (OUTPATIENT)
Dept: CARDIOLOGY CLINIC | Age: 49
End: 2019-12-19

## 2019-12-19 VITALS
HEART RATE: 72 BPM | DIASTOLIC BLOOD PRESSURE: 90 MMHG | BODY MASS INDEX: 39.17 KG/M2 | WEIGHT: 315 LBS | SYSTOLIC BLOOD PRESSURE: 136 MMHG | HEIGHT: 75 IN | OXYGEN SATURATION: 97 %

## 2019-12-19 DIAGNOSIS — I48.92 ATRIAL FLUTTER, UNSPECIFIED TYPE (HCC): ICD-10-CM

## 2019-12-19 DIAGNOSIS — I48.91 ATRIAL FIBRILLATION WITH RVR (HCC): ICD-10-CM

## 2019-12-19 DIAGNOSIS — R56.9 SEIZURE (HCC): ICD-10-CM

## 2019-12-19 DIAGNOSIS — Z98.890 H/O CARDIAC RADIOFREQUENCY ABLATION: Primary | ICD-10-CM

## 2019-12-19 DIAGNOSIS — I10 ESSENTIAL HYPERTENSION: ICD-10-CM

## 2019-12-19 RX ORDER — DIVALPROEX SODIUM 250 MG/1
250 TABLET, DELAYED RELEASE ORAL DAILY
COMMUNITY
End: 2020-01-15 | Stop reason: SDUPTHER

## 2019-12-19 RX ORDER — SERTRALINE HYDROCHLORIDE 50 MG/1
50 TABLET, FILM COATED ORAL DAILY
Refills: 2 | Status: ON HOLD | COMMUNITY
Start: 2019-10-12 | End: 2021-06-11

## 2019-12-19 RX ORDER — IBUPROFEN 800 MG/1
800 TABLET ORAL
COMMUNITY
Start: 2013-08-02 | End: 2020-03-05 | Stop reason: SDUPTHER

## 2019-12-19 RX ORDER — FUROSEMIDE 40 MG/1
40 TABLET ORAL 2 TIMES DAILY
COMMUNITY
End: 2019-12-19 | Stop reason: SDUPTHER

## 2019-12-19 RX ORDER — FUROSEMIDE 40 MG/1
40 TABLET ORAL 2 TIMES DAILY
Qty: 30 TAB | Refills: 6 | Status: SHIPPED | OUTPATIENT
Start: 2019-12-19 | End: 2020-01-17

## 2019-12-19 RX ORDER — DULOXETIN HYDROCHLORIDE 30 MG/1
30 CAPSULE, DELAYED RELEASE ORAL DAILY
COMMUNITY
End: 2019-12-19 | Stop reason: ALTCHOICE

## 2019-12-19 RX ORDER — TOPIRAMATE 50 MG/1
50 TABLET, FILM COATED ORAL DAILY
COMMUNITY
End: 2020-02-13

## 2019-12-19 RX ORDER — SIMVASTATIN 20 MG/1
20 TABLET, FILM COATED ORAL DAILY
Refills: 1 | COMMUNITY
Start: 2019-11-13 | End: 2021-01-07 | Stop reason: SDUPTHER

## 2019-12-19 RX ORDER — DIVALPROEX SODIUM 500 MG/1
500 TABLET, DELAYED RELEASE ORAL 2 TIMES DAILY
COMMUNITY
End: 2020-01-15 | Stop reason: SDUPTHER

## 2019-12-19 NOTE — PROGRESS NOTES
Timmothy Friday presents today for   Chief Complaint   Patient presents with   [facility] Follow Up     s/p ablation        Timmothy Friday preferred language for health care discussion is english/other. Is someone accompanying this pt? no    Is the patient using any DME equipment during [address]? no    Depression Screening:  3 most recent PHQ Screens 10/24/2019   Little interest or pleasure in doing things Not at all   Feeling down, depressed, irritable, or hopeless Not at all   Total Score PHQ 2 0       Learning Assessment:  Learning Assessment 10/24/2019   PRIMARY LEARNER Patient   PRIMARY LANGUAGE ENGLISH   LEARNER PREFERENCE PRIMARY DEMONSTRATION   ANSWERED BY Patient   RELATIONSHIP SELF       Abuse Screening:  Abuse Screening Questionnaire 10/24/2019   Do you ever feel afraid of your partner? N   Are you in a relationship with someone who physically or mentally threatens you? N   Is it safe for you to go home? Y       Fall Risk  Fall Risk Assessment, last 12 mths 10/24/2019   Able to walk? Yes   Fall in past 12 months? No       Pt currently taking Anticoagulant therapy? no    Coordination of Care:  1. Have you been to the ER, urgent care clinic since your last visit? Hospitalized since your last visit? 11/25 for seizure; 11/11/19 for ablation     2. Have you seen or consulted any other health care providers outside of the [address] [place] [address] since your last visit? Include any pap smears or colon screening.  no
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
## 2020-01-05 ENCOUNTER — HOSPITAL ENCOUNTER (EMERGENCY)
Age: 50
Discharge: HOME | End: 2020-01-05
Attending: EMERGENCY MEDICINE
Payer: MEDICARE

## 2020-01-05 VITALS
SYSTOLIC BLOOD PRESSURE: 101 MMHG | TEMPERATURE: 98.8 F | BODY MASS INDEX: 39.17 KG/M2 | HEIGHT: 75 IN | WEIGHT: 315 LBS | DIASTOLIC BLOOD PRESSURE: 73 MMHG | RESPIRATION RATE: 16 BRPM | HEART RATE: 84 BPM

## 2020-01-05 DIAGNOSIS — G40.909 RECURRENT SEIZURES (HCC): Primary | ICD-10-CM

## 2020-01-05 LAB
ANION GAP SERPL CALC-SCNC: 7 MMOL/L (ref 3–18)
APTT PPP: 27 SEC (ref 23–36.4)
BASOPHILS # BLD: 0 K/UL (ref 0–0.1)
BASOPHILS NFR BLD: 0 % (ref 0–2)
BUN SERPL-MCNC: 12 MG/DL (ref 7–18)
BUN/CREAT SERPL: 13 (ref 12–20)
CALCIUM SERPL-MCNC: 8.5 MG/DL (ref 8.5–10.1)
CHLORIDE SERPL-SCNC: 110 MMOL/L (ref 100–111)
CK MB CFR SERPL CALC: 0.6 % (ref 0–4)
CK MB SERPL-MCNC: 1.3 NG/ML (ref 5–25)
CK SERPL-CCNC: 217 U/L (ref 39–308)
CO2 SERPL-SCNC: 26 MMOL/L (ref 21–32)
CREAT SERPL-MCNC: 0.92 MG/DL (ref 0.6–1.3)
DIFFERENTIAL METHOD BLD: ABNORMAL
EOSINOPHIL # BLD: 0.1 K/UL (ref 0–0.4)
EOSINOPHIL NFR BLD: 1 % (ref 0–5)
ERYTHROCYTE [DISTWIDTH] IN BLOOD BY AUTOMATED COUNT: 13.1 % (ref 11.6–14.5)
GLUCOSE SERPL-MCNC: 106 MG/DL (ref 74–99)
HCT VFR BLD AUTO: 40.8 % (ref 36–48)
HGB BLD-MCNC: 13.4 G/DL (ref 13–16)
INR PPP: 1 (ref 0.8–1.2)
LYMPHOCYTES # BLD: 1.7 K/UL (ref 0.9–3.6)
LYMPHOCYTES NFR BLD: 32 % (ref 21–52)
MAGNESIUM SERPL-MCNC: 2.5 MG/DL (ref 1.6–2.6)
MCH RBC QN AUTO: 31.2 PG (ref 24–34)
MCHC RBC AUTO-ENTMCNC: 32.8 G/DL (ref 31–37)
MCV RBC AUTO: 95.1 FL (ref 74–97)
MONOCYTES # BLD: 0.5 K/UL (ref 0.05–1.2)
MONOCYTES NFR BLD: 10 % (ref 3–10)
NEUTS SEG # BLD: 2.9 K/UL (ref 1.8–8)
NEUTS SEG NFR BLD: 57 % (ref 40–73)
PLATELET # BLD AUTO: 196 K/UL (ref 135–420)
PMV BLD AUTO: 10.7 FL (ref 9.2–11.8)
POTASSIUM SERPL-SCNC: 3.6 MMOL/L (ref 3.5–5.5)
PROTHROMBIN TIME: 13.1 SEC (ref 11.5–15.2)
RBC # BLD AUTO: 4.29 M/UL (ref 4.7–5.5)
SODIUM SERPL-SCNC: 143 MMOL/L (ref 136–145)
TROPONIN I SERPL-MCNC: <0.02 NG/ML (ref 0–0.04)
VALPROATE SERPL-MCNC: 88 UG/ML (ref 50–100)
WBC # BLD AUTO: 5.2 K/UL (ref 4.6–13.2)

## 2020-01-05 PROCEDURE — 99284 EMERGENCY DEPT VISIT MOD MDM: CPT

## 2020-01-05 PROCEDURE — 83735 ASSAY OF MAGNESIUM: CPT

## 2020-01-05 PROCEDURE — 80048 BASIC METABOLIC PNL TOTAL CA: CPT

## 2020-01-05 PROCEDURE — 85610 PROTHROMBIN TIME: CPT

## 2020-01-05 PROCEDURE — 85025 COMPLETE CBC W/AUTO DIFF WBC: CPT

## 2020-01-05 PROCEDURE — 74011250636 HC RX REV CODE- 250/636: Performed by: EMERGENCY MEDICINE

## 2020-01-05 PROCEDURE — 85730 THROMBOPLASTIN TIME PARTIAL: CPT

## 2020-01-05 PROCEDURE — 74011250637 HC RX REV CODE- 250/637: Performed by: EMERGENCY MEDICINE

## 2020-01-05 PROCEDURE — 93005 ELECTROCARDIOGRAM TRACING: CPT

## 2020-01-05 PROCEDURE — 80164 ASSAY DIPROPYLACETIC ACD TOT: CPT

## 2020-01-05 PROCEDURE — 82550 ASSAY OF CK (CPK): CPT

## 2020-01-05 RX ORDER — METOPROLOL SUCCINATE 50 MG/1
25 TABLET, EXTENDED RELEASE ORAL
Status: COMPLETED | OUTPATIENT
Start: 2020-01-05 | End: 2020-01-05

## 2020-01-05 RX ORDER — METOPROLOL SUCCINATE 25 MG/1
25 TABLET, EXTENDED RELEASE ORAL DAILY
Qty: 30 TAB | Refills: 0 | Status: SHIPPED | OUTPATIENT
Start: 2020-01-05 | End: 2021-01-07 | Stop reason: SDUPTHER

## 2020-01-05 RX ADMIN — SODIUM CHLORIDE 1000 ML: 9 INJECTION, SOLUTION INTRAVENOUS at 15:34

## 2020-01-05 RX ADMIN — METOPROLOL SUCCINATE 25 MG: 50 TABLET, EXTENDED RELEASE ORAL at 19:58
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
## 2020-01-05 NOTE — ED TRIAGE NOTES
Patient states that he had a seizure at home, he has a button at home that he pushes when he is getting ready to have a seizure and that is what he did. Patient is currently awake alert and oriented with no signs of distress noted at this time.

## 2020-01-06 LAB
ATRIAL RATE: 85 BPM
CALCULATED P AXIS, ECG09: 31 DEGREES
CALCULATED R AXIS, ECG10: 10 DEGREES
CALCULATED T AXIS, ECG11: 6 DEGREES
DIAGNOSIS, 93000: NORMAL
P-R INTERVAL, ECG05: 162 MS
Q-T INTERVAL, ECG07: 356 MS
QRS DURATION, ECG06: 94 MS
QTC CALCULATION (BEZET), ECG08: 423 MS
VENTRICULAR RATE, ECG03: 85 BPM

## 2020-01-06 NOTE — DISCHARGE INSTRUCTIONS
Patient Education      If you were prescribed any medication take as directed. Do not drive or use heavy equipment if prescribed narcotics. Follow up with your primary care physician or with specialist as directed. Return to the emergency room with any new or worsening conditions. Learning About Epilepsy  What is epilepsy? Epilepsy is a common condition that causes repeated seizures. Seizures may cause problems with muscle control, movement, speech, vision, or awareness. They usually don't last very long, but they can be scary. Treatment usually works to control and reduce seizures. What causes it? Often doctors don't know what causes epilepsy. It's possible that it may run in families. But you don't have to have a family history to develop it. What are the symptoms? The main symptom of epilepsy is repeated seizures that happen without warning. There are different kinds of seizures. You may notice strange smells or sounds. You may lose control of your muscles. Or your body may twitch or jerk. Your symptoms will depend on the type of seizure you have. How is it diagnosed? Diagnosing epilepsy can be hard. Your doctor will ask questions to find out what happened just before, during, and right after a seizure. Your doctor will examine you. You'll have some tests, such as an electroencephalogram. This information can help your doctor decide what kind of seizures you have and if you have epilepsy. How is it treated? You can take medicines to control and reduce seizures. Which type you use depends on the type of seizure. You and your doctor will need to find the right combination, schedule, and dose of medicine. If medicine alone doesn't help, your doctor may suggest a special diet or surgery to help reduce seizures. How can you care for yourself at home? To control your seizures, you need to follow your treatment plan.  If you take medicine to control seizures, you must take it exactly as prescribed. The medicine works only if you take the right amount on the schedule your doctor sets up. Following this schedule keeps the right level of medicine in your body. Even missing just a few doses can allow seizures to happen. You might be on a special ketogenic diet. If so, you'll need to follow the diet exactly for it to help prevent seizures. As you follow your treatment plan, also try to figure out and avoid things that may make you more likely to have a seizure. These may include:  · Not getting enough sleep. · Using drugs or alcohol. · Being stressed. · Skipping meals. If you keep having seizures despite treatment, keep a record of them. Note the date, time of day, and any details about the seizure that you can remember. Your doctor can use this information to plan or adjust your medicine or other treatment. The record can also help your doctor find out what kinds of seizures you are having. If you have epilepsy:  · Be sure that any doctor who treats you knows that you have epilepsy. And let the doctor know what medicines you take, if any. · Wear a medical ID bracelet. If you have a seizure or accident that leaves you unconscious or unable to speak for yourself, the bracelet will let those who are treating you know that you have epilepsy. It will also list any medicines you take to control your seizures. That way, you won't be given any medicines that will react badly with those already in your body. · Ask your doctor if it's safe for you to do things like drive or swim. · Create a seizure first-aid plan with your friends and family. The plan will help them know how to help you. The kind of plan you need can depend on the kind of seizures you have. Your doctor can tell you more about this. Follow-up care is a key part of your treatment and safety. Be sure to make and go to all appointments, and call your doctor if you are having problems.  It's also a good idea to know your test results and keep a list of the medicines you take. Where can you learn more? Go to http://richard-luis enrique.info/. Enter [id number] [id number] in the search box to learn more about \"Learning About Epilepsy. \"  Current as of: March 28, 2019  Content Version: 12.2  © 6092-1261 [employer]. Care instructions adapted under license by [employer] (which disclaims liability or warranty for this information). If you have questions about a medical condition or this instruction, always ask your healthcare professional. [place] [address] any warranty or liability for your use of this information. Patient Education        Atrial Flutter: Care Instructions  Your Care Instructions    Atrial flutter is a type of heartbeat problem (arrhythmia) that usually causes a fast heart rate. In atrial flutter, a problem with the heart's electrical system causes the two upper parts of the heart (the right atrium and the left atrium) to flutter, or beat very fast. Atrial flutter might be diagnosed using an an electrocardiogram (EKG). An EKG translates the heart's electrical activity into line tracings on paper. Treating atrial flutter is important for several reasons. The change in heartbeat can cause blood clots. The clots can travel from your heart to your brain and cause a stroke. A fast heartbeat can make you feel lightheaded, dizzy, and weak. And over time, it can also increase your risk for heart failure. Atrial flutter is often the result of another heart condition, such as coronary artery disease or some other heart rhythm problems. Making changes to improve your heart health will help you stay healthy and active. Your doctor may prescribe medicines to help slow down your heartbeat. You may also take medicine to help prevent a stroke. In some cases, a procedure called catheter ablation is done to stop atrial flutter. Follow-up care is a key part of your treatment and safety.  Be sure to make and go to all appointments, and call your doctor if you are having problems. It's also a good idea to know your test results and keep a list of the medicines you take. How can you care for yourself at home? Medicines    · Take your medicines exactly as prescribed. Call your doctor if you think you are having a problem with your medicine. You will get more details on the specific medicines your doctor prescribes.     · If your doctor has given you a blood thinner to prevent a stroke, be sure you get instructions about how to take your medicine safely. Blood thinners can cause serious bleeding problems.     · Do not take any vitamins, over-the-counter drugs, or herbal products without talking to your doctor first.    Lifestyle changes    · Do not smoke. Smoking can increase your chance of a stroke and heart attack. If you need help quitting, talk to your doctor about stop-smoking programs and medicines. These can increase your chances of quitting for good.     · Eat a heart-healthy diet.     · Stay at a healthy weight. Lose weight if you need to.     · Limit alcohol to 2 drinks a day for men and 1 drink a day for women. Too much alcohol can cause health problems.     · Avoid colds and flu. Get a pneumococcal vaccine shot. If you have had one before, ask your doctor whether you need another dose. Get a flu shot every year. If you must be around people with colds or flu, wash your hands often. Activity    · Talk to your doctor about what type and level of exercise is safe for you. Start light exercise if your doctor says it is okay. Walking is a good choice. Try for at least 30 minutes on most days of the week. You also may want to swim, bike, or do other activities.     · When you exercise, watch for signs that your heart is working too hard. You are pushing too hard if you can't talk while you exercise. If you become short of breath or dizzy or have chest pain, sit down and rest right away.    When should you call for help?  Call 911 anytime you think you may need emergency care. For example, call if:    · You have symptoms of a stroke. These may include:  ? Sudden numbness, tingling, weakness, or loss of movement in your face, arm, or leg, especially on only one side of your body. ? Sudden vision changes. ? Sudden trouble speaking. ? Sudden confusion or trouble understanding simple statements. ? Sudden problems with walking or balance. ? A sudden, severe headache that is different from past headaches.     · You passed out (lost consciousness).    Call your doctor now or seek immediate medical care if:    · You have new or increased shortness of breath.     · You feel dizzy or lightheaded, or you feel like you may faint.     · Your heart rate becomes irregular.     · You can feel your heart flutter in your chest or skip heartbeats. Tell your doctor if these symptoms are new or worse.    Watch closely for changes in your health, and be sure to contact your doctor if you have any problems. Where can you learn more? Go to http://richard-luis enrique.info/. Enter G223 in the search box to learn more about \"Atrial Flutter: Care Instructions. \"  Current as of: April 9, 2019  Content Version: 12.2  © 5047-6794 [employer]. Care instructions adapted under license by [employer] (which disclaims liability or warranty for this information). If you have questions about a medical condition or this instruction, always ask your healthcare professional. [place] [address] any warranty or liability for your use of this information.
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
## 2020-01-15 ENCOUNTER — OFFICE VISIT (OUTPATIENT)
Dept: NEUROLOGY | Age: 50
End: 2020-01-15

## 2020-01-15 VITALS
WEIGHT: 315 LBS | OXYGEN SATURATION: 96 % | SYSTOLIC BLOOD PRESSURE: 140 MMHG | DIASTOLIC BLOOD PRESSURE: 72 MMHG | HEIGHT: 75 IN | BODY MASS INDEX: 39.17 KG/M2 | RESPIRATION RATE: 22 BRPM | HEART RATE: 75 BPM | TEMPERATURE: 99 F

## 2020-01-15 DIAGNOSIS — G40.109 PARTIAL SYMPTOMATIC EPILEPSY WITH SIMPLE PARTIAL SEIZURES, NOT INTRACTABLE, WITHOUT STATUS EPILEPTICUS (HCC): Primary | ICD-10-CM

## 2020-01-15 RX ORDER — DIVALPROEX SODIUM 500 MG/1
500 TABLET, DELAYED RELEASE ORAL 2 TIMES DAILY
Qty: 60 TAB | Refills: 6 | Status: SHIPPED | OUTPATIENT
Start: 2020-01-15 | End: 2020-08-31

## 2020-01-15 RX ORDER — DIVALPROEX SODIUM 250 MG/1
250 TABLET, DELAYED RELEASE ORAL 2 TIMES DAILY
Qty: 60 TAB | Refills: 6 | Status: SHIPPED | OUTPATIENT
Start: 2020-01-15 | End: 2020-03-05 | Stop reason: SDUPTHER
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
## 2020-01-15 NOTE — PATIENT INSTRUCTIONS
Patient instructions:  -Increase Depakote to 750 mg twice daily  -Have lab drawn (valproic acid level) in 2 weeks  -Follow up in 6 weeks

## 2020-01-17 ENCOUNTER — OFFICE VISIT (OUTPATIENT)
Dept: CARDIOLOGY CLINIC | Age: 50
End: 2020-01-17

## 2020-01-17 ENCOUNTER — HOSPITAL ENCOUNTER (OUTPATIENT)
Dept: LAB | Age: 50
Discharge: HOME | End: 2020-01-17

## 2020-01-17 VITALS
HEIGHT: 75 IN | OXYGEN SATURATION: 97 % | RESPIRATION RATE: 20 BRPM | HEART RATE: 73 BPM | SYSTOLIC BLOOD PRESSURE: 120 MMHG | WEIGHT: 315 LBS | DIASTOLIC BLOOD PRESSURE: 76 MMHG | BODY MASS INDEX: 39.17 KG/M2

## 2020-01-17 DIAGNOSIS — E87.70 HYPERVOLEMIA, UNSPECIFIED HYPERVOLEMIA TYPE: ICD-10-CM

## 2020-01-17 DIAGNOSIS — I50.33 DIASTOLIC CHF, ACUTE ON CHRONIC (HCC): ICD-10-CM

## 2020-01-17 DIAGNOSIS — I10 ESSENTIAL HYPERTENSION: ICD-10-CM

## 2020-01-17 DIAGNOSIS — I48.92 ATRIAL FLUTTER, UNSPECIFIED TYPE (HCC): Primary | ICD-10-CM

## 2020-01-17 DIAGNOSIS — R06.02 SHORTNESS OF BREATH: ICD-10-CM

## 2020-01-17 LAB — XX-LABCORP SPECIMEN COL,LCBCF: NORMAL

## 2020-01-17 PROCEDURE — 99001 SPECIMEN HANDLING PT-LAB: CPT

## 2020-01-17 RX ORDER — FUROSEMIDE 40 MG/1
40 TABLET ORAL 2 TIMES DAILY
Qty: 75 TAB | Refills: 6 | Status: SHIPPED | OUTPATIENT
Start: 2020-01-17 | End: 2020-01-31

## 2020-01-17 RX ORDER — SPIRONOLACTONE 25 MG/1
25 TABLET ORAL DAILY
Qty: 30 TAB | Refills: 6 | Status: ON HOLD | OUTPATIENT
Start: 2020-01-17 | End: 2021-06-11

## 2020-01-17 NOTE — PROGRESS NOTES
Lio Xie is a 44 y.o. male presents in office for    Chief Complaint   Patient presents with    Irregular Heart Beat     follow up        Visit Vitals  /76 (BP 1 Location: Left arm, BP Patient Position: Sitting)   Pulse 73   Resp 20   Ht 6' 3\" (1.905 m)   Wt (!) 163.7 kg (361 lb)   SpO2 97%   BMI 45.12 kg/m²         Health Maintenance Due   Topic Date Due    MEDICARE YEARLY EXAM  03/14/2018    Shingrix Vaccine Age 50> (1 of 2) 05/07/2018    FOBT Q 1 YEAR AGE 50-75  05/07/2018    Influenza Age 5 to Adult  08/01/2019             1. Have you been to the ER, urgent care clinic since your last visit? Hospitalized since your last visit? [facility] 1/5/2020 r/t seizure    2. Have you seen or consulted any other health care providers outside of the [address] [place] [address] since your last visit? Include any pap smears or colon screening. Dr. Ary Flores (PCP)    3 most recent [facility] Screens 1/17/2020   PHQ Not Done Active Diagnosis of Depression or Bipolar Disorder   Little interest or pleasure in doing things Not at all   Feeling down, depressed, irritable, or hopeless Not at all   Total Score PHQ 2 0       Abuse Screening Questionnaire 1/17/2020   Do you ever feel afraid of your partner? N   Are you in a relationship with someone who physically or mentally threatens you? N   Is it safe for you to go home? Y       Fall Risk Assessment, last 12 mths 1/17/2020   Able to walk? Yes   Fall in past 12 months?  No       Learning Assessment 10/24/2019   PRIMARY LEARNER Patient   PRIMARY LANGUAGE ENGLISH   LEARNER PREFERENCE PRIMARY DEMONSTRATION   ANSWERED BY Patient   RELATIONSHIP SELF

## 2020-01-17 NOTE — PATIENT INSTRUCTIONS
NT pro-BNP and BMP today Take Lasix 80mg twice a day for 3 days only and then back to 40mg twice a day Begin spironolactone 25mg daily Follow-up with Jolie Queen in 2 weeks Follow-up with Dr Iker Mcghee as scheduled and as needed Weigh daily and record Limit sodium intake to 2000mg per day Limit fluid intake to no more 48 ounces per day Low Sodium Diet (2,000 Milligram): Care Instructions Your Care Instructions Too much sodium causes your body to hold on to extra water. This can raise your blood pressure and force your heart and kidneys to work harder. In very serious cases, this could cause you to be put in the hospital. It might even be life-threatening. By limiting sodium, you will feel better and lower your risk of serious problems. The most common source of sodium is salt. People get most of the salt in their diet from canned, prepared, and packaged foods. Fast food and restaurant meals also are very high in sodium. Your doctor will probably limit your sodium to less than 2,000 milligrams (mg) a day. This limit counts all the sodium in prepared and packaged foods and any salt you add to your food. Follow-up care is a key part of your treatment and safety. Be sure to make and go to all appointments, and call your doctor if you are having problems. It's also a good idea to know your test results and keep a list of the medicines you take. How can you care for yourself at home? Read food labels · Read labels on cans and food packages. The labels tell you how much sodium is in each serving. Make sure that you look at the serving size. If you eat more than the serving size, you have eaten more sodium. · Food labels also tell you the Percent Daily Value for sodium. Choose products with low Percent Daily Values for sodium.  
· Be aware that sodium can come in forms other than salt, including monosodium glutamate (MSG), sodium citrate, and sodium bicarbonate (baking soda). MSG is often added to Asian food. When you eat out, you can sometimes ask for food without MSG or added salt. Buy low-sodium foods · Buy foods that are labeled \"unsalted\" (no salt added), \"sodium-free\" (less than 5 mg of sodium per serving), or \"low-sodium\" (less than 140 mg of sodium per serving). Foods labeled \"reduced-sodium\" and \"light sodium\" may still have too much sodium. Be sure to read the label to see how much sodium you are getting. · Buy fresh vegetables, or frozen vegetables without added sauces. Buy low-sodium versions of canned vegetables, soups, and other canned goods. Prepare low-sodium meals · Cut back on the amount of salt you use in cooking. This will help you adjust to the taste. Do not add salt after cooking. One teaspoon of salt has about 2,300 mg of sodium. · Take the salt shaker off the table. · Flavor your food with garlic, lemon juice, onion, vinegar, herbs, and spices. Do not use soy sauce, lite soy sauce, steak sauce, onion salt, garlic salt, celery salt, mustard, or ketchup on your food. · Use low-sodium salad dressings, sauces, and ketchup. Or make your own salad dressings and sauces without adding salt. · Use less salt (or none) when recipes call for it. You can often use half the salt a recipe calls for without losing flavor. Other foods such as rice, pasta, and grains do not need added salt. · Rinse canned vegetables, and cook them in fresh water. This removes somebut not allof the salt. · Avoid water that is naturally high in sodium or that has been treated with water softeners, which add sodium. Call your local water company to find out the sodium content of your water supply. If you buy bottled water, read the label and choose a sodium-free brand. Avoid high-sodium foods · Avoid eating: 
? Smoked, cured, salted, and canned meat, fish, and poultry. ? Ham, cordero, hot dogs, and luncheon meats. ? Regular, hard, and processed cheese and regular peanut butter. ? Crackers with salted tops, and other salted snack foods such as pretzels, chips, and salted popcorn. ? Frozen prepared meals, unless labeled low-sodium. ? Canned and dried soups, broths, and bouillon, unless labeled sodium-free or low-sodium. ? Canned vegetables, unless labeled sodium-free or low-sodium. ? [place] fries, pizza, tacos, and other fast foods. ? Pickles, olives, ketchup, and other condiments, especially soy sauce, unless labeled sodium-free or low-sodium. Where can you learn more? Go to http://richard-luis enrique.info/. Enter R756 in the search box to learn more about \"Low Sodium Diet (2,000 Milligram): Care Instructions. \" Current as of: November 7, 2018 Content Version: 12.2 © 9493-2597 [employer] ( [employer] ). Care instructions adapted under license by [employer] (which disclaims liability or warranty for this information). If you have questions about a medical condition or this instruction, always ask your healthcare professional. [place] [address] any warranty or liability for your use of this information. How to Read a Food Label to Limit Sodium: Care Instructions Your Care Instructions Sodium causes your body to hold on to extra water. This can raise your blood pressure and force your heart and kidneys to work harder. In very serious cases, this could cause you to be put in the hospital. It might even be life-threatening. By limiting sodium, you will feel better and lower your risk of serious problems. Processed foods, fast food, and restaurant foods are the major sources of dietary sodium. The most common name for sodium is salt. Try to limit how much sodium you eat to less than 2,300 milligrams (mg) a day. If you limit your sodium to 1,500 mg a day, you can lower your blood pressure even more. This limit counts all the salt that you eat in foods you cook or in packaged foods. Keep a list of everything you eat and drink. Follow-up care is a key part of your treatment and safety. Be sure to make and go to all appointments, and call your doctor if you are having problems. It's also a good idea to know your test results and keep a list of the medicines you take. How can you care for yourself at home? Read ingredient lists on food labels · Read the list of ingredients on food labels to help you find how much sodium is in a food. The label lists the ingredients in a food in descending order (from the most to the least). If salt or sodium is high on the list, there may be a lot of sodium in the food. · Know that sodium has different names. Sodium is also called monosodium glutamate (MSG, common in [facility] food), sodium citrate, sodium alginate, sodium hydroxide, and sodium phosphate. Read Nutrition Facts labels · On most foods, there is a Nutrition Facts label. This will tell you how much sodium is in one serving of food. Look at both the serving size and the sodium amount. The serving size is located at the top of the label, usually right under the \"Nutrition Facts\" title. The amount of sodium is given in the list under the title. It is given in milligrams (mg). ? Check the serving size carefully. A single serving is often very small, and you may eat more than one serving. If this is the case, you will eat more sodium than listed on the label. For example, if the serving size for a canned soup is 1 cup and the sodium amount is 470 mg, if you have 2 cups you will eat 940 mg of sodium. · The nutrition facts for fresh fruits and vegetables are not listed on the food. They may be listed somewhere in the store. These foods usually have no sodium or low sodium. · The Nutrition Facts label also gives you the Percent Daily Value for sodium. This is how much of the recommended amount of sodium a serving contains. The daily value for sodium is less than 2,300 mg.  So if the Percent Daily Value says 50%, this means one serving is giving you half of this, or 1,150 mg. Buy low-sodium foods · Look for foods that are made with less sodium. Watch for the following words on the label. ? \"Unsalted\" means there is no sodium added to the food. But there may be sodium already in the food naturally. ? \"Sodium-free\" means a serving has less than 5 mg of sodium. ? \"Very low sodium\" means a serving has 35 mg or less of sodium. ? \"Low-sodium\" means a serving has 140 mg or less of sodium. · \"Reduced-sodium\" means that there is 25% less sodium than what the food normally has. This is still usually too much sodium. Try not to buy foods with this on the label. · Buy fresh vegetables, or frozen vegetables without added sauces. Buy low-sodium versions of canned vegetables, soups, and other canned goods. Where can you learn more? Go to http://richard-luis enrique.info/. Enter 26 [id number] in the search box to learn more about \"How to Read a Food Label to Limit Sodium: Care Instructions. \" Current as of: November 7, 2018 Content Version: 12.2 © 4190-0525 [employer]. Care instructions adapted under license by [employer] (which disclaims liability or warranty for this information). If you have questions about a medical condition or this instruction, always ask your healthcare professional. [place] [address] any warranty or liability for your use of this information. Heart-Healthy Diet: Care Instructions Your Care Instructions A heart-healthy diet has lots of vegetables, fruits, nuts, beans, and whole grains, and is low in salt. It limits foods that are high in saturated fat, such as meats, cheeses, and fried foods. It may be hard to change your diet, but even small changes can lower your risk of heart attack and heart disease. Follow-up care is a key part of your treatment and safety.  Be sure to make and go to all appointments, and call your doctor if you are having problems. It's also a good idea to know your test results and keep a list of the medicines you take. How can you care for yourself at home? Watch your portions · Learn what a serving is. A \"serving\" and a \"portion\" are not always the same thing. Make sure that you are not eating larger portions than are recommended. For example, a serving of pasta is ½ cup. A serving size of meat is 2 to 3 ounces. A 3-ounce serving is about the size of a deck of cards. Measure serving sizes until you are good at [place]" them. Keep in mind that restaurants often serve portions that are 2 or 3 times the size of one serving. · To keep your energy level up and keep you from feeling hungry, eat often but in smaller portions. · Eat only the number of calories you need to stay at a healthy weight. If you need to lose weight, eat fewer calories than your body burns (through exercise and other physical activity). Eat more fruits and vegetables · Eat a variety of fruit and vegetables every day. Dark green, deep orange, red, or yellow fruits and vegetables are especially good for you. Examples include spinach, carrots, peaches, and berries. · Keep carrots, celery, and other veggies handy for snacks. Buy fruit that is in season and store it where you can see it so that you will be tempted to eat it. · Cook dishes that have a lot of veggies in them, such as stir-fries and soups. Limit saturated and trans fat · Read food labels, and try to avoid saturated and trans fats. They increase your risk of heart disease. Trans fat is found in many processed foods such as cookies and crackers. · Use olive or canola oil when you cook. Try cholesterol-lowering spreads, such as Benecol or Take Control. · Bake, broil, grill, or steam foods instead of frying them.  
· Choose lean meats instead of high-fat meats such as hot dogs and sausages. Cut off all visible fat when you prepare meat. · Eat fish, skinless poultry, and meat alternatives such as soy products instead of high-fat meats. Soy products, such as tofu, may be especially good for your heart. · Choose low-fat or fat-free milk and dairy products. Eat fish · Eat at least two servings of fish a week. Certain fish, such as salmon and tuna, contain omega-3 fatty acids, which may help reduce your risk of heart attack. Eat foods high in fiber · Eat a variety of grain products every day. Include whole-grain foods that have lots of fiber and nutrients. Examples of whole-grain foods include oats, whole wheat bread, and brown rice. · Buy whole-grain breads and cereals, instead of white bread or pastries. Limit salt and sodium · Limit how much salt and sodium you eat to help lower your blood pressure. · Taste food before you salt it. Add only a little salt when you think you need it. With time, your taste buds will adjust to less salt. · Eat fewer snack items, fast foods, and other high-salt, processed foods. Check food labels for the amount of sodium in packaged foods. · Choose low-sodium versions of canned goods (such as soups, vegetables, and beans). Limit sugar · Limit drinks and foods with added sugar. These include candy, desserts, and soda pop. Limit alcohol · Limit alcohol to no more than 2 drinks a day for men and 1 drink a day for women. Too much alcohol can cause health problems. When should you call for help? Watch closely for changes in your health, and be sure to contact your doctor if: 
  · You would like help planning heart-healthy meals. Where can you learn more? Go to http://richard-luis enrique.info/. Enter V137 in the search box to learn more about \"Heart-Healthy Diet: Care Instructions. \" Current as of: April 9, 2019 Content Version: 12.2 © 7453-1975 [employer].  Care instructions adapted under license by [address] (which disclaims liability or warranty for this information). If you have questions about a medical condition or this instruction, always ask your healthcare professional. [address] any warranty or liability for your use of this information.

## 2020-01-18 LAB
BUN SERPL-MCNC: 10 MG/DL (ref 6–24)
BUN/CREAT SERPL: 11 (ref 9–20)
CALCIUM SERPL-MCNC: 9 MG/DL (ref 8.7–10.2)
CHLORIDE SERPL-SCNC: 103 MMOL/L (ref 96–106)
CO2 SERPL-SCNC: 26 MMOL/L (ref 20–29)
CREAT SERPL-MCNC: 0.91 MG/DL (ref 0.76–1.27)
GLUCOSE SERPL-MCNC: 86 MG/DL (ref 65–99)
NT-PROBNP SERPL-MCNC: 90 PG/ML (ref 0–121)
POTASSIUM SERPL-SCNC: 3.7 MMOL/L (ref 3.5–5.2)
SODIUM SERPL-SCNC: 147 MMOL/L (ref 134–144)
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
## 2020-01-31 ENCOUNTER — OFFICE VISIT (OUTPATIENT)
Dept: CARDIOLOGY CLINIC | Age: 50
End: 2020-01-31

## 2020-01-31 VITALS
BODY MASS INDEX: 45.12 KG/M2 | RESPIRATION RATE: 20 BRPM | HEART RATE: 71 BPM | OXYGEN SATURATION: 96 % | WEIGHT: 315 LBS | SYSTOLIC BLOOD PRESSURE: 122 MMHG | DIASTOLIC BLOOD PRESSURE: 85 MMHG

## 2020-01-31 DIAGNOSIS — I10 ESSENTIAL HYPERTENSION: ICD-10-CM

## 2020-01-31 DIAGNOSIS — E87.70 HYPERVOLEMIA, UNSPECIFIED HYPERVOLEMIA TYPE: Primary | ICD-10-CM

## 2020-01-31 RX ORDER — BUMETANIDE 2 MG/1
2 TABLET ORAL DAILY
Qty: 45 TAB | Refills: 6 | Status: SHIPPED | OUTPATIENT
Start: 2020-01-31 | End: 2021-01-07 | Stop reason: SDUPTHER

## 2020-01-31 NOTE — PATIENT INSTRUCTIONS
Stop Lasix (furosemide)  Begin Bumex (bumetanide) 2mg once a day but on the first day, take 1 tablet twice that day.   Follow-up with [facility] in 3 weeks  BMP to be done prior to returning for follow-up

## 2020-02-12 ENCOUNTER — HOSPITAL ENCOUNTER (EMERGENCY)
Age: 50
Discharge: HOME | End: 2020-02-12
Attending: EMERGENCY MEDICINE
Payer: MEDICARE

## 2020-02-12 ENCOUNTER — APPOINTMENT (OUTPATIENT)
Dept: GENERAL RADIOLOGY | Age: 50
End: 2020-02-12
Attending: EMERGENCY MEDICINE
Payer: MEDICARE

## 2020-02-12 VITALS
DIASTOLIC BLOOD PRESSURE: 89 MMHG | OXYGEN SATURATION: 98 % | WEIGHT: 315 LBS | RESPIRATION RATE: 16 BRPM | HEART RATE: 67 BPM | TEMPERATURE: 99.2 F | BODY MASS INDEX: 45.5 KG/M2 | SYSTOLIC BLOOD PRESSURE: 145 MMHG

## 2020-02-12 DIAGNOSIS — R07.81 RIB PAIN ON LEFT SIDE: Primary | ICD-10-CM

## 2020-02-12 PROCEDURE — 71100 X-RAY EXAM RIBS UNI 2 VIEWS: CPT

## 2020-02-12 PROCEDURE — 99282 EMERGENCY DEPT VISIT SF MDM: CPT

## 2020-02-12 RX ORDER — CYCLOBENZAPRINE HCL 10 MG
10 TABLET ORAL
Qty: 15 TAB | Refills: 0 | Status: SHIPPED | OUTPATIENT
Start: 2020-02-12 | End: 2021-06-16

## 2020-02-12 RX ORDER — NAPROXEN 500 MG/1
500 TABLET ORAL 2 TIMES DAILY WITH MEALS
Qty: 20 TAB | Refills: 0 | Status: ON HOLD | OUTPATIENT
Start: 2020-02-12 | End: 2021-06-11

## 2020-02-12 NOTE — ED TRIAGE NOTES
C/O Lt rib pain after falling in bathroom yesterday post seizure. Pt was seen by EMS and cleared. Was not taken for evaluation.

## 2020-02-12 NOTE — DISCHARGE INSTRUCTIONS
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
## 2020-02-13 ENCOUNTER — OFFICE VISIT (OUTPATIENT)
Dept: NEUROLOGY | Age: 50
End: 2020-02-13

## 2020-02-13 VITALS
HEIGHT: 75 IN | DIASTOLIC BLOOD PRESSURE: 90 MMHG | BODY MASS INDEX: 39.17 KG/M2 | SYSTOLIC BLOOD PRESSURE: 140 MMHG | WEIGHT: 315 LBS | TEMPERATURE: 98.8 F | OXYGEN SATURATION: 84 % | HEART RATE: 73 BPM

## 2020-02-13 DIAGNOSIS — G40.109 PARTIAL SYMPTOMATIC EPILEPSY WITH SIMPLE PARTIAL SEIZURES, NOT INTRACTABLE, WITHOUT STATUS EPILEPTICUS (HCC): ICD-10-CM

## 2020-02-13 DIAGNOSIS — Z86.69 HISTORY OF SLEEP APNEA: ICD-10-CM

## 2020-02-13 DIAGNOSIS — R56.9 SEIZURES (HCC): ICD-10-CM

## 2020-02-13 RX ORDER — TOPIRAMATE 50 MG/1
50 TABLET, FILM COATED ORAL 2 TIMES DAILY
Qty: 60 TAB | Refills: 6 | Status: SHIPPED | OUTPATIENT
Start: 2020-02-13 | End: 2021-06-09

## 2020-02-13 RX ORDER — TOPIRAMATE 50 MG/1
50 TABLET, FILM COATED ORAL
Qty: 30 TAB | Refills: 6 | Status: SHIPPED | OUTPATIENT
Start: 2020-02-13 | End: 2020-02-13 | Stop reason: SDUPTHER

## 2020-02-13 NOTE — PATIENT INSTRUCTIONS
Topiramate (By mouth)   Topiramate (toe-PIR-a-mate)  Treats and prevents seizures, and helps prevent migraine headaches. Brand Name(s): Qudexy XR, Topamax, Trokendi XR   There may be other brand names for this medicine. When This Medicine Should Not Be Used: This medicine is not right for everyone. Do not use it if you had an allergic reaction to topiramate, or if you are pregnant. How to Use This Medicine:   Capsule, Long Acting Capsule, Tablet  · Take your medicine as directed. Your dose may need to be changed several times to find what works best for you. · Tablet: Swallow whole. Do not break, crush, or chew the tablet. It has a very bitter taste. · Capsule or extended-release capsule: Do not crush or chew the capsule. Swallow whole or open the capsule and pour the medicine into a small amount (1 teaspoon) of soft food, such as applesauce. Swallow the mixture right away without chewing. Do not store the mixture for use at a later time. · Drink extra fluids so you will urinate more often and help prevent kidney problems. · This medicine should come with a Medication Guide. Ask your pharmacist for a copy if you do not have one. · Missed dose: Take a dose as soon as you remember. If it is almost time for your next dose, wait until then and take a regular dose. Do not take extra medicine to make up for a missed dose. If you miss a dose or forget to use your medicine, use it as soon as you can. If your next regular dose of Topamax® is less than 6 hours away, wait until then to use the medicine and skip the missed dose. If you miss more than 1 dose of Topamax®, call your doctor for instructions. · Store the medicine in a closed container at room temperature, away from heat, moisture, and direct light. Drugs and Foods to Avoid:   Ask your doctor or pharmacist before using any other medicine, including over-the-counter medicines, vitamins, and herbal products.   · Do not drink alcohol with Qudexy XR or Topamax®. Do not drink alcohol for 6 hours before and 6 hours after you take the Trokendi XR capsule. · Some medicines can affect how topiramate works. Tell your doctor if you are using acetazolamide, dichlorphenamide, dichloralphenazone, digoxin, lithium, metformin, zonisamide, other medicine for seizures (such as carbamazepine, phenytoin, valproic acid), or birth control pills. · Tell your doctor if you are using any medicine that makes you sleepy, such as allergy medicine or narcotic pain medicine. Warnings While Using This Medicine:   · It is not safe to take this medicine during pregnancy. It could harm an unborn baby. Tell your doctor right away if you become pregnant. · Tell your doctor if you are breastfeeding, or if you have kidney disease, liver disease, glaucoma, lung or breathing problems, osteoporosis, or a history of depression or mood disorders. Tell your doctor if you are on a ketogenic diet (high in fat and low in carbohydrates). · This medicine may cause the following problems:  ¨ Eye pain or vision changes, including glaucoma  ¨ Changes in body temperature  ¨ Metabolic acidosis (too much acid in the blood)  ¨ Kidney stones  · This medicine may increase depression or thoughts of suicide. Tell your doctor right away if you start to feel more depressed or think about hurting yourself. · This medicine may make you dizzy, drowsy, or tired. Do not drive or do anything else that could be dangerous until you know how this medicine affects you. · Do not stop using this medicine suddenly. Your doctor will need to slowly decrease your dose before you stop it completely. · Your doctor will do lab tests at regular visits to check on the effects of this medicine. Keep all appointments. · Keep all medicine out of the reach of children. Never share your medicine with anyone.   Possible Side Effects While Using This Medicine:   Call your doctor right away if you notice any of these side effects:  · Allergic reaction: Itching or hives, swelling in your face or hands, swelling or tingling in your mouth or throat, chest tightness, trouble breathing  · Bloody or cloudy urine, painful urination, sudden lower back or stomach pain  · Changes in vision, eye pain  · Confusion, problems with walking, clumsiness, dizziness, or trouble talking, concentrating, or remembering  · Feeling agitated, depressed, nervous, or irritable, thoughts of hurting yourself or others, unusual mood or behavior  · Fever, decreased sweating  · Numbness, tingling, or burning pain in your hands, arms, legs, or feet  · Rapid, deep breathing, loss of appetite, fast or uneven heartbeat  · Vomiting, unusual drowsiness, tiredness, or weakness  If you notice these less serious side effects, talk with your doctor:   · Change in taste  · Nausea, diarrhea  · Stuffy or runny nose  · Weight loss  If you notice other side effects that you think are caused by this medicine, tell your doctor. Call your doctor for medical advice about side effects. You may report side effects to FDA at 6-811-FDA-4745  © 2017 [employer] Information is for End User's use only and may not be sold, redistributed or otherwise used for commercial purposes. The above information is an  only. It is not intended as medical advice for individual conditions or treatments. Talk to your doctor, nurse or pharmacist before following any medical regimen to see if it is safe and effective for you.
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
## 2020-02-18 ENCOUNTER — HOSPITAL ENCOUNTER (OUTPATIENT)
Dept: NEUROLOGY | Age: 50
Discharge: HOME | End: 2020-02-18
Attending: NURSE PRACTITIONER

## 2020-02-26 ENCOUNTER — HOSPITAL ENCOUNTER (OUTPATIENT)
Dept: NEUROLOGY | Age: 50
Discharge: HOME | End: 2020-02-26
Attending: NURSE PRACTITIONER
Payer: MEDICARE

## 2020-02-26 DIAGNOSIS — R56.9 SEIZURES (HCC): ICD-10-CM

## 2020-02-26 PROCEDURE — 95816 EEG AWAKE AND DROWSY: CPT

## 2020-03-05 ENCOUNTER — OFFICE VISIT (OUTPATIENT)
Dept: ORTHOPEDIC SURGERY | Age: 50
End: 2020-03-05

## 2020-03-05 VITALS
HEIGHT: 75 IN | TEMPERATURE: 99.5 F | BODY MASS INDEX: 39.17 KG/M2 | RESPIRATION RATE: 24 BRPM | SYSTOLIC BLOOD PRESSURE: 142 MMHG | HEART RATE: 75 BPM | DIASTOLIC BLOOD PRESSURE: 84 MMHG | OXYGEN SATURATION: 94 % | WEIGHT: 315 LBS

## 2020-03-05 DIAGNOSIS — M79.18 MYOFASCIAL PAIN: ICD-10-CM

## 2020-03-05 DIAGNOSIS — M79.18 CHRONIC PRIMARY MUSCULOSKELETAL PAIN: Primary | ICD-10-CM

## 2020-03-05 DIAGNOSIS — G89.29 CHRONIC PRIMARY MUSCULOSKELETAL PAIN: Primary | ICD-10-CM

## 2020-03-05 DIAGNOSIS — M54.50 LUMBAR SPINE PAIN: ICD-10-CM

## 2020-03-05 DIAGNOSIS — M54.59 MECHANICAL LOW BACK PAIN: ICD-10-CM

## 2020-03-05 DIAGNOSIS — M47.816 LUMBAR FACET ARTHROPATHY: ICD-10-CM

## 2020-03-05 RX ORDER — IBUPROFEN 800 MG/1
800 TABLET ORAL
Qty: 60 TAB | Refills: 5 | OUTPATIENT
Start: 2020-03-05 | End: 2021-05-26

## 2020-03-05 RX ORDER — MOMETASONE FUROATE 1 MG/G
CREAM TOPICAL DAILY
Status: ON HOLD | COMMUNITY
End: 2021-06-11
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
## 2020-03-10 ENCOUNTER — APPOINTMENT (OUTPATIENT)
Dept: PHYSICAL THERAPY | Age: 50
End: 2020-03-10

## 2020-03-11 NOTE — PROCEDURES
[address] [place] [address]   EEG    Name:  Yanelis Ponce  MR#:   [id number]  :  1970  ACCOUNT #:  [de-identified]  DATE OF SERVICE:  2020    OUTPATIENT EEG    REFERRING PROVIDER:  EEG was ordered by Sam Morales NP    REASON FOR EEG:  For evaluation of seizures. EEG TECHNIQUE USED:  Includes a standard 10-20 system with 20-channel recording and an EKG. Activation procedure used was photic stimulation. The patient did not sleep during the recording. Total duration of the EEG was 21 minutes. EEG REPORT:  The background consists of posterior-dominant alpha rhythm at a frequency of 10-11 Hz. No marked asymmetry between the right and left sides. There are no focal slowing. No spike or sharp wave discharges detected. Photic stimulation did not induce any abnormal discharges. IMPRESSION:  This is a normal EEG. Clinical correlation: a normal EEG does not rule out the possibility of seizures or epilepsy.       MD ELLA [id number]  D:  03/10/2020 17:08  T:  2020 1:16  JOB #:  [id number]

## 2020-03-13 ENCOUNTER — APPOINTMENT (OUTPATIENT)
Dept: PHYSICAL THERAPY | Age: 50
End: 2020-03-13

## 2020-03-24 NOTE — PROGRESS NOTES
Spoke with patient, he can't recall having his recent lab order completed. Advised to have labs before return to office.

## 2020-04-10 ENCOUNTER — VIRTUAL VISIT (OUTPATIENT)
Dept: NEUROLOGY | Age: 50
End: 2020-04-10

## 2020-04-10 VITALS — BODY MASS INDEX: 39.17 KG/M2 | WEIGHT: 315 LBS | HEIGHT: 75 IN

## 2020-04-10 DIAGNOSIS — E66.01 MORBID OBESITY (HCC): ICD-10-CM

## 2020-04-10 DIAGNOSIS — G40.109 PARTIAL SYMPTOMATIC EPILEPSY WITH SIMPLE PARTIAL SEIZURES, NOT INTRACTABLE, WITHOUT STATUS EPILEPTICUS (HCC): ICD-10-CM

## 2020-04-10 DIAGNOSIS — G47.33 OSA (OBSTRUCTIVE SLEEP APNEA): Primary | ICD-10-CM

## 2020-04-10 DIAGNOSIS — S06.9X0S TRAUMATIC BRAIN INJURY, WITHOUT LOSS OF CONSCIOUSNESS, SEQUELA (HCC): ICD-10-CM

## 2020-04-10 NOTE — PROGRESS NOTES
Hoa Drake presents today for   Chief Complaint   Patient presents with    Sleep Problem     follow up       Is someone accompanying this pt? VIRTUAL VISIT [phone]    Is the patient using any DME equipment during OV? Has C-Pap, No DME    Depression Screening:  3 most recent PHQ Screens 1/17/2020   PHQ Not Done Active Diagnosis of Depression or Bipolar Disorder   Little interest or pleasure in doing things Not at all   Feeling down, depressed, irritable, or hopeless Not at all   Total Score PHQ 2 0       Learning Assessment:  Learning Assessment 10/24/2019   PRIMARY LEARNER Patient   PRIMARY LANGUAGE ENGLISH   LEARNER PREFERENCE PRIMARY DEMONSTRATION   ANSWERED BY Patient   RELATIONSHIP SELF       Abuse Screening:  Abuse Screening Questionnaire 1/17/2020   Do you ever feel afraid of your partner? N   Are you in a relationship with someone who physically or mentally threatens you? N   Is it safe for you to go home? Y       Fall Risk  Fall Risk Assessment, last 12 mths 1/17/2020   Able to walk? Yes   Fall in past 12 months? No         Coordination of Care:  1. Have you been to the ER, urgent care clinic since your last visit? Hospitalized since your last visit? NO    2. Have you seen or consulted any other health care providers outside of the [address] [place] [address] since your last visit? Include any pap smears or colon screening.  no
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
## 2020-04-10 NOTE — PATIENT INSTRUCTIONS
Thank you for choosing [employer] and [facility] for your  
 
care. You may receive a survey about your visit. We appreciate you taking time  
 
to complete this survey as we use your feedback to improve our services. We  
 
realize we are not perfect, but we strive to provide excellent care.

## 2020-04-21 ENCOUNTER — DOCUMENTATION ONLY (OUTPATIENT)
Dept: NEUROLOGY | Age: 50
End: 2020-04-21

## 2020-05-06 ENCOUNTER — OFFICE VISIT (OUTPATIENT)
Dept: CARDIOLOGY CLINIC | Age: 50
End: 2020-05-06

## 2020-05-06 VITALS
SYSTOLIC BLOOD PRESSURE: 140 MMHG | DIASTOLIC BLOOD PRESSURE: 98 MMHG | BODY MASS INDEX: 39.17 KG/M2 | OXYGEN SATURATION: 93 % | HEART RATE: 80 BPM | WEIGHT: 315 LBS | HEIGHT: 75 IN

## 2020-05-06 DIAGNOSIS — R63.5 WEIGHT GAIN: ICD-10-CM

## 2020-05-06 DIAGNOSIS — Z98.890 H/O CARDIAC RADIOFREQUENCY ABLATION: ICD-10-CM

## 2020-05-06 DIAGNOSIS — I10 ESSENTIAL HYPERTENSION: ICD-10-CM

## 2020-05-06 DIAGNOSIS — I48.92 ATRIAL FLUTTER, UNSPECIFIED TYPE (HCC): ICD-10-CM

## 2020-05-06 DIAGNOSIS — I50.33 DIASTOLIC CHF, ACUTE ON CHRONIC (HCC): ICD-10-CM

## 2020-05-06 DIAGNOSIS — R06.02 SHORTNESS OF BREATH: Primary | ICD-10-CM

## 2020-05-06 DIAGNOSIS — R60.0 LEG EDEMA: ICD-10-CM

## 2020-05-06 DIAGNOSIS — R56.9 SEIZURE (HCC): ICD-10-CM

## 2020-05-06 DIAGNOSIS — E87.70 HYPERVOLEMIA, UNSPECIFIED HYPERVOLEMIA TYPE: ICD-10-CM

## 2020-05-06 NOTE — PROGRESS NOTES
Errol Ta presents today for   Chief Complaint   Patient presents with    Follow-up     legs swelling    Leg Swelling     both legs very bad    Shortness of Breath     hurts to breath deep    Chest Pain     SOB, when breathing deep    Palpitations     racing       Errol Ta preferred language for health care discussion is english/other. Is someone accompanying this pt? no    Is the patient using any DME equipment during [address]? no    Depression Screening:  3 most recent PHQ Screens 5/6/2020   PHQ Not Done -   Little interest or pleasure in doing things Not at all   Feeling down, depressed, irritable, or hopeless Several days   Total Score PHQ 2 1       Learning Assessment:  Learning Assessment 10/24/2019   PRIMARY LEARNER Patient   PRIMARY LANGUAGE ENGLISH   LEARNER PREFERENCE PRIMARY DEMONSTRATION   ANSWERED BY Patient   RELATIONSHIP SELF       Abuse Screening:  Abuse Screening Questionnaire 5/6/2020   Do you ever feel afraid of your partner? N   Are you in a relationship with someone who physically or mentally threatens you? N   Is it safe for you to go home? Y       Fall Risk  Fall Risk Assessment, last 12 mths 5/6/2020   Able to walk? Yes   Fall in past 12 months? No       Pt currently taking Anticoagulant therapy? no    Coordination of Care:  1. Have you been to the ER, urgent care clinic since your last visit? Hospitalized since your last visit? 11-    2. Have you seen or consulted any other health care providers outside of the [address] [place] [address] since your last visit? Include any pap smears or colon screening.  no
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
## 2020-05-22 ENCOUNTER — TELEPHONE (OUTPATIENT)
Dept: NEUROLOGY | Age: 50
End: 2020-05-22

## 2020-05-22 NOTE — TELEPHONE ENCOUNTER
[facility] called and stated that they need a copy of the sleep study and have not been able to reach the patient so they will close the order if they do not receive the sleep study.  Please advise

## 2020-06-04 ENCOUNTER — VIRTUAL VISIT (OUTPATIENT)
Dept: CARDIOLOGY CLINIC | Age: 50
End: 2020-06-04

## 2020-06-04 DIAGNOSIS — R63.5 WEIGHT GAIN: ICD-10-CM

## 2020-06-04 DIAGNOSIS — Z98.890 H/O CARDIAC RADIOFREQUENCY ABLATION: ICD-10-CM

## 2020-06-04 DIAGNOSIS — Z91.89 AT RISK FOR INTRACRANIAL BLEEDING: ICD-10-CM

## 2020-06-04 DIAGNOSIS — I10 ESSENTIAL HYPERTENSION: ICD-10-CM

## 2020-06-04 DIAGNOSIS — R60.0 LEG EDEMA: ICD-10-CM

## 2020-06-04 DIAGNOSIS — I48.91 ATRIAL FIBRILLATION WITH RVR (HCC): ICD-10-CM

## 2020-06-04 DIAGNOSIS — I48.92 ATRIAL FLUTTER, UNSPECIFIED TYPE (HCC): ICD-10-CM

## 2020-06-04 DIAGNOSIS — I50.33 DIASTOLIC CHF, ACUTE ON CHRONIC (HCC): Primary | ICD-10-CM

## 2020-06-04 DIAGNOSIS — R56.9 SEIZURE (HCC): ICD-10-CM

## 2020-06-04 NOTE — PROGRESS NOTES
Assessment & Plan:     -Ongoing weight gain of unclear etiology. Concern for diastolic heart failure. Increased bumex to 2 mg BID last month.  -Increasing dyspnea as well as lower extremity edema, concern for heart failure. However, his edema did improve when he took Lovenox in the past.   -Desire not to be on long term anticoagulation due to intracranial hemorrhage.  -History of remote intracranial hemorrhage but able to tolerate Lovenox in the past. He had a history of a traumatic brain injury as a child.  -History of seizures due to remote traumatic brain injury.  -Echocardiogram and nuclear stress test April 2019 without obvious ischemia and normal left ventricular function.  -Bipolar disorder.  -Hypertension, slight increase today. -Dyslipidemia.  -Ongoing weight gain. -BMI 46 at last visit    Edema has improved with bumex 2 mg BID increase. Plan to followup echo next week and blood work, specifically K/Cr. I will see back in 3-4 weeks. If weight continues to continue, will need to see primary MD.  All questions answered. Follow-up and Dispositions    · Return in about 4 weeks (around 7/2/2020). All questions answered and discussion of diagnosis and planned treatment. I spent at least 25 minutes with this established patient, and >50% of the time was spent counseling and/or coordinating care regarding edema, weight diuretics    Subjective:     HPI:  Beckie Cockayne is a 44 y.o. male who was seen for edema. Weight has stabilized, edema improved with increased bumex. NO chest pain or syncope. No palpitations. ROS    Prior to Admission medications    Medication Sig Start Date End Date Taking? Authorizing Provider   mometasone (ELOCON) 0.1 % topical cream daily. Provider, Historical   ibuprofen (MOTRIN) 800 mg tablet Take 1 Tab by mouth two (2) times daily as needed for Pain.  3/5/20   Emilie Sánchez MD   topiramate (TOPAMAX) 50 mg tablet Take 1 Tab by mouth two (2) times a day. 2/13/20   Renan Lopez NP   cyclobenzaprine (FLEXERIL) 10 mg tablet Take 1 Tab by mouth three (3) times daily as needed for Muscle Spasm(s). 2/12/20   Lillian Zhu PA-C   naproxen (NAPROSYN) 500 mg tablet Take 1 Tab by mouth two (2) times daily (with meals). 2/12/20   Lillian Zhu PA-C   bumetanide (BUMEX) 2 mg tablet Take 1 Tab by mouth daily. Or as directed 1/31/20   Rei Herring NP   spironolactone (ALDACTONE) 25 mg tablet Take 1 Tab by mouth daily. 1/17/20   Rei Herring NP   divalproex DR (DEPAKOTE) 500 mg tablet Take 1 Tab by mouth two (2) times a day. With 250 mg tablets for total of 750 mg twice daily. 1/15/20   Renan Lopez NP   metoprolol succinate (TOPROL-XL) 25 mg XL tablet Take 1 Tab by mouth daily. Indications: Ventricular Rate Control in Atrial Fibrillation 1/5/20   Marta Chino DO   sertraline (ZOLOFT) 50 mg tablet Take 50 mg by mouth daily. 10/12/19   Provider, Historical   simvastatin (ZOCOR) 20 mg tablet Take 20 mg by mouth daily. 11/13/19   Provider, Historical   lisinopril (PRINIVIL, ZESTRIL) 40 mg tablet Take 1 Tab by mouth daily. 10/24/19   Matty Ledesma MD   ARIPiprazole (ABILIFY) 10 mg tablet Take 1 Tab by mouth daily. Indications: Bipolar Disorder in Remission 4/10/19   Sal Jara MD     Allergies   Allergen Reactions    Haloperidol Anaphylaxis    Trazodone Other (comments)     Priapism     Haldol [Haloperidol Lactate] Unknown (comments)    Acetaminophen Nausea and Vomiting and Nausea Only    Adhesive Tape-Silicones Rash         Objective:     Vital Signs: (As obtained by patient/caregiver at home)  There were no vitals taken for this visit.      [INSTRUCTIONS:  \"[x]\" Indicates a positive item  \"[]\" Indicates a negative item  -- DELETE ALL ITEMS NOT EXAMINED]    Constitutional: [x] Appears well-developed and well-nourished [x] No apparent distress      [] Abnormal -     Mental status: [x] Alert and awake  [x] Oriented to person/place/time [x] Able to follow commands    [] Abnormal -     Eyes:   EOM    [x]  Normal    [] Abnormal -   Sclera  [x]  Normal    [] Abnormal -          Discharge [x]  None visible   [] Abnormal -     HENT: [x] Normocephalic, atraumatic  [] Abnormal -   [x] Mouth/Throat: Mucous membranes are moist    External Ears [x] Normal  [] Abnormal -    Neck: [x] No visualized mass [] Abnormal -     Pulmonary/Chest: [x] Respiratory effort normal   [x] No visualized signs of difficulty breathing or respiratory distress        [] Abnormal -      Musculoskeletal:   [x] Normal gait with no signs of ataxia         [x] Normal range of motion of neck        [] Abnormal -     Neurological:        [x] No Facial Asymmetry (Cranial nerve 7 motor function) (limited exam due to video visit)          [x] No gaze palsy        [] Abnormal -          Skin:        [x] No significant exanthematous lesions or discoloration noted on facial skin         [] Abnormal -            Psychiatric:       [x] Normal Affect [] Abnormal -        [x] No Hallucinations    Other pertinent observable physical exam findings:-    We discussed the expected course, resolution and complications of the diagnosis(es) in detail. Medication risks, benefits, costs, interactions, and alternatives were discussed as indicated. I advised him to contact the office if his condition worsens, changes or fails to improve as anticipated. He expressed understanding with the diagnosis(es) and plan. Tigist Segal is a 44 y.o. male being evaluated by a video visit encounter for concerns as above. A caregiver was present when appropriate. Due to this being a TeleHealth encounter (During [facility]-15 public health emergency), evaluation of the following organ systems was limited: Vitals/Constitutional/EENT/Resp/CV/GI//MS/Neuro/Skin/Heme-Lymph-Imm.   Pursuant to the emergency declaration under the [facility], [address] waiver authority and the [employer] and [employer] Act, this Virtual  Visit was conducted, with patient's (and/or legal guardian's) consent, to reduce the patient's risk of exposure to COVID-19 and provide necessary medical care. Services were provided through a video synchronous discussion virtually to substitute for in-person clinic visit. Patient and provider were located at their individual homes. Consent: Nicolasa Dupont, who was seen by synchronous (real-time) audio-video technology, and/or his healthcare decision maker, is aware that this patient-initiated, Telehealth encounter on 6/4/2020 is a billable service, with coverage as determined by his insurance carrier. He is aware that he may receive a bill and has provided verbal consent to proceed: Yes. Discussion about cardiac condition and relevant findings.     Rhiannon Andre MD

## 2020-06-10 ENCOUNTER — HOSPITAL ENCOUNTER (OUTPATIENT)
Dept: NON INVASIVE DIAGNOSTICS | Age: 50
Discharge: HOME | End: 2020-06-10
Attending: INTERNAL MEDICINE
Payer: MEDICARE

## 2020-06-10 VITALS
SYSTOLIC BLOOD PRESSURE: 140 MMHG | DIASTOLIC BLOOD PRESSURE: 90 MMHG | HEIGHT: 75 IN | WEIGHT: 315 LBS | BODY MASS INDEX: 39.17 KG/M2

## 2020-06-10 DIAGNOSIS — R06.02 SHORTNESS OF BREATH: ICD-10-CM

## 2020-06-10 PROCEDURE — 74011250636 HC RX REV CODE- 250/636: Performed by: INTERNAL MEDICINE

## 2020-06-10 PROCEDURE — C8929 TTE W OR WO FOL WCON,DOPPLER: HCPCS

## 2020-06-10 RX ADMIN — PERFLUTREN 2 ML: 6.52 INJECTION, SUSPENSION INTRAVENOUS at 15:02

## 2020-06-10 NOTE — ED NOTES
Discharge instructions given and patient given the opportunity to ask questions. Patient verbalized understanding of instructions. Armband removed and shredded. Patient ambulated to car for discharge with girlfriend.
I performed a brief evaluation, including history and physical, of the patient here in triage and I have determined that pt will need further treatment and evaluation from the main side ER physician. I have placed initial orders to help in expediting patients care. November 26, 2018 at 11:58 AM - EVELIA Ramirez There were no vitals taken for this visit.
22.5

## 2020-06-11 ENCOUNTER — HOSPITAL ENCOUNTER (OUTPATIENT)
Dept: LAB | Age: 50
Discharge: HOME | End: 2020-06-11
Payer: MEDICARE

## 2020-06-11 ENCOUNTER — VIRTUAL VISIT (OUTPATIENT)
Dept: NEUROLOGY | Age: 50
End: 2020-06-11

## 2020-06-11 DIAGNOSIS — I48.92 ATRIAL FLUTTER, UNSPECIFIED TYPE (HCC): ICD-10-CM

## 2020-06-11 DIAGNOSIS — E87.70 HYPERVOLEMIA, UNSPECIFIED HYPERVOLEMIA TYPE: ICD-10-CM

## 2020-06-11 DIAGNOSIS — Z98.890 H/O CARDIAC RADIOFREQUENCY ABLATION: ICD-10-CM

## 2020-06-11 DIAGNOSIS — I10 ESSENTIAL HYPERTENSION: ICD-10-CM

## 2020-06-11 LAB
ALBUMIN SERPL-MCNC: 3.3 G/DL (ref 3.4–5)
ALBUMIN/GLOB SERPL: 0.9 {RATIO} (ref 0.8–1.7)
ALP SERPL-CCNC: 95 U/L (ref 45–117)
ALT SERPL-CCNC: 36 U/L (ref 16–61)
ANION GAP SERPL CALC-SCNC: 3 MMOL/L (ref 3–18)
AST SERPL-CCNC: 33 U/L (ref 10–38)
BASOPHILS # BLD: 0 K/UL (ref 0–0.1)
BASOPHILS NFR BLD: 0 % (ref 0–2)
BILIRUB SERPL-MCNC: 0.6 MG/DL (ref 0.2–1)
BUN SERPL-MCNC: 13 MG/DL (ref 7–18)
BUN/CREAT SERPL: 16 (ref 12–20)
CALCIUM SERPL-MCNC: 8.8 MG/DL (ref 8.5–10.1)
CHLORIDE SERPL-SCNC: 107 MMOL/L (ref 100–111)
CO2 SERPL-SCNC: 32 MMOL/L (ref 21–32)
CREAT SERPL-MCNC: 0.8 MG/DL (ref 0.6–1.3)
DIFFERENTIAL METHOD BLD: ABNORMAL
ECHO LVOT PEAK GRADIENT: 4.8 MMHG
ECHO LVOT PEAK VELOCITY: 109.1 CM/S
ECHO LVOT VTI: 16.55 CM
ECHO MV A VELOCITY: 64.24 CM/S
ECHO MV E DECELERATION TIME (DT): 254.9 MS
ECHO MV E VELOCITY: 50.65 CM/S
ECHO MV E/A RATIO: 0.79
EOSINOPHIL # BLD: 0.1 K/UL (ref 0–0.4)
EOSINOPHIL NFR BLD: 2 % (ref 0–5)
ERYTHROCYTE [DISTWIDTH] IN BLOOD BY AUTOMATED COUNT: 13.3 % (ref 11.6–14.5)
GLOBULIN SER CALC-MCNC: 3.8 G/DL (ref 2–4)
GLUCOSE SERPL-MCNC: 86 MG/DL (ref 74–99)
HCT VFR BLD AUTO: 42.3 % (ref 36–48)
HGB BLD-MCNC: 13.6 G/DL (ref 13–16)
LVOT MG: 2.56 MMHG
LVOT MV: 0.72 CM/S
LYMPHOCYTES # BLD: 2.6 K/UL (ref 0.9–3.6)
LYMPHOCYTES NFR BLD: 50 % (ref 21–52)
MCH RBC QN AUTO: 31.6 PG (ref 24–34)
MCHC RBC AUTO-ENTMCNC: 32.2 G/DL (ref 31–37)
MCV RBC AUTO: 98.4 FL (ref 74–97)
MONOCYTES # BLD: 0.7 K/UL (ref 0.05–1.2)
MONOCYTES NFR BLD: 13 % (ref 3–10)
MV DEC SLOPE: 1.99
NEUTS SEG # BLD: 1.8 K/UL (ref 1.8–8)
NEUTS SEG NFR BLD: 35 % (ref 40–73)
PLATELET # BLD AUTO: 194 K/UL (ref 135–420)
PMV BLD AUTO: 11.5 FL (ref 9.2–11.8)
POTASSIUM SERPL-SCNC: 3.9 MMOL/L (ref 3.5–5.5)
PROT SERPL-MCNC: 7.1 G/DL (ref 6.4–8.2)
RBC # BLD AUTO: 4.3 M/UL (ref 4.7–5.5)
SODIUM SERPL-SCNC: 142 MMOL/L (ref 136–145)
WBC # BLD AUTO: 5.2 K/UL (ref 4.6–13.2)

## 2020-06-11 PROCEDURE — 85025 COMPLETE CBC W/AUTO DIFF WBC: CPT

## 2020-06-11 PROCEDURE — 80053 COMPREHEN METABOLIC PANEL: CPT

## 2020-06-11 PROCEDURE — 36415 COLL VENOUS BLD VENIPUNCTURE: CPT

## 2020-06-17 ENCOUNTER — VIRTUAL VISIT (OUTPATIENT)
Dept: NEUROLOGY | Age: 50
End: 2020-06-17

## 2020-06-17 DIAGNOSIS — G47.33 OSA (OBSTRUCTIVE SLEEP APNEA): Primary | ICD-10-CM

## 2020-06-17 DIAGNOSIS — E66.01 MORBID OBESITY (HCC): ICD-10-CM

## 2020-06-17 NOTE — Clinical Note
Please obtain sleep study results from [facility].

## 2020-06-17 NOTE — PROGRESS NOTES
Dustin Oliva presents today for   Chief Complaint   Patient presents with    Sleep Problem     Patient's own C-pap no Download       Is someone accompanying this pt? Virtual visit [phone]    Is the patient using any DME equipment during OV? [employer]    Depression Screening:  3 most recent PHQ Screens 5/6/2020   PHQ Not Done -   Little interest or pleasure in doing things Not at all   Feeling down, depressed, irritable, or hopeless Several days   Total Score PHQ 2 1       Learning Assessment:  Learning Assessment 10/24/2019   PRIMARY LEARNER Patient   PRIMARY LANGUAGE ENGLISH   LEARNER PREFERENCE PRIMARY DEMONSTRATION   ANSWERED BY Patient   RELATIONSHIP SELF       Abuse Screening:  Abuse Screening Questionnaire 5/6/2020   Do you ever feel afraid of your partner? N   Are you in a relationship with someone who physically or mentally threatens you? N   Is it safe for you to go home? Y       Fall Risk  Fall Risk Assessment, last 12 mths 5/6/2020   Able to walk? Yes   Fall in past 12 months? No         Coordination of Care:  1. Have you been to the ER, urgent care clinic since your last visit? Hospitalized since your last visit? no    2. Have you seen or consulted any other health care providers outside of the [address] [place] [address] since your last visit? Include any pap smears or colon screening.  no
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
## 2020-06-23 ENCOUNTER — DOCUMENTATION ONLY (OUTPATIENT)
Dept: NEUROLOGY | Age: 50
End: 2020-06-23

## 2020-06-24 ENCOUNTER — VIRTUAL VISIT (OUTPATIENT)
Dept: CARDIOLOGY CLINIC | Age: 50
End: 2020-06-24

## 2020-06-24 DIAGNOSIS — R06.02 SHORTNESS OF BREATH: Primary | ICD-10-CM

## 2020-06-24 DIAGNOSIS — I48.92 ATRIAL FLUTTER, UNSPECIFIED TYPE (HCC): ICD-10-CM

## 2020-06-24 DIAGNOSIS — R63.5 WEIGHT GAIN: ICD-10-CM

## 2020-06-24 DIAGNOSIS — I10 ESSENTIAL HYPERTENSION: ICD-10-CM

## 2020-06-24 NOTE — PROGRESS NOTES
Assessment & Plan:     -Ongoing weight gain of unclear etiology. Concern for diastolic heart failure. Increased bumex to 2 mg BID last month.  -Increasing dyspnea as well as lower extremity edema, concern for heart failure. However, his edema did improve when he took Lovenox in the past.   -Desire not to be on long term anticoagulation due to intracranial hemorrhage.  -History of remote intracranial hemorrhage but able to tolerate Lovenox in the past. He had a history of a traumatic brain injury as a child.  -History of seizures due to remote traumatic brain injury.  -Echocardiogram and nuclear stress test April 2019 without obvious ischemia and normal left ventricular function.  -Bipolar disorder.  -Hypertension, slight increase today. -Dyslipidemia.  -Ongoing weight gain. -BMI 46 at last visit     Edema has improved with bumex 2 mg BID increase. Echo from 6/10/20 with normal EF.        All questions answered and discussion of diagnosis and planned treatment. I spent at least 15 minutes with this established patient, and >50% of the time was spent counseling and/or coordinating care regarding echo/bumex    Subjective:     HPI:  Hank Quintana is a 44 y.o. male who was seen for edema. Edema improved, weight downtrending, now 306 lbs. ROS    Prior to Admission medications    Medication Sig Start Date End Date Taking? Authorizing Provider   mometasone (ELOCON) 0.1 % topical cream daily. Provider, Historical   ibuprofen (MOTRIN) 800 mg tablet Take 1 Tab by mouth two (2) times daily as needed for Pain. 3/5/20   Jena Silva MD   topiramate (TOPAMAX) 50 mg tablet Take 1 Tab by mouth two (2) times a day. 2/13/20   Celso Diaz NP   cyclobenzaprine (FLEXERIL) 10 mg tablet Take 1 Tab by mouth three (3) times daily as needed for Muscle Spasm(s). 2/12/20   Jeanette Stein PA-C   naproxen (NAPROSYN) 500 mg tablet Take 1 Tab by mouth two (2) times daily (with meals).  2/12/20   Gabriela Boas CHRIS, PABRANDON   bumetanide (BUMEX) 2 mg tablet Take 1 Tab by mouth daily. Or as directed 1/31/20   Dori Herring NP   spironolactone (ALDACTONE) 25 mg tablet Take 1 Tab by mouth daily. 1/17/20   Dori Herring, NP   divalproex DR (DEPAKOTE) 500 mg tablet Take 1 Tab by mouth two (2) times a day. With 250 mg tablets for total of 750 mg twice daily. 1/15/20   Shelley Moura NP   metoprolol succinate (TOPROL-XL) 25 mg XL tablet Take 1 Tab by mouth daily. Indications: Ventricular Rate Control in Atrial Fibrillation 1/5/20   Odell Boast, DO   sertraline (ZOLOFT) 50 mg tablet Take 50 mg by mouth daily. 10/12/19   Provider, Historical   simvastatin (ZOCOR) 20 mg tablet Take 20 mg by mouth daily. 11/13/19   Provider, Historical   lisinopril (PRINIVIL, ZESTRIL) 40 mg tablet Take 1 Tab by mouth daily. 10/24/19   Carlos Ledesma MD   ARIPiprazole (ABILIFY) 10 mg tablet Take 1 Tab by mouth daily. Indications: Bipolar Disorder in Remission 4/10/19   Neel Mcdermott MD     Allergies   Allergen Reactions    Haloperidol Anaphylaxis    Trazodone Other (comments)     Priapism     Haldol [Haloperidol Lactate] Unknown (comments)    Acetaminophen Nausea and Vomiting and Nausea Only    Adhesive Tape-Silicones Rash         Objective:     Vital Signs: (As obtained by patient/caregiver at home)  There were no vitals taken for this visit.      [INSTRUCTIONS:  \"[x]\" Indicates a positive item  \"[]\" Indicates a negative item  -- DELETE ALL ITEMS NOT EXAMINED]    Constitutional: [x] Appears well-developed and well-nourished [x] No apparent distress      [] Abnormal -     Mental status: [x] Alert and awake  [x] Oriented to person/place/time [x] Able to follow commands    [] Abnormal -     Eyes:   EOM    [x]  Normal    [] Abnormal -   Sclera  [x]  Normal    [] Abnormal -          Discharge [x]  None visible   [] Abnormal -     HENT: [x] Normocephalic, atraumatic  [] Abnormal -   [x] Mouth/Throat: Mucous membranes are moist    External Ears [x] Normal  [] Abnormal -    Neck: [x] No visualized mass [] Abnormal -     Pulmonary/Chest: [x] Respiratory effort normal   [x] No visualized signs of difficulty breathing or respiratory distress        [] Abnormal -      Musculoskeletal:   [x] Normal gait with no signs of ataxia         [x] Normal range of motion of neck        [] Abnormal -     Neurological:        [x] No Facial Asymmetry (Cranial nerve 7 motor function) (limited exam due to video visit)          [x] No gaze palsy        [] Abnormal -          Skin:        [x] No significant exanthematous lesions or discoloration noted on facial skin         [] Abnormal -            Psychiatric:       [x] Normal Affect [] Abnormal -        [x] No Hallucinations    Other pertinent observable physical exam findings:-    We discussed the expected course, resolution and complications of the diagnosis(es) in detail. Medication risks, benefits, costs, interactions, and alternatives were discussed as indicated. I advised him to contact the office if his condition worsens, changes or fails to improve as anticipated. He expressed understanding with the diagnosis(es) and plan. Paige Black is a 44 y.o. male being evaluated by a video visit encounter for concerns as above. A caregiver was present when appropriate. Due to this being a TeleHealth encounter (During [facility]-00 public health emergency), evaluation of the following organ systems was limited: Vitals/Constitutional/EENT/Resp/CV/GI//MS/Neuro/Skin/Heme-Lymph-Imm. Pursuant to the emergency declaration under the [facility] waiver authority and the [employer] and [employer] Act, this Virtual  Visit was conducted, with patient's (and/or legal guardian's) consent, to reduce the patient's risk of exposure to COVID-19 and provide necessary medical care.      Services were provided through a video synchronous discussion virtually to substitute for in-person clinic visit. Patient and provider were located at their individual homes. Consent: Martin Holly, who was seen by synchronous (real-time) audio-video technology, and/or his healthcare decision maker, is aware that this patient-initiated, Telehealth encounter on 6/24/2020 is a billable service, with coverage as determined by his insurance carrier. He is aware that he may receive a bill and has provided verbal consent to proceed: Yes. Discussion about cardiac condition and relevant findings.     Jody Solorzano MD

## 2020-07-16 ENCOUNTER — OFFICE VISIT (OUTPATIENT)
Dept: NEUROLOGY | Age: 50
End: 2020-07-16

## 2020-07-16 VITALS
DIASTOLIC BLOOD PRESSURE: 80 MMHG | TEMPERATURE: 98 F | WEIGHT: 315 LBS | SYSTOLIC BLOOD PRESSURE: 150 MMHG | HEIGHT: 75 IN | BODY MASS INDEX: 39.17 KG/M2 | RESPIRATION RATE: 18 BRPM | HEART RATE: 74 BPM | OXYGEN SATURATION: 95 %

## 2020-07-16 DIAGNOSIS — G47.33 OSA (OBSTRUCTIVE SLEEP APNEA): Primary | ICD-10-CM

## 2020-07-16 DIAGNOSIS — E66.01 MORBID OBESITY (HCC): ICD-10-CM

## 2020-07-16 NOTE — PROGRESS NOTES
Josephine Barajas presents today for   Chief Complaint   Patient presents with    Sleep Problem     follow up       Is someone accompanying this pt? no    Is the patient using any DME equipment during OV? [employer]    Depression Screening:  3 most recent PHQ Screens 5/6/2020   PHQ Not Done -   Little interest or pleasure in doing things Not at all   Feeling down, depressed, irritable, or hopeless Several days   Total Score PHQ 2 1       Learning Assessment:  Learning Assessment 10/24/2019   PRIMARY LEARNER Patient   PRIMARY LANGUAGE ENGLISH   LEARNER PREFERENCE PRIMARY DEMONSTRATION   ANSWERED BY Patient   RELATIONSHIP SELF       Abuse Screening:  Abuse Screening Questionnaire 5/6/2020   Do you ever feel afraid of your partner? N   Are you in a relationship with someone who physically or mentally threatens you? N   Is it safe for you to go home? Y       Fall Risk  Fall Risk Assessment, last 12 mths 5/6/2020   Able to walk? Yes   Fall in past 12 months? No         Coordination of Care:  1. Have you been to the ER, urgent care clinic since your last visit? Hospitalized since your last visit? no    2. Have you seen or consulted any other health care providers outside of the [address] [place] [address] since your last visit? Include any pap smears or colon screening.  no
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
## 2020-08-20 ENCOUNTER — OFFICE VISIT (OUTPATIENT)
Dept: NEUROLOGY | Age: 50
End: 2020-08-20

## 2020-08-20 VITALS
DIASTOLIC BLOOD PRESSURE: 96 MMHG | HEIGHT: 75 IN | OXYGEN SATURATION: 96 % | RESPIRATION RATE: 18 BRPM | SYSTOLIC BLOOD PRESSURE: 144 MMHG | BODY MASS INDEX: 39.17 KG/M2 | WEIGHT: 315 LBS | TEMPERATURE: 98.7 F | HEART RATE: 90 BPM

## 2020-08-20 DIAGNOSIS — E66.01 MORBID OBESITY (HCC): ICD-10-CM

## 2020-08-20 DIAGNOSIS — I10 ESSENTIAL HYPERTENSION: ICD-10-CM

## 2020-08-20 DIAGNOSIS — G47.33 OSA (OBSTRUCTIVE SLEEP APNEA): Primary | ICD-10-CM

## 2020-08-20 NOTE — PROGRESS NOTES
Karon Barfield presents today for   Chief Complaint   Patient presents with    Sleep Problem     ADAIR follow up       Is someone accompanying this pt? no    Is the patient using any DME equipment during OV? no    Depression Screening:  3 most recent PHQ Screens 5/6/2020   PHQ Not Done -   Little interest or pleasure in doing things Not at all   Feeling down, depressed, irritable, or hopeless Several days   Total Score PHQ 2 1       Learning Assessment:  Learning Assessment 10/24/2019   PRIMARY LEARNER Patient   PRIMARY LANGUAGE ENGLISH   LEARNER PREFERENCE PRIMARY DEMONSTRATION   ANSWERED BY Patient   RELATIONSHIP SELF       Abuse Screening:  Abuse Screening Questionnaire 5/6/2020   Do you ever feel afraid of your partner? N   Are you in a relationship with someone who physically or mentally threatens you? N   Is it safe for you to go home? Y       Fall Risk  Fall Risk Assessment, last 12 mths 5/6/2020   Able to walk? Yes   Fall in past 12 months? No         Coordination of Care:  1. Have you been to the ER, urgent care clinic since your last visit? Hospitalized since your last visit? no    2. Have you seen or consulted any other health care providers outside of the [address] [place] [address] since your last visit? Include any pap smears or colon screening.  no
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
## 2020-09-22 ENCOUNTER — TELEPHONE (OUTPATIENT)
Dept: NEUROLOGY | Age: 50
End: 2020-09-22

## 2020-09-23 ENCOUNTER — VIRTUAL VISIT (OUTPATIENT)
Dept: NEUROLOGY | Age: 50
End: 2020-09-23
Payer: MEDICARE

## 2020-09-23 DIAGNOSIS — G40.109 PARTIAL SYMPTOMATIC EPILEPSY WITH SIMPLE PARTIAL SEIZURES, NOT INTRACTABLE, WITHOUT STATUS EPILEPTICUS (HCC): ICD-10-CM

## 2020-09-23 DIAGNOSIS — G47.33 OSA (OBSTRUCTIVE SLEEP APNEA): Primary | ICD-10-CM

## 2020-09-23 PROCEDURE — G8427 DOCREV CUR MEDS BY ELIG CLIN: HCPCS | Performed by: PSYCHIATRY & NEUROLOGY

## 2020-09-23 PROCEDURE — 99214 OFFICE O/P EST MOD 30 MIN: CPT | Performed by: PSYCHIATRY & NEUROLOGY

## 2020-09-23 PROCEDURE — G8756 NO BP MEASURE DOC: HCPCS | Performed by: PSYCHIATRY & NEUROLOGY

## 2020-09-23 PROCEDURE — G9717 DOC PT DX DEP/BP F/U NT REQ: HCPCS | Performed by: PSYCHIATRY & NEUROLOGY

## 2020-09-23 PROCEDURE — 3017F COLORECTAL CA SCREEN DOC REV: CPT | Performed by: PSYCHIATRY & NEUROLOGY

## 2020-09-23 PROCEDURE — G8417 CALC BMI ABV UP PARAM F/U: HCPCS | Performed by: PSYCHIATRY & NEUROLOGY
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
## 2020-09-23 NOTE — PROGRESS NOTES
Kiet Jenkins is a 44 y.o. male on virtual visit today for follow-up on ADAIR and epilepsy. 1. Have you been to the ER, urgent care clinic since your last visit? Hospitalized since your last visit? No    2. Have you seen or consulted any other health care providers outside of the [address] [place] [address] since your last visit? Include any pap smears or colon screening. No     Mobile number [phone] will be used for today's visit.

## 2020-10-23 ENCOUNTER — HOSPITAL ENCOUNTER (OUTPATIENT)
Dept: LAB | Age: 50
Discharge: HOME | End: 2020-10-23
Payer: MEDICARE

## 2020-10-23 LAB
ALBUMIN SERPL-MCNC: 3.1 G/DL (ref 3.4–5)
ALBUMIN/GLOB SERPL: 0.9 {RATIO} (ref 0.8–1.7)
ALP SERPL-CCNC: 93 U/L (ref 45–117)
ALT SERPL-CCNC: 42 U/L (ref 16–61)
ANION GAP SERPL CALC-SCNC: 5 MMOL/L (ref 3–18)
AST SERPL-CCNC: 36 U/L (ref 10–38)
BASOPHILS # BLD: 0 K/UL (ref 0–0.1)
BASOPHILS NFR BLD: 0 % (ref 0–2)
BILIRUB SERPL-MCNC: 0.4 MG/DL (ref 0.2–1)
BUN SERPL-MCNC: 14 MG/DL (ref 7–18)
BUN/CREAT SERPL: 18 (ref 12–20)
CALCIUM SERPL-MCNC: 8.7 MG/DL (ref 8.5–10.1)
CHLORIDE SERPL-SCNC: 105 MMOL/L (ref 100–111)
CHOLEST SERPL-MCNC: 204 MG/DL
CO2 SERPL-SCNC: 33 MMOL/L (ref 21–32)
CREAT SERPL-MCNC: 0.77 MG/DL (ref 0.6–1.3)
DIFFERENTIAL METHOD BLD: ABNORMAL
EOSINOPHIL # BLD: 0.1 K/UL (ref 0–0.4)
EOSINOPHIL NFR BLD: 2 % (ref 0–5)
ERYTHROCYTE [DISTWIDTH] IN BLOOD BY AUTOMATED COUNT: 12.7 % (ref 11.6–14.5)
GLOBULIN SER CALC-MCNC: 3.5 G/DL (ref 2–4)
GLUCOSE SERPL-MCNC: 84 MG/DL (ref 74–99)
HCT VFR BLD AUTO: 41.6 % (ref 36–48)
HDLC SERPL-MCNC: 50 MG/DL (ref 40–60)
HDLC SERPL: 4.1 {RATIO} (ref 0–5)
HGB BLD-MCNC: 13.6 G/DL (ref 13–16)
LDLC SERPL CALC-MCNC: 135.6 MG/DL (ref 0–100)
LIPID PROFILE,FLP: ABNORMAL
LYMPHOCYTES # BLD: 2.6 K/UL (ref 0.9–3.6)
LYMPHOCYTES NFR BLD: 47 % (ref 21–52)
MCH RBC QN AUTO: 32.2 PG (ref 24–34)
MCHC RBC AUTO-ENTMCNC: 32.7 G/DL (ref 31–37)
MCV RBC AUTO: 98.3 FL (ref 74–97)
MONOCYTES # BLD: 0.6 K/UL (ref 0.05–1.2)
MONOCYTES NFR BLD: 11 % (ref 3–10)
NEUTS SEG # BLD: 2.2 K/UL (ref 1.8–8)
NEUTS SEG NFR BLD: 40 % (ref 40–73)
PLATELET # BLD AUTO: 170 K/UL (ref 135–420)
PMV BLD AUTO: 11.8 FL (ref 9.2–11.8)
POTASSIUM SERPL-SCNC: 3.8 MMOL/L (ref 3.5–5.5)
PROT SERPL-MCNC: 6.6 G/DL (ref 6.4–8.2)
RBC # BLD AUTO: 4.23 M/UL (ref 4.7–5.5)
SODIUM SERPL-SCNC: 143 MMOL/L (ref 136–145)
TRIGL SERPL-MCNC: 92 MG/DL (ref ?–150)
VLDLC SERPL CALC-MCNC: 18.4 MG/DL
WBC # BLD AUTO: 5.4 K/UL (ref 4.6–13.2)

## 2020-10-23 PROCEDURE — 80061 LIPID PANEL: CPT

## 2020-10-23 PROCEDURE — 80053 COMPREHEN METABOLIC PANEL: CPT

## 2020-10-23 PROCEDURE — 84403 ASSAY OF TOTAL TESTOSTERONE: CPT

## 2020-10-23 PROCEDURE — 85025 COMPLETE CBC W/AUTO DIFF WBC: CPT

## 2020-10-23 PROCEDURE — 36415 COLL VENOUS BLD VENIPUNCTURE: CPT

## 2020-10-25 LAB
TESTOST FREE SERPL-MCNC: 5.6 PG/ML (ref 7.2–24)
TESTOST SERPL-MCNC: 207 NG/DL (ref 264–916)

## 2020-11-02 DIAGNOSIS — M47.816 LUMBAR FACET ARTHROPATHY: ICD-10-CM

## 2020-11-02 RX ORDER — IBUPROFEN 800 MG/1
TABLET ORAL
Qty: 60 TAB | Refills: 0 | OUTPATIENT
Start: 2020-11-02

## 2021-01-07 ENCOUNTER — OFFICE VISIT (OUTPATIENT)
Dept: CARDIOLOGY CLINIC | Age: 51
End: 2021-01-07
Payer: MEDICARE

## 2021-01-07 VITALS
HEIGHT: 75 IN | SYSTOLIC BLOOD PRESSURE: 130 MMHG | BODY MASS INDEX: 39.17 KG/M2 | DIASTOLIC BLOOD PRESSURE: 82 MMHG | WEIGHT: 315 LBS | HEART RATE: 57 BPM | OXYGEN SATURATION: 97 %

## 2021-01-07 DIAGNOSIS — I10 ESSENTIAL HYPERTENSION: ICD-10-CM

## 2021-01-07 DIAGNOSIS — Z98.890 H/O CARDIAC RADIOFREQUENCY ABLATION: ICD-10-CM

## 2021-01-07 DIAGNOSIS — I48.92 ATRIAL FLUTTER, UNSPECIFIED TYPE (HCC): ICD-10-CM

## 2021-01-07 DIAGNOSIS — R56.9 SEIZURE (HCC): ICD-10-CM

## 2021-01-07 DIAGNOSIS — R07.9 CHEST PAIN, UNSPECIFIED TYPE: ICD-10-CM

## 2021-01-07 DIAGNOSIS — R09.1 PLEURISY: Primary | ICD-10-CM

## 2021-01-07 DIAGNOSIS — I48.91 ATRIAL FIBRILLATION WITH RVR (HCC): ICD-10-CM

## 2021-01-07 DIAGNOSIS — I26.99 PULMONARY EMBOLISM, UNSPECIFIED CHRONICITY, UNSPECIFIED PULMONARY EMBOLISM TYPE, UNSPECIFIED WHETHER ACUTE COR PULMONALE PRESENT (HCC): ICD-10-CM

## 2021-01-07 PROCEDURE — G8427 DOCREV CUR MEDS BY ELIG CLIN: HCPCS | Performed by: INTERNAL MEDICINE

## 2021-01-07 PROCEDURE — 99215 OFFICE O/P EST HI 40 MIN: CPT | Performed by: INTERNAL MEDICINE

## 2021-01-07 PROCEDURE — G8752 SYS BP LESS 140: HCPCS | Performed by: INTERNAL MEDICINE

## 2021-01-07 PROCEDURE — G8754 DIAS BP LESS 90: HCPCS | Performed by: INTERNAL MEDICINE

## 2021-01-07 PROCEDURE — G9717 DOC PT DX DEP/BP F/U NT REQ: HCPCS | Performed by: INTERNAL MEDICINE

## 2021-01-07 PROCEDURE — G8417 CALC BMI ABV UP PARAM F/U: HCPCS | Performed by: INTERNAL MEDICINE

## 2021-01-07 PROCEDURE — 93000 ELECTROCARDIOGRAM COMPLETE: CPT | Performed by: INTERNAL MEDICINE

## 2021-01-07 PROCEDURE — 3017F COLORECTAL CA SCREEN DOC REV: CPT | Performed by: INTERNAL MEDICINE

## 2021-01-07 RX ORDER — SIMVASTATIN 20 MG/1
20 TABLET, FILM COATED ORAL DAILY
Qty: 90 TAB | Refills: 3 | Status: SHIPPED | OUTPATIENT
Start: 2021-01-07 | End: 2021-09-09 | Stop reason: SDUPTHER

## 2021-01-07 RX ORDER — LISINOPRIL 40 MG/1
40 TABLET ORAL DAILY
Qty: 90 TAB | Refills: 3 | Status: ON HOLD | OUTPATIENT
Start: 2021-01-07 | End: 2021-06-11

## 2021-01-07 RX ORDER — BUMETANIDE 2 MG/1
2 TABLET ORAL DAILY
Qty: 90 TAB | Refills: 3 | Status: ON HOLD | OUTPATIENT
Start: 2021-01-07 | End: 2021-06-11

## 2021-01-07 RX ORDER — METOPROLOL SUCCINATE 25 MG/1
25 TABLET, EXTENDED RELEASE ORAL DAILY
Qty: 90 TAB | Refills: 3 | Status: ON HOLD | OUTPATIENT
Start: 2021-01-07 | End: 2021-06-11
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
## 2021-01-07 NOTE — PROGRESS NOTES
[place] Holly presents today for   Chief Complaint   Patient presents with    Follow-up     7 month follow up       Martin Holly preferred language for health care discussion is english/other. Is someone accompanying this pt? no    Is the patient using any DME equipment during [address]? no    Depression Screening:  3 most recent PHQ Screens 1/7/2021   PHQ Not Done -   Little interest or pleasure in doing things Not at all   Feeling down, depressed, irritable, or hopeless Not at all   Total Score PHQ 2 0       Learning Assessment:  Learning Assessment 10/24/2019   PRIMARY LEARNER Patient   PRIMARY LANGUAGE ENGLISH   LEARNER PREFERENCE PRIMARY DEMONSTRATION   ANSWERED BY Patient   RELATIONSHIP SELF       Abuse Screening:  Abuse Screening Questionnaire 1/7/2021   Do you ever feel afraid of your partner? N   Are you in a relationship with someone who physically or mentally threatens you? N   Is it safe for you to go home? -       Fall Risk  Fall Risk Assessment, last 12 mths 1/7/2021   Able to walk? Yes   Fall in past 12 months? 0   Do you feel unsteady? 0   Are you worried about falling 0       Pt currently taking Anticoagulant therapy? no    Coordination of Care:  1. Have you been to the ER, urgent care clinic since your last visit? Hospitalized since your last visit? no    2. Have you seen or consulted any other health care providers outside of the [address] [place] [address] since your last visit? Include any pap smears or colon screening.  no

## 2021-02-20 DIAGNOSIS — G40.109 PARTIAL SYMPTOMATIC EPILEPSY WITH SIMPLE PARTIAL SEIZURES, NOT INTRACTABLE, WITHOUT STATUS EPILEPTICUS (HCC): ICD-10-CM

## 2021-02-23 DIAGNOSIS — G40.109 PARTIAL SYMPTOMATIC EPILEPSY WITH SIMPLE PARTIAL SEIZURES, NOT INTRACTABLE, WITHOUT STATUS EPILEPTICUS (HCC): ICD-10-CM

## 2021-02-23 RX ORDER — DIVALPROEX SODIUM 500 MG/1
TABLET, DELAYED RELEASE ORAL
Qty: 180 TAB | Refills: 0 | Status: SHIPPED | OUTPATIENT
Start: 2021-02-23 | End: 2021-06-01 | Stop reason: SDUPTHER

## 2021-02-23 RX ORDER — DIVALPROEX SODIUM 250 MG/1
TABLET, DELAYED RELEASE ORAL
Qty: 90 TAB | Refills: 0 | Status: SHIPPED | OUTPATIENT
Start: 2021-02-23 | End: 2021-05-28

## 2021-04-17 ENCOUNTER — HOSPITAL ENCOUNTER (OUTPATIENT)
Dept: LAB | Age: 51
Discharge: HOME | End: 2021-04-17

## 2021-04-17 LAB — SENTARA SPECIMEN COL,SENBCF: NORMAL

## 2021-04-17 PROCEDURE — 99001 SPECIMEN HANDLING PT-LAB: CPT

## 2021-04-21 ENCOUNTER — APPOINTMENT (OUTPATIENT)
Dept: GENERAL RADIOLOGY | Age: 51
End: 2021-04-21
Attending: PHYSICIAN ASSISTANT
Payer: MEDICARE

## 2021-04-21 ENCOUNTER — HOSPITAL ENCOUNTER (EMERGENCY)
Age: 51
Discharge: HOME | End: 2021-04-21
Attending: EMERGENCY MEDICINE
Payer: MEDICARE

## 2021-04-21 VITALS
WEIGHT: 315 LBS | RESPIRATION RATE: 20 BRPM | HEART RATE: 83 BPM | TEMPERATURE: 98.9 F | DIASTOLIC BLOOD PRESSURE: 84 MMHG | OXYGEN SATURATION: 98 % | SYSTOLIC BLOOD PRESSURE: 117 MMHG | BODY MASS INDEX: 39.17 KG/M2 | HEIGHT: 75 IN

## 2021-04-21 DIAGNOSIS — S60.221A CONTUSION OF RIGHT HAND, INITIAL ENCOUNTER: Primary | ICD-10-CM

## 2021-04-21 PROCEDURE — 73130 X-RAY EXAM OF HAND: CPT

## 2021-04-21 PROCEDURE — 99283 EMERGENCY DEPT VISIT LOW MDM: CPT

## 2021-04-21 NOTE — ED TRIAGE NOTES
Pt states he fell backward earlier today. Caught himself with his right hand. Hand is swollen and painful.
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
## 2021-05-26 ENCOUNTER — HOSPITAL ENCOUNTER (EMERGENCY)
Age: 51
Discharge: HOME | End: 2021-05-26
Attending: EMERGENCY MEDICINE
Payer: MEDICARE

## 2021-05-26 ENCOUNTER — APPOINTMENT (OUTPATIENT)
Dept: GENERAL RADIOLOGY | Age: 51
End: 2021-05-26
Attending: PHYSICIAN ASSISTANT
Payer: MEDICARE

## 2021-05-26 VITALS
SYSTOLIC BLOOD PRESSURE: 115 MMHG | OXYGEN SATURATION: 98 % | BODY MASS INDEX: 39.17 KG/M2 | HEART RATE: 78 BPM | RESPIRATION RATE: 18 BRPM | HEIGHT: 75 IN | DIASTOLIC BLOOD PRESSURE: 75 MMHG | WEIGHT: 315 LBS | TEMPERATURE: 98.5 F

## 2021-05-26 DIAGNOSIS — S82.891A CLOSED FRACTURE OF RIGHT ANKLE, INITIAL ENCOUNTER: Primary | ICD-10-CM

## 2021-05-26 DIAGNOSIS — M47.816 LUMBAR FACET ARTHROPATHY: ICD-10-CM

## 2021-05-26 PROCEDURE — 96372 THER/PROPH/DIAG INJ SC/IM: CPT

## 2021-05-26 PROCEDURE — 74011250636 HC RX REV CODE- 250/636: Performed by: PHYSICIAN ASSISTANT

## 2021-05-26 PROCEDURE — 75810000053 HC SPLINT APPLICATION

## 2021-05-26 PROCEDURE — 73610 X-RAY EXAM OF ANKLE: CPT

## 2021-05-26 PROCEDURE — 99284 EMERGENCY DEPT VISIT MOD MDM: CPT

## 2021-05-26 PROCEDURE — 73630 X-RAY EXAM OF FOOT: CPT

## 2021-05-26 RX ORDER — IBUPROFEN 800 MG/1
800 TABLET ORAL
Qty: 20 TABLET | Refills: 0 | Status: SHIPPED | OUTPATIENT
Start: 2021-05-26 | End: 2021-05-26 | Stop reason: SDUPTHER

## 2021-05-26 RX ORDER — IBUPROFEN 800 MG/1
800 TABLET ORAL
Qty: 20 TABLET | Refills: 0 | Status: SHIPPED | OUTPATIENT
Start: 2021-05-26 | End: 2021-06-02

## 2021-05-26 RX ORDER — OXYCODONE HYDROCHLORIDE 5 MG/1
5 TABLET ORAL
Qty: 10 TABLET | Refills: 0 | Status: SHIPPED | OUTPATIENT
Start: 2021-05-26 | End: 2021-05-26 | Stop reason: SDUPTHER

## 2021-05-26 RX ORDER — OXYCODONE HYDROCHLORIDE 5 MG/1
5 TABLET ORAL
Qty: 10 TABLET | Refills: 0 | Status: SHIPPED | OUTPATIENT
Start: 2021-05-26 | End: 2021-05-29

## 2021-05-26 RX ORDER — MORPHINE SULFATE 4 MG/ML
4 INJECTION INTRAVENOUS
Status: COMPLETED | OUTPATIENT
Start: 2021-05-26 | End: 2021-05-26

## 2021-05-26 RX ADMIN — MORPHINE SULFATE 4 MG: 4 INJECTION, SOLUTION INTRAMUSCULAR; INTRAVENOUS at 18:49

## 2021-05-26 NOTE — ROUTINE PROCESS
Carmen Xie is a 46 y.o. male that was discharged in stable. Pt was accompanied by spouse. Pt is not driving. The patients diagnosis, condition and treatment were explained to  patient and aftercare instructions were given. The patient verbalized understanding. Patient armband removed and shredded.

## 2021-05-26 NOTE — ED TRIAGE NOTES
Patient states he fell today and now has pain to his right lower leg. He states he is unable to stand or put any pressure on that leg.

## 2021-05-26 NOTE — DISCHARGE INSTRUCTIONS
maufait Activation    Thank you for requesting access to maufait. Please follow the instructions below to securely access and download your online medical record. maufait allows you to send messages to your doctor, view your test results, renew your prescriptions, schedule appointments, and more. How Do I Sign Up? In your internet browser, go to [email or web address]  Click on the First Time User? Click Here link in the Sign In box. You will be redirect to the New Member Sign Up page. Enter your maufait Access Code exactly as it appears below. You will not need to use this code after youve completed the sign-up process. If you do not sign up before the expiration date, you must request a new code. maufait Access Code: [unfilled] (This is the date your maufait access code will )    Enter the last four digits of your Social Security Number (xxxx) and Date of Birth (mm/dd/yyyy) as indicated and click Submit. You will be taken to the next sign-up page. Create a maufait ID. This will be your maufait login ID and cannot be changed, so think of one that is secure and easy to remember. Create a maufait password. You can change your password at any time. Enter your Password Reset Question and Answer. This can be used at a later time if you forget your password. Enter your e-mail address. You will receive e-mail notification when new information is available in [address] Click Sign Up. You can now view and download portions of your medical record. Click the [place] link to download a portable copy of your medical information. Additional Information    If you have questions, please visit the Frequently Asked Questions section of the maufait website at [email or web address] Remember, maufait is NOT to be used for urgent needs. For medical emergencies, dial 911.
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
## 2021-05-26 NOTE — ROUTINE PROCESS
Alexis Morataya is a 46 y.o. male that was discharged in stable. Pt was accompanied by spouse. Pt is driving. The patients diagnosis, condition and treatment were explained to  patient and aftercare instructions were given. The patient verbalized understanding. Patient armband removed and shredded.

## 2021-05-28 DIAGNOSIS — G40.109 PARTIAL SYMPTOMATIC EPILEPSY WITH SIMPLE PARTIAL SEIZURES, NOT INTRACTABLE, WITHOUT STATUS EPILEPTICUS (HCC): ICD-10-CM

## 2021-05-28 RX ORDER — DIVALPROEX SODIUM 250 MG/1
TABLET, DELAYED RELEASE ORAL
Qty: 90 TABLET | Refills: 0 | Status: SHIPPED | OUTPATIENT
Start: 2021-05-28 | End: 2021-06-09 | Stop reason: SDUPTHER

## 2021-06-01 ENCOUNTER — TELEPHONE (OUTPATIENT)
Dept: CARDIOLOGY CLINIC | Age: 51
End: 2021-06-01

## 2021-06-01 DIAGNOSIS — G40.109 PARTIAL SYMPTOMATIC EPILEPSY WITH SIMPLE PARTIAL SEIZURES, NOT INTRACTABLE, WITHOUT STATUS EPILEPTICUS (HCC): ICD-10-CM

## 2021-06-01 RX ORDER — DIVALPROEX SODIUM 500 MG/1
TABLET, DELAYED RELEASE ORAL
Qty: 180 TABLET | Refills: 0 | Status: SHIPPED | OUTPATIENT
Start: 2021-06-01 | End: 2021-06-09 | Stop reason: SDUPTHER

## 2021-06-01 NOTE — TELEPHONE ENCOUNTER
Called patient to reschedule his 07/08/21 appointment with Dr. Kimberley Arenas since he will be out of the office. Spoke to patient and he told me to call his girlfriend. Called and placed message. Will send out letter in case there is no return call back.

## 2021-06-02 ENCOUNTER — DOCUMENTATION ONLY (OUTPATIENT)
Dept: CARDIOLOGY CLINIC | Age: 51
End: 2021-06-02

## 2021-06-02 ENCOUNTER — OFFICE VISIT (OUTPATIENT)
Dept: ORTHOPEDIC SURGERY | Age: 51
End: 2021-06-02
Payer: MEDICARE

## 2021-06-02 ENCOUNTER — TELEPHONE (OUTPATIENT)
Dept: NEUROLOGY | Age: 51
End: 2021-06-02

## 2021-06-02 ENCOUNTER — TELEPHONE (OUTPATIENT)
Dept: ORTHOPEDIC SURGERY | Age: 51
End: 2021-06-02

## 2021-06-02 VITALS — TEMPERATURE: 97.3 F | HEIGHT: 75 IN | BODY MASS INDEX: 41.25 KG/M2 | HEART RATE: 77 BPM | OXYGEN SATURATION: 96 %

## 2021-06-02 DIAGNOSIS — Z01.812 PRE-OPERATIVE LABORATORY EXAMINATION: ICD-10-CM

## 2021-06-02 DIAGNOSIS — S82.851A CLOSED TRIMALLEOLAR FRACTURE OF RIGHT ANKLE, INITIAL ENCOUNTER: Primary | ICD-10-CM

## 2021-06-02 DIAGNOSIS — Z01.818 PRE-OPERATIVE EXAMINATION: ICD-10-CM

## 2021-06-02 PROCEDURE — G9717 DOC PT DX DEP/BP F/U NT REQ: HCPCS | Performed by: ORTHOPAEDIC SURGERY

## 2021-06-02 PROCEDURE — 3017F COLORECTAL CA SCREEN DOC REV: CPT | Performed by: ORTHOPAEDIC SURGERY

## 2021-06-02 PROCEDURE — G8427 DOCREV CUR MEDS BY ELIG CLIN: HCPCS | Performed by: ORTHOPAEDIC SURGERY

## 2021-06-02 PROCEDURE — G8417 CALC BMI ABV UP PARAM F/U: HCPCS | Performed by: ORTHOPAEDIC SURGERY

## 2021-06-02 PROCEDURE — G8756 NO BP MEASURE DOC: HCPCS | Performed by: ORTHOPAEDIC SURGERY

## 2021-06-02 PROCEDURE — 99204 OFFICE O/P NEW MOD 45 MIN: CPT | Performed by: ORTHOPAEDIC SURGERY

## 2021-06-02 PROCEDURE — 73610 X-RAY EXAM OF ANKLE: CPT | Performed by: ORTHOPAEDIC SURGERY

## 2021-06-02 RX ORDER — HYDROCODONE BITARTRATE AND ACETAMINOPHEN 7.5; 325 MG/1; MG/1
1 TABLET ORAL 3 TIMES DAILY
Qty: 40 TABLET | Refills: 0 | Status: CANCELLED | OUTPATIENT
Start: 2021-06-02 | End: 2021-06-16

## 2021-06-02 NOTE — TELEPHONE ENCOUNTER
[facility]  [phone]    Dr. Marco A Chase wants a recommendation on what pain medication patient should take for his upcoming right ankle surgery.

## 2021-06-02 NOTE — PATIENT INSTRUCTIONS
Dr. Torri Reynolds Pre-operative Instructions:      Patient: Rosendo Choudhary   :  1970     I understand I am to stop taking oral birth control pills, hormonal replacement therapy and all Aspirin, Aspirin containing medications, Non-Steroidal Anti-Inflammatory medications (such as Advil, Aleve, Motrin, Ibuprofen) and or Blood thinner medication such as Coumadin, Plavix, Heparin or others 5 days prior to surgery. I understand I am to STOP taking these Medications 5 days prior to surgery:  I am to get instructions from my prescribing physician. 1. __As listed above_______________________  2. _____________________________________  3. _____________________________________  4. _____________________________________    I understand that if I am taking daily medications for high blood pressure, I can take them the morning of surgery with a small sip of water. I will consult my prescribing physician or call [facility] with specific questions. I also understand that:     I am to report important observations or changes that may occur prior to surgery. If I have any changes in my physical condition, such as a rash, a fever, sore throat, abscess, ulcers, nausea, vomiting, or diarrhea. I am to call the office and I am to consult my primary care physician to assess and treat the problem.  I am not to eat or drink anything after midnight the night before my surgery.  I am not to drink alcoholic beverages 24 hours prior to surgery.  I am not to do any illegal drugs prior to surgery.  I am not to smoke at least 24 hours prior to surgery.  I am able and will shower or bathe before surgery. I will use the Hibiclens solution on my surgical site only. The hibiclens directions are one packet a day starting two days before surgery.  I will remove any nail polish, make-up or jewelry prior to arriving for my surgery.       If I wear glasses, contact lenses or dentures they must be removed prior to going to the operating room.  All body piercing and artifical eye-lashes must be removed prior to surgery     I will not wear any aerosol sprays, perfumes or skin creams.  I am to make arrangements for a family member or friend to accompany me to surgery and take me home after my surgery as I will not be allowed to leave the hospital alone. A cab or bus will not be acceptable. Please make arrange for someone to stay with you for 24 hours after surgery.  Patient has expressed understanding of the diagnosis, treatment and planned surgery      Post operative medications will be e-scribed to your pharmacy on record if possible        Acute Pain After Surgery: Care Instructions  Your Care Instructions      It's common to have some pain after surgery. Pain doesn't mean that something is wrong or that the surgery didn't go well. But when the pain is severe, it's important to work with your doctor to manage it. It's also important to be aware of a few facts about pain and pain medicine. · You are the only person who knows what your pain feels like. So be sure to tell your doctor when you are in pain or when the pain changes. Then he or she will know how to adjust your medicines. · Pain is often easier to control right after it starts. So it may be better to take regular doses of pain medicine and not wait until the pain gets bad. · Medicine can help control pain. But this doesn't mean you'll have no pain. Medicine works to keep the pain at a level you can live with. With time, you will feel better. Follow-up care is a key part of your treatment and safety. Be sure to make and go to all appointments, and call your doctor if you are having problems. It's also a good idea to know your test results and keep a list of the medicines you take. How can you care for yourself at home? · Be safe with medicines. Read and follow all instructions on the label.   ¨ If the doctor gave you a prescription medicine for pain, take it as prescribed. ¨ If you are not taking a prescription pain medicine, ask your doctor if you can take an over-the-counter medicine. · If you take an over-the-counter pain medicine, such as acetaminophen (Tylenol), ibuprofen (Advil, Motrin), or naproxen (Aleve), read and follow all instructions on the label. · Do not take two or more pain medicines at the same time unless the doctor told you to. · Do not drink alcohol while you are taking pain medicines. · Try to walk each day if your doctor recommends it. Start by walking a little more than you did the day before. Bit by bit, increase the amount you walk. Walking increases blood flow. It also helps prevent pneumonia and constipation. · To prevent constipation from opioid pain medicines:  ¨ Talk to your doctor about a laxative. ¨ Include fruits, vegetables, beans, and whole grains in your diet each day. These foods are high in fiber. ¨ Drink plenty of fluids, enough so that your urine is light yellow or clear like water. Drink water, fruit juice, or other drinks that do not contain caffeine or alcohol. If you have kidney, heart, or liver disease and have to limit fluids, talk with your doctor before you increase the amount of fluids you drink. ¨ Take a fiber supplement, such as Citrucel or Metamucil, every day if needed. Read and follow all instructions on the label. If you take pain medicine for more than a few days, talk to your doctor before you take fiber. When should you call for help? Call your doctor now or seek immediate medical care if:   · Your pain gets worse. · Your pain is not controlled by medicine. Watch closely for changes in your health, and be sure to contact your doctor if you have any problems.
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
## 2021-06-02 NOTE — TELEPHONE ENCOUNTER
Vishnu Coleman at [facility] called to request office visit note be sent to them for the DME order for the cast shoe, knee scooter, and wheelchair.       Fax [phone]
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
## 2021-06-07 ENCOUNTER — HOSPITAL ENCOUNTER (OUTPATIENT)
Dept: PREADMISSION TESTING | Age: 51
Discharge: HOME | End: 2021-06-07
Payer: MEDICARE

## 2021-06-07 ENCOUNTER — HOSPITAL ENCOUNTER (OUTPATIENT)
Dept: GENERAL RADIOLOGY | Age: 51
Discharge: HOME | End: 2021-06-07
Payer: MEDICARE

## 2021-06-07 DIAGNOSIS — S82.851A CLOSED TRIMALLEOLAR FRACTURE OF RIGHT ANKLE, INITIAL ENCOUNTER: ICD-10-CM

## 2021-06-07 DIAGNOSIS — Z01.812 PRE-OPERATIVE LABORATORY EXAMINATION: ICD-10-CM

## 2021-06-07 LAB
25(OH)D3 SERPL-MCNC: 8.5 NG/ML (ref 30–100)
ALBUMIN SERPL-MCNC: 3.3 G/DL (ref 3.4–5)
ALBUMIN/GLOB SERPL: 0.9 {RATIO} (ref 0.8–1.7)
ALP SERPL-CCNC: 95 U/L (ref 45–117)
ALT SERPL-CCNC: 18 U/L (ref 16–61)
ANION GAP SERPL CALC-SCNC: 1 MMOL/L (ref 3–18)
AST SERPL-CCNC: 16 U/L (ref 10–38)
ATRIAL RATE: 75 BPM
BASOPHILS # BLD: 0 K/UL (ref 0–0.1)
BASOPHILS NFR BLD: 1 % (ref 0–2)
BILIRUB SERPL-MCNC: 0.7 MG/DL (ref 0.2–1)
BUN SERPL-MCNC: 16 MG/DL (ref 7–18)
BUN/CREAT SERPL: 24 (ref 12–20)
CALCIUM SERPL-MCNC: 9.6 MG/DL (ref 8.5–10.1)
CALCULATED P AXIS, ECG09: 75 DEGREES
CALCULATED R AXIS, ECG10: 17 DEGREES
CALCULATED T AXIS, ECG11: 25 DEGREES
CHLORIDE SERPL-SCNC: 105 MMOL/L (ref 100–111)
CO2 SERPL-SCNC: 33 MMOL/L (ref 21–32)
CREAT SERPL-MCNC: 0.68 MG/DL (ref 0.6–1.3)
DIAGNOSIS, 93000: NORMAL
DIFFERENTIAL METHOD BLD: ABNORMAL
EOSINOPHIL # BLD: 0.1 K/UL (ref 0–0.4)
EOSINOPHIL NFR BLD: 1 % (ref 0–5)
ERYTHROCYTE [DISTWIDTH] IN BLOOD BY AUTOMATED COUNT: 12.6 % (ref 11.6–14.5)
GLOBULIN SER CALC-MCNC: 3.8 G/DL (ref 2–4)
GLUCOSE SERPL-MCNC: 91 MG/DL (ref 74–99)
HCT VFR BLD AUTO: 41.9 % (ref 36–48)
HGB BLD-MCNC: 13.7 G/DL (ref 13–16)
INR PPP: 1 (ref 0.8–1.2)
LYMPHOCYTES # BLD: 2.3 K/UL (ref 0.9–3.6)
LYMPHOCYTES NFR BLD: 36 % (ref 21–52)
MCH RBC QN AUTO: 32 PG (ref 24–34)
MCHC RBC AUTO-ENTMCNC: 32.7 G/DL (ref 31–37)
MCV RBC AUTO: 97.9 FL (ref 74–97)
MONOCYTES # BLD: 0.6 K/UL (ref 0.05–1.2)
MONOCYTES NFR BLD: 10 % (ref 3–10)
NEUTS SEG # BLD: 3.2 K/UL (ref 1.8–8)
NEUTS SEG NFR BLD: 51 % (ref 40–73)
P-R INTERVAL, ECG05: 152 MS
PLATELET # BLD AUTO: 218 K/UL (ref 135–420)
PMV BLD AUTO: 11.1 FL (ref 9.2–11.8)
POTASSIUM SERPL-SCNC: 4 MMOL/L (ref 3.5–5.5)
PROT SERPL-MCNC: 7.1 G/DL (ref 6.4–8.2)
PROTHROMBIN TIME: 12.7 SEC (ref 11.5–15.2)
Q-T INTERVAL, ECG07: 406 MS
QRS DURATION, ECG06: 88 MS
QTC CALCULATION (BEZET), ECG08: 453 MS
RBC # BLD AUTO: 4.28 M/UL (ref 4.35–5.65)
SODIUM SERPL-SCNC: 139 MMOL/L (ref 136–145)
VALPROATE SERPL-MCNC: 83 UG/ML (ref 50–100)
VENTRICULAR RATE, ECG03: 75 BPM
WBC # BLD AUTO: 6.3 K/UL (ref 4.6–13.2)

## 2021-06-07 PROCEDURE — 93005 ELECTROCARDIOGRAM TRACING: CPT

## 2021-06-07 PROCEDURE — U0003 INFECTIOUS AGENT DETECTION BY NUCLEIC ACID (DNA OR RNA); SEVERE ACUTE RESPIRATORY SYNDROME CORONAVIRUS 2 (SARS-COV-2) (CORONAVIRUS DISEASE [COVID-19]), AMPLIFIED PROBE TECHNIQUE, MAKING USE OF HIGH THROUGHPUT TECHNOLOGIES AS DESCRIBED BY CMS-2020-01-R: HCPCS

## 2021-06-07 PROCEDURE — 71046 X-RAY EXAM CHEST 2 VIEWS: CPT

## 2021-06-07 PROCEDURE — 36415 COLL VENOUS BLD VENIPUNCTURE: CPT

## 2021-06-07 PROCEDURE — 85025 COMPLETE CBC W/AUTO DIFF WBC: CPT

## 2021-06-07 PROCEDURE — 85610 PROTHROMBIN TIME: CPT

## 2021-06-07 PROCEDURE — 80164 ASSAY DIPROPYLACETIC ACD TOT: CPT

## 2021-06-07 PROCEDURE — 82306 VITAMIN D 25 HYDROXY: CPT

## 2021-06-07 PROCEDURE — 80053 COMPREHEN METABOLIC PANEL: CPT

## 2021-06-08 ENCOUNTER — TELEPHONE (OUTPATIENT)
Dept: NEUROLOGY | Age: 51
End: 2021-06-08

## 2021-06-08 ENCOUNTER — DOCUMENTATION ONLY (OUTPATIENT)
Dept: ORTHOPEDIC SURGERY | Age: 51
End: 2021-06-08

## 2021-06-08 LAB — SARS-COV-2, COV2NT: NOT DETECTED

## 2021-06-08 NOTE — PROGRESS NOTES
Spoke with the pharmacist, at [facility], this morning. There is a concern that I had, regarding the valproic acid, and possible interaction with the narcotic or opioid analgesics. The pharmacist, caution me, informing that that I should start on a lower dose of narcotic analgesic, for this individual for postoperative pain. This recommendation, is based on increased sedating effects, of valproic acid with the narcotic analgesic. Otherwise to his knowledge, there is no increased risk of seizures, with the opioid use. This patient's valproic acid is within therapeutic range.         Josse Almaguer MD  6/8/2021  11:30 AM

## 2021-06-08 NOTE — TELEPHONE ENCOUNTER
Ekta Taylor from Dr. Elizabeth Núñez office called and stated that she has been awaiting a phone call from our office in regards to the last message rec;d by her. Ekta Taylor also stated that the first message should have included that Dr. Elizabeth Núñez wanted to know if neuro is clearing him for the surgery of the right ankle.  Please advise; the correct phone number for Ekta Taylor is [phone]

## 2021-06-08 NOTE — H&P (VIEW-ONLY)
Spoke with the pharmacist, at [address], this morning. There is a concern that I had, regarding the valproic acid, and possible interaction with the narcotic or opioid analgesics. The pharmacist, caution me, informing that that I should start on a lower dose of narcotic analgesic, for this individual for postoperative pain. This recommendation, is based on increased sedating effects, of valproic acid with the narcotic analgesic. Otherwise to his knowledge, there is no increased risk of seizures, with the opioid use. This patient's valproic acid is within therapeutic range.         Clifford Benites MD  6/8/2021  11:30 AM

## 2021-06-09 ENCOUNTER — DOCUMENTATION ONLY (OUTPATIENT)
Dept: NEUROLOGY | Age: 51
End: 2021-06-09

## 2021-06-09 ENCOUNTER — VIRTUAL VISIT (OUTPATIENT)
Dept: NEUROLOGY | Age: 51
End: 2021-06-09
Payer: MEDICARE

## 2021-06-09 DIAGNOSIS — G40.109 PARTIAL SYMPTOMATIC EPILEPSY WITH SIMPLE PARTIAL SEIZURES, NOT INTRACTABLE, WITHOUT STATUS EPILEPTICUS (HCC): Primary | ICD-10-CM

## 2021-06-09 DIAGNOSIS — G47.33 OSA (OBSTRUCTIVE SLEEP APNEA): ICD-10-CM

## 2021-06-09 PROCEDURE — 99214 OFFICE O/P EST MOD 30 MIN: CPT | Performed by: NURSE PRACTITIONER

## 2021-06-09 PROCEDURE — G9717 DOC PT DX DEP/BP F/U NT REQ: HCPCS | Performed by: NURSE PRACTITIONER

## 2021-06-09 PROCEDURE — G8427 DOCREV CUR MEDS BY ELIG CLIN: HCPCS | Performed by: NURSE PRACTITIONER

## 2021-06-09 PROCEDURE — G8756 NO BP MEASURE DOC: HCPCS | Performed by: NURSE PRACTITIONER

## 2021-06-09 PROCEDURE — 3017F COLORECTAL CA SCREEN DOC REV: CPT | Performed by: NURSE PRACTITIONER

## 2021-06-09 RX ORDER — DIVALPROEX SODIUM 500 MG/1
TABLET, DELAYED RELEASE ORAL
Qty: 180 TABLET | Refills: 2 | Status: SHIPPED | OUTPATIENT
Start: 2021-06-09 | End: 2021-06-16

## 2021-06-09 RX ORDER — DIVALPROEX SODIUM 250 MG/1
TABLET, DELAYED RELEASE ORAL
Qty: 90 TABLET | Refills: 2 | Status: SHIPPED | OUTPATIENT
Start: 2021-06-09 | End: 2021-06-16

## 2021-06-09 NOTE — PROGRESS NOTES
April Esteves is a 46 y.o. male who will be using a cell phone for today's VV. 1. Have you been to the ER, urgent care clinic since your last visit? Hospitalized since your last visit? No    2. Have you seen or consulted any other health care providers outside of the [address] [place] [address] since your last visit? Include any pap smears or colon screening.  No     This will be the cell phone number [phone]
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
## 2021-06-10 ENCOUNTER — ANESTHESIA EVENT (OUTPATIENT)
Dept: SURGERY | Age: 51
DRG: 493 | End: 2021-06-10
Payer: MEDICARE

## 2021-06-10 NOTE — OP NOTES
Patient: Radha De León                MRN:@           SSN: [id number]   YOB: 1970         AGE: 46 y.o. SEX: male       OPERATIVE REPORT    Patient: Radha De León  YOB: 1970  MRN: [id number]    Date of Procedure: 6/11/2021     Pre-Op Diagnosis: S82.851A TRIMALLEOLAR ANKLE FRACTURE, RIGHT ANKLE    Post-Op Diagnosis: Same as preoperative diagnosis. Procedure(s):  OPEN REDUCTION INTERNAL FIXATION TRIMALLEOLAR ANKLE FRACTURE WITHOUT FIXATION POSTERIOR LIP  47126  SYNDESMOTIC STABILIZATION 61673,  EXTERNAL BONE STIMULATION  80400     Surgeon(s):  Nohemi Lemos MD    Surgical Assistant: Physician Assistant: (Unknown)  Surg Asst-1: Benjiman Phoenix PA:  ASSISTANT    Tisha Rucker PA-C, DHSc  was medically necessary for the procedure. She assisted in : patient positioning, surgical incision retraction, placement of hardware, incision closure, placement of DME, and transfer of the patient to the PACU. Her role was critical for the success of this procedure. Anesthesia: General and Regional Block    Estimated Blood Loss (mL): Minimal    Complications: None    Specimens: * No specimens in log *     Implants:   Implant Name Type Inv.  Item Serial No.  Lot No. LRB No. Used Action   PLATE BNE RECON 8H [id number] -- SS - [id number]  PLATE BNE RECON 8H [id number] -- SS  [employer] [place] Right 1 Implanted   SCR BNE CRTX ST HEX 3.5X22MM -- SS - [id number]  SCR BNE CRTX ST HEX 3.5X22MM -- SS  [employer] [place] Right 1 Implanted   SCR BNE CANC FT 4X18MM SS --  - [id number]  SCR BNE CANC FT 4X18MM SS --   [employer] [place] Right 2 Implanted   SCR BNE CRTX ST HEX 3.5X16MM -- SS - [id number]  SCR BNE CRTX ST HEX 3.5X16MM -- SS  [employer]ES [place] Right 4 Implanted   SCR BNE CRTX ST HEX 3.5X55MM -- SS - [id number]  SCR BNE CRTX ST HEX 3.5X55MM -- SS  SY[employer] [place] NA Right 1 Implanted       Drains: * No LDAs found *    Findings: Jackeline C, right ankle fracture, trimalleolar ankle fracture equivalent injury: Posterior tibial fracture, nondisplaced, medial deltoid ankle with injury. I spoke with the pharmacist, and documented this in the connect care when I spoke with him. The pharmacist informed me that this patient's valproic acid ,antiseizure medications, can with his post op narcotic, accentuate the sedative effect, or sedating effect, of the narcotic. My plans to start slowly with him, Norco 5 mg tablets 1-2, p.o. 3 times daily, as needed severe pain. Electronically Signed by Tang Aguero MD on 6/11/2021 at 5:20 PM        OPERATIVE NOTE    . Sadaf York was taken to the operating room suite on this date: 6/11/2021 . General and regional anesthesia was used. A regional block was performed prior to taking the patient to the OR. The patient was positioned Supine and a small stack of rolled soft sheets were placed under the Right ipsilateral hip in order to gently internally rotate the Right lower extremity. A formal verbal  time out was conducted: identifying the patient, identifying the surgical location, reading the informed written signed consent for procedure, confirmation that  the patient did receive preoperative antibiotics and that my signature on the patient was visible at the surgical field. All members of the surgical team agreed to the consent process. The  Right  ipsilateral lower extremity was then exsanguinated with an Esmarch and a tourniquet was insufflated to   325 mmHg and attention was then directed towards surgery. The fibula was exposed in the standard longitudinal incision along the lateral aspect of the fibula, carefully exposing the fracture site, and reducing the fracture site and definitely fixing the fracture the following technique:   Interfragmentary screw across the fracture site, fibular fracture fixation with a 3.5 mm, LCDCP, type recon plate fibular plate. Long spiral type fracture, Viveros C type fracture:  The fibular fracture pattern was as follows: Fibular fracture, is a long spiral fracture. The fibula was anatomically reduced. The medial malleolar deltoid injury, did not have to be repaired, as is in stabilization of the fibula, anatomically aligned the distal ankle mortise. The posterior tibia fracture was : Remain anatomically aligned, did not require incisional exposure. Intraoperative fluoroscopic films revealed anatomic alignment of the fibula and of the ankle mortise. The  Medial malleolus fracture was fixed anatomically. The distal tibia and fibula relationship was restored and anatomically aligned. The incision was thoroughly irrigated with sterile and hand pulsatile saline and the tourniquet was deflated and selective electrocautery used for hemostasis. The tourniquet times was 54 minutes at 325 mm HG. The Incision was nice and dry no bleeders were noted at the end of this case. The fibular incision was closed in layers with the following sutures: 2/0, 3/0 Vicryl suture, Monocryl 3/0, skin staples. There were no fracture blisters and no signs of compartment syndrome. Sterile dressings were applied which consisted of Xeroform, 4 x 4's,sterile ABD pads, a care and well-padded posterior splint was applied without any undue tension. The patient was then transferred from stable condition and no intraoperative complications.       Nonnie Spates MD [facility]       Lotus Geiger MD  6/11/2021  9:49 AM

## 2021-06-10 NOTE — BRIEF OP NOTE
Brief Postoperative Note    Patient: Jr Dey  YOB: 1970  MRN: [id number]    Date of Procedure: 6/11/2021     Pre-Op Diagnosis: S82.851A TRIMALLEOLAR ANKLE FRACTURE, RIGHT ANKLE    Post-Op Diagnosis: Same as preoperative diagnosis. Procedure(s):  OPEN REDUCTION INTERNAL FIXATION TRIMALLEOLAR ANKLE FRACTURE WITHOUT FIXATION POSTERIOR LIP  88562  SYNDESMOTIIC STABILIZATION 30017,  EXTERNAL BONE STIMULATION  44670     Surgeon(s):  Ruchi Lopez MD    Surgical Assistant: Physician Assistant: (Unknown)  Surg Asst-1: Yuni Noe PA:  ASSISTANT    Marsha Marquez PA-C, DHSc  was medically necessary for the procedure. She assisted in : patient positioning, surgical incision retraction, placement of hardware, incision closure, placement of DME, and transfer of the patient to the PACU. Her role was critical for the success of this procedure. Anesthesia: General and Regional Block    Estimated Blood Loss (mL): Minimal    Complications: None    Specimens: * No specimens in log *     Implants:   Implant Name Type Inv.  Item Serial No.  Lot No. LRB No. Used Action   PLATE BNE RECON 8H [id number] -- SS - [id number]  PLATE BNE RECON 8H [id number] -- SS  SY[employer]ES [place] Right 1 Implanted   SCR BNE CRTX ST HEX 3.5X22MM -- SS - [id number]  SCR BNE CRTX ST HEX 3.5X22MM -- SS  [employer] [place] Right 1 Implanted   SCR BNE CANC FT 4X18MM SS --  - [id number]  SCR BNE CANC FT 4X18MM SS --   SY[employer]ES [place] Right 2 Implanted   SCR BNE CRTX ST HEX 3.5X16MM -- SS - [id number]  SCR BNE CRTX ST HEX 3.5X16MM -- SS  SY[employer] [place] Right 4 Implanted   SCR BNE CRTX ST HEX 3.5X55MM -- SS - [id number]  SCR BNE CRTX ST HEX 3.5X55MM -- SS  [employer] [place] Right 1 Implanted       Drains: * No LDAs found *    Findings: Viveros C, right ankle fracture, trimalleolar ankle fracture equivalent injury: Posterior tibial fracture, nondisplaced, medial deltoid ankle with injury    Electronically Signed by Helga Hunter MD on 6/11/2021 at 5:20 PM

## 2021-06-11 ENCOUNTER — APPOINTMENT (OUTPATIENT)
Dept: GENERAL RADIOLOGY | Age: 51
DRG: 493 | End: 2021-06-11
Attending: ORTHOPAEDIC SURGERY
Payer: MEDICARE

## 2021-06-11 ENCOUNTER — HOSPITAL ENCOUNTER (INPATIENT)
Age: 51
LOS: 1 days | Discharge: NURSING, REHAB OR LONG-TERM CARE | DRG: 493 | End: 2021-06-16
Attending: ORTHOPAEDIC SURGERY | Admitting: INTERNAL MEDICINE
Payer: MEDICARE

## 2021-06-11 ENCOUNTER — APPOINTMENT (OUTPATIENT)
Dept: CT IMAGING | Age: 51
DRG: 493 | End: 2021-06-11
Attending: INTERNAL MEDICINE
Payer: MEDICARE

## 2021-06-11 ENCOUNTER — ANESTHESIA (OUTPATIENT)
Dept: SURGERY | Age: 51
DRG: 493 | End: 2021-06-11
Payer: MEDICARE

## 2021-06-11 DIAGNOSIS — S82.851A CLOSED TRIMALLEOLAR FRACTURE OF RIGHT ANKLE, INITIAL ENCOUNTER: Primary | ICD-10-CM

## 2021-06-11 DIAGNOSIS — S82.851A CLOSED DISPLACED TRIMALLEOLAR FRACTURE OF RIGHT ANKLE, INITIAL ENCOUNTER: ICD-10-CM

## 2021-06-11 DIAGNOSIS — Z98.890 POST-OPERATIVE STATE: ICD-10-CM

## 2021-06-11 DIAGNOSIS — S82.851A CLOSED TRIMALLEOLAR FRACTURE OF RIGHT ANKLE, INITIAL ENCOUNTER: ICD-10-CM

## 2021-06-11 DIAGNOSIS — S93.431A SPRAIN OF TIBIOFIBULAR LIGAMENT OF RIGHT ANKLE, INITIAL ENCOUNTER: ICD-10-CM

## 2021-06-11 PROBLEM — G40.909 SEIZURE DISORDER (HCC): Status: ACTIVE | Noted: 2021-06-11

## 2021-06-11 LAB
ALBUMIN SERPL-MCNC: 2.9 G/DL (ref 3.4–5)
ALBUMIN/GLOB SERPL: 0.8 {RATIO} (ref 0.8–1.7)
ALP SERPL-CCNC: 88 U/L (ref 45–117)
ALT SERPL-CCNC: 21 U/L (ref 16–61)
AMPHET UR QL SCN: NEGATIVE
ANION GAP SERPL CALC-SCNC: 4 MMOL/L (ref 3–18)
AST SERPL-CCNC: 22 U/L (ref 10–38)
BARBITURATES UR QL SCN: NEGATIVE
BASOPHILS # BLD: 0 K/UL (ref 0–0.1)
BASOPHILS NFR BLD: 0 % (ref 0–2)
BENZODIAZ UR QL: NEGATIVE
BILIRUB SERPL-MCNC: 0.3 MG/DL (ref 0.2–1)
BNP SERPL-MCNC: 15 PG/ML (ref 0–900)
BUN SERPL-MCNC: 11 MG/DL (ref 7–18)
BUN/CREAT SERPL: 15 (ref 12–20)
CALCIUM SERPL-MCNC: 8.2 MG/DL (ref 8.5–10.1)
CANNABINOIDS UR QL SCN: NEGATIVE
CHLORIDE SERPL-SCNC: 107 MMOL/L (ref 100–111)
CK MB CFR SERPL CALC: NORMAL % (ref 0–4)
CK MB SERPL-MCNC: <1 NG/ML (ref 5–25)
CK SERPL-CCNC: 99 U/L (ref 39–308)
CO2 SERPL-SCNC: 31 MMOL/L (ref 21–32)
COCAINE UR QL SCN: NEGATIVE
CREAT SERPL-MCNC: 0.71 MG/DL (ref 0.6–1.3)
DIFFERENTIAL METHOD BLD: ABNORMAL
EOSINOPHIL # BLD: 0 K/UL (ref 0–0.4)
EOSINOPHIL NFR BLD: 1 % (ref 0–5)
ERYTHROCYTE [DISTWIDTH] IN BLOOD BY AUTOMATED COUNT: 12.9 % (ref 11.6–14.5)
GLOBULIN SER CALC-MCNC: 3.7 G/DL (ref 2–4)
GLUCOSE SERPL-MCNC: 106 MG/DL (ref 74–99)
HCT VFR BLD AUTO: 39.9 % (ref 36–48)
HDSCOM,HDSCOM: NORMAL
HGB BLD-MCNC: 12.8 G/DL (ref 13–16)
LYMPHOCYTES # BLD: 1.5 K/UL (ref 0.9–3.6)
LYMPHOCYTES NFR BLD: 26 % (ref 21–52)
MCH RBC QN AUTO: 31.6 PG (ref 24–34)
MCHC RBC AUTO-ENTMCNC: 32.1 G/DL (ref 31–37)
MCV RBC AUTO: 98.5 FL (ref 74–97)
METHADONE UR QL: NEGATIVE
MONOCYTES # BLD: 0.4 K/UL (ref 0.05–1.2)
MONOCYTES NFR BLD: 6 % (ref 3–10)
NEUTS SEG # BLD: 3.9 K/UL (ref 1.8–8)
NEUTS SEG NFR BLD: 66 % (ref 40–73)
OPIATES UR QL: NEGATIVE
PCP UR QL: NEGATIVE
PLATELET # BLD AUTO: 198 K/UL (ref 135–420)
PMV BLD AUTO: 11.1 FL (ref 9.2–11.8)
POTASSIUM SERPL-SCNC: 4 MMOL/L (ref 3.5–5.5)
PROT SERPL-MCNC: 6.6 G/DL (ref 6.4–8.2)
RBC # BLD AUTO: 4.05 M/UL (ref 4.35–5.65)
SODIUM SERPL-SCNC: 142 MMOL/L (ref 136–145)
TROPONIN I SERPL-MCNC: <0.02 NG/ML (ref 0–0.04)
TSH SERPL DL<=0.05 MIU/L-ACNC: 2.61 UIU/ML (ref 0.36–3.74)
VALPROATE SERPL-MCNC: 68 UG/ML (ref 50–100)
WBC # BLD AUTO: 5.9 K/UL (ref 4.6–13.2)

## 2021-06-11 PROCEDURE — 76942 ECHO GUIDE FOR BIOPSY: CPT | Performed by: ORTHOPAEDIC SURGERY

## 2021-06-11 PROCEDURE — 76942 ECHO GUIDE FOR BIOPSY: CPT | Performed by: ANESTHESIOLOGY

## 2021-06-11 PROCEDURE — 73610 X-RAY EXAM OF ANKLE: CPT

## 2021-06-11 PROCEDURE — 80164 ASSAY DIPROPYLACETIC ACD TOT: CPT

## 2021-06-11 PROCEDURE — 76210000000 HC OR PH I REC 2 TO 2.5 HR: Performed by: ORTHOPAEDIC SURGERY

## 2021-06-11 PROCEDURE — 77030008683 HC TU ET CUF COVD -A: Performed by: ANESTHESIOLOGY

## 2021-06-11 PROCEDURE — 2709999900 HC NON-CHARGEABLE SUPPLY

## 2021-06-11 PROCEDURE — 27829 TREAT LOWER LEG JOINT: CPT | Performed by: PHYSICIAN ASSISTANT

## 2021-06-11 PROCEDURE — 27822 TREATMENT OF ANKLE FRACTURE: CPT | Performed by: PHYSICIAN ASSISTANT

## 2021-06-11 PROCEDURE — 77030026438 HC STYL ET INTUB CARD -A: Performed by: ANESTHESIOLOGY

## 2021-06-11 PROCEDURE — 64450 NJX AA&/STRD OTHER PN/BRANCH: CPT | Performed by: ANESTHESIOLOGY

## 2021-06-11 PROCEDURE — 20974 ESTIM AID BONE HEALG N-INVAS: CPT | Performed by: ORTHOPAEDIC SURGERY

## 2021-06-11 PROCEDURE — 94660 CPAP INITIATION&MGMT: CPT

## 2021-06-11 PROCEDURE — 80053 COMPREHEN METABOLIC PANEL: CPT

## 2021-06-11 PROCEDURE — 74011250636 HC RX REV CODE- 250/636: Performed by: NURSE ANESTHETIST, CERTIFIED REGISTERED

## 2021-06-11 PROCEDURE — 74011250636 HC RX REV CODE- 250/636: Performed by: ANESTHESIOLOGY

## 2021-06-11 PROCEDURE — 70450 CT HEAD/BRAIN W/O DYE: CPT

## 2021-06-11 PROCEDURE — 77030019605: Performed by: ORTHOPAEDIC SURGERY

## 2021-06-11 PROCEDURE — 99220 PR INITIAL OBSERVATION CARE/DAY 70 MINUTES: CPT | Performed by: INTERNAL MEDICINE

## 2021-06-11 PROCEDURE — 74011000250 HC RX REV CODE- 250: Performed by: NURSE ANESTHETIST, CERTIFIED REGISTERED

## 2021-06-11 PROCEDURE — 99218 HC RM OBSERVATION: CPT

## 2021-06-11 PROCEDURE — 85025 COMPLETE CBC W/AUTO DIFF WBC: CPT

## 2021-06-11 PROCEDURE — 74011250636 HC RX REV CODE- 250/636

## 2021-06-11 PROCEDURE — 01480 ANES OPEN PX LOWER L/A/F NOS: CPT | Performed by: NURSE ANESTHETIST, CERTIFIED REGISTERED

## 2021-06-11 PROCEDURE — 82553 CREATINE MB FRACTION: CPT

## 2021-06-11 PROCEDURE — 74011250636 HC RX REV CODE- 250/636: Performed by: INTERNAL MEDICINE

## 2021-06-11 PROCEDURE — 74011000250 HC RX REV CODE- 250: Performed by: PHYSICIAN ASSISTANT

## 2021-06-11 PROCEDURE — C1713 ANCHOR/SCREW BN/BN,TIS/BN: HCPCS | Performed by: ORTHOPAEDIC SURGERY

## 2021-06-11 PROCEDURE — 77030002933 HC SUT MCRYL J&J -A: Performed by: ORTHOPAEDIC SURGERY

## 2021-06-11 PROCEDURE — 36415 COLL VENOUS BLD VENIPUNCTURE: CPT

## 2021-06-11 PROCEDURE — 83880 ASSAY OF NATRIURETIC PEPTIDE: CPT

## 2021-06-11 PROCEDURE — 77030027138 HC INCENT SPIROMETER -A

## 2021-06-11 PROCEDURE — 64450 NJX AA&/STRD OTHER PN/BRANCH: CPT | Performed by: ORTHOPAEDIC SURGERY

## 2021-06-11 PROCEDURE — 84443 ASSAY THYROID STIM HORMONE: CPT

## 2021-06-11 PROCEDURE — 74011000250 HC RX REV CODE- 250: Performed by: ORTHOPAEDIC SURGERY

## 2021-06-11 PROCEDURE — 74011250637 HC RX REV CODE- 250/637: Performed by: INTERNAL MEDICINE

## 2021-06-11 PROCEDURE — 77030008462 HC STPLR SKN PROX J&J -A: Performed by: ORTHOPAEDIC SURGERY

## 2021-06-11 PROCEDURE — 76010000131 HC OR TIME 2 TO 2.5 HR: Performed by: ORTHOPAEDIC SURGERY

## 2021-06-11 PROCEDURE — 74011250637 HC RX REV CODE- 250/637: Performed by: NURSE ANESTHETIST, CERTIFIED REGISTERED

## 2021-06-11 PROCEDURE — 01480 ANES OPEN PX LOWER L/A/F NOS: CPT | Performed by: ANESTHESIOLOGY

## 2021-06-11 PROCEDURE — 77030040922 HC BLNKT HYPOTHRM STRY -A: Performed by: ORTHOPAEDIC SURGERY

## 2021-06-11 PROCEDURE — 77030014685 HC STIM BN GRWTH PHYSIO EXTERNAL ORTH -I1: Performed by: ORTHOPAEDIC SURGERY

## 2021-06-11 PROCEDURE — 77030031139 HC SUT VCRL2 J&J -A: Performed by: ORTHOPAEDIC SURGERY

## 2021-06-11 PROCEDURE — 76060000035 HC ANESTHESIA 2 TO 2.5 HR: Performed by: ORTHOPAEDIC SURGERY

## 2021-06-11 PROCEDURE — 77030000032 HC CUF TRNQT ZIMM -B: Performed by: ORTHOPAEDIC SURGERY

## 2021-06-11 PROCEDURE — 27822 TREATMENT OF ANKLE FRACTURE: CPT | Performed by: ORTHOPAEDIC SURGERY

## 2021-06-11 PROCEDURE — 77030003862 HC BIT DRL SYNT -B: Performed by: ORTHOPAEDIC SURGERY

## 2021-06-11 PROCEDURE — 0QSG04Z REPOSITION RIGHT TIBIA WITH INTERNAL FIXATION DEVICE, OPEN APPROACH: ICD-10-PCS | Performed by: ORTHOPAEDIC SURGERY

## 2021-06-11 PROCEDURE — 80307 DRUG TEST PRSMV CHEM ANLYZR: CPT

## 2021-06-11 PROCEDURE — 27829 TREAT LOWER LEG JOINT: CPT | Performed by: ORTHOPAEDIC SURGERY

## 2021-06-11 PROCEDURE — 77030040361 HC SLV COMPR DVT MDII -B: Performed by: ORTHOPAEDIC SURGERY

## 2021-06-11 PROCEDURE — 2709999900 HC NON-CHARGEABLE SUPPLY: Performed by: ORTHOPAEDIC SURGERY

## 2021-06-11 PROCEDURE — 74011250636 HC RX REV CODE- 250/636: Performed by: PHYSICIAN ASSISTANT

## 2021-06-11 PROCEDURE — 77030013079 HC BLNKT BAIR HGGR 3M -A: Performed by: ANESTHESIOLOGY

## 2021-06-11 DEVICE — SCREW BNE L18MM DIA4MM CANC S STL ST CANN NONLOCKING FULL: Type: IMPLANTABLE DEVICE | Site: ANKLE | Status: FUNCTIONAL

## 2021-06-11 DEVICE — SCREW BNE L22MM DIA3.5MM CORT S STL ST NONCANNULATED LOK: Type: IMPLANTABLE DEVICE | Site: ANKLE | Status: FUNCTIONAL

## 2021-06-11 DEVICE — SCREW BNE L55MM DIA3.5MM CORT S STL ST NONCANNULATED LOK: Type: IMPLANTABLE DEVICE | Site: ANKLE | Status: FUNCTIONAL

## 2021-06-11 DEVICE — SCREW BNE L16MM DIA3.5MM CORT S STL ST NONCANNULATED LOK: Type: IMPLANTABLE DEVICE | Site: ANKLE | Status: FUNCTIONAL

## 2021-06-11 DEVICE — PLATE BNE L W10.1XL112MM THK3.5MM 8 H BILAT S STL STR RIG: Type: IMPLANTABLE DEVICE | Site: ANKLE | Status: FUNCTIONAL

## 2021-06-11 DEVICE — PHYSIO-STIM BONE GROWTH STIMULATOR
Type: IMPLANTABLE DEVICE | Status: FUNCTIONAL
Brand: PHYSIO-STIM

## 2021-06-11 RX ORDER — NEOMYCIN AND POLYMYXIN B SULFATES 40; 200000 MG/ML; [USP'U]/ML
SOLUTION IRRIGATION AS NEEDED
Status: DISCONTINUED | OUTPATIENT
Start: 2021-06-11 | End: 2021-06-11 | Stop reason: HOSPADM

## 2021-06-11 RX ORDER — ONDANSETRON 2 MG/ML
INJECTION INTRAMUSCULAR; INTRAVENOUS AS NEEDED
Status: DISCONTINUED | OUTPATIENT
Start: 2021-06-11 | End: 2021-06-11 | Stop reason: HOSPADM

## 2021-06-11 RX ORDER — FAMOTIDINE 20 MG/1
20 TABLET, FILM COATED ORAL ONCE
Status: COMPLETED | OUTPATIENT
Start: 2021-06-11 | End: 2021-06-11

## 2021-06-11 RX ORDER — CEPHALEXIN 500 MG/1
500 CAPSULE ORAL 4 TIMES DAILY
Qty: 20 CAPSULE | Refills: 0 | Status: SHIPPED | OUTPATIENT
Start: 2021-06-11 | End: 2021-06-11

## 2021-06-11 RX ORDER — MIDAZOLAM HYDROCHLORIDE 1 MG/ML
INJECTION, SOLUTION INTRAMUSCULAR; INTRAVENOUS
Status: COMPLETED
Start: 2021-06-11 | End: 2021-06-11

## 2021-06-11 RX ORDER — SERTRALINE HYDROCHLORIDE 50 MG/1
50 TABLET, FILM COATED ORAL DAILY
COMMUNITY
End: 2022-04-19

## 2021-06-11 RX ORDER — SODIUM CHLORIDE 0.9 % (FLUSH) 0.9 %
5-40 SYRINGE (ML) INJECTION EVERY 8 HOURS
Status: DISCONTINUED | OUTPATIENT
Start: 2021-06-11 | End: 2021-06-11 | Stop reason: HOSPADM

## 2021-06-11 RX ORDER — ONDANSETRON 2 MG/ML
4 INJECTION INTRAMUSCULAR; INTRAVENOUS
Status: DISCONTINUED | OUTPATIENT
Start: 2021-06-11 | End: 2021-06-16 | Stop reason: HOSPADM

## 2021-06-11 RX ORDER — DIVALPROEX SODIUM 250 MG/1
750 TABLET, DELAYED RELEASE ORAL 2 TIMES DAILY
Status: DISCONTINUED | OUTPATIENT
Start: 2021-06-11 | End: 2021-06-15

## 2021-06-11 RX ORDER — EPHEDRINE SULFATE/0.9% NACL/PF 50 MG/5 ML
SYRINGE (ML) INTRAVENOUS AS NEEDED
Status: DISCONTINUED | OUTPATIENT
Start: 2021-06-11 | End: 2021-06-11 | Stop reason: HOSPADM

## 2021-06-11 RX ORDER — SODIUM CHLORIDE 0.9 % (FLUSH) 0.9 %
5-40 SYRINGE (ML) INJECTION AS NEEDED
Status: DISCONTINUED | OUTPATIENT
Start: 2021-06-11 | End: 2021-06-16 | Stop reason: HOSPADM

## 2021-06-11 RX ORDER — GABAPENTIN 100 MG/1
100 CAPSULE ORAL 3 TIMES DAILY
Status: DISCONTINUED | OUTPATIENT
Start: 2021-06-11 | End: 2021-06-12

## 2021-06-11 RX ORDER — MIDAZOLAM HYDROCHLORIDE 1 MG/ML
INJECTION, SOLUTION INTRAMUSCULAR; INTRAVENOUS
Status: COMPLETED | OUTPATIENT
Start: 2021-06-11 | End: 2021-06-11

## 2021-06-11 RX ORDER — EPHEDRINE SULFATE/0.9% NACL/PF 50 MG/5 ML
SYRINGE (ML) INTRAVENOUS AS NEEDED
Status: DISCONTINUED | OUTPATIENT
Start: 2021-06-11 | End: 2021-06-11

## 2021-06-11 RX ORDER — SPIRONOLACTONE 25 MG/1
25 TABLET ORAL DAILY
Status: DISCONTINUED | OUTPATIENT
Start: 2021-06-11 | End: 2021-06-11

## 2021-06-11 RX ORDER — SODIUM CHLORIDE 0.9 % (FLUSH) 0.9 %
5-40 SYRINGE (ML) INJECTION AS NEEDED
Status: DISCONTINUED | OUTPATIENT
Start: 2021-06-11 | End: 2021-06-11

## 2021-06-11 RX ORDER — ACETAMINOPHEN 325 MG/1
650 TABLET ORAL
Status: DISCONTINUED | OUTPATIENT
Start: 2021-06-11 | End: 2021-06-14

## 2021-06-11 RX ORDER — DEXAMETHASONE SODIUM PHOSPHATE 4 MG/ML
INJECTION, SOLUTION INTRA-ARTICULAR; INTRALESIONAL; INTRAMUSCULAR; INTRAVENOUS; SOFT TISSUE AS NEEDED
Status: DISCONTINUED | OUTPATIENT
Start: 2021-06-11 | End: 2021-06-11 | Stop reason: HOSPADM

## 2021-06-11 RX ORDER — PANTOPRAZOLE SODIUM 40 MG/1
40 TABLET, DELAYED RELEASE ORAL
Status: DISCONTINUED | OUTPATIENT
Start: 2021-06-12 | End: 2021-06-16 | Stop reason: HOSPADM

## 2021-06-11 RX ORDER — CHLORHEXIDINE GLUCONATE 4 G/100ML
SOLUTION TOPICAL AS NEEDED
Status: DISCONTINUED | OUTPATIENT
Start: 2021-06-11 | End: 2021-06-11 | Stop reason: HOSPADM

## 2021-06-11 RX ORDER — FUROSEMIDE 40 MG/1
40 TABLET ORAL 2 TIMES DAILY
COMMUNITY
Start: 2021-05-08 | End: 2021-06-16

## 2021-06-11 RX ORDER — MORPHINE SULFATE 4 MG/ML
4 INJECTION INTRAVENOUS ONCE
Status: COMPLETED | OUTPATIENT
Start: 2021-06-11 | End: 2021-06-11

## 2021-06-11 RX ORDER — METOPROLOL TARTRATE 25 MG/1
25 TABLET, FILM COATED ORAL 2 TIMES DAILY
Status: DISCONTINUED | OUTPATIENT
Start: 2021-06-11 | End: 2021-06-16 | Stop reason: HOSPADM

## 2021-06-11 RX ORDER — NEOSTIGMINE METHYLSULFATE 1 MG/ML
INJECTION, SOLUTION INTRAVENOUS AS NEEDED
Status: DISCONTINUED | OUTPATIENT
Start: 2021-06-11 | End: 2021-06-11 | Stop reason: HOSPADM

## 2021-06-11 RX ORDER — HYDROCODONE BITARTRATE AND ACETAMINOPHEN 5; 325 MG/1; MG/1
1-2 TABLET ORAL
Qty: 42 TABLET | Refills: 0 | Status: SHIPPED | OUTPATIENT
Start: 2021-06-11 | End: 2021-06-11

## 2021-06-11 RX ORDER — ACETAMINOPHEN 650 MG/1
650 SUPPOSITORY RECTAL
Status: DISCONTINUED | OUTPATIENT
Start: 2021-06-11 | End: 2021-06-14

## 2021-06-11 RX ORDER — SODIUM CHLORIDE, SODIUM LACTATE, POTASSIUM CHLORIDE, CALCIUM CHLORIDE 600; 310; 30; 20 MG/100ML; MG/100ML; MG/100ML; MG/100ML
1000 INJECTION, SOLUTION INTRAVENOUS ONCE
Status: COMPLETED | OUTPATIENT
Start: 2021-06-11 | End: 2021-06-11

## 2021-06-11 RX ORDER — LISINOPRIL 30 MG/1
30 TABLET ORAL DAILY
COMMUNITY
End: 2021-06-16

## 2021-06-11 RX ORDER — DEXTROSE 50 % IN WATER (D50W) INTRAVENOUS SYRINGE
25-50 AS NEEDED
Status: DISCONTINUED | OUTPATIENT
Start: 2021-06-11 | End: 2021-06-11

## 2021-06-11 RX ORDER — ROCURONIUM BROMIDE 10 MG/ML
INJECTION, SOLUTION INTRAVENOUS AS NEEDED
Status: DISCONTINUED | OUTPATIENT
Start: 2021-06-11 | End: 2021-06-11 | Stop reason: HOSPADM

## 2021-06-11 RX ORDER — ROPIVACAINE HYDROCHLORIDE 5 MG/ML
INJECTION, SOLUTION EPIDURAL; INFILTRATION; PERINEURAL
Status: COMPLETED | OUTPATIENT
Start: 2021-06-11 | End: 2021-06-11

## 2021-06-11 RX ORDER — MORPHINE SULFATE 2 MG/ML
2 INJECTION, SOLUTION INTRAMUSCULAR; INTRAVENOUS
Status: DISCONTINUED | OUTPATIENT
Start: 2021-06-11 | End: 2021-06-12

## 2021-06-11 RX ORDER — FENTANYL CITRATE 50 UG/ML
INJECTION, SOLUTION INTRAMUSCULAR; INTRAVENOUS
Status: COMPLETED | OUTPATIENT
Start: 2021-06-11 | End: 2021-06-11

## 2021-06-11 RX ORDER — PROMETHAZINE HYDROCHLORIDE 12.5 MG/1
12.5 TABLET ORAL
Status: DISCONTINUED | OUTPATIENT
Start: 2021-06-11 | End: 2021-06-16 | Stop reason: HOSPADM

## 2021-06-11 RX ORDER — POLYETHYLENE GLYCOL 3350 17 G/17G
17 POWDER, FOR SOLUTION ORAL DAILY PRN
Status: DISCONTINUED | OUTPATIENT
Start: 2021-06-11 | End: 2021-06-16 | Stop reason: HOSPADM

## 2021-06-11 RX ORDER — ENOXAPARIN SODIUM 100 MG/ML
30 INJECTION SUBCUTANEOUS EVERY 24 HOURS
Status: DISCONTINUED | OUTPATIENT
Start: 2021-06-11 | End: 2021-06-12

## 2021-06-11 RX ORDER — FENTANYL CITRATE 50 UG/ML
50 INJECTION, SOLUTION INTRAMUSCULAR; INTRAVENOUS AS NEEDED
Status: DISCONTINUED | OUTPATIENT
Start: 2021-06-11 | End: 2021-06-11

## 2021-06-11 RX ORDER — MAGNESIUM SULFATE 100 %
4 CRYSTALS MISCELLANEOUS AS NEEDED
Status: DISCONTINUED | OUTPATIENT
Start: 2021-06-11 | End: 2021-06-11

## 2021-06-11 RX ORDER — LIDOCAINE HYDROCHLORIDE 10 MG/ML
5 INJECTION, SOLUTION EPIDURAL; INFILTRATION; INTRACAUDAL; PERINEURAL AS NEEDED
Status: DISCONTINUED | OUTPATIENT
Start: 2021-06-11 | End: 2021-06-11 | Stop reason: HOSPADM

## 2021-06-11 RX ORDER — GLYCOPYRROLATE 0.2 MG/ML
INJECTION INTRAMUSCULAR; INTRAVENOUS AS NEEDED
Status: DISCONTINUED | OUTPATIENT
Start: 2021-06-11 | End: 2021-06-11 | Stop reason: HOSPADM

## 2021-06-11 RX ORDER — FENTANYL CITRATE 50 UG/ML
100 INJECTION, SOLUTION INTRAMUSCULAR; INTRAVENOUS ONCE
Status: COMPLETED | OUTPATIENT
Start: 2021-06-11 | End: 2021-06-11

## 2021-06-11 RX ORDER — MORPHINE SULFATE 4 MG/ML
2 INJECTION INTRAVENOUS
Status: DISCONTINUED | OUTPATIENT
Start: 2021-06-11 | End: 2021-06-11

## 2021-06-11 RX ORDER — LISINOPRIL 10 MG/1
30 TABLET ORAL DAILY
Status: DISCONTINUED | OUTPATIENT
Start: 2021-06-12 | End: 2021-06-13

## 2021-06-11 RX ORDER — TRAZODONE HYDROCHLORIDE 100 MG/1
TABLET ORAL
COMMUNITY
End: 2022-04-19

## 2021-06-11 RX ORDER — LEVETIRACETAM 10 MG/ML
1000 INJECTION INTRAVASCULAR EVERY 12 HOURS
Status: COMPLETED | OUTPATIENT
Start: 2021-06-11 | End: 2021-06-11

## 2021-06-11 RX ORDER — ROPIVACAINE HYDROCHLORIDE 5 MG/ML
30 INJECTION, SOLUTION EPIDURAL; INFILTRATION; PERINEURAL
Status: COMPLETED | OUTPATIENT
Start: 2021-06-11 | End: 2021-06-11

## 2021-06-11 RX ORDER — UREA 10 %
2 LOTION (ML) TOPICAL 2 TIMES DAILY
Status: DISCONTINUED | OUTPATIENT
Start: 2021-06-11 | End: 2021-06-16 | Stop reason: HOSPADM

## 2021-06-11 RX ORDER — LORAZEPAM 2 MG/ML
2 INJECTION INTRAMUSCULAR ONCE
Status: COMPLETED | OUTPATIENT
Start: 2021-06-11 | End: 2021-06-11

## 2021-06-11 RX ORDER — MIDAZOLAM HYDROCHLORIDE 1 MG/ML
2 INJECTION, SOLUTION INTRAMUSCULAR; INTRAVENOUS ONCE
Status: COMPLETED | OUTPATIENT
Start: 2021-06-11 | End: 2021-06-11

## 2021-06-11 RX ORDER — SODIUM CHLORIDE 0.9 % (FLUSH) 0.9 %
5-40 SYRINGE (ML) INJECTION EVERY 8 HOURS
Status: DISCONTINUED | OUTPATIENT
Start: 2021-06-11 | End: 2021-06-16 | Stop reason: HOSPADM

## 2021-06-11 RX ORDER — IBUPROFEN 800 MG/1
800 TABLET ORAL
COMMUNITY
End: 2021-06-16

## 2021-06-11 RX ORDER — SODIUM CHLORIDE, SODIUM LACTATE, POTASSIUM CHLORIDE, CALCIUM CHLORIDE 600; 310; 30; 20 MG/100ML; MG/100ML; MG/100ML; MG/100ML
75 INJECTION, SOLUTION INTRAVENOUS CONTINUOUS
Status: DISCONTINUED | OUTPATIENT
Start: 2021-06-11 | End: 2021-06-11

## 2021-06-11 RX ORDER — SODIUM CHLORIDE 0.9 % (FLUSH) 0.9 %
5-40 SYRINGE (ML) INJECTION AS NEEDED
Status: DISCONTINUED | OUTPATIENT
Start: 2021-06-11 | End: 2021-06-11 | Stop reason: HOSPADM

## 2021-06-11 RX ORDER — ONDANSETRON 4 MG/1
4 TABLET, ORALLY DISINTEGRATING ORAL
Qty: 30 TABLET | Refills: 1 | Status: SHIPPED | OUTPATIENT
Start: 2021-06-11 | End: 2021-06-11

## 2021-06-11 RX ORDER — POLYETHYLENE GLYCOL 3350 17 G/17G
17 POWDER, FOR SOLUTION ORAL DAILY
Qty: 10 PACKET | Refills: 1 | Status: SHIPPED | OUTPATIENT
Start: 2021-06-11 | End: 2021-06-11

## 2021-06-11 RX ORDER — SODIUM CHLORIDE, SODIUM LACTATE, POTASSIUM CHLORIDE, CALCIUM CHLORIDE 600; 310; 30; 20 MG/100ML; MG/100ML; MG/100ML; MG/100ML
100 INJECTION, SOLUTION INTRAVENOUS CONTINUOUS
Status: DISCONTINUED | OUTPATIENT
Start: 2021-06-11 | End: 2021-06-11 | Stop reason: HOSPADM

## 2021-06-11 RX ORDER — LORAZEPAM 2 MG/ML
INJECTION INTRAMUSCULAR
Status: COMPLETED
Start: 2021-06-11 | End: 2021-06-11

## 2021-06-11 RX ORDER — ATORVASTATIN CALCIUM 10 MG/1
10 TABLET, FILM COATED ORAL
Status: DISCONTINUED | OUTPATIENT
Start: 2021-06-11 | End: 2021-06-16 | Stop reason: HOSPADM

## 2021-06-11 RX ORDER — SODIUM CHLORIDE, SODIUM LACTATE, POTASSIUM CHLORIDE, CALCIUM CHLORIDE 600; 310; 30; 20 MG/100ML; MG/100ML; MG/100ML; MG/100ML
50 INJECTION, SOLUTION INTRAVENOUS CONTINUOUS
Status: DISCONTINUED | OUTPATIENT
Start: 2021-06-11 | End: 2021-06-11 | Stop reason: HOSPADM

## 2021-06-11 RX ORDER — LEVETIRACETAM 10 MG/ML
1000 INJECTION INTRAVASCULAR ONCE
Status: COMPLETED | OUTPATIENT
Start: 2021-06-11 | End: 2021-06-11

## 2021-06-11 RX ORDER — SODIUM CHLORIDE 0.9 % (FLUSH) 0.9 %
5-40 SYRINGE (ML) INJECTION EVERY 8 HOURS
Status: DISCONTINUED | OUTPATIENT
Start: 2021-06-11 | End: 2021-06-11

## 2021-06-11 RX ORDER — METOPROLOL TARTRATE 25 MG/1
25 TABLET, FILM COATED ORAL 2 TIMES DAILY
COMMUNITY
End: 2021-06-16

## 2021-06-11 RX ORDER — INSULIN LISPRO 100 [IU]/ML
INJECTION, SOLUTION INTRAVENOUS; SUBCUTANEOUS ONCE
Status: DISCONTINUED | OUTPATIENT
Start: 2021-06-11 | End: 2021-06-11 | Stop reason: HOSPADM

## 2021-06-11 RX ORDER — MIDAZOLAM HYDROCHLORIDE 1 MG/ML
2 INJECTION, SOLUTION INTRAMUSCULAR; INTRAVENOUS
Status: COMPLETED | OUTPATIENT
Start: 2021-06-11 | End: 2021-06-11

## 2021-06-11 RX ORDER — ONDANSETRON 2 MG/ML
4 INJECTION INTRAMUSCULAR; INTRAVENOUS ONCE
Status: DISCONTINUED | OUTPATIENT
Start: 2021-06-11 | End: 2021-06-11

## 2021-06-11 RX ORDER — ARIPIPRAZOLE 10 MG/1
10 TABLET ORAL DAILY
Status: DISCONTINUED | OUTPATIENT
Start: 2021-06-11 | End: 2021-06-11

## 2021-06-11 RX ORDER — PROPOFOL 10 MG/ML
INJECTION, EMULSION INTRAVENOUS AS NEEDED
Status: DISCONTINUED | OUTPATIENT
Start: 2021-06-11 | End: 2021-06-11 | Stop reason: HOSPADM

## 2021-06-11 RX ORDER — SUCCINYLCHOLINE CHLORIDE 20 MG/ML
INJECTION INTRAMUSCULAR; INTRAVENOUS AS NEEDED
Status: DISCONTINUED | OUTPATIENT
Start: 2021-06-11 | End: 2021-06-11 | Stop reason: HOSPADM

## 2021-06-11 RX ADMIN — DEXAMETHASONE SODIUM PHOSPHATE 8 MG: 4 INJECTION, SOLUTION INTRAMUSCULAR; INTRAVENOUS at 08:59

## 2021-06-11 RX ADMIN — MIDAZOLAM 2 MG: 1 INJECTION INTRAMUSCULAR; INTRAVENOUS at 10:00

## 2021-06-11 RX ADMIN — MIDAZOLAM HYDROCHLORIDE 2 MG: 2 INJECTION, SOLUTION INTRAMUSCULAR; INTRAVENOUS at 07:04

## 2021-06-11 RX ADMIN — MORPHINE SULFATE 2 MG: 4 INJECTION, SOLUTION INTRAMUSCULAR; INTRAVENOUS at 13:17

## 2021-06-11 RX ADMIN — LORAZEPAM 2 MG: 2 INJECTION INTRAMUSCULAR at 10:10

## 2021-06-11 RX ADMIN — MORPHINE SULFATE 2 MG: 4 INJECTION, SOLUTION INTRAMUSCULAR; INTRAVENOUS at 17:10

## 2021-06-11 RX ADMIN — ONDANSETRON 4 MG: 2 INJECTION INTRAMUSCULAR; INTRAVENOUS at 08:57

## 2021-06-11 RX ADMIN — ATORVASTATIN CALCIUM 10 MG: 10 TABLET, FILM COATED ORAL at 21:26

## 2021-06-11 RX ADMIN — SODIUM CHLORIDE, SODIUM LACTATE, POTASSIUM CHLORIDE, AND CALCIUM CHLORIDE 50 ML/HR: 600; 310; 30; 20 INJECTION, SOLUTION INTRAVENOUS at 06:45

## 2021-06-11 RX ADMIN — Medication 2 TABLET: at 17:05

## 2021-06-11 RX ADMIN — MIDAZOLAM HYDROCHLORIDE 2 MG: 1 INJECTION, SOLUTION INTRAMUSCULAR; INTRAVENOUS at 10:00

## 2021-06-11 RX ADMIN — MORPHINE SULFATE 4 MG: 4 INJECTION, SOLUTION INTRAMUSCULAR; INTRAVENOUS at 14:47

## 2021-06-11 RX ADMIN — ROPIVACAINE HYDROCHLORIDE 30 ML: 5 INJECTION, SOLUTION EPIDURAL; INFILTRATION; PERINEURAL at 07:02

## 2021-06-11 RX ADMIN — GABAPENTIN 100 MG: 100 CAPSULE ORAL at 21:26

## 2021-06-11 RX ADMIN — MIDAZOLAM HYDROCHLORIDE 1 MG: 2 INJECTION, SOLUTION INTRAMUSCULAR; INTRAVENOUS at 08:27

## 2021-06-11 RX ADMIN — GLYCOPYRROLATE 0.4 MG: 0.2 INJECTION INTRAMUSCULAR; INTRAVENOUS at 09:23

## 2021-06-11 RX ADMIN — LORAZEPAM 2 MG: 2 INJECTION INTRAMUSCULAR; INTRAVENOUS at 10:10

## 2021-06-11 RX ADMIN — METOPROLOL TARTRATE 25 MG: 25 TABLET, FILM COATED ORAL at 20:20

## 2021-06-11 RX ADMIN — Medication 10 MG: at 08:37

## 2021-06-11 RX ADMIN — MIDAZOLAM HYDROCHLORIDE 2 MG: 1 INJECTION, SOLUTION INTRAMUSCULAR; INTRAVENOUS at 10:15

## 2021-06-11 RX ADMIN — LEVETIRACETAM 1000 MG: 10 INJECTION INTRAVENOUS at 21:26

## 2021-06-11 RX ADMIN — LIDOCAINE HYDROCHLORIDE 5 ML: 10 INJECTION, SOLUTION EPIDURAL; INFILTRATION; INTRACAUDAL; PERINEURAL at 07:00

## 2021-06-11 RX ADMIN — ROCURONIUM BROMIDE 5 MG: 50 INJECTION INTRAVENOUS at 08:42

## 2021-06-11 RX ADMIN — FENTANYL CITRATE 100 MCG: 50 INJECTION, SOLUTION INTRAMUSCULAR; INTRAVENOUS at 07:04

## 2021-06-11 RX ADMIN — MIDAZOLAM 2 MG: 1 INJECTION INTRAMUSCULAR; INTRAVENOUS at 06:58

## 2021-06-11 RX ADMIN — MIDAZOLAM HYDROCHLORIDE 1 MG: 2 INJECTION, SOLUTION INTRAMUSCULAR; INTRAVENOUS at 07:31

## 2021-06-11 RX ADMIN — Medication 10 ML: at 21:27

## 2021-06-11 RX ADMIN — SUCCINYLCHOLINE CHLORIDE 180 MG: 20 INJECTION, SOLUTION INTRAMUSCULAR; INTRAVENOUS at 07:44

## 2021-06-11 RX ADMIN — ENOXAPARIN SODIUM 30 MG: 100 INJECTION SUBCUTANEOUS at 17:05

## 2021-06-11 RX ADMIN — WATER 3 G: 1 INJECTION INTRAMUSCULAR; INTRAVENOUS; SUBCUTANEOUS at 07:31

## 2021-06-11 RX ADMIN — Medication 10 ML: at 17:06

## 2021-06-11 RX ADMIN — MORPHINE SULFATE 2 MG: 2 INJECTION, SOLUTION INTRAMUSCULAR; INTRAVENOUS at 20:21

## 2021-06-11 RX ADMIN — FAMOTIDINE 20 MG: 20 TABLET ORAL at 06:45

## 2021-06-11 RX ADMIN — FENTANYL CITRATE 50 MCG: 50 INJECTION, SOLUTION INTRAMUSCULAR; INTRAVENOUS at 07:31

## 2021-06-11 RX ADMIN — FENTANYL CITRATE 25 MCG: 50 INJECTION INTRAMUSCULAR; INTRAVENOUS at 09:50

## 2021-06-11 RX ADMIN — LEVETIRACETAM 1000 MG: 1000 INJECTION, SOLUTION INTRAVENOUS at 10:45

## 2021-06-11 RX ADMIN — Medication 3 MG: at 09:23

## 2021-06-11 RX ADMIN — FENTANYL CITRATE 100 MCG: 50 INJECTION INTRAMUSCULAR; INTRAVENOUS at 06:58

## 2021-06-11 RX ADMIN — GABAPENTIN 100 MG: 100 CAPSULE ORAL at 14:46

## 2021-06-11 RX ADMIN — ROCURONIUM BROMIDE 25 MG: 50 INJECTION INTRAVENOUS at 07:49

## 2021-06-11 RX ADMIN — Medication 10 MG: at 08:41

## 2021-06-11 RX ADMIN — DIVALPROEX SODIUM 750 MG: 250 TABLET, DELAYED RELEASE ORAL at 20:20

## 2021-06-11 RX ADMIN — MIDAZOLAM 2 MG: 1 INJECTION INTRAMUSCULAR; INTRAVENOUS at 10:15

## 2021-06-11 RX ADMIN — SODIUM CHLORIDE, SODIUM LACTATE, POTASSIUM CHLORIDE, AND CALCIUM CHLORIDE 1000 ML: 600; 310; 30; 20 INJECTION, SOLUTION INTRAVENOUS at 17:05

## 2021-06-11 RX ADMIN — ROPIVACAINE HYDROCHLORIDE 30 ML: 5 INJECTION, SOLUTION EPIDURAL; INFILTRATION; PERINEURAL at 07:04

## 2021-06-11 RX ADMIN — ROCURONIUM BROMIDE 20 MG: 50 INJECTION INTRAVENOUS at 08:43

## 2021-06-11 RX ADMIN — PROPOFOL 200 MG: 10 INJECTION, EMULSION INTRAVENOUS at 07:44

## 2021-06-11 NOTE — PROGRESS NOTES
Problem: Pain  Goal: *Control of Pain  6/11/2021 1547 by Jessa Delcid  Outcome: Progressing Towards Goal  6/11/2021 1547 by Jessa Delcid  Outcome: Progressing Towards Goal  Goal: *PALLIATIVE CARE:  Alleviation of Pain  6/11/2021 1547 by Jessa Delcid  Outcome: Progressing Towards Goal  6/11/2021 1547 by Jessa Delcid  Outcome: Progressing Towards Goal     Problem: Patient Education: Go to Patient Education Activity  Goal: Patient/Family Education  6/11/2021 1547 by Jessa Delcid  Outcome: Progressing Towards Goal  6/11/2021 1547 by Jessa Delcid  Outcome: Progressing Towards Goal

## 2021-06-11 NOTE — PROGRESS NOTES
Paige Black sustained a seizure, grand mal type seizure. He, upon arrival to the PACU, was doing well, conversing with the nurse and the nurse was getting a stack of sheets to elevate his right lower extremity. And the patient called for the nurse and stated that he felt as if he were going to have a seizure. He states that he did not take his seizure medications this morning. The nurse immediately put a well-padded tongue depressor in his mouth, and within 30 seconds or so, he sustained a seizure. Anesthesiologist was called, and anti seizure medications IV, were administered. He is remained on oxygen, and his vital signs have remained stable. Saturations 95% to 100%. Heart rate ranging from 64-75 . Please see the anesthesia medical log, records, for the medications IV, administered. Hospitalist was called, as the plan would be to admit him to the hospitalist service, and then have a neurology consult, as well.        Nicolas Briscoe MD  6/11/2021  10:11 AM

## 2021-06-11 NOTE — PROGRESS NOTES
SLP Note:    New order received. Pt currently in PACU. Will address as medically appropriate.       Thank you for this referral,   Simin Ralph M.S., [address] [facility]  Speech-Language Pathologist

## 2021-06-11 NOTE — PERIOP NOTES
Swallowing evaluation patient passed. Patient first swallowed  Ice chips without coughing or drooling. Pt swallowed cranberry juice well with our coughing or drooling.

## 2021-06-11 NOTE — PROGRESS NOTES
6/11/2021  4:05 PM    [facility] Bariatric Bed with foam and trapeze.   Ref# [id number]      Confirmation #: [id number]

## 2021-06-11 NOTE — PROGRESS NOTES
The following post-op medication(s) e-scribed to patients [facility]:    Orders Placed This Encounter    HYDROcodone-acetaminophen (Norco) 5-325 mg per tablet     Sig: Take 1-2 Tablets by mouth every eight (8) hours as needed for Pain for up to 7 days. Max Daily Amount: 6 Tablets. Indications: pain     Dispense:  42 Tablet     Refill:  0    rivaroxaban (Xarelto) 10 mg tablet     Sig: TAKE ONE TABLET BY MOUTH PER DAY FOLLOWING SURGERY  Indications: treatment to prevent recurrence of a clot in a deep vein     Dispense:  30 Tablet     Refill:  1    polyethylene glycol (Miralax) 17 gram packet     Sig: Take 1 Packet by mouth daily. Dispense:  10 Packet     Refill:  1    cephALEXin (KEFLEX) 500 mg capsule     Sig: Take 1 Capsule by mouth four (4) times daily for 5 days. Dispense:  20 Capsule     Refill:  0    ondansetron (ZOFRAN ODT) 4 mg disintegrating tablet     Sig: Take 1 Tablet by mouth every eight (8) hours as needed for Nausea or Vomiting.  Indications: prevent nausea and vomiting after surgery     Dispense:  30 Tablet     Refill:  [address] Jolinda Goldmann, PA-C  6/11/2021  9:46 AM

## 2021-06-11 NOTE — PERIOP NOTES
TRANSFER - OUT REPORT:    Telephone report given to Iris(name) on Eric Rosen  being transferred to [facility](unit) for routine post - op       Report consisted of patients Situation, Background, Assessment and   Recommendations(SBAR). Information from the following report(s) SBAR, OR Summary and MAR was reviewed with the receiving nurse. Lines:   Peripheral IV 06/11/21 Left Antecubital (Active)   Site Assessment Clean, dry, & intact 06/11/21 1156   Phlebitis Assessment 0 06/11/21 1156   Infiltration Assessment 0 06/11/21 1156   Dressing Status Clean, dry, & intact 06/11/21 1156   Dressing Type Tape;Transparent 06/11/21 1156   Hub Color/Line Status Pink; Infusing 06/11/21 1156        Opportunity for questions and clarification was provided.       Patient transported with:   O2 @ 4 liters  Tech

## 2021-06-11 NOTE — PERIOP NOTES
Patient stating that he is not feeling right and feels like he is going to have a seizure. Padded tongue blade present and blanket applied to side rails for padding.
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
## 2021-06-11 NOTE — ROUTINE PROCESS
Bedside shift change report given to Amy (oncoming nurse) by Niecy Rios (offgoing nurse). Report included the following information SBAR, Kardex, Intake/Output, MAR and Recent Results.

## 2021-06-11 NOTE — ANESTHESIA PROCEDURE NOTES
Peripheral Block    Start time: 6/11/2021 6:58 AM  End time: 6/11/2021 7:08 AM  Performed by: Carleen Reinoso MD  Authorized by: Carleen Reinoso MD       Pre-procedure:    Indications: at surgeon's request and post-op pain management    Preanesthetic Checklist: patient identified, risks and benefits discussed, site marked, timeout performed, anesthesia consent given and patient being monitored    Timeout Time: 06:58 EDT          Block Type:   Block Type:  Popliteal  Laterality:  Right  Monitoring:  Continuous pulse ox, oxygen, frequent vital sign checks, heart rate, standard ASA monitoring and responsive to questions  Injection Technique:  Single shot  Procedures: ultrasound guided    Patient Position: left lateral decubitus  Prep: chlorhexidine    Needle Type:  Ultraplex  Needle Gauge:  21 G  Medication Injected:  FentaNYL citrate (PF) injection sedation initial, 100 mcg  midazolam (VERSED) injection, 2 mg  ropivacaine (PF) (NAROPIN)(0.5%) 5 mg/mL injection, 30 mL  Med Admin Time: 6/11/2021 7:04 AM    Assessment:  Number of attempts:  1  Injection Assessment:  Incremental injection every 5 mL, negative aspiration for CSF, no paresthesia, ultrasound image on chart, local visualized surrounding nerve on ultrasound, negative aspiration for blood, no intravascular symptoms and low pressure verified by pressure monitor  Patient tolerance:  Patient tolerated the procedure well with no immediate complications

## 2021-06-11 NOTE — ANESTHESIA POSTPROCEDURE EVALUATION
Procedure(s):  OPEN REDUCTION INTERNAL FIXATION TRIMALLEOLAR ANKLE FRACTURE WITH FIXATION POSTERIOR LIP, RIGHT ANKLE/C-ARM/SYNTHES ANKLE TRAUMA TRAY/DBM ALLOMATRIX  **GEN W/REGIONAL**.    regional, general    Anesthesia Post Evaluation      Multimodal analgesia: multimodal analgesia used between 6 hours prior to anesthesia start to PACU discharge  Patient location during evaluation: bedside  Patient participation: complete - patient participated  Level of consciousness: awake  Pain score: 0  Pain management: adequate  Airway patency: patent  Anesthetic complications: no  Cardiovascular status: stable  Respiratory status: acceptable  Hydration status: acceptable  Post anesthesia nausea and vomiting:  none  Final Post Anesthesia Temperature Assessment:  Normothermia (36.0-37.5 degrees C)      INITIAL Post-op Vital signs:   Vitals Value Taken Time   /70 06/11/21 1156   Temp 36.5 °C (97.7 °F) 06/11/21 1156   Pulse 70 06/11/21 1156   Resp 23 06/11/21 1156   SpO2 98 % 06/11/21 1156

## 2021-06-11 NOTE — INTERVAL H&P NOTE
Update History & Physical    The Patient's History and Physical of June 6/11/2021 7:08 AM  was reviewed with the patient and I examined the patient. There was no change. The surgical site was confirmed by the patient and me. Plan:  The risk, benefits, expected outcome, and alternative to the recommended procedure have been discussed with the patient. Patient understands and wants to proceed with the procedure.     Electronically signed by Isidro Price MD on 6/11/2021 at 7:07 AM

## 2021-06-11 NOTE — DISCHARGE INSTRUCTIONS
ORTHOPAEDIC DISCHARGE PLAN     1. DISCHARGE DISPOSITION:  Home    2. FOLLOW UP RETURN TO OFFICE: 1 weeks    Call [phone] to confirm your appointment to see Dr. Lauryn Mcdaniel. Marlene Arora MD.   Follow up with Primary Care Provider in 7 to 10 days. 3. WEIGHT BEARING STATUS/ROM:    NO WEIGHT BEARING TO   the Right LOWER EXTREMITY    4. ELEVATE the Right lower extremity on 1 pillow. Place the pillow horizontal so that no pressure is on the back of your heel    Please leave your current dressings and in place. Keep your dressings clean and dry to the: Right lower extremity      Please call [phone] ([facility]) if any: fever, shakes, chills, intractable pain, or for any questions you have regarding your care/medical condition   1. If you experience any calf pain or swelling, or are having any shortness of  breath, chest pain, or extremity swelling, or bleeding thru any surgical dressings,  or Bleeding at any body location while you are taking on any blood thinners. ie (mouth,nose, skin sites:)   2. Please go to closest ER  ASAP for assessment to rule out a leg clot and to  assess any  bleeding. 3. After general anesthesia or intravenous sedation, for 24 hours or while taking  prescription Narcotics:      [x]  Limit your activities     [x]   Do not drive and operate hazardous machinery    [x]   Do not make important personal or business decisions    [x]   Do  not drink alcoholic beverages    [x]  If you have not urinated within 8 hours after discharge, please contact    your surgeon on call.      Report the following to your surgeon: If any below is true         [x]   Excessive pain, swelling, redness or odor of or around the surgical area       [x]   Temperature over 100.5       [x]  Nausea and vomiting lasting longer than 4 hours or if unable to take medications       [x]    Any signs of decreased circulation or nerve impairment to extremity:    Change in color, persistent  numbness, tingling, coldness or increase pain          [x]  No smoking/ No tobacco products/ Avoid exposure to second hand smoke    5. DIET:  Regular Diet  OTC (Nutritional supplements/multivitamins/calcium w/ Vitamin  D     6. ACTIVITY:   // No lifting, twisting, squatting, deep bending. 7. INCISION CARE/DRESSINGS: Keep wound clean and dry ,ACE Wraps    Keep all pets away from  any wound present in order to prevent infection. 8. PAIN CONTROL: Prescriptions written: Narcotics       Start taking your pain medications when you get home    9. VTE prophylaxis : Start Xarelto on date // 6/11/2021 . 10. ANTIBIOTICS:  Keflex 500 mg  None at this time    11.  DME/ PRESCRIPTIONS WRITTEN ALREADY WRITTEN:     Home Care Equipment:            Claudeen Lapidus, MD  6/10/2021  5:21 PM

## 2021-06-11 NOTE — PERIOP NOTES
Patient actively seizing. Padded tongue blade in mouth.  [facility]-anesthesia staff, Rafal Chi and Dr. Mary Adair at bedside including anesthesiologist Dr. Emelia Doe.

## 2021-06-11 NOTE — ANESTHESIA PREPROCEDURE EVALUATION
Relevant Problems   No relevant active problems       Anesthetic History   No history of anesthetic complications            Review of Systems / Medical History  Patient summary reviewed and pertinent labs reviewed    Pulmonary        Sleep apnea: CPAP           Neuro/Psych     seizures: well controlled    Psychiatric history     Cardiovascular    Hypertension: well controlled        Dysrhythmias       Exercise tolerance: >4 METS     GI/Hepatic/Renal                Endo/Other        Morbid obesity     Other Findings              Physical Exam    Airway  Mallampati: III  TM Distance: 4 - 6 cm  Neck ROM: decreased range of motion   Mouth opening: Normal     Cardiovascular    Rhythm: regular  Rate: normal         Dental    Dentition: Poor dentition     Pulmonary  Breath sounds clear to auscultation               Abdominal  GI exam deferred       Other Findings            Anesthetic Plan    ASA: 3  Anesthesia type: regional and general - popliteal fossa block          Induction: Intravenous  Anesthetic plan and risks discussed with: Patient

## 2021-06-11 NOTE — INTERVAL H&P NOTE
Update History & Physical    The Patient's History and Physical of June 6/11/2021 ,     was reviewed with the patient and I examined the patient. There was no change. The surgical site was confirmed by the patient and me. Plan:  The risk, benefits, expected outcome, and alternative to the recommended procedure have been discussed with the patient. Patient understands and wants to proceed with the procedure.     Electronically signed by Liz Collins MD on 6/11/2021 at 7:12 AM

## 2021-06-12 ENCOUNTER — APPOINTMENT (OUTPATIENT)
Dept: GENERAL RADIOLOGY | Age: 51
DRG: 493 | End: 2021-06-12
Attending: INTERNAL MEDICINE
Payer: MEDICARE

## 2021-06-12 LAB
ANION GAP SERPL CALC-SCNC: 4 MMOL/L (ref 3–18)
BUN SERPL-MCNC: 16 MG/DL (ref 7–18)
BUN/CREAT SERPL: 20 (ref 12–20)
CALCIUM SERPL-MCNC: 9.1 MG/DL (ref 8.5–10.1)
CHLORIDE SERPL-SCNC: 104 MMOL/L (ref 100–111)
CO2 SERPL-SCNC: 31 MMOL/L (ref 21–32)
CREAT SERPL-MCNC: 0.8 MG/DL (ref 0.6–1.3)
ERYTHROCYTE [DISTWIDTH] IN BLOOD BY AUTOMATED COUNT: 13.2 % (ref 11.6–14.5)
GLUCOSE SERPL-MCNC: 118 MG/DL (ref 74–99)
HCT VFR BLD AUTO: 39.8 % (ref 36–48)
HGB BLD-MCNC: 12.3 G/DL (ref 13–16)
MAGNESIUM SERPL-MCNC: 2.3 MG/DL (ref 1.6–2.6)
MCH RBC QN AUTO: 31.1 PG (ref 24–34)
MCHC RBC AUTO-ENTMCNC: 30.9 G/DL (ref 31–37)
MCV RBC AUTO: 100.5 FL (ref 74–97)
PLATELET # BLD AUTO: 223 K/UL (ref 135–420)
PMV BLD AUTO: 10.9 FL (ref 9.2–11.8)
POTASSIUM SERPL-SCNC: 4.2 MMOL/L (ref 3.5–5.5)
RBC # BLD AUTO: 3.96 M/UL (ref 4.35–5.65)
SODIUM SERPL-SCNC: 139 MMOL/L (ref 136–145)
WBC # BLD AUTO: 11.9 K/UL (ref 4.6–13.2)

## 2021-06-12 PROCEDURE — 71046 X-RAY EXAM CHEST 2 VIEWS: CPT

## 2021-06-12 PROCEDURE — 74011250636 HC RX REV CODE- 250/636: Performed by: INTERNAL MEDICINE

## 2021-06-12 PROCEDURE — 83735 ASSAY OF MAGNESIUM: CPT

## 2021-06-12 PROCEDURE — 36415 COLL VENOUS BLD VENIPUNCTURE: CPT

## 2021-06-12 PROCEDURE — 99226 PR SBSQ OBSERVATION CARE/DAY 35 MINUTES: CPT | Performed by: INTERNAL MEDICINE

## 2021-06-12 PROCEDURE — 2709999900 HC NON-CHARGEABLE SUPPLY

## 2021-06-12 PROCEDURE — 99218 HC RM OBSERVATION: CPT

## 2021-06-12 PROCEDURE — 74011250636 HC RX REV CODE- 250/636: Performed by: PHYSICIAN ASSISTANT

## 2021-06-12 PROCEDURE — 97535 SELF CARE MNGMENT TRAINING: CPT

## 2021-06-12 PROCEDURE — 85027 COMPLETE CBC AUTOMATED: CPT

## 2021-06-12 PROCEDURE — 97162 PT EVAL MOD COMPLEX 30 MIN: CPT

## 2021-06-12 PROCEDURE — 92526 ORAL FUNCTION THERAPY: CPT

## 2021-06-12 PROCEDURE — 74011250637 HC RX REV CODE- 250/637: Performed by: PHYSICIAN ASSISTANT

## 2021-06-12 PROCEDURE — 80048 BASIC METABOLIC PNL TOTAL CA: CPT

## 2021-06-12 PROCEDURE — 92610 EVALUATE SWALLOWING FUNCTION: CPT

## 2021-06-12 PROCEDURE — 97161 PT EVAL LOW COMPLEX 20 MIN: CPT

## 2021-06-12 PROCEDURE — 97530 THERAPEUTIC ACTIVITIES: CPT

## 2021-06-12 PROCEDURE — 74011250637 HC RX REV CODE- 250/637: Performed by: INTERNAL MEDICINE

## 2021-06-12 PROCEDURE — 97166 OT EVAL MOD COMPLEX 45 MIN: CPT

## 2021-06-12 RX ORDER — OXYCODONE HYDROCHLORIDE 5 MG/1
5-10 TABLET ORAL
Status: DISCONTINUED | OUTPATIENT
Start: 2021-06-12 | End: 2021-06-13

## 2021-06-12 RX ORDER — TRAZODONE HYDROCHLORIDE 100 MG/1
100 TABLET ORAL
Status: DISCONTINUED | OUTPATIENT
Start: 2021-06-12 | End: 2021-06-16 | Stop reason: HOSPADM

## 2021-06-12 RX ORDER — KETOROLAC TROMETHAMINE 15 MG/ML
15 INJECTION, SOLUTION INTRAMUSCULAR; INTRAVENOUS ONCE
Status: COMPLETED | OUTPATIENT
Start: 2021-06-12 | End: 2021-06-12

## 2021-06-12 RX ORDER — GABAPENTIN 300 MG/1
300 CAPSULE ORAL 3 TIMES DAILY
Status: DISCONTINUED | OUTPATIENT
Start: 2021-06-12 | End: 2021-06-16 | Stop reason: HOSPADM

## 2021-06-12 RX ADMIN — GABAPENTIN 100 MG: 100 CAPSULE ORAL at 08:36

## 2021-06-12 RX ADMIN — TRAZODONE HYDROCHLORIDE 100 MG: 100 TABLET ORAL at 21:08

## 2021-06-12 RX ADMIN — MORPHINE SULFATE 2 MG: 2 INJECTION, SOLUTION INTRAMUSCULAR; INTRAVENOUS at 08:36

## 2021-06-12 RX ADMIN — OXYCODONE HYDROCHLORIDE 10 MG: 5 TABLET ORAL at 16:05

## 2021-06-12 RX ADMIN — LISINOPRIL 30 MG: 10 TABLET ORAL at 08:36

## 2021-06-12 RX ADMIN — PANTOPRAZOLE SODIUM 40 MG: 40 TABLET, DELAYED RELEASE ORAL at 06:35

## 2021-06-12 RX ADMIN — METOPROLOL TARTRATE 25 MG: 25 TABLET, FILM COATED ORAL at 08:36

## 2021-06-12 RX ADMIN — DIVALPROEX SODIUM 750 MG: 250 TABLET, DELAYED RELEASE ORAL at 08:36

## 2021-06-12 RX ADMIN — Medication 2 TABLET: at 16:08

## 2021-06-12 RX ADMIN — GABAPENTIN 300 MG: 300 CAPSULE ORAL at 16:05

## 2021-06-12 RX ADMIN — Medication 2 TABLET: at 08:36

## 2021-06-12 RX ADMIN — METOPROLOL TARTRATE 25 MG: 25 TABLET, FILM COATED ORAL at 21:08

## 2021-06-12 RX ADMIN — Medication 10 ML: at 21:10

## 2021-06-12 RX ADMIN — GABAPENTIN 300 MG: 300 CAPSULE ORAL at 21:08

## 2021-06-12 RX ADMIN — Medication 10 ML: at 16:04

## 2021-06-12 RX ADMIN — MORPHINE SULFATE 2 MG: 2 INJECTION, SOLUTION INTRAMUSCULAR; INTRAVENOUS at 04:44

## 2021-06-12 RX ADMIN — RIVAROXABAN 10 MG: 10 TABLET, FILM COATED ORAL at 11:26

## 2021-06-12 RX ADMIN — KETOROLAC TROMETHAMINE 15 MG: 15 INJECTION, SOLUTION INTRAMUSCULAR; INTRAVENOUS at 09:29

## 2021-06-12 RX ADMIN — MORPHINE SULFATE 2 MG: 2 INJECTION, SOLUTION INTRAMUSCULAR; INTRAVENOUS at 00:01

## 2021-06-12 RX ADMIN — ACETAMINOPHEN 650 MG: 325 TABLET ORAL at 11:26

## 2021-06-12 RX ADMIN — OXYCODONE HYDROCHLORIDE 10 MG: 5 TABLET ORAL at 11:26

## 2021-06-12 RX ADMIN — ATORVASTATIN CALCIUM 10 MG: 10 TABLET, FILM COATED ORAL at 21:07

## 2021-06-12 RX ADMIN — DIVALPROEX SODIUM 750 MG: 250 TABLET, DELAYED RELEASE ORAL at 21:07
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
## 2021-06-12 NOTE — PROGRESS NOTES
Shift Summary Note:  Assumed care of patient in bed awake and alert. Medicated x 2 for pain with some relief noted. VSS, call bell within reach. Patient used hospital cpap during the night.   Patient Vitals for the past 12 hrs:   Temp Pulse Resp BP SpO2   06/12/21 0443 97.9 °F (36.6 °C) 74 16 99/61 92 %   06/11/21 2308 98.3 °F (36.8 °C) 73 16 101/64 91 %   06/11/21 2018 98.7 °F (37.1 °C) 82 16 118/72 92 %
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
## 2021-06-12 NOTE — PROGRESS NOTES
Per Ortho    Patient seen at chair side    S/p         Patient: Hank Quintana  YOB: 1970  MRN: [id number]     Date of Procedure: 6/11/2021      Pre-Op Diagnosis: S82.851A TRIMALLEOLAR ANKLE FRACTURE, RIGHT ANKLE     Post-Op Diagnosis: Same as preoperative diagnosis.       Procedure(s):  OPEN REDUCTION INTERNAL FIXATION TRIMALLEOLAR ANKLE FRACTURE WITHOUT FIXATION POSTERIOR LIP  32212  SYNDESMOTIC STABILIZATION 71881,  EXTERNAL BONE STIMULATION  51985      Surgeon(s):  Kyara Manzo MD     Surgical Assistant: Physician Assistant: (Unknown)  Surg Asst-1: Antonietta Arshad  Sitting up with pain RLE    Pt had difficulty transfering to chair from Bed, \"T\" handles ordered for bed assist,    Patient doubts \"safe\" at home alone wishes to explore other options. No CP, No SOB, (+) pain RLE with decreased sensation light touch.     Moving digits all RLE distal    Cap refill < 2 seconds RLE    Soft cast intact well fitting RLE    Plan: Toradol 15mg IM Now, oxycodone 5-10mg Q4/prn pain, PT/OT assess consider SNF    Case discussed Medicine, agree with POC

## 2021-06-12 NOTE — PROGRESS NOTES
Speech Pathology Note:    ST eval completed; RN Rupa reports pt coughing when eating. Pt is x1 post op and reports odynophagia. He did sporadically cough throughout eval across various po consistencies/textures. He appeared to have some discomfort with facial grimaces during po acceptance. No change in VQ, and  cough was dry and presented almost as a gag like reflex in nature. Rec continue current diet as tolerated. Would recommend completing MBS on Monday to r/o any potential aspiration. D/W RN.      Thank you for this referral,  Hollis Castellon MA CCC-SLP  Speech language pathologist

## 2021-06-12 NOTE — PROGRESS NOTES
Problem: Mobility Impaired (Adult and Pediatric)  Goal: *Acute Goals and Plan of Care (Insert Text)  6/12/2021 0841 by Bonifacio Overton PT  Note: Physical Therapy Goals  Initiated 6/12/2021 and to be accomplished within 5-7 day(s)  1. Patient will move from supine to sit and sit to supine  in bed with minimal assistance/contact guard assist.    2.  Patient will transfer from bed to chair and chair to bed with minimal assistance/contact guard assist using the least restrictive device. NWB R  3. Patient will perform sit to stand with minimal assistance/contact guard assist. NWB R  4. Patient will ambulate with moderate assistance  for 5 feet with the least restrictive device. NWB R       PLOF: I all areas, no AD use, Has good support from girlfrienluiz Huertas who has been assisting with care since injury    PHYSICAL THERAPY EVALUATION    Patient: Sid Reynaga (43 y.o. male)  Date: 6/12/2021  Primary Diagnosis: Seizure disorder ([facility] [G40.909]  Procedure(s) (LRB):  OPEN REDUCTION INTERNAL FIXATION TRIMALLEOLAR ANKLE FRACTURE WITH FIXATION POSTERIOR LIP, RIGHT ANKLE/C-ARM/SYNTHES ANKLE TRAUMA TRAY/DBM ALLOMATRIX  **GEN W/REGIONAL** (Right) 1 Day Post-Op   Precautions:    Fall, Other (comment) (observation)  NWB  PLOF:  I all areas, no AD use, Has good support from girlfrienluiz Kiya who has been assisting with care since injury     ASSESSMENT :  Based on the objective data described below, the patient presents to skilled PT with decrease I all areas of bed mobility, transfers and gait. He was found supine in bed and cleared for PT by nursing who requests OOB to chair to change out beds. He was co treat with OT to maximize safety and progression of mobility and completion of consult. He was agreeable for OOB to chair.  He declined use of RW to aid with transfer preferring to use a [facility] [place] to bear down on like he has been at home- informed patient we would use A X 2 for this morning for stand pivot bed to chair NWB R and he was agreeable. Supine to sit Min A and sit to stand to sit for a stand pivot bed to chair transfer strong Mod A X2 . Patient states he was maintaining NWB R. He was set up with the bone stimulator and R LE elevated. Nursing present at conclusion. Recommend lift team to assist with back to bed later. Recommend co treat with OT should they keep on caseload. His residence has [address] and due to his decrease I with functional mobility this is a concern for safe DC to home setting. Patient will benefit from skilled intervention to address the above impairments. Patient's rehabilitation potential is considered to be Good  Factors which may influence rehabilitation potential include:    []         None noted  []         Mental ability/status  [x]         Medical condition  []         Home/family situation and support systems  [x]         Safety awareness  []         Pain tolerance/management  []         Other:      PLAN :  Recommendations and Planned Interventions:    [x]           Bed Mobility Training             [x]    Neuromuscular Re-Education  [x]           Transfer Training                   []    Orthotic/Prosthetic Training  [x]           Gait Training                          []    Modalities  [x]           Therapeutic Exercises           []    Edema Management/Control  [x]           Therapeutic Activities            [x]    Family Training/Education  [x]           Patient Education  []           Other (comment):    Frequency/Duration: Patient will be followed by physical therapy 1-2 times per day/4-7 days per week to address goals. Discharge Recommendations: rehab  Further Equipment Recommendations for Discharge: to be assessed for any other mobility devices,      SUBJECTIVE:   Patient stated My girlfriend has been helping me  .     OBJECTIVE DATA SUMMARY:     Past Medical History:   Diagnosis Date    [facility] 6/26/2018    Cardiac arrhythmia     Depression     W (puma wound)     H/O blood clots     H/O brain surgery     AS A CHILD    H/O chest tube placement     Hypercholesterolemia     Hypertension     Mood disorder (HCC)     Seizures (HCC)     Grand mal    Seizures ([facility]     Sleep apnea     Substance abuse ([facility]     Thromboembolus ([facility]      Past Surgical History:   Procedure Laterality Date    HX OTHER SURGICAL      Shunts, Bilateral legs- DENIES    NEUROLOGICAL PROCEDURE UNLISTED      brain surgery    NE CARDIAC SURG PROCEDURE UNLIST      NE COMPRE ELECTROPHYSIOL XM W/LEFT ATRIAL PACNG/REC N/A 11/11/2019    Lt Atrial Pace & Record During Ep Study performed by Yoli Anders MD at [facility] CATH LAB    NE EPHYS EVAL W/ABLATION [address] N/A 11/11/2019    ABLATION A-FLUTTER/with DAMARI prior performed by Yoli nAders MD at [facility] CATH LAB    VASCULAR SURGERY PROCEDURE UNLIST      blood clot removed     Barriers to Learning/Limitations: None  Compensate with: N/A  Home Situation:  Home Situation  Home Environment: Private residence (refers to his room)  # Steps to Enter: 3  Rails to Enter: Yes  Hand Rails : Bilateral  Wheelchair Ramp: No  One/Two Story Residence: One story  Living Alone: Yes  Support Systems: Friends \ neighbors, Spouse/Significant Other/Partner, Other (comments) (roommates)  Patient Expects to be Discharged to[de-identified] [employer]  Current DME Used/Available at Home: Raised toilet seat, Shower chair, Wheelchair, Other (comment) (roommates [facility] [place])  Critical Behavior:  Neurologic State: Alert; Other (Comment) (talkative)  Orientation Level: Oriented X4  Cognition: Follows commands  Safety/Judgement: Fall prevention  Psychosocial  Patient Behaviors: Calm; Cooperative  Purposeful Interaction: Yes                 Strength:    Strength:  Within functional limits (B LE P/O R Lower leg)                    Tone & Sensation:   Tone: Normal              Sensation: Intact (nerve block wearing off R LE)               Range Of Motion:  AROM: Within functional limits (B LE, P/O R lower leg)                       Posture:        Functional Mobility:  Bed Mobility:  Rolling: Modified independent  Supine to Sit: Minimum assistance        Transfers:  Sit to Stand: Moderate assistance;Assist x2  Stand to Sit: Moderate assistance;Assist x2  Stand Pivot Transfers: Moderate assistance;Assist x2     Bed to Chair: Moderate assistance;Assist x2              Balance:   Sitting: Intact; Without support  Standing: Intact; With support  Wheelchair Mobility:        Ambulation/Gait Training:                    Right Side Weight Bearing: Non-weight bearing                                Stairs: Therapeutic Exercises:      Pain:  Pain level pre-treatment: 9/10   Pain level post-treatment: 9/10   Pain Intervention(s) : Medication (see MAR); Rest, Ice, Repositioning   Response to intervention: Nurse notified, See doc flow     Activity Tolerance: Riaz fwell  Please refer to the flowsheet for vital signs taken during this treatment. After treatment:   [x]         Patient left in no apparent distress sitting up in chair  []         Patient left in no apparent distress in bed  [x]         Call bell left within reach  [x]         Nursing notified  []         Caregiver present  []         Bed alarm activated  []         SCDs applied    COMMUNICATION/EDUCATION:   [x]         Role of Physical Therapy in the acute care setting. [x]         Fall prevention education was provided and the patient/caregiver indicated understanding. [x]         Patient/family have participated as able in goal setting and plan of care. [x]         Patient/family agree to work toward stated goals and plan of care. []         Patient understands intent and goals of therapy, but is neutral about his/her participation. []         Patient is unable to participate in goal setting/plan of care: ongoing with therapy staff.  []         Other:     Thank you for this referral.  Graham Harper, PT   Time Calculation: 24 mins      Eval Complexity: History: MEDIUM  Complexity : 1-2 comorbidities / personal factors will impact the outcome/ POC Exam:MEDIUM Complexity : 3 Standardized tests and measures addressing body structure, function, activity limitation and / or participation in recreation  Presentation: MEDIUM Complexity : Evolving with changing characteristics  Clinical Decision Making:Medium Complexity level of assistance with functional mobility  Overall Complexity:MEDIUM

## 2021-06-12 NOTE — PROGRESS NOTES
Problem: Patient Education: Go to Patient Education Activity  Goal: Patient/Family Education  Outcome: Progressing Towards Goal     Problem: Lower Extremity Fracture:Day of Admission  Goal: Activity/Safety  Outcome: Progressing Towards Goal  Goal: Consults, if ordered  Outcome: Progressing Towards Goal  Goal: Diagnostic Test/Procedures  Outcome: Progressing Towards Goal  Goal: Nutrition/Diet  Outcome: Progressing Towards Goal  Goal: Medications  Outcome: Progressing Towards Goal  Goal: Respiratory  Outcome: Progressing Towards Goal  Goal: Treatments/Interventions/Procedures  Outcome: Progressing Towards Goal  Goal: Psychosocial  Outcome: Progressing Towards Goal  Goal: *Optimal pain control at patient's stated goal  Outcome: Progressing Towards Goal  Goal: *Hemodynamically stable  Outcome: Progressing Towards Goal  Goal: *Adequate oxygenation  Outcome: Progressing Towards Goal     Problem: Lower Extremity Fracture:Day of Surgery ([facility]iate SCIP Measures for Post-op Care)  Goal: Off Pathway (Use only if patient is Off Pathway)  Outcome: Progressing Towards Goal  Goal: Activity/Safety  Outcome: Progressing Towards Goal  Goal: Diagnostic Test/Procedures  Outcome: Progressing Towards Goal  Goal: Nutrition/Diet  Outcome: Progressing Towards Goal  Goal: Medications  Outcome: Progressing Towards Goal  Goal: Respiratory  Outcome: Progressing Towards Goal  Goal: Treatments/Interventions/Procedures  Outcome: Progressing Towards Goal  Goal: Psychosocial  Outcome: Progressing Towards Goal  Goal: *Optimal pain control at patient's stated goal  Outcome: Progressing Towards Goal  Goal: *Hemodynamically stable  Outcome: Progressing Towards Goal  Goal: *Adequate oxygenation  Outcome: Progressing Towards Goal     Problem: Lower Extremity Fracture:Post-op Day 1  Goal: Off Pathway (Use only if patient is Off Pathway)  Outcome: Progressing Towards Goal  Goal: Activity/Safety  Outcome: Progressing Towards Goal  Goal: Consults, if ordered  Outcome: Progressing Towards Goal  Goal: Diagnostic Test/Procedures  Outcome: Progressing Towards Goal  Goal: Nutrition/Diet  Outcome: Progressing Towards Goal  Goal: Discharge Planning  Outcome: Progressing Towards Goal  Goal: Medications  Outcome: Progressing Towards Goal  Goal: Respiratory  Outcome: Progressing Towards Goal  Goal: Treatments/Interventions/Procedures  Outcome: Progressing Towards Goal  Goal: Psychosocial  Outcome: Progressing Towards Goal  Goal: *Optimal pain control at patient's stated goal  Outcome: Progressing Towards Goal  Goal: *Hemodynamically stable  Outcome: Progressing Towards Goal  Goal: *Adequate oxygenation  Outcome: Progressing Towards Goal  Goal: *PT/INR within defined limits  Outcome: Progressing Towards Goal  Goal: *Demonstrates progressive activity  Outcome: Progressing Towards Goal     Problem: Lower Extremity Fracture:Post-op Day 1  Goal: *PT/INR within defined limits  Outcome: Progressing Towards Goal     Problem: Lower Extremity Fracture:Post-op Day 1  Goal: *Adequate oxygenation  Outcome: Progressing Towards Goal     Problem: Lower Extremity Fracture:Post-op Day 1  Goal: *Adequate oxygenation  Outcome: Progressing Towards Goal     Problem: Lower Extremity Fracture:Post-op Day 1  Goal: *Demonstrates progressive activity  Outcome: Progressing Towards Goal     Problem: Lower Extremity Fracture:Post-op Day 1  Goal: *Adequate oxygenation  Outcome: Progressing Towards Goal     Problem: Lower Extremity Fracture:Post-op Day 1  Goal: *PT/INR within defined limits  Outcome: Progressing Towards Goal     Problem: Lower Extremity Fracture:Post-op Day 1  Goal: *Demonstrates progressive activity  Outcome: Progressing Towards Goal     Problem: Lower Extremity Fracture:Post-op Day 1  Goal: *Adequate oxygenation  Outcome: Progressing Towards Goal     Problem: Lower Extremity Fracture:Post-op Day 1  Goal: *PT/INR within defined limits  Outcome: Progressing Towards Goal     Problem: Lower Extremity Fracture:Post-op Day 1  Goal: *Demonstrates progressive activity  Outcome: Progressing Towards Goal     Problem: Lower Extremity Fracture:Post-op Day 1  Goal: *Demonstrates progressive activity  Outcome: Progressing Towards Goal     Problem: Lower Extremity Fracture:Post-op Day 1  Goal: *Adequate oxygenation  Outcome: Progressing Towards Goal     Problem: Lower Extremity Fracture:Post-op Day 1  Goal: *PT/INR within defined limits  Outcome: Progressing Towards Goal     Problem: Lower Extremity Fracture:Post-op Day 1  Goal: *Demonstrates progressive activity  Outcome: Progressing Towards Goal

## 2021-06-13 LAB
ANION GAP SERPL CALC-SCNC: 4 MMOL/L (ref 3–18)
BUN SERPL-MCNC: 17 MG/DL (ref 7–18)
BUN/CREAT SERPL: 25 (ref 12–20)
CALCIUM SERPL-MCNC: 9.1 MG/DL (ref 8.5–10.1)
CHLORIDE SERPL-SCNC: 107 MMOL/L (ref 100–111)
CO2 SERPL-SCNC: 30 MMOL/L (ref 21–32)
CREAT SERPL-MCNC: 0.67 MG/DL (ref 0.6–1.3)
ERYTHROCYTE [DISTWIDTH] IN BLOOD BY AUTOMATED COUNT: 13.2 % (ref 11.6–14.5)
GLUCOSE SERPL-MCNC: 102 MG/DL (ref 74–99)
HCT VFR BLD AUTO: 37.3 % (ref 36–48)
HGB BLD-MCNC: 11.7 G/DL (ref 13–16)
MAGNESIUM SERPL-MCNC: 2.3 MG/DL (ref 1.6–2.6)
MCH RBC QN AUTO: 31.4 PG (ref 24–34)
MCHC RBC AUTO-ENTMCNC: 31.4 G/DL (ref 31–37)
MCV RBC AUTO: 100 FL (ref 74–97)
PLATELET # BLD AUTO: 190 K/UL (ref 135–420)
PMV BLD AUTO: 11.3 FL (ref 9.2–11.8)
POTASSIUM SERPL-SCNC: 3.6 MMOL/L (ref 3.5–5.5)
RBC # BLD AUTO: 3.73 M/UL (ref 4.35–5.65)
SODIUM SERPL-SCNC: 141 MMOL/L (ref 136–145)
WBC # BLD AUTO: 8.8 K/UL (ref 4.6–13.2)

## 2021-06-13 PROCEDURE — 36415 COLL VENOUS BLD VENIPUNCTURE: CPT

## 2021-06-13 PROCEDURE — 74011250637 HC RX REV CODE- 250/637: Performed by: INTERNAL MEDICINE

## 2021-06-13 PROCEDURE — 80048 BASIC METABOLIC PNL TOTAL CA: CPT

## 2021-06-13 PROCEDURE — 85027 COMPLETE CBC AUTOMATED: CPT

## 2021-06-13 PROCEDURE — 94660 CPAP INITIATION&MGMT: CPT

## 2021-06-13 PROCEDURE — 74011250637 HC RX REV CODE- 250/637: Performed by: PHYSICIAN ASSISTANT

## 2021-06-13 PROCEDURE — 2709999900 HC NON-CHARGEABLE SUPPLY

## 2021-06-13 PROCEDURE — 99218 HC RM OBSERVATION: CPT

## 2021-06-13 PROCEDURE — 83735 ASSAY OF MAGNESIUM: CPT

## 2021-06-13 PROCEDURE — 99226 PR SBSQ OBSERVATION CARE/DAY 35 MINUTES: CPT | Performed by: INTERNAL MEDICINE

## 2021-06-13 RX ORDER — CHOLECALCIFEROL (VITAMIN D3) 125 MCG
5000 CAPSULE ORAL DAILY
Status: DISCONTINUED | OUTPATIENT
Start: 2021-06-13 | End: 2021-06-16 | Stop reason: HOSPADM

## 2021-06-13 RX ORDER — OXYCODONE HYDROCHLORIDE 10 MG/1
10-15 TABLET ORAL
Status: DISCONTINUED | OUTPATIENT
Start: 2021-06-13 | End: 2021-06-15

## 2021-06-13 RX ORDER — LISINOPRIL 10 MG/1
10 TABLET ORAL DAILY
Status: DISCONTINUED | OUTPATIENT
Start: 2021-06-14 | End: 2021-06-16 | Stop reason: HOSPADM

## 2021-06-13 RX ORDER — LANOLIN ALCOHOL/MO/W.PET/CERES
1 CREAM (GRAM) TOPICAL EVERY OTHER DAY
Status: DISCONTINUED | OUTPATIENT
Start: 2021-06-13 | End: 2021-06-16 | Stop reason: HOSPADM

## 2021-06-13 RX ADMIN — TRAZODONE HYDROCHLORIDE 100 MG: 100 TABLET ORAL at 21:18

## 2021-06-13 RX ADMIN — Medication 10 ML: at 21:20

## 2021-06-13 RX ADMIN — PANTOPRAZOLE SODIUM 40 MG: 40 TABLET, DELAYED RELEASE ORAL at 05:22

## 2021-06-13 RX ADMIN — OXYCODONE HYDROCHLORIDE 10 MG: 10 TABLET ORAL at 21:27

## 2021-06-13 RX ADMIN — GABAPENTIN 300 MG: 300 CAPSULE ORAL at 21:19

## 2021-06-13 RX ADMIN — DIVALPROEX SODIUM 750 MG: 250 TABLET, DELAYED RELEASE ORAL at 21:18

## 2021-06-13 RX ADMIN — OXYCODONE HYDROCHLORIDE 10 MG: 5 TABLET ORAL at 00:10

## 2021-06-13 RX ADMIN — Medication 2 TABLET: at 17:28

## 2021-06-13 RX ADMIN — LISINOPRIL 30 MG: 10 TABLET ORAL at 08:52

## 2021-06-13 RX ADMIN — RIVAROXABAN 10 MG: 10 TABLET, FILM COATED ORAL at 08:52

## 2021-06-13 RX ADMIN — Medication 10 ML: at 17:27

## 2021-06-13 RX ADMIN — METOPROLOL TARTRATE 25 MG: 25 TABLET, FILM COATED ORAL at 08:52

## 2021-06-13 RX ADMIN — OXYCODONE HYDROCHLORIDE 10 MG: 5 TABLET ORAL at 03:48

## 2021-06-13 RX ADMIN — Medication 5000 UNITS: at 12:43

## 2021-06-13 RX ADMIN — GABAPENTIN 300 MG: 300 CAPSULE ORAL at 08:51

## 2021-06-13 RX ADMIN — OXYCODONE HYDROCHLORIDE 15 MG: 10 TABLET ORAL at 13:41

## 2021-06-13 RX ADMIN — ATORVASTATIN CALCIUM 10 MG: 10 TABLET, FILM COATED ORAL at 21:18

## 2021-06-13 RX ADMIN — GABAPENTIN 300 MG: 300 CAPSULE ORAL at 17:28

## 2021-06-13 RX ADMIN — FERROUS SULFATE TAB 325 MG (65 MG ELEMENTAL FE) 325 MG: 325 (65 FE) TAB at 08:52

## 2021-06-13 RX ADMIN — DIVALPROEX SODIUM 750 MG: 250 TABLET, DELAYED RELEASE ORAL at 08:51

## 2021-06-13 RX ADMIN — OXYCODONE HYDROCHLORIDE 15 MG: 10 TABLET ORAL at 08:51

## 2021-06-13 RX ADMIN — OXYCODONE HYDROCHLORIDE 15 MG: 10 TABLET ORAL at 17:27

## 2021-06-13 RX ADMIN — Medication 2 TABLET: at 08:51

## 2021-06-13 RX ADMIN — Medication 10 ML: at 05:14

## 2021-06-13 NOTE — PROGRESS NOTES
Per Dr. Shyla Bhat, pt will need snf placement. Met with pt and he was agreeable to [facility]. Clinicals sent to [address] [place] [address] in [place]. Notified [facility] of [facility].       IVAN FooteN RN  Care Management  Pager: [phone]

## 2021-06-13 NOTE — PROGRESS NOTES
Hospitalist Progress Note    Patient: Paige Black Age: 46 y.o. : 1970 MR#: [id number] SSN: [id number]  Date/Time: 2021 12:02 PM    DOA: 2021  PCP: Dipika Ramirez MD    Subjective:     Still feels unstable on his feet. He does not think he can handle going home. PT/OT reconsider for him to SNF   Still has pain in his right ankle  Has right chest pain with deep inspiration, this is chronic. No cough. No fever. Negative CXR  No further report of seizure. Interval Hospital Course:        ROS:  No current fever/chills, no headache, no dizziness, no facial pain, no sinus congestion,   No swallowing pain, No chest pain, no palpitation, no shortness of breath, no abd pain,  No diarrhea, no urinary complaint, + leg pain or swelling    Assessment/Plan:   1. Breakthrough seizure, with underlying seizure disorder, now resolved   2. Right ankle fracture, s/p ORIF (21), still with significant pain with weight bearing   3. Hypertension   4. Paroxysmal atrial flutter, no Xarelto   5. ADAIR on CPAP   6. Morbid obesity   7. Depression       Pain management. Can have narcotic prn. Increase gabapentin   Seizure precaution   Resume all his home medications, resume Xarelto  CPAP QHS  ICS encourage   Wound care. Strict non weight bearing on right foot. Spoke with ortho. Full code  Disposition: spoke with [facility] for placement.  SNF option provided by ICM    Additional Notes:    Time spent >35 minutes    Case discussed with:  [x]Patient  []Family  [x]Nursing  [x]Case Management  DVT Prophylaxis:  []Lovenox  [x]xarelto  []SCDs  []Coumadin   []On Heparin gtt    Signed By: Tasia Andrews MD     2021 12:02 PM              Objective:   VS:   Visit Vitals  BP 96/64   Pulse 66   Temp 98.8 °F (37.1 °C)   Resp 18   Ht 6' 3\" (1.905 m)   Wt (!) 168.3 kg (371 lb)   SpO2 97%   BMI 46.37 kg/m²      Tmax/24hrs: Temp (24hrs), Av.9 °F (37.2 °C), Min:98.6 °F (37 °C), Max:99.3 °F (37.4 °C)      Intake/Output Summary (Last 24 hours) at 6/13/2021 1202  Last data filed at 6/13/2021 0803  Gross per 24 hour   Intake 960 ml   Output 1150 ml   Net -190 ml       Tele:   General:  Cooperative, Not in acute distress, speaks in full sentence while in bed  HEENT: PERRL, EOMI, supple neck, no JVD, dry oral mucosa  Cardiovascular: S1S2 regular, no rub/gallop   Pulmonary: Clear air entry bilaterally, no wheezing, no crackle  GI:  Soft, non tender, non distended, +bs, no guarding   Extremities:  No pedal edema, +distal pulses appreciated   Right ankle in brace. Neuro: AOx3, moving all extremities, no gross deficit.      Additional:       Current Facility-Administered Medications   Medication Dose Route Frequency    ferrous sulfate tablet 325 mg  1 Tablet Oral EVERY OTHER DAY    oxyCODONE IR (ROXICODONE) tablet 10-15 mg  10-15 mg Oral Q4H PRN    [START ON 6/14/2021] lisinopriL (PRINIVIL, ZESTRIL) tablet 10 mg  10 mg Oral DAILY    gabapentin (NEURONTIN) capsule 300 mg  300 mg Oral TID    rivaroxaban (XARELTO) tablet 10 mg  10 mg Oral DAILY WITH BREAKFAST    traZODone (DESYREL) tablet 100 mg  100 mg Oral QHS    metoprolol tartrate (LOPRESSOR) tablet 25 mg  25 mg Oral BID    atorvastatin (LIPITOR) tablet 10 mg  10 mg Oral QHS    divalproex DR (DEPAKOTE) tablet 750 mg  750 mg Oral BID    sodium chloride (NS) flush 5-40 mL  5-40 mL IntraVENous Q8H    sodium chloride (NS) flush 5-40 mL  5-40 mL IntraVENous PRN    acetaminophen (TYLENOL) tablet 650 mg  650 mg Oral Q6H PRN    Or    acetaminophen (TYLENOL) suppository 650 mg  650 mg Rectal Q6H PRN    polyethylene glycol (MIRALAX) packet 17 g  17 g Oral DAILY PRN    promethazine (PHENERGAN) tablet 12.5 mg  12.5 mg Oral Q6H PRN    Or    ondansetron (ZOFRAN) injection 4 mg  4 mg IntraVENous Q6H PRN    pantoprazole (PROTONIX) tablet 40 mg  40 mg Oral ACB    Lactobacillus Acidoph & Bulgar (FLORANEX) tablet 2 Tablet  2 Tablet Oral BID            Lab/Data Review:  Labs: Results:       Chemistry Recent Labs     06/13/21  0541 06/12/21  0702 06/11/21  1104   * 118* 106*    139 142   K 3.6 4.2 4.0    104 107   CO2 30 31 31   BUN 17 16 11   CREA 0.67 0.80 0.71   BUCR 25* 20 15   AGAP 4 4 4   CA 9.1 9.1 8.2*     Recent Labs     06/11/21  1104   ALT 21   TP 6.6   ALB 2.9*   GLOB 3.7   AGRAT 0.8      CBC w/Diff Recent Labs     06/13/21  0541 06/12/21  0702 06/11/21  1104   WBC 8.8 11.9 5.9   RBC 3.73* 3.96* 4.05*   HGB 11.7* 12.3* 12.8*   HCT 37.3 39.8 39.9   .0* 100.5* 98.5*   MCH 31.4 31.1 31.6   MCHC 31.4 30.9* 32.1   RDW 13.2 13.2 12.9    223 198   GRANS  --   --  66   LYMPH  --   --  26   EOS  --   --  1      Coagulation No results for input(s): PTP, INR, APTT, INREXT, INREXT in the last 72 hours. Iron/Ferritin No results found for: IRON, FE, TIBC, IBCT, PSAT, FERR    BNP    Cardiac Enzymes Lab Results   Component Value Date/Time    CK 99 06/11/2021 11:04 AM    CK - MB <1.0 06/11/2021 11:04 AM    CK-MB Index  06/11/2021 11:04 AM     CALCULATION NOT PERFORMED WHEN RESULT IS BELOW LINEAR LIMIT    Troponin-I, QT <0.02 06/11/2021 11:04 AM        Lactic Acid    Thyroid Studies          All Micro Results     None            Images:    CT (Most Recent). XRAY (Most Recent)      EKG Results for orders placed or performed in visit on 01/07/21   [facility] POC EKG ROUTINE W/ 12 LEADS, INTER & REP     Status: None (Preliminary result)    Impression    Sinus bradycardia at 57 bpm.  Nonspecific T wave abnormality.           2D ECHO

## 2021-06-13 NOTE — ROUTINE PROCESS
Patient is alert and oriented times three demanding to be put back on regular bed he does not like baratric bed.  Ankle dressing is clean dry and intact and pulses palpable

## 2021-06-13 NOTE — PROGRESS NOTES
Problem: Pressure Injury - Risk of  Goal: *Prevention of pressure injury  Description: Document Vaughn Scale and appropriate interventions in the flowsheet.   Outcome: Progressing Towards Goal  Note: Pressure Injury Interventions:  Sensory Interventions: Avoid rigorous massage over bony prominences, Discuss PT/OT consult with provider, Float heels, Keep linens dry and wrinkle-free    Moisture Interventions: Absorbent underpads, Assess need for specialty bed, Check for incontinence Q2 hours and as needed, Internal/External urinary devices    Activity Interventions: Assess need for specialty bed, Increase time out of bed, Pressure redistribution bed/mattress(bed type), PT/OT evaluation    Mobility Interventions: Assess need for specialty bed, HOB 30 degrees or less, Pressure redistribution bed/mattress (bed type), PT/OT evaluation    Nutrition Interventions: Document food/fluid/supplement intake, Offer support with meals,snacks and hydration                     Problem: Patient Education: Go to Patient Education Activity  Goal: Patient/Family Education  Outcome: Progressing Towards Goal     Problem: Patient Education: Go to Patient Education Activity  Goal: Patient/Family Education  Outcome: Progressing Towards Goal     Problem: Lower Extremity Fracture:Day of Admission  Goal: Off Pathway (Use only if patient is Off Pathway)  Outcome: Progressing Towards Goal  Goal: Activity/Safety  Outcome: Progressing Towards Goal  Goal: Consults, if ordered  Outcome: Progressing Towards Goal  Goal: Diagnostic Test/Procedures  Outcome: Progressing Towards Goal  Goal: Nutrition/Diet  Outcome: Progressing Towards Goal  Goal: Medications  Outcome: Progressing Towards Goal  Goal: Respiratory  Outcome: Progressing Towards Goal  Goal: Treatments/Interventions/Procedures  Outcome: Progressing Towards Goal  Goal: Psychosocial  Outcome: Progressing Towards Goal  Goal: *Optimal pain control at patient's stated goal  Outcome: Progressing Towards Goal  Goal: *Hemodynamically stable  Outcome: Progressing Towards Goal  Goal: *Adequate oxygenation  Outcome: Progressing Towards Goal     Problem: Lower Extremity Fracture:Day of Surgery ([facility] SCIP Measures for Post-op Care)  Goal: Off Pathway (Use only if patient is Off Pathway)  Outcome: Progressing Towards Goal  Goal: Activity/Safety  Outcome: Progressing Towards Goal  Goal: Consults, if ordered  Outcome: Progressing Towards Goal  Goal: Diagnostic Test/Procedures  Outcome: Progressing Towards Goal  Goal: Nutrition/Diet  Outcome: Progressing Towards Goal  Goal: Medications  Outcome: Progressing Towards Goal  Goal: Respiratory  Outcome: Progressing Towards Goal  Goal: Treatments/Interventions/Procedures  Outcome: Progressing Towards Goal  Goal: Psychosocial  Outcome: Progressing Towards Goal  Goal: *Optimal pain control at patient's stated goal  Outcome: Progressing Towards Goal  Goal: *Hemodynamically stable  Outcome: Progressing Towards Goal  Goal: *Adequate oxygenation  Outcome: Progressing Towards Goal     Problem: Lower Extremity Fracture:Post-op Day 1  Goal: Off Pathway (Use only if patient is Off Pathway)  Outcome: Progressing Towards Goal  Goal: Activity/Safety  Outcome: Progressing Towards Goal  Goal: Consults, if ordered  Outcome: Progressing Towards Goal  Goal: Diagnostic Test/Procedures  Outcome: Progressing Towards Goal  Goal: Nutrition/Diet  Outcome: Progressing Towards Goal  Goal: Discharge Planning  Outcome: Progressing Towards Goal  Goal: Medications  Outcome: Progressing Towards Goal  Goal: Respiratory  Outcome: Progressing Towards Goal  Goal: Treatments/Interventions/Procedures  Outcome: Progressing Towards Goal  Goal: Psychosocial  Outcome: Progressing Towards Goal  Goal: *Optimal pain control at patient's stated goal  Outcome: Progressing Towards Goal  Goal: *Hemodynamically stable  Outcome: Progressing Towards Goal  Goal: *Adequate oxygenation  Outcome: Progressing Towards Goal  Goal: *PT/INR within defined limits  Outcome: Progressing Towards Goal  Goal: *Demonstrates progressive activity  Outcome: Progressing Towards Goal     Problem: Lower Extremity Fracture:Post-op Day 2  Goal: Off Pathway (Use only if patient is Off Pathway)  Outcome: Progressing Towards Goal  Goal: Activity/Safety  Outcome: Progressing Towards Goal  Goal: Diagnostic Test/Procedures  Outcome: Progressing Towards Goal  Goal: Nutrition/Diet  Outcome: Progressing Towards Goal  Goal: Discharge Planning  Outcome: Progressing Towards Goal  Goal: Medications  Outcome: Progressing Towards Goal  Goal: Respiratory  Outcome: Progressing Towards Goal  Goal: Treatments/Interventions/Procedures  Outcome: Progressing Towards Goal  Goal: Psychosocial  Outcome: Progressing Towards Goal  Goal: *Optimal pain control at patient's stated goal  Outcome: Progressing Towards Goal  Goal: *Hemodynamically stable  Outcome: Progressing Towards Goal  Goal: *Adequate oxygenation  Outcome: Progressing Towards Goal  Goal: *PT/INR within defined limits  Outcome: Progressing Towards Goal  Goal: *Demonstrates progressive activity  Outcome: Progressing Towards Goal  Goal: *Voiding  Outcome: Progressing Towards Goal  Goal: *Bowel movement  Outcome: Progressing Towards Goal  Goal: *Tolerating diet  Outcome: Progressing Towards Goal     Problem: Lower Extremity Fracture:Post-op Day 3  Goal: Off Pathway (Use only if patient is Off Pathway)  Outcome: Progressing Towards Goal  Goal: Activity/Safety  Outcome: Progressing Towards Goal  Goal: Diagnostic Test/Procedures  Outcome: Progressing Towards Goal  Goal: Nutrition/Diet  Outcome: Progressing Towards Goal  Goal: Discharge Planning  Outcome: Progressing Towards Goal  Goal: Medications  Outcome: Progressing Towards Goal  Goal: Respiratory  Outcome: Progressing Towards Goal  Goal: Treatments/Interventions/Procedures  Outcome: Progressing Towards Goal  Goal: Psychosocial  Outcome: Progressing Towards Goal  Goal: *Optimal pain control at patient's stated goal  Outcome: Progressing Towards Goal  Goal: *Hemodynamically stable  Outcome: Progressing Towards Goal  Goal: *Adequate oxygenation  Outcome: Progressing Towards Goal  Goal: *PT/INR within defined limits  Outcome: Progressing Towards Goal  Goal: *Demonstrates progressive activity  Outcome: Progressing Towards Goal  Goal: *Voiding  Outcome: Progressing Towards Goal  Goal: *Bowel movement  Outcome: Progressing Towards Goal  Goal: *Tolerating diet  Outcome: Progressing Towards Goal     Problem: Lower Extremity Fracture:Post-op Day 4  Goal: Off Pathway (Use only if patient is Off Pathway)  Outcome: Progressing Towards Goal  Goal: Activity/Safety  Outcome: Progressing Towards Goal  Goal: Diagnostic Test/Procedures  Outcome: Progressing Towards Goal  Goal: Nutrition/Diet  Outcome: Progressing Towards Goal  Goal: Discharge Planning  Outcome: Progressing Towards Goal  Goal: Medications  Outcome: Progressing Towards Goal  Goal: Respiratory  Outcome: Progressing Towards Goal  Goal: Treatments/Interventions/Procedures  Outcome: Progressing Towards Goal  Goal: Psychosocial  Outcome: Progressing Towards Goal     Problem: Lower Extremity Fracture:Discharge Outcomes  Goal: *Hemodynamically stable  Outcome: Progressing Towards Goal  Goal: *Modified independence with transfers, ambulation on levels with assistance devices, stair climbing, ADL's  Outcome: Progressing Towards Goal  Goal: *Independent with active exercises  Outcome: Progressing Towards Goal  Goal: *Describes follow-up/return visits to physicians  Outcome: Progressing Towards Goal  Goal: *Tolerating diet  Outcome: Progressing Towards Goal  Goal: *Optimal pain control at patient's stated goal  Outcome: Progressing Towards Goal  Goal: *Adequate air exchange  Outcome: Progressing Towards Goal  Goal: *Lungs clear or at baseline  Outcome: Progressing Towards Goal  Goal: *Afebrile  Outcome: Progressing Towards Goal  Goal: *Incision intact without signs of infection, redness, warmth  Outcome: Progressing Towards Goal  Goal: *Absence of deep venous thrombosis signs and symptoms(Stroke Metric)  Outcome: Progressing Towards Goal  Goal: *Active bowel function  Outcome: Progressing Towards Goal  Goal: *Adequate urinary output  Outcome: Progressing Towards Goal  Goal: *Discharge anxiety minimal  Outcome: Progressing Towards Goal  Goal: *Describes available resources and support systems  Outcome: Progressing Towards Goal

## 2021-06-13 NOTE — ROUTINE PROCESS
Bedside shift change report given to Hernan Del Valle RN (oncoming nurse) by Flakito Newton RN (offgoing nurse). Report included the following information SBAR, Kardex, Procedure Summary, Intake/Output, MAR and Recent Results. Opportunity for questions and clarification was provided.

## 2021-06-13 NOTE — PROGRESS NOTES
Per Ortho    Patient seen at bedside    S/p below         Patient: Damari Beavers  YOB: 1970  MRN: [id number]     Date of Procedure: 6/11/2021      Pre-Op Diagnosis: S82.851A TRIMALLEOLAR ANKLE FRACTURE, RIGHT ANKLE     Post-Op Diagnosis: Same as preoperative diagnosis.       Procedure(s):  OPEN REDUCTION INTERNAL FIXATION TRIMALLEOLAR ANKLE FRACTURE WITHOUT FIXATION POSTERIOR LIP  89593  SYNDESMOTIC STABILIZATION 20557,  EXTERNAL BONE STIMULATION  22857      Surgeon(s):  Linda Mcmillan MD     Surgical Assistant: Physician Assistant: (Unknown)  Surg Asst-1: Nelsy Sick  Sitting up with pain RLE        Medicine exploring transfer options    No CP, No SOB, (+) pain RLE poorly managed with current strength of oxycodone.  Recommend increasing strength    Speech therapy, pending study on Monday    Moving digits all RLE distal    Cap refill < 2 seconds RLE    Soft cast intact well fitting RLE    Plan: , oxycodone 10-15mg Q4/prn pain SNF plans, elevate Right LE on 2 pillows

## 2021-06-13 NOTE — ROUTINE PROCESS
Bedside shift change report given to Radha Owusu rn (oncoming nurse) by Libertad Flores (offgoing nurse). Report included the following information SBAR.

## 2021-06-13 NOTE — PROGRESS NOTES
Problem: Pressure Injury - Risk of  Goal: *Prevention of pressure injury  Description: Document Vaughn Scale and appropriate interventions in the flowsheet.   Outcome: Progressing Towards Goal  Note: Pressure Injury Interventions:  Sensory Interventions: Assess changes in LOC, Avoid rigorous massage over bony prominences, Keep linens dry and wrinkle-free, Minimize linen layers    Moisture Interventions: Absorbent underpads, Apply protective barrier, creams and emollients, Minimize layers, Moisture barrier    Activity Interventions: Increase time out of bed, Pressure redistribution bed/mattress(bed type), PT/OT evaluation    Mobility Interventions: Pressure redistribution bed/mattress (bed type), PT/OT evaluation    Nutrition Interventions: Document food/fluid/supplement intake                     Problem: Patient Education: Go to Patient Education Activity  Goal: Patient/Family Education  Outcome: Progressing Towards Goal     Problem: Patient Education: Go to Patient Education Activity  Goal: Patient/Family Education  Outcome: Progressing Towards Goal     Problem: Lower Extremity Fracture:Day of Admission  Goal: Off Pathway (Use only if patient is Off Pathway)  Outcome: Resolved/Met  Goal: Activity/Safety  Outcome: Resolved/Met  Goal: Consults, if ordered  Outcome: Resolved/Met  Goal: Diagnostic Test/Procedures  Outcome: Resolved/Met  Goal: Nutrition/Diet  Outcome: Resolved/Met  Goal: Medications  Outcome: Resolved/Met  Goal: Respiratory  Outcome: Resolved/Met  Goal: Treatments/Interventions/Procedures  Outcome: Resolved/Met  Goal: Psychosocial  Outcome: Resolved/Met  Goal: *Optimal pain control at patient's stated goal  Outcome: Resolved/Met  Goal: *Hemodynamically stable  Outcome: Resolved/Met  Goal: *Adequate oxygenation  Outcome: Resolved/Met     Problem: Lower Extremity Fracture:Day of Surgery ([facility] SCIP Measures for Post-op Care)  Goal: Off Pathway (Use only if patient is Off Pathway)  Outcome: Resolved/Met  Goal: Activity/Safety  Outcome: Resolved/Met  Goal: Consults, if ordered  Outcome: Resolved/Met  Goal: Diagnostic Test/Procedures  Outcome: Resolved/Met  Goal: Nutrition/Diet  Outcome: Resolved/Met  Goal: Medications  Outcome: Resolved/Met  Goal: Respiratory  Outcome: Resolved/Met  Goal: Treatments/Interventions/Procedures  Outcome: Resolved/Met  Goal: Psychosocial  Outcome: Resolved/Met  Goal: *Optimal pain control at patient's stated goal  Outcome: Resolved/Met  Goal: *Hemodynamically stable  Outcome: Resolved/Met  Goal: *Adequate oxygenation  Outcome: Resolved/Met     Problem: Lower Extremity Fracture:Post-op Day 1  Goal: Off Pathway (Use only if patient is Off Pathway)  Outcome: Resolved/Met  Goal: Activity/Safety  Outcome: Resolved/Met  Goal: Consults, if ordered  Outcome: Resolved/Met  Goal: Diagnostic Test/Procedures  Outcome: Resolved/Met  Goal: Nutrition/Diet  Outcome: Resolved/Met  Goal: Discharge Planning  Outcome: Resolved/Met  Goal: Medications  Outcome: Resolved/Met  Goal: Respiratory  Outcome: Resolved/Met  Goal: Treatments/Interventions/Procedures  Outcome: Resolved/Met  Goal: Psychosocial  Outcome: Resolved/Met  Goal: *Optimal pain control at patient's stated goal  Outcome: Resolved/Met  Goal: *Hemodynamically stable  Outcome: Resolved/Met  Goal: *Adequate oxygenation  Outcome: Resolved/Met  Goal: *PT/INR within defined limits  Outcome: Resolved/Met  Goal: *Demonstrates progressive activity  Outcome: Resolved/Met     Problem: Lower Extremity Fracture:Post-op Day 2  Goal: Off Pathway (Use only if patient is Off Pathway)  Outcome: Progressing Towards Goal  Goal: Activity/Safety  Outcome: Progressing Towards Goal  Goal: Diagnostic Test/Procedures  Outcome: Progressing Towards Goal  Goal: Nutrition/Diet  Outcome: Progressing Towards Goal  Goal: Discharge Planning  Outcome: Progressing Towards Goal  Goal: Medications  Outcome: Progressing Towards Goal  Goal: Respiratory  Outcome: Progressing Towards Goal  Goal: Treatments/Interventions/Procedures  Outcome: Progressing Towards Goal  Goal: Psychosocial  Outcome: Progressing Towards Goal  Goal: *Optimal pain control at patient's stated goal  Outcome: Progressing Towards Goal  Goal: *Hemodynamically stable  Outcome: Progressing Towards Goal  Goal: *Adequate oxygenation  Outcome: Progressing Towards Goal  Goal: *PT/INR within defined limits  Outcome: Progressing Towards Goal  Goal: *Demonstrates progressive activity  Outcome: Progressing Towards Goal  Goal: *Voiding  Outcome: Progressing Towards Goal  Goal: *Bowel movement  Outcome: Progressing Towards Goal  Goal: *Tolerating diet  Outcome: Progressing Towards Goal     Problem: Lower Extremity Fracture:Post-op Day 3  Goal: Off Pathway (Use only if patient is Off Pathway)  Outcome: Progressing Towards Goal  Goal: Activity/Safety  Outcome: Progressing Towards Goal  Goal: Diagnostic Test/Procedures  Outcome: Progressing Towards Goal  Goal: Nutrition/Diet  Outcome: Progressing Towards Goal  Goal: Discharge Planning  Outcome: Progressing Towards Goal  Goal: Medications  Outcome: Progressing Towards Goal  Goal: Respiratory  Outcome: Progressing Towards Goal  Goal: Treatments/Interventions/Procedures  Outcome: Progressing Towards Goal  Goal: Psychosocial  Outcome: Progressing Towards Goal  Goal: *Optimal pain control at patient's stated goal  Outcome: Progressing Towards Goal  Goal: *Hemodynamically stable  Outcome: Progressing Towards Goal  Goal: *Adequate oxygenation  Outcome: Progressing Towards Goal  Goal: *PT/INR within defined limits  Outcome: Progressing Towards Goal  Goal: *Demonstrates progressive activity  Outcome: Progressing Towards Goal  Goal: *Voiding  Outcome: Progressing Towards Goal  Goal: *Bowel movement  Outcome: Progressing Towards Goal  Goal: *Tolerating diet  Outcome: Progressing Towards Goal     Problem: Lower Extremity Fracture:Post-op Day 4  Goal: Off Pathway (Use only if patient is Off Pathway)  Outcome: Progressing Towards Goal  Goal: Activity/Safety  Outcome: Progressing Towards Goal  Goal: Diagnostic Test/Procedures  Outcome: Progressing Towards Goal  Goal: Nutrition/Diet  Outcome: Progressing Towards Goal  Goal: Discharge Planning  Outcome: Progressing Towards Goal  Goal: Medications  Outcome: Progressing Towards Goal  Goal: Respiratory  Outcome: Progressing Towards Goal  Goal: Treatments/Interventions/Procedures  Outcome: Progressing Towards Goal  Goal: Psychosocial  Outcome: Progressing Towards Goal     Problem: Lower Extremity Fracture:Discharge Outcomes  Goal: *Hemodynamically stable  Outcome: Progressing Towards Goal  Goal: *Modified independence with transfers, ambulation on levels with assistance devices, stair climbing, ADL's  Outcome: Progressing Towards Goal  Goal: *Independent with active exercises  Outcome: Progressing Towards Goal  Goal: *Describes follow-up/return visits to physicians  Outcome: Progressing Towards Goal  Goal: *Tolerating diet  Outcome: Progressing Towards Goal  Goal: *Optimal pain control at patient's stated goal  Outcome: Progressing Towards Goal  Goal: *Adequate air exchange  Outcome: Progressing Towards Goal  Goal: *Lungs clear or at baseline  Outcome: Progressing Towards Goal  Goal: *Afebrile  Outcome: Progressing Towards Goal  Goal: *Incision intact without signs of infection, redness, warmth  Outcome: Progressing Towards Goal  Goal: *Absence of deep venous thrombosis signs and symptoms(Stroke Metric)  Outcome: Progressing Towards Goal  Goal: *Active bowel function  Outcome: Progressing Towards Goal  Goal: *Adequate urinary output  Outcome: Progressing Towards Goal  Goal: *Discharge anxiety minimal  Outcome: Progressing Towards Goal  Goal: *Describes available resources and support systems  Outcome: Progressing Towards Goal     Problem: Pain  Goal: *Control of Pain  Outcome: Progressing Towards Goal  Goal: *PALLIATIVE CARE:  Alleviation of Pain  Outcome: Progressing Towards Goal     Problem: Patient Education: Go to Patient Education Activity  Goal: Patient/Family Education  Outcome: Progressing Towards Goal     Problem: Falls - Risk of  Goal: *Absence of Falls  Description: Document Elizabeth Blood Fall Risk and appropriate interventions in the flowsheet.   Outcome: Progressing Towards Goal  Note: Fall Risk Interventions:  Mobility Interventions: Strengthening exercises (ROM-active/passive), Utilize walker, cane, or other assistive device, Patient to call before getting OOB, PT Consult for mobility concerns, PT Consult for assist device competence         Medication Interventions: Bed/chair exit alarm, Patient to call before getting OOB    Elimination Interventions: Bed/chair exit alarm, Call light in reach, Patient to call for help with toileting needs    History of Falls Interventions: Bed/chair exit alarm, Investigate reason for fall         Problem: Patient Education: Go to Patient Education Activity  Goal: Patient/Family Education  Outcome: Progressing Towards Goal     Problem: Patient Education: Go to Patient Education Activity  Goal: Patient/Family Education  Outcome: Progressing Towards Goal     Problem: Patient Education: Go to Patient Education Activity  Goal: Patient/Family Education  Outcome: Progressing Towards Goal

## 2021-06-13 NOTE — ROUTINE PROCESS
Treated patient for pain of 9/10 with prescribed pain medication. No signs or symptoms of distress of distress at this time.  Dressing remains clean dry and intact

## 2021-06-13 NOTE — ROUTINE PROCESS
Patient is alert and oriented times three with no signs or symptoms of distress.  Dressing is clean dry and intact

## 2021-06-14 ENCOUNTER — APPOINTMENT (OUTPATIENT)
Dept: GENERAL RADIOLOGY | Age: 51
DRG: 493 | End: 2021-06-14
Attending: INTERNAL MEDICINE
Payer: MEDICARE

## 2021-06-14 ENCOUNTER — APPOINTMENT (OUTPATIENT)
Dept: VASCULAR SURGERY | Age: 51
DRG: 493 | End: 2021-06-14
Attending: INTERNAL MEDICINE
Payer: MEDICARE

## 2021-06-14 LAB
ALBUMIN SERPL-MCNC: 2.9 G/DL (ref 3.4–5)
ALBUMIN/GLOB SERPL: 0.7 {RATIO} (ref 0.8–1.7)
ALP SERPL-CCNC: 86 U/L (ref 45–117)
ALT SERPL-CCNC: 22 U/L (ref 16–61)
ANION GAP SERPL CALC-SCNC: 2 MMOL/L (ref 3–18)
AST SERPL-CCNC: 20 U/L (ref 10–38)
BASOPHILS # BLD: 0 K/UL (ref 0–0.1)
BASOPHILS NFR BLD: 0 % (ref 0–2)
BILIRUB SERPL-MCNC: 0.3 MG/DL (ref 0.2–1)
BUN SERPL-MCNC: 14 MG/DL (ref 7–18)
BUN/CREAT SERPL: 16 (ref 12–20)
CALCIUM SERPL-MCNC: 9.1 MG/DL (ref 8.5–10.1)
CHLORIDE SERPL-SCNC: 104 MMOL/L (ref 100–111)
CK MB CFR SERPL CALC: NORMAL % (ref 0–4)
CK MB SERPL-MCNC: <1 NG/ML (ref 5–25)
CK SERPL-CCNC: 120 U/L (ref 39–308)
CO2 SERPL-SCNC: 32 MMOL/L (ref 21–32)
CREAT SERPL-MCNC: 0.86 MG/DL (ref 0.6–1.3)
DIFFERENTIAL METHOD BLD: ABNORMAL
EOSINOPHIL # BLD: 0.1 K/UL (ref 0–0.4)
EOSINOPHIL NFR BLD: 1 % (ref 0–5)
ERYTHROCYTE [DISTWIDTH] IN BLOOD BY AUTOMATED COUNT: 13 % (ref 11.6–14.5)
ERYTHROCYTE [DISTWIDTH] IN BLOOD BY AUTOMATED COUNT: 13.2 % (ref 11.6–14.5)
GLOBULIN SER CALC-MCNC: 4 G/DL (ref 2–4)
GLUCOSE BLD STRIP.AUTO-MCNC: 107 MG/DL (ref 70–110)
GLUCOSE SERPL-MCNC: 108 MG/DL (ref 74–99)
HCT VFR BLD AUTO: 37.7 % (ref 36–48)
HCT VFR BLD AUTO: 38.8 % (ref 36–48)
HGB BLD-MCNC: 11.7 G/DL (ref 13–16)
HGB BLD-MCNC: 11.7 G/DL (ref 13–16)
LACTATE SERPL-SCNC: 1.6 MMOL/L (ref 0.4–2)
LACTATE SERPL-SCNC: 1.9 MMOL/L (ref 0.4–2)
LYMPHOCYTES # BLD: 3.8 K/UL (ref 0.9–3.6)
LYMPHOCYTES NFR BLD: 42 % (ref 21–52)
MAGNESIUM SERPL-MCNC: 2.2 MG/DL (ref 1.6–2.6)
MCH RBC QN AUTO: 30.8 PG (ref 24–34)
MCH RBC QN AUTO: 31.3 PG (ref 24–34)
MCHC RBC AUTO-ENTMCNC: 30.2 G/DL (ref 31–37)
MCHC RBC AUTO-ENTMCNC: 31 G/DL (ref 31–37)
MCV RBC AUTO: 100.8 FL (ref 74–97)
MCV RBC AUTO: 102.1 FL (ref 74–97)
MONOCYTES # BLD: 0.8 K/UL (ref 0.05–1.2)
MONOCYTES NFR BLD: 9 % (ref 3–10)
NEUTS SEG # BLD: 4.3 K/UL (ref 1.8–8)
NEUTS SEG NFR BLD: 48 % (ref 40–73)
PHOSPHATE SERPL-MCNC: 3.5 MG/DL (ref 2.5–4.9)
PLATELET # BLD AUTO: 188 K/UL (ref 135–420)
PLATELET # BLD AUTO: 205 K/UL (ref 135–420)
PMV BLD AUTO: 10.8 FL (ref 9.2–11.8)
PMV BLD AUTO: 11.5 FL (ref 9.2–11.8)
POTASSIUM SERPL-SCNC: 3.9 MMOL/L (ref 3.5–5.5)
PROCALCITONIN SERPL-MCNC: <0.05 NG/ML
PROT SERPL-MCNC: 6.9 G/DL (ref 6.4–8.2)
RBC # BLD AUTO: 3.74 M/UL (ref 4.35–5.65)
RBC # BLD AUTO: 3.8 M/UL (ref 4.35–5.65)
SODIUM SERPL-SCNC: 138 MMOL/L (ref 136–145)
TROPONIN I SERPL-MCNC: <0.02 NG/ML (ref 0–0.04)
VALPROATE SERPL-MCNC: 93 UG/ML (ref 50–100)
WBC # BLD AUTO: 9 K/UL (ref 4.6–13.2)
WBC # BLD AUTO: 9.4 K/UL (ref 4.6–13.2)

## 2021-06-14 PROCEDURE — 74011250637 HC RX REV CODE- 250/637: Performed by: INTERNAL MEDICINE

## 2021-06-14 PROCEDURE — 83735 ASSAY OF MAGNESIUM: CPT

## 2021-06-14 PROCEDURE — 92526 ORAL FUNCTION THERAPY: CPT

## 2021-06-14 PROCEDURE — 71046 X-RAY EXAM CHEST 2 VIEWS: CPT

## 2021-06-14 PROCEDURE — 92611 MOTION FLUOROSCOPY/SWALLOW: CPT

## 2021-06-14 PROCEDURE — 80164 ASSAY DIPROPYLACETIC ACD TOT: CPT

## 2021-06-14 PROCEDURE — 83605 ASSAY OF LACTIC ACID: CPT

## 2021-06-14 PROCEDURE — 2709999900 HC NON-CHARGEABLE SUPPLY

## 2021-06-14 PROCEDURE — 82553 CREATINE MB FRACTION: CPT

## 2021-06-14 PROCEDURE — 80053 COMPREHEN METABOLIC PANEL: CPT

## 2021-06-14 PROCEDURE — 96374 THER/PROPH/DIAG INJ IV PUSH: CPT

## 2021-06-14 PROCEDURE — 84145 PROCALCITONIN (PCT): CPT

## 2021-06-14 PROCEDURE — 84100 ASSAY OF PHOSPHORUS: CPT

## 2021-06-14 PROCEDURE — 82962 GLUCOSE BLOOD TEST: CPT

## 2021-06-14 PROCEDURE — 99024 POSTOP FOLLOW-UP VISIT: CPT | Performed by: ORTHOPAEDIC SURGERY

## 2021-06-14 PROCEDURE — 93005 ELECTROCARDIOGRAM TRACING: CPT

## 2021-06-14 PROCEDURE — 96375 TX/PRO/DX INJ NEW DRUG ADDON: CPT

## 2021-06-14 PROCEDURE — 85025 COMPLETE CBC W/AUTO DIFF WBC: CPT

## 2021-06-14 PROCEDURE — 74011250636 HC RX REV CODE- 250/636: Performed by: INTERNAL MEDICINE

## 2021-06-14 PROCEDURE — 93970 EXTREMITY STUDY: CPT

## 2021-06-14 PROCEDURE — 74011250637 HC RX REV CODE- 250/637: Performed by: PHYSICIAN ASSISTANT

## 2021-06-14 PROCEDURE — 85027 COMPLETE CBC AUTOMATED: CPT

## 2021-06-14 PROCEDURE — 74230 X-RAY XM SWLNG FUNCJ C+: CPT

## 2021-06-14 PROCEDURE — 99218 HC RM OBSERVATION: CPT

## 2021-06-14 PROCEDURE — 99024 POSTOP FOLLOW-UP VISIT: CPT | Performed by: PHYSICIAN ASSISTANT

## 2021-06-14 PROCEDURE — 36415 COLL VENOUS BLD VENIPUNCTURE: CPT

## 2021-06-14 PROCEDURE — 87040 BLOOD CULTURE FOR BACTERIA: CPT

## 2021-06-14 PROCEDURE — 99226 PR SBSQ OBSERVATION CARE/DAY 35 MINUTES: CPT | Performed by: INTERNAL MEDICINE

## 2021-06-14 PROCEDURE — 74011000250 HC RX REV CODE- 250: Performed by: INTERNAL MEDICINE

## 2021-06-14 RX ORDER — ACETAMINOPHEN 325 MG/1
650 TABLET ORAL
Status: DISCONTINUED | OUTPATIENT
Start: 2021-06-14 | End: 2021-06-16 | Stop reason: HOSPADM

## 2021-06-14 RX ORDER — LORAZEPAM 2 MG/ML
INJECTION INTRAMUSCULAR
Status: DISPENSED
Start: 2021-06-14 | End: 2021-06-15

## 2021-06-14 RX ORDER — ACETAMINOPHEN 650 MG/1
650 SUPPOSITORY RECTAL
Status: DISCONTINUED | OUTPATIENT
Start: 2021-06-14 | End: 2021-06-16 | Stop reason: HOSPADM

## 2021-06-14 RX ORDER — IBUPROFEN 600 MG/1
600 TABLET ORAL
Status: DISCONTINUED | OUTPATIENT
Start: 2021-06-14 | End: 2021-06-14

## 2021-06-14 RX ORDER — LEVETIRACETAM 10 MG/ML
1000 INJECTION INTRAVASCULAR EVERY 12 HOURS
Status: DISCONTINUED | OUTPATIENT
Start: 2021-06-14 | End: 2021-06-15

## 2021-06-14 RX ORDER — MORPHINE SULFATE 4 MG/ML
4 INJECTION INTRAVENOUS ONCE
Status: COMPLETED | OUTPATIENT
Start: 2021-06-14 | End: 2021-06-14

## 2021-06-14 RX ADMIN — ACETAMINOPHEN 650 MG: 325 TABLET ORAL at 18:01

## 2021-06-14 RX ADMIN — ACETAMINOPHEN 650 MG: 325 TABLET ORAL at 21:33

## 2021-06-14 RX ADMIN — GABAPENTIN 300 MG: 300 CAPSULE ORAL at 18:01

## 2021-06-14 RX ADMIN — ACETAMINOPHEN 650 MG: 325 TABLET ORAL at 09:04

## 2021-06-14 RX ADMIN — Medication 10 ML: at 05:23

## 2021-06-14 RX ADMIN — OXYCODONE HYDROCHLORIDE 15 MG: 10 TABLET ORAL at 09:08

## 2021-06-14 RX ADMIN — METOPROLOL TARTRATE 25 MG: 25 TABLET, FILM COATED ORAL at 21:18

## 2021-06-14 RX ADMIN — LISINOPRIL 10 MG: 10 TABLET ORAL at 09:02

## 2021-06-14 RX ADMIN — DIVALPROEX SODIUM 750 MG: 250 TABLET, DELAYED RELEASE ORAL at 09:04

## 2021-06-14 RX ADMIN — METOPROLOL TARTRATE 25 MG: 25 TABLET, FILM COATED ORAL at 09:04

## 2021-06-14 RX ADMIN — Medication 5000 UNITS: at 09:04

## 2021-06-14 RX ADMIN — PANTOPRAZOLE SODIUM 40 MG: 40 TABLET, DELAYED RELEASE ORAL at 09:03

## 2021-06-14 RX ADMIN — LEVETIRACETAM 1000 MG: 10 INJECTION INTRAVENOUS at 18:35

## 2021-06-14 RX ADMIN — Medication 2 TABLET: at 18:01

## 2021-06-14 RX ADMIN — OXYCODONE HYDROCHLORIDE 10 MG: 10 TABLET ORAL at 01:19

## 2021-06-14 RX ADMIN — OXYCODONE HYDROCHLORIDE 15 MG: 10 TABLET ORAL at 13:25

## 2021-06-14 RX ADMIN — BARIUM SULFATE 700 MG: 700 TABLET ORAL at 10:00

## 2021-06-14 RX ADMIN — GABAPENTIN 300 MG: 300 CAPSULE ORAL at 09:04

## 2021-06-14 RX ADMIN — OXYCODONE HYDROCHLORIDE 10 MG: 10 TABLET ORAL at 05:43

## 2021-06-14 RX ADMIN — DIVALPROEX SODIUM 750 MG: 250 TABLET, DELAYED RELEASE ORAL at 21:18

## 2021-06-14 RX ADMIN — BARIUM SULFATE 45 ML: 400 PASTE ORAL at 10:00

## 2021-06-14 RX ADMIN — PANTOPRAZOLE SODIUM 40 MG: 40 TABLET, DELAYED RELEASE ORAL at 05:43

## 2021-06-14 RX ADMIN — ATORVASTATIN CALCIUM 10 MG: 10 TABLET, FILM COATED ORAL at 21:17

## 2021-06-14 RX ADMIN — GABAPENTIN 300 MG: 300 CAPSULE ORAL at 21:17

## 2021-06-14 RX ADMIN — MORPHINE SULFATE 4 MG: 4 INJECTION, SOLUTION INTRAMUSCULAR; INTRAVENOUS at 11:58

## 2021-06-14 RX ADMIN — Medication 2 TABLET: at 09:02

## 2021-06-14 RX ADMIN — TRAZODONE HYDROCHLORIDE 100 MG: 100 TABLET ORAL at 21:17

## 2021-06-14 RX ADMIN — Medication 10 ML: at 21:18

## 2021-06-14 RX ADMIN — RIVAROXABAN 10 MG: 10 TABLET, FILM COATED ORAL at 09:05

## 2021-06-14 RX ADMIN — BARIUM SULFATE 30 G: 960 POWDER, FOR SUSPENSION ORAL at 10:00

## 2021-06-14 NOTE — ROUTINE PROCESS
Bedside shift change report given to Yobani Crain rn (oncoming nurse) by Kurt Del Rosario (offgoing nurse). Report included the following information SBAR.

## 2021-06-14 NOTE — PROGRESS NOTES
Rapid Response Note  [facility]    Patient: Evelia Herrera 46 y.o. male  [id number]  1970      Admit Date: 6/11/2021   [facility] [G40.909]    RAPID RESPONSE     Rapid response called for Altered mental status. Patient was seen normal less than 15 minutes prior to rapid being called. Patient was found by nursing to be altered, AAOx2, without understanding of day or why he was here. Patient seemed to have some tremulousness and seemed confused overall, when baseline he is fully AAOx4. When team arrived the patient was as described above. He had stable vitals though was febrile. He could answer questions and had no focal neuro deficits. When asked further, he described that this felt similar to prior seizures for him, and he described aura like sensations he has had during and after prior seizures as well. Patient was found stable, and slowly recovered baseline cognition until he was no longer confused, and AAOx4. Medications Reviewed    OBJECTIVE     Patient Vitals for the past 12 hrs:   Temp Pulse Resp BP SpO2   06/14/21 1744 (!) 101.4 °F (38.6 °C) 74 20 135/77 95 %   06/14/21 1744 (!) 101.4 °F (38.6 °C) 75 20 135/77 91 %   06/14/21 1741 (!) 101.4 °F (38.6 °C) 75 20 135/77 91 %   06/14/21 1611 100.3 °F (37.9 °C) 67 16 104/62 95 %   06/14/21 1118 97.8 °F (36.6 °C) 73 18 100/60 90 %   06/14/21 0751 (!) 102 °F (38.9 °C) 88 16 129/71 98 %         PHYSICAL:  General:  Alert and Responsive and in no acute distress. CV:  RRR, no murmurs, rubs, or gallops. PMI not displaced. No visible pulsations or thrills. No carotid bruits. RESP:  Unlabored breathing. CTAB. ABD:  Soft, nontender, nondistended. Normoactive bowel sounds. No hepatosplenomegaly. No suprapubic tenderness. No CVA tenderness. Neuro:  5/5 strength bilateral upper extremities and lower extremities. CN II-XII intact. Sensation grossly intact.   A+Ox2 initially, AAOx4 by end of rapid.    EKG: NSR, some PACs, no concerning morphology      ASSESSMENT, PLAN & DISPOSITION   Hank Quintana is a 46y.o. year old male admitted for Seizure disorder ([facility] [G40.909]. Rapid response called for AMS. Patient condition currently: Stable. Medications Administered: none    Labs ordered/Pending: cmp, depakote level, lactate, cardiac enzymes, POC glucose    Disposition: stable to stay at this level of care    Attending Uriel Burton notified of rapid response. In agreement with plan. Primary team resuming care.      Senior resident Jeremy Rubio present during RRT and evaluation    Mariana Marcelino MD  [address] Medicine PGY-1  5:54 PM 06/14/21

## 2021-06-14 NOTE — PROGRESS NOTES
He was evaluated, probably 10 minutes ago, to reassess his right ankle. I remove the dressings, split the dressings, anteriorly, remove the dressings deep I removed some mildly soiled sterile 4 x 4's, 1 ABD, remove the Xeroform. The incision look good there is no undue redness or erythema no undue drainage or any signs of clinical infection. New dressings were placed with consent sterile 4 x 4's, and then I placed new Ace wrap's to secure his dressings. At this point I see no signs infection, to his right ankle. Continue strict nonweightbearing, continue bone stimulator 3 hours a day, bed to chair transfers, Xarelto for blood thinner for DVT prophylaxis.       Rosana Witt MD  6/14/2021  5:53 PM

## 2021-06-14 NOTE — PROGRESS NOTES
PROGRESS NOTE         LOS: 0 days     ASSESSMENT:        3 Days Post-Op s/p:     Procedure(s):  OPEN REDUCTION INTERNAL FIXATION TRIMALLEOLAR ANKLE FRACTURE WITH FIXATION POSTERIOR LIP, RIGHT ANKLE/C-ARM/SYNTHES ANKLE TRAUMA TRAY/DBM ALLOMATRIX  **GEN W/REGIONAL**       PLAN:         1)  Pain management - per pain protocol. Please apply ice pack over dressing on RLE. Approved early (10 min) dose of Oxycodone. I will adjust pain medicine as patient thinks the Gabapentin (and Tylenol) was providing better pain relief  2)  Antibiotics:  None at this time  3)  Incentive Spirometry  4)  Acute blood loss anemia/Post -op anemia: Continue monitoring  5)  DVT prophylaxis:  Xarelto, foot /ankle pumps, SCD  6)  Internal Medicine following for medical management. Working up source of fever:     Temp Readings from Last 1 Encounters:   06/14/21 (!) 102 °F (38.9 °C)     7)  PT/OT - NWB RLE  8) Incision Care: Routine Incision Care and Dressing Changes as necessary per Dr. Batsheva Bryan protocol  9) D/C planning: Pending. Patient would prefer to go to rehab/SNF on [address] near his home      SUBJECTIVE:       Patient seen and evaluated. Doing well. No complaints other than pain. He states that the Oxycodone 15 mg is not touching his pain, but believes the Gabapentin (and possibly Tylenol) was working better. resting comfortably, NAD. Patient states that Tylenol usually causes stomach upset, but he received Tylenol 2 days ago without difficulty. Denies: CP, SOB, ABD PAIN, Calf pain    OBJECTIVE:     Visit Vitals  /71 (BP 1 Location: Right upper arm, BP Patient Position: At rest)   Pulse 88   Temp (!) 102 °F (38.9 °C)   Resp 16   Ht 6' 3\" (1.905 m)   Wt (!) 371 lb (168.3 kg)   SpO2 98%   BMI 46.37 kg/m²       speech normal in context and clarity  memory intact grossly  Alert, Oriented x 3 in bed    CHEST/ABDOMEN: Observation reveals: No audible wheezing from mouth.    No accessory use of chest muscles during breathing. Non tender abdomen    Examination of RLE reveals wound covered. Incision/Dressings:  Dressings  are clean, dry and  Intact. Extremities:        No significant edema or embolic phenomena to the foot/toes or hands                               cap refill < 2 seconds.           Sensory, Motor, NV grossly intact          Lower extremities are warm and appear well perfused          Neg calf tenderness nor evidence DVT                                  Labs:    CBC  Lab Results   Component Value Date/Time    WBC 9.4 06/14/2021 02:35 AM    RBC 3.74 (L) 06/14/2021 02:35 AM    HCT 37.7 06/14/2021 02:35 AM    .8 (H) 06/14/2021 02:35 AM    MCH 31.3 06/14/2021 02:35 AM    MCHC 31.0 06/14/2021 02:35 AM    RDW 13.2 06/14/2021 02:35 AM         Coagulation  Lab Results   Component Value Date    INR 1.0 06/07/2021    APTT 27.0 01/05/2020            Basic Metabolic Profile  Lab Results   Component Value Date     06/13/2021    CO2 30 06/13/2021    BUN 17 06/13/2021       Recent Results (from the past 24 hour(s))   CBC W/O DIFF    Collection Time: 06/14/21  2:35 AM   Result Value Ref Range    WBC 9.4 4.6 - 13.2 K/uL    RBC 3.74 (L) 4.35 - 5.65 M/uL    HGB 11.7 (L) 13.0 - 16.0 g/dL    HCT 37.7 36.0 - 48.0 %    .8 (H) 74.0 - 97.0 FL    MCH 31.3 24.0 - 34.0 PG    MCHC 31.0 31.0 - 37.0 g/dL    RDW 13.2 11.6 - 14.5 %    PLATELET 684 674 - 442 K/uL    MPV 11.5 9.2 - 11.8 FL   PHOSPHORUS    Collection Time: 06/14/21  2:35 AM   Result Value Ref Range    Phosphorus 3.5 2.5 - 4.9 MG/DL   MAGNESIUM    Collection Time: 06/14/21  2:35 AM   Result Value Ref Range    Magnesium 2.2 1.6 - 2.6 mg/dL     Temp Readings from Last 3 Encounters:   06/14/21 (!) 102 °F (38.9 °C)   06/02/21 97.3 °F (36.3 °C) (Temporal)   05/26/21 98.5 °F (36.9 °C)         All Micro Results     Procedure Component Value Units Date/Time    CULTURE, BLOOD [[id number]]     Order Status: Sent Specimen: Blood     CULTURE, BLOOD [[id number]] Order Status: Sent Specimen: Blood               SHAR Tadeo, REVA, LO

## 2021-06-14 NOTE — PROGRESS NOTES
MBS completed with recs of regular solid diet, thin liquids, meds in puree. Full report to follow.      Thank you for this referral.    Swathi Crystal M.S. CCC-SLP/L  Speech-Language Pathologist

## 2021-06-14 NOTE — PROGRESS NOTES
Problem: Pressure Injury - Risk of  Goal: *Prevention of pressure injury  Description: Document Vaughn Scale and appropriate interventions in the flowsheet.   Outcome: Progressing Towards Goal  Note: Pressure Injury Interventions:  Sensory Interventions: Assess changes in LOC, Avoid rigorous massage over bony prominences, Check visual cues for pain, Discuss PT/OT consult with provider, Keep linens dry and wrinkle-free    Moisture Interventions: Absorbent underpads, Assess need for specialty bed, Check for incontinence Q2 hours and as needed, Internal/External urinary devices    Activity Interventions: Assess need for specialty bed, Increase time out of bed, Pressure redistribution bed/mattress(bed type), PT/OT evaluation    Mobility Interventions: Assess need for specialty bed, HOB 30 degrees or less, Pressure redistribution bed/mattress (bed type), PT/OT evaluation    Nutrition Interventions: Document food/fluid/supplement intake, Offer support with meals,snacks and hydration                     Problem: Patient Education: Go to Patient Education Activity  Goal: Patient/Family Education  Outcome: Progressing Towards Goal     Problem: Patient Education: Go to Patient Education Activity  Goal: Patient/Family Education  Outcome: Progressing Towards Goal     Problem: Lower Extremity Fracture:Post-op Day 2  Goal: Off Pathway (Use only if patient is Off Pathway)  Outcome: Progressing Towards Goal  Goal: Activity/Safety  Outcome: Progressing Towards Goal  Goal: Diagnostic Test/Procedures  Outcome: Progressing Towards Goal  Goal: Nutrition/Diet  Outcome: Progressing Towards Goal  Goal: Discharge Planning  Outcome: Progressing Towards Goal  Goal: Medications  Outcome: Progressing Towards Goal  Goal: Respiratory  Outcome: Progressing Towards Goal  Goal: Treatments/Interventions/Procedures  Outcome: Progressing Towards Goal  Goal: Psychosocial  Outcome: Progressing Towards Goal  Goal: *Optimal pain control at patient's stated goal  Outcome: Progressing Towards Goal  Goal: *Hemodynamically stable  Outcome: Progressing Towards Goal  Goal: *Adequate oxygenation  Outcome: Progressing Towards Goal  Goal: *PT/INR within defined limits  Outcome: Progressing Towards Goal  Goal: *Demonstrates progressive activity  Outcome: Progressing Towards Goal  Goal: *Voiding  Outcome: Progressing Towards Goal  Goal: *Bowel movement  Outcome: Progressing Towards Goal  Goal: *Tolerating diet  Outcome: Progressing Towards Goal     Problem: Lower Extremity Fracture:Post-op Day 3  Goal: Off Pathway (Use only if patient is Off Pathway)  Outcome: Progressing Towards Goal  Goal: Activity/Safety  Outcome: Progressing Towards Goal  Goal: Diagnostic Test/Procedures  Outcome: Progressing Towards Goal  Goal: Nutrition/Diet  Outcome: Progressing Towards Goal  Goal: Discharge Planning  Outcome: Progressing Towards Goal  Goal: Medications  Outcome: Progressing Towards Goal  Goal: Respiratory  Outcome: Progressing Towards Goal  Goal: Treatments/Interventions/Procedures  Outcome: Progressing Towards Goal  Goal: Psychosocial  Outcome: Progressing Towards Goal  Goal: *Optimal pain control at patient's stated goal  Outcome: Progressing Towards Goal  Goal: *Hemodynamically stable  Outcome: Progressing Towards Goal  Goal: *Adequate oxygenation  Outcome: Progressing Towards Goal  Goal: *PT/INR within defined limits  Outcome: Progressing Towards Goal  Goal: *Demonstrates progressive activity  Outcome: Progressing Towards Goal  Goal: *Voiding  Outcome: Progressing Towards Goal  Goal: *Bowel movement  Outcome: Progressing Towards Goal  Goal: *Tolerating diet  Outcome: Progressing Towards Goal     Problem: Lower Extremity Fracture:Post-op Day 4  Goal: Off Pathway (Use only if patient is Off Pathway)  Outcome: Progressing Towards Goal  Goal: Activity/Safety  Outcome: Progressing Towards Goal  Goal: Diagnostic Test/Procedures  Outcome: Progressing Towards Goal  Goal: Nutrition/Diet  Outcome: Progressing Towards Goal  Goal: Discharge Planning  Outcome: Progressing Towards Goal  Goal: Medications  Outcome: Progressing Towards Goal  Goal: Respiratory  Outcome: Progressing Towards Goal  Goal: Treatments/Interventions/Procedures  Outcome: Progressing Towards Goal  Goal: Psychosocial  Outcome: Progressing Towards Goal     Problem: Lower Extremity Fracture:Discharge Outcomes  Goal: *Hemodynamically stable  Outcome: Progressing Towards Goal  Goal: *Modified independence with transfers, ambulation on levels with assistance devices, stair climbing, ADL's  Outcome: Progressing Towards Goal  Goal: *Independent with active exercises  Outcome: Progressing Towards Goal  Goal: *Describes follow-up/return visits to physicians  Outcome: Progressing Towards Goal  Goal: *Tolerating diet  Outcome: Progressing Towards Goal  Goal: *Optimal pain control at patient's stated goal  Outcome: Progressing Towards Goal  Goal: *Adequate air exchange  Outcome: Progressing Towards Goal  Goal: *Lungs clear or at baseline  Outcome: Progressing Towards Goal  Goal: *Afebrile  Outcome: Progressing Towards Goal  Goal: *Incision intact without signs of infection, redness, warmth  Outcome: Progressing Towards Goal  Goal: *Absence of deep venous thrombosis signs and symptoms(Stroke Metric)  Outcome: Progressing Towards Goal  Goal: *Active bowel function  Outcome: Progressing Towards Goal  Goal: *Adequate urinary output  Outcome: Progressing Towards Goal  Goal: *Discharge anxiety minimal  Outcome: Progressing Towards Goal  Goal: *Describes available resources and support systems  Outcome: Progressing Towards Goal     Problem: Pain  Goal: *Control of Pain  Outcome: Progressing Towards Goal  Goal: *PALLIATIVE CARE:  Alleviation of Pain  Outcome: Progressing Towards Goal     Problem: Patient Education: Go to Patient Education Activity  Goal: Patient/Family Education  Outcome: Progressing Towards Goal     Problem: Falls - Risk of  Goal: *Absence of Falls  Description: Document [place] Fall Risk and appropriate interventions in the flowsheet.   Outcome: Progressing Towards Goal     Problem: Patient Education: Go to Patient Education Activity  Goal: Patient/Family Education  Outcome: Progressing Towards Goal

## 2021-06-14 NOTE — PROGRESS NOTES
Hospitalist Progress Note    Patient: Tigist Segal Age: 46 y.o. : 1970 MR#: [id number] SSN: [id number]  Date/Time: 2021 12:32 PM    DOA: 2021  PCP: Henok Marie MD    Subjective: Worsened right lower leg pain at his wound site. Asked for pain medications, has been given by ortho. Has fever x1 this AM  No further seizure since admission    Addendum: evaluated his leg wound with Dr. Brittaney Anders, wound is not infected   CXR without infiltrate this AM. No leukocytosis, proCalc<0.05  No growth on blood culture   Duplex of leg negative for clot    Interval Hospital Course:        ROS:  No current fever/chills, no headache, no dizziness, no facial pain, no sinus congestion,   No swallowing pain, No chest pain, no palpitation, no shortness of breath, no abd pain,  No diarrhea, no urinary complaint, + leg pain or swelling    Assessment/Plan:     1. Breakthrough seizure, with underlying seizure disorder, now resolved   2. Right ankle fracture, s/p ORIF (21), still with significant pain with weight bearing       Has more pain this AM   3. Hypertension   4. Paroxysmal atrial flutter, no Xarelto   5. ADAIR on CPAP   6. Morbid obesity   7. Depression   8. Fever, post surgical, non clear source of fever. No respiratory symptom,       Ran his workup of fever, suspected related to wound infection but negative. On exam.   Will keep him here for today to monitor. Hold off antibiotic for now, if recurrent fever, then consider dosing antibiotic. Avoid too much narcotic. Can increase his gabapentin as he stated it helped him   Seizure precaution   Resume all his home medications, cont Xarelto  CPAP QHS  ICS encourage   Wound care. Strict non weight bearing on right foot. Spoke with ortho. Full code  Disposition: can d/c to SNF if no further fever.      Additional Notes:    Time spent >35 minutes    Case discussed with:  [x]Patient  []Family  [x]Nursing  [x]Case Management  DVT Prophylaxis:  []Lovenox  [x]xarelto  []SCDs  []Coumadin   []On Heparin gtt    Signed By: Nelsy Wei MD     2021 12:32 PM              Objective:   VS:   Visit Vitals  /60 (BP 1 Location: Left upper arm, BP Patient Position: At rest)   Pulse 73   Temp 97.8 °F (36.6 °C)   Resp 18   Ht 6' 3\" (1.905 m)   Wt (!) 168.3 kg (371 lb)   SpO2 90%   BMI 46.37 kg/m²      Tmax/24hrs: Temp (24hrs), Av.6 °F (37.6 °C), Min:97.8 °F (36.6 °C), Max:102 °F (38.9 °C)      Intake/Output Summary (Last 24 hours) at 2021 1232  Last data filed at 2021 0850  Gross per 24 hour   Intake 720 ml   Output 2000 ml   Net -1280 ml       Tele:   General:  Cooperative, Not in acute distress, speaks in full sentence while in bed  HEENT: PERRL, EOMI, supple neck, no JVD, dry oral mucosa  Cardiovascular: S1S2 regular, no rub/gallop   Pulmonary: Clear air entry bilaterally, no wheezing, no crackle  GI:  Soft, non tender, non distended, +bs, no guarding   Extremities:  No pedal edema, +distal pulses appreciated   Right ankle in brace. Neuro: AOx3, moving all extremities, no gross deficit.      Additional:       Current Facility-Administered Medications   Medication Dose Route Frequency    ferrous sulfate tablet 325 mg  1 Tablet Oral EVERY OTHER DAY    oxyCODONE IR (ROXICODONE) tablet 10-15 mg  10-15 mg Oral Q4H PRN    lisinopriL (PRINIVIL, ZESTRIL) tablet 10 mg  10 mg Oral DAILY    cholecalciferol (VITAMIN D3) capsule 5,000 Units  5,000 Units Oral DAILY    gabapentin (NEURONTIN) capsule 300 mg  300 mg Oral TID    rivaroxaban (XARELTO) tablet 10 mg  10 mg Oral DAILY WITH BREAKFAST    traZODone (DESYREL) tablet 100 mg  100 mg Oral QHS    metoprolol tartrate (LOPRESSOR) tablet 25 mg  25 mg Oral BID    atorvastatin (LIPITOR) tablet 10 mg  10 mg Oral QHS    divalproex DR (DEPAKOTE) tablet 750 mg  750 mg Oral BID    sodium chloride (NS) flush 5-40 mL  5-40 mL IntraVENous Q8H    sodium chloride (NS) flush 5-40 mL  5-40 mL IntraVENous PRN    acetaminophen (TYLENOL) tablet 650 mg  650 mg Oral Q6H PRN    Or    acetaminophen (TYLENOL) suppository 650 mg  650 mg Rectal Q6H PRN    polyethylene glycol (MIRALAX) packet 17 g  17 g Oral DAILY PRN    promethazine (PHENERGAN) tablet 12.5 mg  12.5 mg Oral Q6H PRN    Or    ondansetron (ZOFRAN) injection 4 mg  4 mg IntraVENous Q6H PRN    pantoprazole (PROTONIX) tablet 40 mg  40 mg Oral ACB    Lactobacillus Acidoph & Bulgar [facility]) tablet 2 Tablet  2 Tablet Oral BID            Lab/Data Review:  Labs: Results:       Chemistry Recent Labs     06/14/21  0235 06/13/21  0541 06/12/21  0702   GLU  --  102* 118*   NA  --  141 139   K  --  3.6 4.2   CL  --  107 104   CO2  --  30 31   BUN  --  17 16   CREA  --  0.67 0.80   BUCR  --  25* 20   AGAP  --  4 4   CA  --  9.1 9.1   PHOS 3.5  --   --      No results for input(s): TBIL, ALT, ALKP, TP, ALB, GLOB, AGRAT in the last 72 hours. No lab exists for component: SGOT   CBC w/Diff Recent Labs     06/14/21  0916 06/14/21  0235 06/13/21  0541   WBC 9.0 9.4 8.8   RBC 3.80* 3.74* 3.73*   HGB 11.7* 11.7* 11.7*   HCT 38.8 37.7 37.3   .1* 100.8* 100.0*   MCH 30.8 31.3 31.4   MCHC 30.2* 31.0 31.4   RDW 13.0 13.2 13.2    188 190   GRANS 48  --   --    LYMPH 42  --   --    EOS 1  --   --       Coagulation No results for input(s): PTP, INR, APTT, INREXT, INREXT in the last 72 hours.     Iron/Ferritin No results found for: IRON, FE, TIBC, IBCT, PSAT, FERR    BNP    Cardiac Enzymes Lab Results   Component Value Date/Time    CK 99 06/11/2021 11:04 AM    CK - MB <1.0 06/11/2021 11:04 AM    CK-MB Index  06/11/2021 11:04 AM     CALCULATION NOT PERFORMED WHEN RESULT IS BELOW LINEAR LIMIT    Troponin-I, QT <0.02 06/11/2021 11:04 AM        Lactic Acid    Thyroid Studies          All Micro Results     Procedure Component Value Units Date/Time    CULTURE, BLOOD [[id number]] Collected: 06/14/21 0916    Order Status: Completed Specimen: Blood Updated: 06/14/21 1016    CULTURE, BLOOD [[id number]] Collected: 06/14/21 0905    Order Status: Completed Specimen: Blood Updated: 06/14/21 1016            Images:    CT (Most Recent). XRAY (Most Recent)      EKG Results for orders placed or performed in visit on 01/07/21   AMB POC EKG ROUTINE W/ 12 LEADS, INTER & REP     Status: None (Preliminary result)    Impression    Sinus bradycardia at 57 bpm.  Nonspecific T wave abnormality.           2D ECHO

## 2021-06-14 NOTE — PROGRESS NOTES
Reason for Admission:  Seizure disorder ([facility] [address] [G40.909]                 RUR Score:    N/A            Plan for utilizing home health:    No, pt needs SNF                      Likelihood of Readmission:   LOW                         Transition of Care Plan:              Initial assessment completed with patient. Cognitive status of patient: oriented to time, place, person and situation. Face sheet information confirmed:  yes. The patient designates girlfriend, Pascual Jackson and sister, Tito Perez to participate in his discharge plan and to receive any needed information. This patient lives in a boarding house. Patient is not able to navigate steps as needed. Prior to hospitalization, patient was considered to be independent with ADLs/IADLS : yes . Patient has a current ACP document on file: yes, completed updated ACP at bedside. Healthcare Decision Maker:     Click here to complete [employer] including selection of the Healthcare Decision Maker Relationship (ie \"Primary\")    The patient will stretcher transport home upon discharge. The patient has a  CPAP available in the home. Patient is not currently active with home health. Patient has not stayed in a skilled nursing facility or rehab. This patient is on dialysis :no    Currently, the discharge plan is SNF. Has requested [facility]. The patient states that he can obtain his medications from the pharmacy, and take his medications as directed. Patient's current insurance is [facility] and Medicaid. Care Management Interventions  PCP Verified by CM: Yes  Mode of Transport at Discharge: BLS  Transition of Care Consult (CM Consult): Discharge Planning, SNF  Current Support Network:  Other (lives in a boarding house)  Confirm Follow Up Transport: Friends  Discharge Location  Discharge Placement: Skilled nursing facility        Gary Rainey RN - Outcomes Manager  [phone]

## 2021-06-14 NOTE — PROGRESS NOTES
Clinicals uploaded to [facility] and faxed to [employer] for SNF auth. Sent referral to [facility].   Cindy Muir RN - Outcomes Manager  [phone]

## 2021-06-14 NOTE — ACP (ADVANCE CARE PLANNING)
Advance Care Planning     General Advance Care Planning (ACP) Conversation      Date of Conversation: 6/2/2021  Conducted with: Patient with Decision Making Capacity    Healthcare Decision Maker:     Primary Decision Maker: Josué Dawson - Sister - [phone]    Secondary Decision Maker: Tyrel Aggarwal UP and surgery update - Girlfriend - [phone]  Click here to complete [address] including selection of the Healthcare Decision Maker Relationship (ie \"Primary\")  Today we documented decision makers according to patient preference    Content/Action Overview:    Has ACP document(s) on file - reflects the patient's care preferences    Roni Grimaldo RN - Outcomes Manager  [phone]
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
## 2021-06-15 DIAGNOSIS — S82.851A CLOSED TRIMALLEOLAR FRACTURE OF RIGHT ANKLE, INITIAL ENCOUNTER: Primary | ICD-10-CM

## 2021-06-15 LAB
APPEARANCE UR: CLEAR
ATRIAL RATE: 74 BPM
B PERT DNA SPEC QL NAA+PROBE: NOT DETECTED
BILIRUB UR QL: NEGATIVE
BORDETELLA PARAPERTUSSIS PCR, BORPAR: NOT DETECTED
C PNEUM DNA SPEC QL NAA+PROBE: NOT DETECTED
CALCULATED P AXIS, ECG09: 50 DEGREES
CALCULATED R AXIS, ECG10: 20 DEGREES
CALCULATED T AXIS, ECG11: -7 DEGREES
COLOR UR: YELLOW
DIAGNOSIS, 93000: NORMAL
ERYTHROCYTE [DISTWIDTH] IN BLOOD BY AUTOMATED COUNT: 12.7 % (ref 11.6–14.5)
FLUAV H1 2009 PAND RNA SPEC QL NAA+PROBE: NOT DETECTED
FLUAV H1 RNA SPEC QL NAA+PROBE: NOT DETECTED
FLUAV H3 RNA SPEC QL NAA+PROBE: NOT DETECTED
FLUAV SUBTYP SPEC NAA+PROBE: NOT DETECTED
FLUBV RNA SPEC QL NAA+PROBE: NOT DETECTED
GLUCOSE UR STRIP.AUTO-MCNC: NEGATIVE MG/DL
HADV DNA SPEC QL NAA+PROBE: NOT DETECTED
HCOV 229E RNA SPEC QL NAA+PROBE: NOT DETECTED
HCOV HKU1 RNA SPEC QL NAA+PROBE: NOT DETECTED
HCOV NL63 RNA SPEC QL NAA+PROBE: NOT DETECTED
HCOV OC43 RNA SPEC QL NAA+PROBE: NOT DETECTED
HCT VFR BLD AUTO: 37.6 % (ref 36–48)
HGB BLD-MCNC: 11.8 G/DL (ref 13–16)
HGB UR QL STRIP: NEGATIVE
HMPV RNA SPEC QL NAA+PROBE: NOT DETECTED
HPIV1 RNA SPEC QL NAA+PROBE: NOT DETECTED
HPIV2 RNA SPEC QL NAA+PROBE: NOT DETECTED
HPIV3 RNA SPEC QL NAA+PROBE: NOT DETECTED
HPIV4 RNA SPEC QL NAA+PROBE: NOT DETECTED
KETONES UR QL STRIP.AUTO: NEGATIVE MG/DL
LEUKOCYTE ESTERASE UR QL STRIP.AUTO: NEGATIVE
M PNEUMO DNA SPEC QL NAA+PROBE: NOT DETECTED
MCH RBC QN AUTO: 31.9 PG (ref 24–34)
MCHC RBC AUTO-ENTMCNC: 31.4 G/DL (ref 31–37)
MCV RBC AUTO: 101.6 FL (ref 74–97)
MUCOUS THREADS URNS QL MICRO: ABNORMAL /LPF
NITRITE UR QL STRIP.AUTO: NEGATIVE
P-R INTERVAL, ECG05: 174 MS
PH UR STRIP: 7 [PH] (ref 5–8)
PLATELET # BLD AUTO: 174 K/UL (ref 135–420)
PMV BLD AUTO: 11.2 FL (ref 9.2–11.8)
PROT UR STRIP-MCNC: NEGATIVE MG/DL
Q-T INTERVAL, ECG07: 382 MS
QRS DURATION, ECG06: 98 MS
QTC CALCULATION (BEZET), ECG08: 424 MS
RBC # BLD AUTO: 3.7 M/UL (ref 4.35–5.65)
RSV RNA SPEC QL NAA+PROBE: NOT DETECTED
RV+EV RNA SPEC QL NAA+PROBE: NOT DETECTED
SARS-COV-2 PCR, COVPCR: NOT DETECTED
SP GR UR REFRACTOMETRY: 1.01 (ref 1–1.03)
UROBILINOGEN UR QL STRIP.AUTO: 1 EU/DL (ref 0.2–1)
VENTRICULAR RATE, ECG03: 74 BPM
WBC # BLD AUTO: 7.7 K/UL (ref 4.6–13.2)
WBC URNS QL MICRO: ABNORMAL /HPF (ref 0–5)

## 2021-06-15 PROCEDURE — 36415 COLL VENOUS BLD VENIPUNCTURE: CPT

## 2021-06-15 PROCEDURE — 97530 THERAPEUTIC ACTIVITIES: CPT

## 2021-06-15 PROCEDURE — 99218 PR INITIAL OBSERVATION CARE/DAY 30 MINUTES: CPT | Performed by: PSYCHIATRY & NEUROLOGY

## 2021-06-15 PROCEDURE — 74011250636 HC RX REV CODE- 250/636: Performed by: INTERNAL MEDICINE

## 2021-06-15 PROCEDURE — 2709999900 HC NON-CHARGEABLE SUPPLY

## 2021-06-15 PROCEDURE — 85027 COMPLETE CBC AUTOMATED: CPT

## 2021-06-15 PROCEDURE — 99232 SBSQ HOSP IP/OBS MODERATE 35: CPT | Performed by: EMERGENCY MEDICINE

## 2021-06-15 PROCEDURE — 99218 HC RM OBSERVATION: CPT

## 2021-06-15 PROCEDURE — 87086 URINE CULTURE/COLONY COUNT: CPT

## 2021-06-15 PROCEDURE — 74011250637 HC RX REV CODE- 250/637: Performed by: INTERNAL MEDICINE

## 2021-06-15 PROCEDURE — 0202U NFCT DS 22 TRGT SARS-COV-2: CPT

## 2021-06-15 PROCEDURE — 74011250637 HC RX REV CODE- 250/637: Performed by: EMERGENCY MEDICINE

## 2021-06-15 PROCEDURE — 81001 URINALYSIS AUTO W/SCOPE: CPT

## 2021-06-15 PROCEDURE — 99024 POSTOP FOLLOW-UP VISIT: CPT | Performed by: PHYSICIAN ASSISTANT

## 2021-06-15 PROCEDURE — 97535 SELF CARE MNGMENT TRAINING: CPT

## 2021-06-15 PROCEDURE — 74011250637 HC RX REV CODE- 250/637: Performed by: PSYCHIATRY & NEUROLOGY

## 2021-06-15 PROCEDURE — 96376 TX/PRO/DX INJ SAME DRUG ADON: CPT

## 2021-06-15 RX ORDER — OXYCODONE HYDROCHLORIDE 5 MG/1
5-10 TABLET ORAL
Qty: 42 TABLET | Refills: 0 | OUTPATIENT
Start: 2021-06-15 | End: 2021-06-16

## 2021-06-15 RX ORDER — SENNOSIDES 8.6 MG/1
1 TABLET ORAL DAILY
Status: DISCONTINUED | OUTPATIENT
Start: 2021-06-16 | End: 2021-06-16 | Stop reason: HOSPADM

## 2021-06-15 RX ORDER — DOCUSATE SODIUM 100 MG/1
100 CAPSULE, LIQUID FILLED ORAL 2 TIMES DAILY
Status: DISCONTINUED | OUTPATIENT
Start: 2021-06-15 | End: 2021-06-16 | Stop reason: HOSPADM

## 2021-06-15 RX ORDER — OXYCODONE HYDROCHLORIDE 5 MG/1
5 TABLET ORAL
Status: DISCONTINUED | OUTPATIENT
Start: 2021-06-15 | End: 2021-06-16 | Stop reason: HOSPADM

## 2021-06-15 RX ORDER — GABAPENTIN 300 MG/1
300 CAPSULE ORAL 3 TIMES DAILY
Qty: 90 CAPSULE | Refills: 0 | Status: SHIPPED | OUTPATIENT
Start: 2021-06-15 | End: 2021-06-16 | Stop reason: SDUPTHER

## 2021-06-15 RX ADMIN — RIVAROXABAN 10 MG: 10 TABLET, FILM COATED ORAL at 08:39

## 2021-06-15 RX ADMIN — LISINOPRIL 10 MG: 10 TABLET ORAL at 08:39

## 2021-06-15 RX ADMIN — GABAPENTIN 300 MG: 300 CAPSULE ORAL at 08:39

## 2021-06-15 RX ADMIN — Medication 2 TABLET: at 08:39

## 2021-06-15 RX ADMIN — PANTOPRAZOLE SODIUM 40 MG: 40 TABLET, DELAYED RELEASE ORAL at 06:59

## 2021-06-15 RX ADMIN — ATORVASTATIN CALCIUM 10 MG: 10 TABLET, FILM COATED ORAL at 21:52

## 2021-06-15 RX ADMIN — METOPROLOL TARTRATE 25 MG: 25 TABLET, FILM COATED ORAL at 08:39

## 2021-06-15 RX ADMIN — Medication 10 ML: at 15:32

## 2021-06-15 RX ADMIN — Medication 10 ML: at 05:35

## 2021-06-15 RX ADMIN — FERROUS SULFATE TAB 325 MG (65 MG ELEMENTAL FE) 325 MG: 325 (65 FE) TAB at 08:39

## 2021-06-15 RX ADMIN — Medication 10 ML: at 21:58

## 2021-06-15 RX ADMIN — DIVALPROEX SODIUM 750 MG: 250 TABLET, DELAYED RELEASE ORAL at 08:39

## 2021-06-15 RX ADMIN — DOCUSATE SODIUM 100 MG: 100 CAPSULE ORAL at 20:28

## 2021-06-15 RX ADMIN — Medication 2 TABLET: at 18:24

## 2021-06-15 RX ADMIN — OXYCODONE HYDROCHLORIDE 5 MG: 5 TABLET ORAL at 11:36

## 2021-06-15 RX ADMIN — OXYCODONE HYDROCHLORIDE 5 MG: 5 TABLET ORAL at 18:24

## 2021-06-15 RX ADMIN — DIVALPROEX SODIUM 1250 MG: 500 TABLET, DELAYED RELEASE ORAL at 18:24

## 2021-06-15 RX ADMIN — GABAPENTIN 300 MG: 300 CAPSULE ORAL at 21:52

## 2021-06-15 RX ADMIN — Medication 5000 UNITS: at 08:39

## 2021-06-15 RX ADMIN — LEVETIRACETAM 1000 MG: 10 INJECTION INTRAVENOUS at 06:42

## 2021-06-15 RX ADMIN — TRAZODONE HYDROCHLORIDE 100 MG: 100 TABLET ORAL at 21:52

## 2021-06-15 RX ADMIN — DIVALPROEX SODIUM 1250 MG: 500 TABLET, DELAYED RELEASE ORAL at 11:37

## 2021-06-15 RX ADMIN — GABAPENTIN 300 MG: 300 CAPSULE ORAL at 15:32

## 2021-06-15 NOTE — PROGRESS NOTES
PROGRESS NOTE         LOS: 0 days     ASSESSMENT:        4 Days Post-Op s/p:     Procedure(s):  OPEN REDUCTION INTERNAL FIXATION TRIMALLEOLAR ANKLE FRACTURE WITH FIXATION POSTERIOR LIP, RIGHT ANKLE/C-ARM/SYNTHES ANKLE TRAUMA TRAY/DBM ALLOMATRIX  **GEN W/REGIONAL**       PLAN:       1)  Pain management - per pain protocol. Please apply ice pack over dressing on RLE  2)  Antibiotics:  None at this time  3)  Incentive Spirometry  4)  Acute blood loss anemia/Post -op anemia: Continue monitoring  5)  DVT prophylaxis:  Xarelto, foot /ankle pumps, SCD  6)  Internal Medicine and Infectious Disease following for medical management. Temperature shows slight decrease today. 7)  PT/OT - Bed-to-chair transfers, otherwise, NWB RLE. Continue to use Bone Stimulator  8) Incision Care: Routine Incision Care and Dressing Changes as necessary per Dr. David Jack protocol. Wound was assessed yesterday and there were no visible signs of infection. Cultures pending. Still need to complete Urinalysis   9) D/C planning: Pending. Patient would prefer to go to rehab/SNF on [address] [employer] near his home      SUBJECTIVE:       Patient seen and evaluated. No complaints other than pain that is slightly better today. Patient states that his BP is low. He is resting comfortably, NAD. Denies: CP, SOB, ABD PAIN, Calf pain    OBJECTIVE:     Visit Vitals  /65   Pulse 67   Temp 100.4 °F (38 °C)   Resp 18   Ht 6' 3\" (1.905 m)   Wt (!) 371 lb (168.3 kg)   SpO2 93%   BMI 46.37 kg/m²       speech normal in context and clarity  memory intact grossly  Alert, Oriented x 3 in bed    CHEST/ABDOMEN: Observation reveals: No audible wheezing from mouth. No accessory use of chest muscles during breathing. Non tender abdomen    Examination of RLE reveals wound covered. Incision/Dressings:  Dressings  are clean, dry and  Intact.  Bone stimulator in place    Extremities:        No significant edema or embolic phenomena to the foot/toes or hands                               cap refill < 2 seconds.           Sensory, Motor, NV grossly intact          Lower extremities are warm and appear well perfused          Neg calf tenderness nor evidence DVT                                  Labs:    CBC  Lab Results   Component Value Date/Time    WBC 7.7 06/15/2021 02:20 AM    RBC 3.70 (L) 06/15/2021 02:20 AM    HCT 37.6 06/15/2021 02:20 AM    .6 (H) 06/15/2021 02:20 AM    MCH 31.9 06/15/2021 02:20 AM    MCHC 31.4 06/15/2021 02:20 AM    RDW 12.7 06/15/2021 02:20 AM         Coagulation  Lab Results   Component Value Date    INR 1.0 06/07/2021    APTT 27.0 01/05/2020            Basic Metabolic Profile  Lab Results   Component Value Date     06/14/2021    CO2 32 06/14/2021    BUN 14 06/14/2021       Recent Results (from the past 24 hour(s))   GLUCOSE, POC    Collection Time: 06/14/21  5:47 PM   Result Value Ref Range    Glucose (POC) 107 70 - 110 mg/dL   EKG, 12 LEAD, SUBSEQUENT    Collection Time: 06/14/21  5:48 PM   Result Value Ref Range    Ventricular Rate 74 BPM    Atrial Rate 74 BPM    P-R Interval 174 ms    QRS Duration 98 ms    Q-T Interval 382 ms    QTC Calculation (Bezet) 424 ms    Calculated P Axis 50 degrees    Calculated R Axis 20 degrees    Calculated T Axis -7 degrees    Diagnosis       Sinus rhythm with premature atrial complexes  Nonspecific T wave abnormality  Abnormal ECG  When compared with ECG of 07-JUN-2021 11:51,  premature atrial complexes are now present  Confirmed by Mariza Jacobs MD, Mykel Samuels ([id number]) on 6/15/2021 8:14:37 AM     VALPROIC ACID    Collection Time: 06/14/21  5:55 PM   Result Value Ref Range    Valproic acid 93 50 - 290 ug/ml   METABOLIC PANEL, COMPREHENSIVE    Collection Time: 06/14/21  5:55 PM   Result Value Ref Range    Sodium 138 136 - 145 mmol/L    Potassium 3.9 3.5 - 5.5 mmol/L    Chloride 104 100 - 111 mmol/L    CO2 32 21 - 32 mmol/L    Anion gap 2 (L) 3.0 - 18 mmol/L    Glucose 108 (H) 74 - 99 mg/dL    BUN 14 7.0 - 18 MG/DL    Creatinine 0.86 0.6 - 1.3 MG/DL    BUN/Creatinine ratio 16 12 - 20      GFR est AA >60 >60 ml/min/1.73m2    GFR est non-AA >60 >60 ml/min/1.73m2    Calcium 9.1 8.5 - 10.1 MG/DL    Bilirubin, total 0.3 0.2 - 1.0 MG/DL    ALT (SGPT) 22 16 - 61 U/L    AST (SGOT) 20 10 - 38 U/L    Alk.  phosphatase 86 45 - 117 U/L    Protein, total 6.9 6.4 - 8.2 g/dL    Albumin 2.9 (L) 3.4 - 5.0 g/dL    Globulin 4.0 2.0 - 4.0 g/dL    A-G Ratio 0.7 (L) 0.8 - 1.7     LACTIC ACID    Collection Time: 06/14/21  5:55 PM   Result Value Ref Range    Lactic acid 1.6 0.4 - 2.0 MMOL/L   CARDIAC PANEL,(CK, CKMB & TROPONIN)    Collection Time: 06/14/21  5:55 PM   Result Value Ref Range    CK - MB <1.0 <3.6 ng/ml    CK-MB Index  0.0 - 4.0 %     CALCULATION NOT PERFORMED WHEN RESULT IS BELOW LINEAR LIMIT     39 - 308 U/L    Troponin-I, QT <0.02 0.0 - 0.045 NG/ML   CULTURE, BLOOD    Collection Time: 06/14/21  9:55 PM    Specimen: Blood   Result Value Ref Range    Special Requests: NO SPECIAL REQUESTS      Culture result: NO GROWTH AFTER 8 HOURS     CULTURE, BLOOD    Collection Time: 06/14/21 10:05 PM    Specimen: Blood   Result Value Ref Range    Special Requests: NO SPECIAL REQUESTS      Culture result: NO GROWTH AFTER 8 HOURS     CBC W/O DIFF    Collection Time: 06/15/21  2:20 AM   Result Value Ref Range    WBC 7.7 4.6 - 13.2 K/uL    RBC 3.70 (L) 4.35 - 5.65 M/uL    HGB 11.8 (L) 13.0 - 16.0 g/dL    HCT 37.6 36.0 - 48.0 %    .6 (H) 74.0 - 97.0 FL    MCH 31.9 24.0 - 34.0 PG    MCHC 31.4 31.0 - 37.0 g/dL    RDW 12.7 11.6 - 14.5 %    PLATELET 220 346 - 436 K/uL    MPV 11.2 9.2 - 11.8 FL   RESPIRATORY VIRUS PANEL W/COVID-19, PCR    Collection Time: 06/15/21  8:24 AM    Specimen: Nasopharyngeal   Result Value Ref Range    Adenovirus Not detected NOTD      Coronavirus 229E Not detected NOTD      Coronavirus HKU1 Not detected NOTD      Coronavirus CVNL63 Not detected NOTD      Coronavirus OC43 Not detected NOTD      SARS-CoV-2, PCR Not detected NOTD      Metapneumovirus Not detected NOTD      Rhinovirus and Enterovirus Not detected NOTD      Influenza A Not detected NOTD      Influenza A, subtype H1 Not detected NOTD      Influenza A, subtype H3 Not detected NOTD      INFLUENZA A H1N1 PCR Not detected NOTD      Influenza B Not detected NOTD      Parainfluenza 1 Not detected NOTD      Parainfluenza 2 Not detected NOTD      Parainfluenza 3 Not detected NOTD      Parainfluenza virus 4 Not detected NOTD      RSV by PCR Not detected NOTD      B. parapertussis, PCR Not detected NOTD      Bordetella pertussis - PCR Not detected NOTD      Chlamydophila pneumoniae DNA, QL, PCR Not detected NOTD      Mycoplasma pneumoniae DNA, QL, PCR Not detected NOTD       Temp Readings from Last 3 Encounters:   06/15/21 100.4 °F (38 °C)   06/02/21 97.3 °F (36.3 °C) (Temporal)   05/26/21 98.5 °F (36.9 °C)         All Micro Results     Procedure Component Value Units Date/Time    RESPIRATORY VIRUS PANEL W/COVID-19, PCR [[id number]] Collected: 06/15/21 0824    Order Status: Completed Specimen: Nasopharyngeal Updated: 06/15/21 1058     Adenovirus Not detected        Coronavirus 229E Not detected        Coronavirus HKU1 Not detected        Coronavirus CVNL63 Not detected        Coronavirus OC43 Not detected        SARS-CoV-2, PCR Not detected        Metapneumovirus Not detected        Rhinovirus and Enterovirus Not detected        Influenza A Not detected        Influenza A, subtype H1 Not detected        Influenza A, subtype H3 Not detected        INFLUENZA A H1N1 PCR Not detected        Influenza B Not detected        Parainfluenza 1 Not detected        Parainfluenza 2 Not detected        Parainfluenza 3 Not detected        Parainfluenza virus 4 Not detected        RSV by PCR Not detected        B. parapertussis, PCR Not detected        Bordetella pertussis - PCR Not detected        Chlamydophila pneumoniae DNA, QL, PCR Not detected        Mycoplasma pneumoniae DNA, QL, PCR Not detected       CULTURE, BLOOD [[id number]] Collected: 06/14/21 2205    Order Status: Completed Specimen: Blood Updated: 06/15/21 0735     Special Requests: NO SPECIAL REQUESTS        Culture result: NO GROWTH AFTER 8 HOURS       CULTURE, BLOOD [[id number]] Collected: 06/14/21 2155    Order Status: Completed Specimen: Blood Updated: 06/15/21 0735     Special Requests: NO SPECIAL REQUESTS        Culture result: NO GROWTH AFTER 8 HOURS       CULTURE, BLOOD [[id number]] Collected: 06/14/21 0916    Order Status: Completed Specimen: Blood Updated: 06/15/21 0735     Special Requests: NO SPECIAL REQUESTS        Culture result: NO GROWTH AFTER 21 HOURS       CULTURE, BLOOD [[id number]] Collected: 06/14/21 0905    Order Status: Completed Specimen: Blood Updated: 06/15/21 0735     Special Requests: NO SPECIAL REQUESTS        Culture result: NO GROWTH AFTER 21 HOURS                 Suzanna Roberts, DHSC, MPA, PA-C
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
## 2021-06-15 NOTE — PROGRESS NOTES
Hospitalist Progress Note    Patient: Alejandrina Pope Age: 46 y.o. : 1970 MR#: [id number] SSN: [id number]  Date/Time: 6/15/2021     DOA: 2021  PCP: Diaz Khoury MD    Subjective:     Patient is sitting in a chair in no apparent distress, awake and alert, follows commands. Patient denies any cough or shortness of breath. Patient denies any abdominal pain or urinary complaints or diarrhea. Patient denies any rash    Interval Hospital Course:      Assessment/Plan:     1. Breakthrough seizure, with underlying seizure disorder,   2. Right ankle fracture, s/p ORIF (21),   3. Hypertension   4. Paroxysmal atrial flutter,   5. ADAIR on CPAP   6. Morbid obesity   7. Depression   8. Fever,, unclear etiology    On Depakote for seizures.   Neurology input noted  Hold off antibiotics per ID  PT OT  Pain control  On Xarelto per orthopedics for DVT prophylaxis  Continue lisinopril and Lopressor, monitor blood pressure  CPAP nightly  Nonweightbearing on right foot  Bowel regimen, incentive spirometry  Discussed with patient, discussed with ID    Full code  Disposition: Await skilled nursing facility    Additional Notes:    Time spent >35 minutes    Case discussed with:  [x]Patient  []Family  [x]Nursing  [x]Case Management  DVT Prophylaxis:  []Lovenox  [x]xarelto  []SCDs  []Coumadin   []On Heparin gtt    Signed By: Brando Monsalve MD     Lana 15, 2021               Objective:   VS:   Visit Vitals  /62   Pulse 66   Temp 97.9 °F (36.6 °C)   Resp 18   Ht 6' 3\" (1.905 m)   Wt (!) 168.3 kg (371 lb)   SpO2 92%   BMI 46.37 kg/m²      Tmax/24hrs: Temp (24hrs), Av.2 °F (37.9 °C), Min:97.9 °F (36.6 °C), Max:102.9 °F (39.4 °C)      Intake/Output Summary (Last 24 hours) at 6/15/2021 1838  Last data filed at 6/15/2021 1827  Gross per 24 hour   Intake 100 ml   Output 1800 ml   Net -1700 ml       General:  Awake, alert  Cardiovascular:  S1S2+, RRR  Pulmonary:  CTA b/l  GI:  Soft, BS+, NT, ND  Extremities: Right ankle and leg in cast      Additional:       Current Facility-Administered Medications   Medication Dose Route Frequency    divalproex DR (DEPAKOTE) tablet 1,250 mg  1,250 mg Oral BID    oxyCODONE IR (ROXICODONE) tablet 5 mg  5 mg Oral Q6H PRN    acetaminophen (TYLENOL) tablet 650 mg  650 mg Oral Q4H PRN    Or    acetaminophen (TYLENOL) suppository 650 mg  650 mg Rectal Q6H PRN    ferrous sulfate tablet 325 mg  1 Tablet Oral EVERY OTHER DAY    lisinopriL (PRINIVIL, ZESTRIL) tablet 10 mg  10 mg Oral DAILY    cholecalciferol (VITAMIN D3) capsule 5,000 Units  5,000 Units Oral DAILY    gabapentin (NEURONTIN) capsule 300 mg  300 mg Oral TID    rivaroxaban (XARELTO) tablet 10 mg  10 mg Oral DAILY WITH BREAKFAST    traZODone (DESYREL) tablet 100 mg  100 mg Oral QHS    metoprolol tartrate (LOPRESSOR) tablet 25 mg  25 mg Oral BID    atorvastatin (LIPITOR) tablet 10 mg  10 mg Oral QHS    sodium chloride (NS) flush 5-40 mL  5-40 mL IntraVENous Q8H    sodium chloride (NS) flush 5-40 mL  5-40 mL IntraVENous PRN    polyethylene glycol (MIRALAX) packet 17 g  17 g Oral DAILY PRN    promethazine (PHENERGAN) tablet 12.5 mg  12.5 mg Oral Q6H PRN    Or    ondansetron (ZOFRAN) injection 4 mg  4 mg IntraVENous Q6H PRN    pantoprazole (PROTONIX) tablet 40 mg  40 mg Oral ACB    Lactobacillus Acidoph & Bulgar [facility]) tablet 2 Tablet  2 Tablet Oral BID            Lab/Data Review:  Labs: Results:       Chemistry Recent Labs     06/14/21  1755 06/14/21  0235 06/13/21  0541   *  --  102*     --  141   K 3.9  --  3.6     --  107   CO2 32  --  30   BUN 14  --  17   CREA 0.86  --  0.67   BUCR 16  --  25*   AGAP 2*  --  4   CA 9.1  --  9.1   PHOS  --  3.5  --      Recent Labs     06/14/21  1755   ALT 22   TP 6.9   ALB 2.9*   GLOB 4.0   AGRAT 0.7*      CBC w/Diff Recent Labs     06/15/21  0220 06/14/21  0916 06/14/21  0235   WBC 7.7 9.0 9.4   RBC 3.70* 3.80* 3.74*   HGB 11.8* 11.7* 11.7*   HCT 37.6 38.8 37.7   .6* 102.1* 100.8*   MCH 31.9 30.8 31.3   MCHC 31.4 30.2* 31.0   RDW 12.7 13.0 13.2    205 188   GRANS  --  48  --    LYMPH  --  42  --    EOS  --  1  --       Coagulation No results for input(s): PTP, INR, APTT, INREXT, INREXT in the last 72 hours.     Iron/Ferritin No results found for: IRON, FE, TIBC, IBCT, PSAT, FERR    BNP    Cardiac Enzymes Lab Results   Component Value Date/Time     06/14/2021 05:55 PM    CK - MB <1.0 06/14/2021 05:55 PM    CK-MB Index  06/14/2021 05:55 PM     CALCULATION NOT PERFORMED WHEN RESULT IS BELOW LINEAR LIMIT    Troponin-I, QT <0.02 06/14/2021 05:55 PM        Lactic Acid    Thyroid Studies          All Micro Results     Procedure Component Value Units Date/Time    CULTURE, URINE [[id number]] Collected: 06/15/21 1530    Order Status: Completed Specimen: Urine from Clean catch Updated: 06/15/21 1602    RESPIRATORY VIRUS PANEL W/COVID-19, PCR [[id number]] Collected: 06/15/21 0824    Order Status: Completed Specimen: Nasopharyngeal Updated: 06/15/21 1058     Adenovirus Not detected        Coronavirus 229E Not detected        Coronavirus HKU1 Not detected        Coronavirus CVNL63 Not detected        Coronavirus OC43 Not detected        SARS-CoV-2, PCR Not detected        Metapneumovirus Not detected        Rhinovirus and Enterovirus Not detected        Influenza A Not detected        Influenza A, subtype H1 Not detected        Influenza A, subtype H3 Not detected        INFLUENZA A H1N1 PCR Not detected        Influenza B Not detected        Parainfluenza 1 Not detected        Parainfluenza 2 Not detected        Parainfluenza 3 Not detected        Parainfluenza virus 4 Not detected        RSV by PCR Not detected        B. parapertussis, PCR Not detected        Bordetella pertussis - PCR Not detected        Chlamydophila pneumoniae DNA, QL, PCR Not detected        Mycoplasma pneumoniae DNA, QL, PCR Not detected       CULTURE, BLOOD [[id number]] Collected: 06/14/21 2205    Order Status: Completed Specimen: Blood Updated: 06/15/21 0735     Special Requests: NO SPECIAL REQUESTS        Culture result: NO GROWTH AFTER 8 HOURS       CULTURE, BLOOD [[id number]] Collected: 06/14/21 2155    Order Status: Completed Specimen: Blood Updated: 06/15/21 0735     Special Requests: NO SPECIAL REQUESTS        Culture result: NO GROWTH AFTER 8 HOURS       CULTURE, BLOOD [[id number]] Collected: 06/14/21 0916    Order Status: Completed Specimen: Blood Updated: 06/15/21 0735     Special Requests: NO SPECIAL REQUESTS        Culture result: NO GROWTH AFTER 21 HOURS       CULTURE, BLOOD [[id number]] Collected: 06/14/21 0905    Order Status: Completed Specimen: Blood Updated: 06/15/21 0735     Special Requests: NO SPECIAL REQUESTS        Culture result: NO GROWTH AFTER 21 HOURS               Images:    CT (Most Recent). XRAY (Most Recent)      EKG Results for orders placed or performed in visit on 01/07/21   AMB POC EKG ROUTINE W/ 12 LEADS, INTER & REP     Status: None (Preliminary result)    Impression    Sinus bradycardia at 57 bpm.  Nonspecific T wave abnormality.           2D ECHO

## 2021-06-15 NOTE — ROUTINE PROCESS
During giving evening medications, patient sounded disoriented. Patient was alert to name and place. Patient stated that he had never seen me before and he did not know his girlfriend had been in his room less than an hour ago. I took a set of VS. I noticed his arms and hands were shaking bilaterally. Both attending MD and Surgeon were in the room 10-20 minutes prior to this onset, the patient was completely A&OX4. RRT showed up and ordered labs and EEG. Narcotics have been placed on hold. Attending MD came to assess patient. Patient returned to previous state and remained in room.

## 2021-06-15 NOTE — ROUTINE PROCESS
Patient is alert andoriented times three with no signs or symptoms of distress.  Dressing is clean dry and intact and pulses palpable

## 2021-06-15 NOTE — PROGRESS NOTES
Problem: Mobility Impaired (Adult and Pediatric)  Goal: *Acute Goals and Plan of Care (Insert Text)  Description: Initiated 6/12/2021 and to be accomplished within 5-7 day(s)  1. Patient will move from supine to sit and sit to supine  in bed with minimal assistance/contact guard assist.    2.  Patient will transfer from bed to chair and chair to bed with minimal assistance/contact guard assist using the least restrictive device. NWB R  3. Patient will perform sit to stand with minimal assistance/contact guard assist. NWB R  4. Patient will ambulate with moderate assistance  for 5 feet with the least restrictive device. NWB R      PLOF: I all areas, no AD use, Has good support from girlfriend Kiya who has been assisting with care since injury   Outcome: Progressing Towards Goal   PHYSICAL THERAPY TREATMENT    Patient: Ruel Navarro (89 y.o. male)  Date: 6/15/2021  Diagnosis: Seizure disorder ([address] [P38.423] <principal problem not specified>  Procedure(s) (LRB):  OPEN REDUCTION INTERNAL FIXATION TRIMALLEOLAR ANKLE FRACTURE WITH FIXATION POSTERIOR LIP, RIGHT ANKLE/C-ARM/SYNTHES ANKLE TRAUMA TRAY/DBM ALLOMATRIX  **GEN W/REGIONAL** (Right) 4 Days Post-Op  Precautions: Fall, NWB (RLE)  ASSESSMENT:  Co-treated with OT to maximize patient safety and participation in functional mobility. Min A for supine to sit with HOB elevated. Noted to be incontinent of large amount of urine; patient unaware. Seated EOB with supervision for balance to complete reaching tasks for clean-up. Educated on NWB RLE and use of AD. Initially refusing ww, then agreeable. Mod A x2 for sit to stand. Requires 3 attempts to achieve full standing. Min A x2 for bed to chair transfer with ww. Poor safety awareness with stand to sit. Seated in recliner with BLE elevated. Educated on need for RN assistance with mobility; verbalized understanding. Call bell in reach.      Progression toward goals:   []      Improving appropriately and progressing toward goals  [x]      Improving slowly and progressing toward goals  []      Not making progress toward goals and plan of care will be adjusted     PLAN:  Patient continues to benefit from skilled intervention to address the above impairments. Continue treatment per established plan of care. Discharge Recommendations:  Rehab  Further Equipment Recommendations for Discharge:  bariatric rolling walker     SUBJECTIVE:   Patient stated The doctor told me not use a walker cause I'd fall.     OBJECTIVE DATA SUMMARY:   Critical Behavior:  Neurologic State: Alert  Orientation Level: Oriented X4  Cognition: Follows commands  Functional Mobility:  Bed Mobility:  Supine to Sit: Minimum assistance; Additional time (w/HOB raised and SR)  Transfers:  Sit to Stand: Moderate assistance;Assist x2 (w/RW from elevated surface)  Bed to Chair: Minimum assistance;Assist x2 (w/RW)  Balance:   Sitting: Intact  Standing: Impaired; With support  Standing - Static: Fair  Standing - Dynamic : Fair  Neuro Re-Education:  Seated EOB 8 minutes with reaching tasks    Pain:  Pain level pre-treatment: 8/10  Pain level post-treatment: 8/10     Activity Tolerance:   Good    After treatment:   [x] Patient left in no apparent distress sitting up in chair  [] Patient left in no apparent distress in bed  [x] Call bell left within reach  [x] Nursing notified  [] Caregiver present  [] Bed alarm activated  [] SCDs applied      COMMUNICATION/EDUCATION:   [x]         Role of physical therapy in the acute care setting. [x]         Fall prevention education was provided and the patient/caregiver indicated understanding. [x]         Patient/family have participated as able in working toward goals and plan of care. [x]         Patient/family agree to work toward stated goals and plan of care. []         Patient understands intent and goals of therapy, but is neutral about his/her participation.   []         Patient is unable to participate in stated goals/plan of care: ongoing with therapy staff.       Adry Peters, PT   Time Calculation: 25 mins

## 2021-06-15 NOTE — PROGRESS NOTES
Problem: Self Care Deficits Care Plan (Adult)  Goal: *Acute Goals and Plan of Care (Insert Text)  Description: Occupational Therapy Goals  Initiated 6/12/2021 within 7 day(s). 1.  Patient will perform bed mobility for ADLs with supervision/set-up. 2.  Patient will perform lower body dressing with minimal assistance/contact guard assist using AE prn.  4.  Patient will perform toilet transfers with minimal assistance/contact guard assist.  5.  Patient will perform all aspects of toileting with supervision/set-up. 6.  Patient will perform bathing with contact guard assistance. 7.  Patient will utilize energy conservation techniques during functional activities with verbal cues. Prior Level of Function: Pt lives in a house with roommates. Pt reports being independent in I/ADLs prior to his fall two weeks ago. Since then reports he mostly stayed in bed and his girlfriend was assisting him with LB ADLs and functional txfrs. Outcome: Progressing Towards Goal   OCCUPATIONAL THERAPY TREATMENT    Patient: Carli Suárez (15 y.o. male)  Date: 6/15/2021  Diagnosis: Seizure disorder ([facility] [K22.621] <principal problem not specified>  Procedure(s) (LRB):  OPEN REDUCTION INTERNAL FIXATION TRIMALLEOLAR ANKLE FRACTURE WITH FIXATION POSTERIOR LIP, RIGHT ANKLE/C-ARM/SYNTHES ANKLE TRAUMA TRAY/DBM ALLOMATRIX  **GEN W/REGIONAL** (Right) 4 Days Post-Op  Precautions: Fall, NWB (RLE)    Chart, occupational therapy assessment, plan of care, and goals were reviewed. ASSESSMENT:  Pt co-treated w/PT to maximize safety w/RW training and functional transfer training. Pt and linens soiled upon entry. Pt requires increase time w/bed mobility and vc's for use of side rail to maneuver to EOB. Reviewed NWB RLE and importance of maintaining w/ADLs and functional transfers. Educated on energy conservation techniques w/ADLs and benefits of shower chair.  Pt performs anterior fanny-care seated EOB and Poor dynamic standing balance requires Max Assist w/posterior fanny-care using RW for support. Pt requires max vc's for hand placement and 2 person assist w/functional transfer to standing. Pt tolerates standing for fanny-care and requires max vc's for NWB RLE. Pt requires 2 person assist for safety w/functional transfer to chair and max vc's for hand placement for carryover w/functional transfer to [facility]. Pt anxious for d/c to rehab. Pt left in chair and reinforced importance of calling for assistance. Progression toward goals:  []          Improving appropriately and progressing toward goals  [x]          Improving slowly and progressing toward goals  []          Not making progress toward goals and plan of care will be adjusted     PLAN:  Patient continues to benefit from skilled intervention to address the above impairments. Continue treatment per established plan of care. Discharge Recommendations:  Skilled Nursing Facility  Further Equipment Recommendations for Discharge:  extended tubseat transfer bench, rolling walker, wheelchair, and BSC  (all bariatric equipment)     SUBJECTIVE:   Patient stated I know one thing, I'm ready to get out of here.     OBJECTIVE DATA SUMMARY:   Cognitive/Behavioral Status:  Neurologic State: Alert  Orientation Level: Oriented X4  Cognition: Follows commands  Safety/Judgement: Fall prevention, Home safety    Functional Mobility and Transfers for ADLs:   Bed Mobility:  Supine to Sit: Minimum assistance; Additional time (w/HOB raised and SR)   Transfers:  Sit to Stand: Moderate assistance;Assist x2 (w/RW from elevated surface)  Bed to Chair: Minimum assistance;Assist x2 (w/RW)    Balance:  Sitting: Intact  Standing: Impaired; With support  Standing - Static: Fair  Standing - Dynamic : Fair    ADL Intervention:  Grooming  Position Performed: Seated edge of bed  Washing Face: Set-up  Washing Hands: Set-up    Upper Body [address] Gown: Set-up    Toileting  Toileting Assistance: Maximum assistance  Bladder Hygiene: Set-up (seated)  Bowel Hygiene: Maximum assistance  Clothing Management: Maximum assistance    UE Therapeutic Exercises:   AROM BUE multiple planes, at chair level    Pain:  Pain level pre-treatment: 0/10   Pain level post-treatment: 0/10  Pt c/o RLE pain, although pain not rated    Activity Tolerance:    Good    Please refer to the flowsheet for vital signs taken during this treatment. After treatment:   [x]  Patient left in no apparent distress sitting up in chair  []  Patient left in no apparent distress in bed  [x]  Call bell left within reach  [x]  Nursing notified  []  Caregiver present  []  Bed alarm activated    COMMUNICATION/EDUCATION:   [] Role of Occupational Therapy in the acute care setting  [] Home safety education was provided and the patient/caregiver indicated understanding. [] Patient/family have participated as able in working towards goals and plan of care. [x] Patient/family agree to work toward stated goals and plan of care. [] Patient understands intent and goals of therapy, but is neutral about his/her participation. [] Patient is unable to participate in goal setting and plan of care.       Thank you for this referral.  Irby Gilford Dextradeur, COTA  Time Calculation: 24 mins

## 2021-06-15 NOTE — PROGRESS NOTES
1905 Report Received  2055 Temp 102.9 Hospitalist notified. Patient comfortable sleeping  Bedside and Verbal shift change report given to [address] [place] [address] oncoming nurse by Donavon Bush RN (offgoing nurse). Report included the following information SBAR, Kardex, Intake/Output, MAR and Recent Results.

## 2021-06-15 NOTE — ROUTINE PROCESS
Bedside shift change report given to Denver Precise, RN (oncoming nurse) by Lupis Li RN (offgoing nurse). Report included the following information SBAR, Kardex, Procedure Summary, Intake/Output, MAR and Recent Results. Opportunity for questions and clarification was provided.
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
## 2021-06-15 NOTE — PROGRESS NOTES
Called [facility] and spoke with [facility]). The case number is [id number]. They have not received the fax from yesterday so will refax again today. Asked PT/OT to see patient asap today as pt has not had therapy since 6/12 and will need recent clinicals for the auth to be approved.   Timothy Toney RN - Outcomes Manager  [phone]

## 2021-06-15 NOTE — PROGRESS NOTES
Post-op prescriptions ordered (to be placed on chart) in preparation for transfer to SNF. Suzanna Armstrong [facility], MPA, PA-C  6/15/2021  1:14 PM

## 2021-06-15 NOTE — PROGRESS NOTES
Bedside and Verbal shift change report given to Elizabeth Woodson RN(oncoming nurse) by Rashaad Burgos RN (offgoing nurse). Report included the following information SBAR and Kardex.

## 2021-06-16 VITALS
WEIGHT: 315 LBS | RESPIRATION RATE: 16 BRPM | DIASTOLIC BLOOD PRESSURE: 73 MMHG | HEART RATE: 63 BPM | TEMPERATURE: 98 F | BODY MASS INDEX: 39.17 KG/M2 | OXYGEN SATURATION: 95 % | HEIGHT: 75 IN | SYSTOLIC BLOOD PRESSURE: 113 MMHG

## 2021-06-16 LAB
ANION GAP SERPL CALC-SCNC: 1 MMOL/L (ref 3–18)
BACTERIA SPEC CULT: NORMAL
BASOPHILS # BLD: 0 K/UL (ref 0–0.1)
BASOPHILS NFR BLD: 1 % (ref 0–2)
BUN SERPL-MCNC: 13 MG/DL (ref 7–18)
BUN/CREAT SERPL: 18 (ref 12–20)
CALCIUM SERPL-MCNC: 9.4 MG/DL (ref 8.5–10.1)
CHLORIDE SERPL-SCNC: 103 MMOL/L (ref 100–111)
CO2 SERPL-SCNC: 37 MMOL/L (ref 21–32)
CREAT SERPL-MCNC: 0.71 MG/DL (ref 0.6–1.3)
DIFFERENTIAL METHOD BLD: ABNORMAL
EOSINOPHIL # BLD: 0.2 K/UL (ref 0–0.4)
EOSINOPHIL NFR BLD: 3 % (ref 0–5)
ERYTHROCYTE [DISTWIDTH] IN BLOOD BY AUTOMATED COUNT: 12.5 % (ref 11.6–14.5)
GLUCOSE SERPL-MCNC: 83 MG/DL (ref 74–99)
HCT VFR BLD AUTO: 38.9 % (ref 36–48)
HGB BLD-MCNC: 12.1 G/DL (ref 13–16)
LYMPHOCYTES # BLD: 3 K/UL (ref 0.9–3.6)
LYMPHOCYTES NFR BLD: 45 % (ref 21–52)
MAGNESIUM SERPL-MCNC: 2.3 MG/DL (ref 1.6–2.6)
MCH RBC QN AUTO: 31.3 PG (ref 24–34)
MCHC RBC AUTO-ENTMCNC: 31.1 G/DL (ref 31–37)
MCV RBC AUTO: 100.8 FL (ref 74–97)
MONOCYTES # BLD: 0.7 K/UL (ref 0.05–1.2)
MONOCYTES NFR BLD: 10 % (ref 3–10)
NEUTS SEG # BLD: 2.8 K/UL (ref 1.8–8)
NEUTS SEG NFR BLD: 42 % (ref 40–73)
PLATELET # BLD AUTO: 166 K/UL (ref 135–420)
PMV BLD AUTO: 11 FL (ref 9.2–11.8)
POTASSIUM SERPL-SCNC: 4.5 MMOL/L (ref 3.5–5.5)
RBC # BLD AUTO: 3.86 M/UL (ref 4.35–5.65)
SERVICE CMNT-IMP: NORMAL
SODIUM SERPL-SCNC: 141 MMOL/L (ref 136–145)
WBC # BLD AUTO: 6.6 K/UL (ref 4.6–13.2)

## 2021-06-16 PROCEDURE — 74011250637 HC RX REV CODE- 250/637: Performed by: INTERNAL MEDICINE

## 2021-06-16 PROCEDURE — 80048 BASIC METABOLIC PNL TOTAL CA: CPT

## 2021-06-16 PROCEDURE — 36415 COLL VENOUS BLD VENIPUNCTURE: CPT

## 2021-06-16 PROCEDURE — 85025 COMPLETE CBC W/AUTO DIFF WBC: CPT

## 2021-06-16 PROCEDURE — 83735 ASSAY OF MAGNESIUM: CPT

## 2021-06-16 PROCEDURE — 99218 HC RM OBSERVATION: CPT

## 2021-06-16 PROCEDURE — 99217 PR OBSERVATION CARE DISCHARGE MANAGEMENT: CPT | Performed by: EMERGENCY MEDICINE

## 2021-06-16 PROCEDURE — 65270000029 HC RM PRIVATE

## 2021-06-16 PROCEDURE — 74011250637 HC RX REV CODE- 250/637: Performed by: PSYCHIATRY & NEUROLOGY

## 2021-06-16 PROCEDURE — 2709999900 HC NON-CHARGEABLE SUPPLY

## 2021-06-16 PROCEDURE — 74011250637 HC RX REV CODE- 250/637: Performed by: EMERGENCY MEDICINE

## 2021-06-16 RX ORDER — POLYETHYLENE GLYCOL 3350 17 G/17G
17 POWDER, FOR SOLUTION ORAL
Qty: 15 PACKET | Refills: 0 | Status: SHIPPED
Start: 2021-06-16 | End: 2022-04-19

## 2021-06-16 RX ORDER — METOPROLOL TARTRATE 25 MG/1
25 TABLET, FILM COATED ORAL 2 TIMES DAILY
Qty: 60 TABLET | Refills: 0 | Status: SHIPPED
Start: 2021-06-16 | End: 2021-09-09 | Stop reason: SDUPTHER

## 2021-06-16 RX ORDER — LISINOPRIL 10 MG/1
10 TABLET ORAL DAILY
Qty: 30 TABLET | Refills: 0 | Status: SHIPPED
Start: 2021-06-17 | End: 2021-09-09 | Stop reason: SDUPTHER

## 2021-06-16 RX ORDER — SENNOSIDES 8.6 MG/1
1 TABLET ORAL DAILY
Qty: 30 TABLET | Refills: 0 | Status: SHIPPED
Start: 2021-06-17 | End: 2022-04-19

## 2021-06-16 RX ORDER — PANTOPRAZOLE SODIUM 40 MG/1
40 TABLET, DELAYED RELEASE ORAL
Qty: 30 TABLET | Refills: 0 | Status: SHIPPED
Start: 2021-06-16 | End: 2022-04-19

## 2021-06-16 RX ORDER — GABAPENTIN 300 MG/1
300 CAPSULE ORAL 3 TIMES DAILY
Qty: 30 CAPSULE | Refills: 0 | Status: SHIPPED | OUTPATIENT
Start: 2021-06-16 | End: 2022-04-19

## 2021-06-16 RX ORDER — DIVALPROEX SODIUM 250 MG/1
1250 TABLET, DELAYED RELEASE ORAL 2 TIMES DAILY
Qty: 20 TABLET | Refills: 0 | Status: SHIPPED
Start: 2021-06-16 | End: 2021-07-08 | Stop reason: DRUGHIGH

## 2021-06-16 RX ORDER — DOCUSATE SODIUM 100 MG/1
100 CAPSULE, LIQUID FILLED ORAL 2 TIMES DAILY
Qty: 60 CAPSULE | Refills: 0 | Status: SHIPPED
Start: 2021-06-16 | End: 2022-04-19

## 2021-06-16 RX ORDER — ACETAMINOPHEN 325 MG/1
650 TABLET ORAL
Qty: 20 TABLET | Refills: 0 | Status: SHIPPED
Start: 2021-06-16 | End: 2022-04-19

## 2021-06-16 RX ORDER — OXYCODONE HYDROCHLORIDE 5 MG/1
5 TABLET ORAL
Qty: 20 TABLET | Refills: 0 | Status: SHIPPED | OUTPATIENT
Start: 2021-06-16 | End: 2021-07-21 | Stop reason: SDUPTHER

## 2021-06-16 RX ORDER — LANOLIN ALCOHOL/MO/W.PET/CERES
325 CREAM (GRAM) TOPICAL EVERY OTHER DAY
Qty: 15 TABLET | Refills: 0 | Status: SHIPPED
Start: 2021-06-17 | End: 2022-04-19

## 2021-06-16 RX ADMIN — Medication 10 ML: at 15:30

## 2021-06-16 RX ADMIN — OXYCODONE HYDROCHLORIDE 5 MG: 5 TABLET ORAL at 15:24

## 2021-06-16 RX ADMIN — SENNOSIDES 8.6 MG: 8.6 TABLET, FILM COATED ORAL at 09:00

## 2021-06-16 RX ADMIN — RIVAROXABAN 10 MG: 10 TABLET, FILM COATED ORAL at 09:00

## 2021-06-16 RX ADMIN — GABAPENTIN 300 MG: 300 CAPSULE ORAL at 09:00

## 2021-06-16 RX ADMIN — LISINOPRIL 10 MG: 10 TABLET ORAL at 09:00

## 2021-06-16 RX ADMIN — DOCUSATE SODIUM 100 MG: 100 CAPSULE ORAL at 09:00

## 2021-06-16 RX ADMIN — DIVALPROEX SODIUM 1250 MG: 500 TABLET, DELAYED RELEASE ORAL at 09:00

## 2021-06-16 RX ADMIN — Medication 2 TABLET: at 18:21

## 2021-06-16 RX ADMIN — DOCUSATE SODIUM 100 MG: 100 CAPSULE ORAL at 18:21

## 2021-06-16 RX ADMIN — OXYCODONE HYDROCHLORIDE 5 MG: 5 TABLET ORAL at 09:59

## 2021-06-16 RX ADMIN — Medication 2 TABLET: at 09:00

## 2021-06-16 RX ADMIN — Medication 5000 UNITS: at 09:00

## 2021-06-16 RX ADMIN — DIVALPROEX SODIUM 1250 MG: 500 TABLET, DELAYED RELEASE ORAL at 18:21

## 2021-06-16 RX ADMIN — PANTOPRAZOLE SODIUM 40 MG: 40 TABLET, DELAYED RELEASE ORAL at 05:33

## 2021-06-16 RX ADMIN — METOPROLOL TARTRATE 25 MG: 25 TABLET, FILM COATED ORAL at 09:00

## 2021-06-16 RX ADMIN — Medication 10 ML: at 05:19

## 2021-06-16 RX ADMIN — GABAPENTIN 300 MG: 300 CAPSULE ORAL at 15:24

## 2021-06-16 RX ADMIN — OXYCODONE HYDROCHLORIDE 5 MG: 5 TABLET ORAL at 04:40

## 2021-06-16 NOTE — PROGRESS NOTES
Transition of Care Plan to SNF/Rehab    SNF/Rehab Transition:  Patient has been accepted to [facility] and meets criteria for admission. Patient will  be transported by [facility] and expected to leave at [place] [address] [phone], spoke with Liane Craft. States no one has claimed the trip yet. [employer] and spoke with Adelfo Patino. They have accepted the trip and will transport patient around 7pm..    Communication to Patient/Family:  Met with patient and he is agreeable to the transition plan. Communication to SNF/Rehab:  Bedside RN, Marry Collazo, has been notified of the transition plan to the facility and to call report (phone number [phone]). Discharge information has been updated on the AVS. And communicated to facility via [facility], or CC link. SNF/Rehab Transition:    PCP/Specialist:     Nursing Please include all hard scripts for controlled substances, med rec and dc summary, and AVS in packet. Reviewed and confirmed with facility representative, Nasima Rodriguez  at [facility] they can manage the patient care needs for the following:     Raina Blackwell with (X) only those applicable:    COVID Status  Patient has had the Covid vaccine Yes. If yes, dates:  5/2021 and 6/2021. Patient is Covid Negative  Pt initially tested Covid positive on: Lucía Hong  It has been  days since initial positive Covid test.      Medication:  [x]  Medications will be available at the facility  []  IV Antibiotics   [x]  Controlled Substance - hard copy to be sent with patient   []  Weekly Labs    Documents:  [x] Hard RX  Number sent three  [] MAR  [] Kardex  [] AVS  [] Wound care note  [x]Transfer Summary/Discharge Summary    Equipment:  [x]  CPAP/BiPAP  []  Wound Vacuum  []  Moseley or Urinary Device  []  PICC/Central Line  []  Nebulizer  []  Ventilator    Treatment:  []Isolation (for MRSA, VRE, etc.)  []Surgical Drain Management  []Tracheostomy Care  []Dressing Changes  []Dialysis with transportation and chair time  Center Mode of tranpsort   []PEG Care  []Oxygen  []Daily Weights for Heart Failure    Dietary:  []Any diet limitations  []Tube Feedings   []Total Parenteral Management (TPN)    Eligible for Medicaid Long Term Services and Supports  Yes:  [x] Eligible for medical assistance or will become eligible within 180 days and LTSS completed. [] Provider/Patient and/or support system has requested screening. [x] LTSS copy provided to [facility] .  [] LTSS unavailable at discharge will send once processed to SNF provider.  [] LTSS  unavailable at discharged mailed to patient  [] LTSS performed by outside agency on  with tracking number   No:   [] Private pay and is not financially eligible for Medicaid within the next 180 days. [] Reside out-of-state.   [] A residents of a state owned/operated facility that is licensed  by [address] and [facility] or [facility]  [] Enrollment in [facility] hospice services  [] [address] [place] [address]  [] Patient /Family declines to have screening completed or provide financial information for screening          Financial Resources:  Medicaid    [] Initiated and application pending   [x] Full coverage      Advanced Care Plan:  []Surrogate Decision Maker of Care  []POA  []Communicated Code Status- FULL    Other  Curt Barbosa RN - Outcomes Manager  [phone]

## 2021-06-16 NOTE — ROUTINE PROCESS
Bedside shift change report given to mitchel abad (oncoming nurse) by Sandra Felix (offgoing nurse). Report included the following information SBAR.

## 2021-06-16 NOTE — PROGRESS NOTES
conducted an initial consultation and Spiritual Assessment for Martin Holly, who is a 46 y. o.,male. Patient's Primary Language is: [place]. According to the patient's EMR Lutheran Affiliation is: [place]. The reason the Patient came to the hospital is:   Patient Active Problem List    Diagnosis Date Noted    Seizure disorder [facility]) 06/11/2021    Seizure ([facility] 04/07/2019    Head injury 04/07/2019    Atrial fibrillation with RVR ([facility] 04/07/2019    New onset a-fib ([facility] 04/07/2019    Leg pain 04/07/2019    [facility] 04/07/2019    Hypertension 04/07/2019    Bipolar disorder, unspecified ([facility] 06/26/2018    Nonintractable epilepsy with simple partial seizures ([facility] 05/09/2017        The  provided the following Interventions:  Initiated a relationship of care and support. Explored issues of juan, belief, spirituality and Baptist/ritual needs while hospitalized. Listened empathically. Provided chaplaincy education. Provided information about Spiritual Care Services. Offered prayer and assurance of continued prayers on patient's behalf. Chart reviewed. The following outcomes where achieved:  Patient shared limited information about both their medical narrative and spiritual journey/beliefs. Patient processed feeling about current hospitalization. Patient expressed gratitude for 's visit. Assessment:  Patient does not have any Baptist/cultural needs that will affect patient's preferences in health care. There are no spiritual or Baptist issues which require intervention at this time. Plan:  Chaplains will continue to follow and will provide pastoral care on an as needed/requested basis.  recommends bedside caregivers page  on duty if patient shows signs of acute spiritual or emotional distress.     [address]   [phone]

## 2021-06-16 NOTE — PROGRESS NOTES
PROGRESS NOTE         LOS: 0 days     ASSESSMENT:        5 Days Post-Op s/p:     Procedure(s):  OPEN REDUCTION INTERNAL FIXATION TRIMALLEOLAR ANKLE FRACTURE WITH FIXATION POSTERIOR LIP, RIGHT ANKLE/C-ARM/SYNTHES ANKLE TRAUMA TRAY/DBM ALLOMATRIX  **GEN W/REGIONAL**       PLAN:       1)  Pain management - per pain protocol. 2)  Antibiotics:  None at this time  3)  Incentive Spirometry  4)  Acute blood loss anemia/Post -op anemia: Continue monitoring  5)  DVT prophylaxis:  Xarelto, foot /ankle pumps, SCD  6)  Internal Medicine and Infectious Disease following for medical management. Temperature shows slight decrease today. 7)  PT/OT - Bed-to-chair transfers, otherwise, NWB RLE. Continue to use Bone Stimulator  8) Incision Care: Routine Incision Care and Dressing Changes as necessary per Dr. Jonathon Canela protocol. Wound was assessed yesterday and there were no visible signs of infection. Cultures pending. Still need to complete Urinalysis   9) D/C planning: Pending. Patient would prefer to go to rehab/SNF on [employer] near his home    Post operative prescriptions are on the patients chart for discharge    SUBJECTIVE:       Patient resting comfortably at this time. No NAD. OBJECTIVE:     Visit Vitals  /82 (BP 1 Location: Left upper arm, BP Patient Position: At rest)   Pulse 63   Temp 99 °F (37.2 °C)   Resp 16   Ht 6' 3\" (1.905 m)   Wt (!) 371 lb (168.3 kg)   SpO2 93%   BMI 46.37 kg/m²         CHEST/ABDOMEN: Observation reveals: No audible wheezing from mouth. No accessory use of chest muscles during breathing. Non tender abdomen    Examination of RLE reveals wound covered. Incision/Dressings:  Dressings  are clean, dry and  Intact.  Bone stimulator in place    Extremities:        No significant edema or embolic phenomena to the foot/toes or hands         Lower extremities are warm and appear well perfused                                           Labs:    CBC  Lab Results Component Value Date/Time    WBC 6.6 06/16/2021 03:10 AM    RBC 3.86 (L) 06/16/2021 03:10 AM    HCT 38.9 06/16/2021 03:10 AM    .8 (H) 06/16/2021 03:10 AM    MCH 31.3 06/16/2021 03:10 AM    MCHC 31.1 06/16/2021 03:10 AM    RDW 12.5 06/16/2021 03:10 AM         Coagulation  Lab Results   Component Value Date    INR 1.0 06/07/2021    APTT 27.0 01/05/2020            Basic Metabolic Profile  Lab Results   Component Value Date     06/16/2021    CO2 37 (H) 06/16/2021    BUN 13 06/16/2021       Recent Results (from the past 24 hour(s))   URINALYSIS W/MICROSCOPIC    Collection Time: 06/15/21  3:30 PM   Result Value Ref Range    Color YELLOW      Appearance CLEAR      Specific gravity 1.011 1.005 - 1.030      pH (UA) 7.0 5.0 - 8.0      Protein Negative NEG mg/dL    Glucose Negative NEG mg/dL    Ketone Negative NEG mg/dL    Bilirubin Negative NEG      Blood Negative NEG      Urobilinogen 1.0 0.2 - 1.0 EU/dL    Nitrites Negative NEG      Leukocyte Esterase Negative NEG      WBC 0 to 3 0 - 5 /hpf    Mucus FEW (A) NEG /lpf   CBC WITH AUTOMATED DIFF    Collection Time: 06/16/21  3:10 AM   Result Value Ref Range    WBC 6.6 4.6 - 13.2 K/uL    RBC 3.86 (L) 4.35 - 5.65 M/uL    HGB 12.1 (L) 13.0 - 16.0 g/dL    HCT 38.9 36.0 - 48.0 %    .8 (H) 74.0 - 97.0 FL    MCH 31.3 24.0 - 34.0 PG    MCHC 31.1 31.0 - 37.0 g/dL    RDW 12.5 11.6 - 14.5 %    PLATELET 136 297 - 208 K/uL    MPV 11.0 9.2 - 11.8 FL    NEUTROPHILS 42 40 - 73 %    LYMPHOCYTES 45 21 - 52 %    MONOCYTES 10 3 - 10 %    EOSINOPHILS 3 0 - 5 %    BASOPHILS 1 0 - 2 %    ABS. NEUTROPHILS 2.8 1.8 - 8.0 K/UL    ABS. LYMPHOCYTES 3.0 0.9 - 3.6 K/UL    ABS. MONOCYTES 0.7 0.05 - 1.2 K/UL    ABS. EOSINOPHILS 0.2 0.0 - 0.4 K/UL    ABS.  BASOPHILS 0.0 0.0 - 0.1 K/UL    DF AUTOMATED     METABOLIC PANEL, BASIC    Collection Time: 06/16/21  3:10 AM   Result Value Ref Range    Sodium 141 136 - 145 mmol/L    Potassium 4.5 3.5 - 5.5 mmol/L    Chloride 103 100 - 111 mmol/L CO2 37 (H) 21 - 32 mmol/L    Anion gap 1 (L) 3.0 - 18 mmol/L    Glucose 83 74 - 99 mg/dL    BUN 13 7.0 - 18 MG/DL    Creatinine 0.71 0.6 - 1.3 MG/DL    BUN/Creatinine ratio 18 12 - 20      GFR est AA >60 >60 ml/min/1.73m2    GFR est non-AA >60 >60 ml/min/1.73m2    Calcium 9.4 8.5 - 10.1 MG/DL   MAGNESIUM    Collection Time: 06/16/21  3:10 AM   Result Value Ref Range    Magnesium 2.3 1.6 - 2.6 mg/dL     Temp Readings from Last 3 Encounters:   06/16/21 99 °F (37.2 °C)   06/02/21 97.3 °F (36.3 °C) (Temporal)   05/26/21 98.5 °F (36.9 °C)         All Micro Results     Procedure Component Value Units Date/Time    CULTURE, BLOOD [[id number]] Collected: 06/14/21 2205    Order Status: Completed Specimen: Blood Updated: 06/16/21 0719     Special Requests: NO SPECIAL REQUESTS        Culture result: NO GROWTH 2 DAYS       CULTURE, BLOOD [[id number]] Collected: 06/14/21 2155    Order Status: Completed Specimen: Blood Updated: 06/16/21 0719     Special Requests: NO SPECIAL REQUESTS        Culture result: NO GROWTH 2 DAYS       CULTURE, BLOOD [[id number]] Collected: 06/14/21 0916    Order Status: Completed Specimen: Blood Updated: 06/16/21 0719     Special Requests: NO SPECIAL REQUESTS        Culture result: NO GROWTH 2 DAYS       CULTURE, BLOOD [[id number]] Collected: 06/14/21 0905    Order Status: Completed Specimen: Blood Updated: 06/16/21 0719     Special Requests: NO SPECIAL REQUESTS        Culture result: NO GROWTH 2 DAYS       CULTURE, URINE [[id number]] Collected: 06/15/21 1530    Order Status: Completed Specimen: Urine from Clean catch Updated: 06/15/21 2323    RESPIRATORY VIRUS PANEL W/COVID-19, PCR [[id number]] Collected: 06/15/21 0824    Order Status: Completed Specimen: Nasopharyngeal Updated: 06/15/21 1058     Adenovirus Not detected        Coronavirus 229E Not detected        Coronavirus HKU1 Not detected        Coronavirus CVNL63 Not detected        Coronavirus OC43 Not detected        SARS-CoV-2, PCR Not detected Metapneumovirus Not detected        Rhinovirus and Enterovirus Not detected        Influenza A Not detected        Influenza A, subtype H1 Not detected        Influenza A, subtype H3 Not detected        INFLUENZA A H1N1 PCR Not detected        Influenza B Not detected        Parainfluenza 1 Not detected        Parainfluenza 2 Not detected        Parainfluenza 3 Not detected        Parainfluenza virus 4 Not detected        RSV by PCR Not detected        B. parapertussis, PCR Not detected        Bordetella pertussis - PCR Not detected        Chlamydophila pneumoniae DNA, QL, PCR Not detected        Mycoplasma pneumoniae DNA, QL, PCR Not detected                 Karen Roberts, [facility], MPA, PA-C

## 2021-06-16 NOTE — PROGRESS NOTES
Problem: Pressure Injury - Risk of  Goal: *Prevention of pressure injury  Description: Document Vaughn Scale and appropriate interventions in the flowsheet.   Outcome: Progressing Towards Goal  Note: Pressure Injury Interventions:  Sensory Interventions: Assess changes in LOC, Keep linens dry and wrinkle-free, Maintain/enhance activity level, Pad between skin to skin, Pressure redistribution bed/mattress (bed type)    Moisture Interventions: Absorbent underpads, Assess need for specialty bed, Internal/External urinary devices    Activity Interventions: Assess need for specialty bed, Increase time out of bed, Pressure redistribution bed/mattress(bed type), PT/OT evaluation    Mobility Interventions: Assess need for specialty bed, HOB 30 degrees or less, Pressure redistribution bed/mattress (bed type)    Nutrition Interventions: Document food/fluid/supplement intake, Offer support with meals,snacks and hydration                     Problem: Patient Education: Go to Patient Education Activity  Goal: Patient/Family Education  Outcome: Progressing Towards Goal     Problem: Patient Education: Go to Patient Education Activity  Goal: Patient/Family Education  Outcome: Progressing Towards Goal     Problem: Lower Extremity Fracture:Post-op Day 2  Goal: Off Pathway (Use only if patient is Off Pathway)  Outcome: Progressing Towards Goal  Goal: Activity/Safety  Outcome: Progressing Towards Goal  Goal: Diagnostic Test/Procedures  Outcome: Progressing Towards Goal  Goal: Nutrition/Diet  Outcome: Progressing Towards Goal  Goal: Discharge Planning  Outcome: Progressing Towards Goal  Goal: Medications  Outcome: Progressing Towards Goal  Goal: Respiratory  Outcome: Progressing Towards Goal  Goal: Treatments/Interventions/Procedures  Outcome: Progressing Towards Goal  Goal: Psychosocial  Outcome: Progressing Towards Goal  Goal: *Optimal pain control at patient's stated goal  Outcome: Progressing Towards Goal  Goal: *Hemodynamically stable  Outcome: Progressing Towards Goal  Goal: *Adequate oxygenation  Outcome: Progressing Towards Goal  Goal: *PT/INR within defined limits  Outcome: Progressing Towards Goal  Goal: *Demonstrates progressive activity  Outcome: Progressing Towards Goal  Goal: *Voiding  Outcome: Progressing Towards Goal  Goal: *Bowel movement  Outcome: Progressing Towards Goal  Goal: *Tolerating diet  Outcome: Progressing Towards Goal     Problem: Lower Extremity Fracture:Post-op Day 3  Goal: Off Pathway (Use only if patient is Off Pathway)  Outcome: Progressing Towards Goal  Goal: Activity/Safety  Outcome: Progressing Towards Goal  Goal: Diagnostic Test/Procedures  Outcome: Progressing Towards Goal  Goal: Nutrition/Diet  Outcome: Progressing Towards Goal  Goal: Discharge Planning  Outcome: Progressing Towards Goal  Goal: Medications  Outcome: Progressing Towards Goal  Goal: Respiratory  Outcome: Progressing Towards Goal  Goal: Treatments/Interventions/Procedures  Outcome: Progressing Towards Goal  Goal: Psychosocial  Outcome: Progressing Towards Goal  Goal: *Optimal pain control at patient's stated goal  Outcome: Progressing Towards Goal  Goal: *Hemodynamically stable  Outcome: Progressing Towards Goal  Goal: *Adequate oxygenation  Outcome: Progressing Towards Goal  Goal: *PT/INR within defined limits  Outcome: Progressing Towards Goal  Goal: *Demonstrates progressive activity  Outcome: Progressing Towards Goal  Goal: *Voiding  Outcome: Progressing Towards Goal  Goal: *Bowel movement  Outcome: Progressing Towards Goal  Goal: *Tolerating diet  Outcome: Progressing Towards Goal     Problem: Lower Extremity Fracture:Post-op Day 4  Goal: Off Pathway (Use only if patient is Off Pathway)  Outcome: Progressing Towards Goal  Goal: Activity/Safety  Outcome: Progressing Towards Goal  Goal: Diagnostic Test/Procedures  Outcome: Progressing Towards Goal  Goal: Nutrition/Diet  Outcome: Progressing Towards Goal  Goal: Discharge Planning  Outcome: Progressing Towards Goal  Goal: Medications  Outcome: Progressing Towards Goal  Goal: Respiratory  Outcome: Progressing Towards Goal  Goal: Treatments/Interventions/Procedures  Outcome: Progressing Towards Goal  Goal: Psychosocial  Outcome: Progressing Towards Goal     Problem: Lower Extremity Fracture:Discharge Outcomes  Goal: *Hemodynamically stable  Outcome: Progressing Towards Goal  Goal: *Modified independence with transfers, ambulation on levels with assistance devices, stair climbing, ADL's  Outcome: Progressing Towards Goal  Goal: *Independent with active exercises  Outcome: Progressing Towards Goal  Goal: *Describes follow-up/return visits to physicians  Outcome: Progressing Towards Goal  Goal: *Tolerating diet  Outcome: Progressing Towards Goal  Goal: *Optimal pain control at patient's stated goal  Outcome: Progressing Towards Goal  Goal: *Adequate air exchange  Outcome: Progressing Towards Goal  Goal: *Lungs clear or at baseline  Outcome: Progressing Towards Goal  Goal: *Afebrile  Outcome: Progressing Towards Goal  Goal: *Incision intact without signs of infection, redness, warmth  Outcome: Progressing Towards Goal  Goal: *Absence of deep venous thrombosis signs and symptoms(Stroke Metric)  Outcome: Progressing Towards Goal  Goal: *Active bowel function  Outcome: Progressing Towards Goal  Goal: *Adequate urinary output  Outcome: Progressing Towards Goal  Goal: *Discharge anxiety minimal  Outcome: Progressing Towards Goal  Goal: *Describes available resources and support systems  Outcome: Progressing Towards Goal     Problem: Pain  Goal: *Control of Pain  Outcome: Progressing Towards Goal  Goal: *PALLIATIVE CARE:  Alleviation of Pain  Outcome: Progressing Towards Goal     Problem: Patient Education: Go to Patient Education Activity  Goal: Patient/Family Education  Outcome: Progressing Towards Goal     Problem: Falls - Risk of  Goal: *Absence of Falls  Description: Document [employer] Risk and appropriate interventions in the flowsheet.   Outcome: Progressing Towards Goal     Problem: Patient Education: Go to Patient Education Activity  Goal: Patient/Family Education  Outcome: Progressing Towards Goal

## 2021-06-16 NOTE — PROGRESS NOTES
Pt has been set up on cpap with a pressure of 5.        06/13/21 0016   CPAP/BIPAP   CPAP/BIPAP Start/Stop On   $$ CPAP Daily Yes   Mask Type and Size Full face   EPAP (cm H2O) 5 cm H2O   Vt Spont (ml) 440 ml   Ve Observed (l/min) 7.6 l/min   Total RR (Spontaneous) 18 breaths per minute   Leak (Estimated) 46 L/min   Pt's Home Machine No   Biomedical Check Performed Yes   Settings Verified Yes

## 2021-06-16 NOTE — DISCHARGE SUMMARY
[address], [address]     DISCHARGE SUMMARY    Name: Rupert Sandoval MRN: [id number]   Age / Sex: 46 y.o. / male CSN: [id number]   YOB: 1970 Length of Stay: 0 days   Admit Date: 6/11/2021 Discharge Date:        PRIMARY CARE PHYSICIAN: Maciel Amin MD      DISCHARGE DIAGNOSES:    1. Breakthrough seizure, with underlying seizure disorder,   2. Right ankle fracture, s/p ORIF (6/11/21),   3. Hypertension   4. Paroxysmal atrial flutter,   5. ADAIR on CPAP   6. Morbid obesity   7. Depression   8. Fever,    CONSULTS CALLED: Orthopedics, ID, neurology      PROCEDURES DONE: Open reduction and internal fixation of right ankle fracture on June 11, 2021      Yvette James 63: This is a 49-year-old male with past medical history of hypertension dyslipidemia sleep apnea and seizure disorder and paroxysmal atrial flutter who underwent open reduction and internal fixation of an ankle fracture and and postoperatively was noted to have a tonic-clonic seizure. Patient was admitted. Patient was continued on antiepileptic medications. Orthopedics continue to follow the patient. PT OT were consulted. Patient was continued on Xarelto 10 mg for DVT prophylaxis. Patient was continued as nonweightbearing on right foot and lower extremity. Subsequently patient had more seizures. Neurology saw the patient and adjusted the patient's seizure medication regimen. Patient was also running fevers and cultures have remained negative. ID was consulted and do not recommend antibiotics. Patient is afebrile today. Case management was involved. PT OT have recommended SNF.  has advised me that the bed is available today for the patient. Patient will be transition to skilled nursing facility once a bed is available.       MEDICATIONS ON DISCHARGE:    Current Discharge Medication List      START taking these medications    Details   gabapentin (NEURONTIN) 300 mg capsule Take 1 Capsule by mouth three (3) times daily. Max Daily Amount: 900 mg. Indications: acute pain following an operation  Qty: 30 Capsule, Refills: 0  Start date: 6/16/2021    Associated Diagnoses: Closed trimalleolar fracture of right ankle, initial encounter      acetaminophen (TYLENOL) 325 mg tablet Take 2 Tablets by mouth every six (6) hours as needed for Pain or Fever. Qty: 20 Tablet, Refills: 0  Start date: 6/16/2021      docusate sodium (COLACE) 100 mg capsule Take 1 Capsule by mouth two (2) times a day. Qty: 60 Capsule, Refills: 0  Start date: 6/16/2021      ferrous sulfate 325 mg (65 mg iron) tablet Take 1 Tablet by mouth every other day. Qty: 15 Tablet, Refills: 0  Start date: 6/17/2021      polyethylene glycol (MIRALAX) 17 gram packet Take 1 Packet by mouth daily as needed for Constipation. Qty: 15 Packet, Refills: 0  Start date: 6/16/2021      pantoprazole (PROTONIX) 40 mg tablet Take 1 Tablet by mouth Daily (before breakfast). Qty: 30 Tablet, Refills: 0  Start date: 6/16/2021      senna (SENOKOT) 8.6 mg tablet Take 1 Tablet by mouth daily. Qty: 30 Tablet, Refills: 0  Start date: 6/17/2021         CONTINUE these medications which have CHANGED    Details   divalproex DR (DEPAKOTE) 250 mg tablet Take 5 Tablets by mouth two (2) times a day. Qty: 20 Tablet, Refills: 0  Start date: 6/16/2021      lisinopriL (PRINIVIL, ZESTRIL) 10 mg tablet Take 1 Tablet by mouth daily. Qty: 30 Tablet, Refills: 0  Start date: 6/17/2021      metoprolol tartrate (LOPRESSOR) 25 mg tablet Take 1 Tablet by mouth two (2) times a day. Indications: ventricular rate control in atrial fibrillation  Qty: 60 Tablet, Refills: 0  Start date: 6/16/2021      oxyCODONE IR (ROXICODONE) 5 mg immediate release tablet Take 1 Tablet by mouth every six (6) hours as needed for Pain for up to 7 days.  Max Daily Amount: 20 mg.  Qty: 20 Tablet, Refills: 0  Start date: 6/16/2021, End date: 6/23/2021    Associated Diagnoses: Closed displaced trimalleolar fracture of right ankle, initial encounter         CONTINUE these medications which have NOT CHANGED    Details   rivaroxaban (Xarelto) 10 mg tablet TAKE ONE TABLET BY MOUTH PER DAY FOLLOWING SURGERY  Indications: treatment to prevent recurrence of a clot in a deep vein  Qty: 30 Tablet, Refills: 1  Start date: 6/11/2021      traZODone (DESYREL) 100 mg tablet trazodone 100 mg tablet   Take 1 tablet by mouth once daily      sertraline (ZOLOFT) 50 mg tablet Take 50 mg by mouth daily. simvastatin (ZOCOR) 20 mg tablet Take 1 Tab by mouth daily. Qty: 90 Tab, Refills: 3         STOP taking these medications       furosemide (LASIX) 40 mg tablet Comments:   Reason for Stopping:         ibuprofen (MOTRIN) 800 mg tablet Comments:   Reason for Stopping:         cyclobenzaprine (FLEXERIL) 10 mg tablet Comments:   Reason for Stopping:         naproxen (NAPROSYN) 500 mg tablet Comments:   Reason for Stopping:         metoprolol succinate (TOPROL-XL) 25 mg XL tablet Comments:   Reason for Stopping:                 DISCHARGE VITAL SIGNS:  Visit Vitals  /72 (BP 1 Location: Left upper arm, BP Patient Position: At rest)   Pulse 65   Temp 98.5 °F (36.9 °C)   Resp 16   Ht 6' 3\" (1.905 m)   Wt (!) 168.3 kg (371 lb)   SpO2 93%   BMI 46.37 kg/m²       DISCHARGE PHYSICAL EXAMINATION:  General:  Awake, alert  Cardiovascular:  S1S2+, RRR  Pulmonary:  CTA b/l  GI:  Soft, BS+, NT, ND  Extremities: Right foot and leg in cast      CONDITION ON DISCHARGE: Stable.       DISPOSITION: Skilled nursing facility      FOLLOW-UP RECOMMENDATIONS:   Follow-up Information     Follow up With Specialties Details Why Contact Info    Jeffery Hirsch MD Pediatric Medicine   [address] [id number]  [phone]      Alex Pike Physician Assistant On 6/16/2021 Appointment at 10:45 am [address] [address]  [phone]            OTHER INSTRUCTIONS:  Diet- Cardiac  Activity - as tolerated  FALL PRECAUTIONS  ASPIRATION PRECAUTIONS  DECUBITUS PRECAUTIONS  SEIZURE PRECAUTIONS  Nonweightbearing on right foot and leg  Incentive Spirometry Q30 minutes when awake  PT, OT Evaluate and Treat  Check CBC, CMP, Mg in 3 days, results to supervising physician at the facility immediately  Notify supervising physician at facility immediately if patient has no BM for 2 consecutive days  CPAP nightly  F/u with PCP in 1 week  F/u with orthopedics in 1 week  Follow-up with neurology in 2 weeks        TIME SPENT ON DISCHARGE ACTIVITIES: More than 35 minutes. Dragon medical dictation software was used for portions of this report. Unintended errors may occur.       Signed:  Laverne Mathias MD      6/16/2021

## 2021-06-16 NOTE — PROGRESS NOTES
Received message from Allyson Adamson at [facility] that Birdie Lamb was approved. [place] Rita #[id number] from 6/15-6/17,  is Isra Ceja (o- [phone], f- [phone]). Spoke with Cortney Benz with [facility] to relay this information. [facility] transport to set up bariatric stretcher for today. Spoke with Amita Phillip at [phone], trip #[id number].   Ad Roberts RN - Outcomes Manager  [phone]

## 2021-06-16 NOTE — PROGRESS NOTES
Infectious Disease progress Note        Reason: High-grade fever, evaluate for sepsis    Current abx Prior abx   Cefazolin 6/11/2021      Lines:       Assessment :     46 y.o. male with medical co-morbidities including HTN, hyperlipidemia, ADAIR, seizure disorder, h/o atrial flutter on [address], depression admitted to [facility] on 6/11/2021 for right ankle fracture. Now with high grade fever    After obtaining a detailed history, exam; clinical probability of sepsis is low. Fevers could be secondary to possible seizure yesterday. \"Yesterday RR was called because he was confused. A nurse who saw the episode reported that he kept repeating a phrase and had a rhythmic movement of both hands. \"    No clinical radiological evidence of pneumonia. Negative COVID-19 test  No evidence of bloodstream infection  No evidence of surgical site infection per Ortho report  Low procalcitonin argues against bacterial sepsis    Resolved fevers off antibiotics. No dysuria. No significant pyuria noted on urinalysis 6/15 argues against cystitis    Recommendations:    1. Hold off antibiotics  2. Follow-up neurology recommendations regarding seizures  3. Discharge planning per primary team      Above plan was discussed in details with patient, , dr. Jessie Simpson. Please call me if any further questions or concerns. Will continue to participate in the care of this patient. HPI:    Feels fine. Patient denies any subjective fevers. No chills. He denies any burning while urinating/constipation/diarrhea.      home Medication List    Details   rivaroxaban (Xarelto) 10 mg tablet TAKE ONE TABLET BY MOUTH PER DAY FOLLOWING SURGERY  Indications: treatment to prevent recurrence of a clot in a deep vein  Qty: 30 Tablet, Refills: 1      traZODone (DESYREL) 100 mg tablet trazodone 100 mg tablet   Take 1 tablet by mouth once daily      furosemide (LASIX) 40 mg tablet Take 40 mg by mouth two (2) times a day.       lisinopriL (PRINIVIL, ZESTRIL) 30 mg tablet Take 30 mg by mouth daily. metoprolol tartrate (LOPRESSOR) 25 mg tablet Take 25 mg by mouth two (2) times a day. ibuprofen (MOTRIN) 800 mg tablet Take 800 mg by mouth three (3) times daily as needed for Pain. sertraline (ZOLOFT) 50 mg tablet Take 50 mg by mouth daily. !! divalproex DR (DEPAKOTE) 500 mg tablet TAKE 1 TABLET TWICE DAILY  WITH  250MG  TABLET. Taking 750 mg in AM and 500 mg in PM.  Qty: 180 Tablet, Refills: 2    Associated Diagnoses: Partial symptomatic epilepsy with simple partial seizures, not intractable, without status epilepticus ([facility] [address]      ! ! divalproex DR (DEPAKOTE) 250 mg tablet TAKE 1 TABLET BY MOUTH ONCE DAILY WITH 500MG TABLETS. Taking 750 mg in AM and 500 mg in PM.  Qty: 90 Tablet, Refills: 2    Associated Diagnoses: Partial symptomatic epilepsy with simple partial seizures, not intractable, without status epilepticus ([facility])      simvastatin (ZOCOR) 20 mg tablet Take 1 Tab by mouth daily. Qty: 90 Tab, Refills: 3      cyclobenzaprine (FLEXERIL) 10 mg tablet Take 1 Tab by mouth three (3) times daily as needed for Muscle Spasm(s).   Qty: 15 Tab, Refills: 0          Current Facility-Administered Medications   Medication Dose Route Frequency    divalproex DR (DEPAKOTE) tablet 1,250 mg  1,250 mg Oral BID    oxyCODONE IR (ROXICODONE) tablet 5 mg  5 mg Oral Q6H PRN    docusate sodium (COLACE) capsule 100 mg  100 mg Oral BID    senna (SENOKOT) tablet 8.6 mg  1 Tablet Oral DAILY    acetaminophen (TYLENOL) tablet 650 mg  650 mg Oral Q4H PRN    Or    acetaminophen (TYLENOL) suppository 650 mg  650 mg Rectal Q6H PRN    ferrous sulfate tablet 325 mg  1 Tablet Oral EVERY OTHER DAY    lisinopriL (PRINIVIL, ZESTRIL) tablet 10 mg  10 mg Oral DAILY    cholecalciferol (VITAMIN D3) capsule 5,000 Units  5,000 Units Oral DAILY    gabapentin (NEURONTIN) capsule 300 mg  300 mg Oral TID    rivaroxaban (XARELTO) tablet 10 mg  10 mg Oral DAILY WITH BREAKFAST    traZODone (DESYREL) tablet 100 mg  100 mg Oral QHS    metoprolol tartrate (LOPRESSOR) tablet 25 mg  25 mg Oral BID    atorvastatin (LIPITOR) tablet 10 mg  10 mg Oral QHS    sodium chloride (NS) flush 5-40 mL  5-40 mL IntraVENous Q8H    sodium chloride (NS) flush 5-40 mL  5-40 mL IntraVENous PRN    polyethylene glycol (MIRALAX) packet 17 g  17 g Oral DAILY PRN    promethazine (PHENERGAN) tablet 12.5 mg  12.5 mg Oral Q6H PRN    Or    ondansetron (ZOFRAN) injection 4 mg  4 mg IntraVENous Q6H PRN    pantoprazole (PROTONIX) tablet 40 mg  40 mg Oral ACB    Lactobacillus Acidoph & Bulgar (FLORANEX) tablet 2 Tablet  2 Tablet Oral BID       Allergies: Haloperidol, Trazodone, Haldol [haloperidol lactate], Acetaminophen, and Adhesive tape-silicones    Temp (31VTB), Av.3 °F (36.8 °C), Min:97.4 °F (36.3 °C), Max:99.2 °F (37.3 °C)    Visit Vitals  /72 (BP 1 Location: Left upper arm, BP Patient Position: At rest)   Pulse 65   Temp 98.5 °F (36.9 °C)   Resp 16   Ht 6' 3\" (1.905 m)   Wt (!) 168.3 kg (371 lb)   SpO2 93%   BMI 46.37 kg/m²       ROS: 12 point ROS obtained in details. Pertinent positives as mentioned in HPI,   otherwise negative    Physical Exam:    Vitals signs and nursing note reviewed. Constitutional:       General: He is not in acute distress. Appearance: He is well-developed. HENT:      Head: Normocephalic. Eyes:      Conjunctiva/sclera: Conjunctivae normal.      Neck:      Musculoskeletal: Normal range of motion and neck supple. Cardiovascular:      Rate and Rhythm: Normal rate and regular rhythm  Chest:      Bilateral chest movements equal.  No audible wheezes  Abdominal:      General: There is no distension. Palpations: Abdomen is soft. Tenderness: There is no abdominal tenderness. There is no rebound. Musculoskeletal: Normal range of motion. Right ankle surgical dressing not opened. Trace pitting edema left lower extremity  Skin:     General: Skin is warm and dry. Findings: No rash. Neurological:      Mental Status: He is alert and oriented to person, place, and time. Cranial Nerves: No cranial nerve deficit. Motor: No abnormal muscle tone. Coordination: Coordination normal.   Psychiatric:         Behavior: Behavior normal.         Thought Content: Thought content normal.         Judgment: Judgment normal.     Labs: Results:   Chemistry Recent Labs     06/16/21  0310 06/14/21  1755   GLU 83 108*    138   K 4.5 3.9    104   CO2 37* 32   BUN 13 14   CREA 0.71 0.86   CA 9.4 9.1   AGAP 1* 2*   BUCR 18 16   AP  --  86   TP  --  6.9   ALB  --  2.9*   GLOB  --  4.0   AGRAT  --  0.7*      CBC w/Diff Recent Labs     06/16/21  0310 06/15/21  0220 06/14/21  0916   WBC 6.6 7.7 9.0   RBC 3.86* 3.70* 3.80*   HGB 12.1* 11.8* 11.7*   HCT 38.9 37.6 38.8    174 205   GRANS 42  --  48   LYMPH 45  --  42   EOS 3  --  1      Microbiology Recent Labs     06/14/21  2205 06/14/21  2155 06/14/21  0916 06/14/21  0905   CULT NO GROWTH 2 DAYS NO GROWTH 2 DAYS NO GROWTH 2 DAYS NO GROWTH 2 DAYS          RADIOLOGY:    All available imaging studies/reports in [facility] for this admission were reviewed      Disclaimer: Sections of this note are dictated utilizing voice recognition software, which may have resulted in some phonetic based errors in grammar and contents. Even though attempts were made to correct all the mistakes, some may have been missed, and remained in the body of the document. If questions arise, please contact our department.     Dr. Leonel Pitts, Infectious Disease Specialist  [phone]  June 16, 2021  9:48 AM

## 2021-06-16 NOTE — PROGRESS NOTES
Attempted to call [facility] and [facility] to give report no answer. Left message.

## 2021-06-20 LAB
BACTERIA SPEC CULT: NORMAL
SERVICE CMNT-IMP: NORMAL

## 2021-06-21 NOTE — ADT AUTH CERT NOTES
Comments  Comment     Last edited by  on  at    Patient Demographics    Patient Name   Renate Sun   [id number] Legal Sex   Male    1970 Address   [address] [address] Phone   [phone] (Home) *Preferred*   [phone] (Mobile)   Patient Demographics    Patient Name   Renate Sun   [id number] Legal Sex   Male    1970 Address   [address] [address] Phone   [phone] (Home) *Preferred*   [phone] (Mobile)   CSN:   [id number]   Admit Date: Admit Time Room Bed   2021  5:51  [07292] 01 [90171]   Attending Providers    Provider Pager From To   Moira Quiroga MD  21   Alayna Luu MD  06/11/21 06/15/21   John Doherty MD  06/15/21 06/16/21   Emergency Contact(s)    Name Relation Home Work Mobile   [place]  and surgery update Girlfriend   [phone]   King Storey Sister [phone]   Utilization Reviews       21 Case Management Note by Yahaira Keating RN       Review Entered Review Status   2021 11:30 In Primary      Criteria Review   [place] Sofiya L[place] [facility] [place] Kori Juab was approved. Tha Sifuentesd #[id number] from 6/15-,  is Jackie Fabian (o- [phone], f- [phone]). Spoke with Gem Cancino with [facility] to relay this information. [employer] transport to set up bariatric stretcher for today. Spoke with Jay Monet at [phone], trip #[id number].         6/15/21 Progress Note by Yahaira Keating RN       Review Entered Review Status   2021 11:27 In Primary      Criteria Review   Neurology Progress Note:     Seizure disorder, possible seizure yesterday     Fatimah Vieira is a 46year old AA man who has had seizures since adolescence--he fell off a building and had \"five brain surgeries. \" Kadie Sean this he had seizures controlled (poorly) with Dilantin.  This was switched to Depakote (750/500 or 750/750.) Duyen Mora says that this worked better than Jacques Mars still had occasional auras, consisting of confusion; he has GTC seizures rarely. He stopped taking his Depakote before his recent ankle surgery.  Yesterday RR was called because he was confused.  A nurse who saw the episode reported that he kept repeating a phrase and had a rhythmic movement of both hands.  He does recall feeling strange the last few days.  After the RR Keppra was added, 1000 mg q 12.     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 VPA was 93 mcg/ml.     PMH: HL, HTN, ADAIR, atrial flutter     ROS: Mild headache, no visual loss.  R ankle pain  Exam: Alert. 168 kg.     CNs: Grossly intact     MSE: Normal speech, oriented to PMH.     Motor: Grossly normal.     Lab: , lytes wnl.     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     Rec: Given his weight, he should be on 15 mg/kg or 2520 mg/day of Depakote. I would stop the Keppra to make life simpler and increase his Depakote to 1250 BID. I don't rely on Depakote levels for guidance about proper dosing--they vary too much.        Orthopedic Surgery Progress Note:     Patient seen and evaluated. No complaints other than pain that is slightly better today. Patient states that his BP is low. He is resting comfortably, NAD. Denies: CP, SOB, ABD PAIN, Calf pain     speech normal in context and clarity  memory intact grossly  Alert, Oriented x 3 in bed     CHEST/ABDOMEN: Observation reveals: No audible wheezing from mouth.              No accessory use of chest muscles during breathing. Non tender abdomen     Examination of RLE reveals wound covered.      Incision/Dressings:  Dressings  are clean, dry and  Intact.  Bone stimulator in place     Extremities:        No significant edema or embolic phenomena to the foot/toes or hands                               cap refill < 2 seconds.                                Sensory, Motor, NV grossly intact                              RLYLD extremities are warm and appear well perfused                                Neg calf tenderness nor evidence DVT     ASSESSMENT:          4 Days Post-Op s/p:      Procedure(s):  OPEN REDUCTION INTERNAL FIXATION TRIMALLEOLAR ANKLE FRACTURE WITH FIXATION POSTERIOR LIP, RIGHT ANKLE/C-ARM/SYNTHES ANKLE TRAUMA TRAY/DBM ALLOMATRIX  **GEN W/REGIONAL**         PLAN:         1)  Pain management - per pain protocol. Please apply ice pack over dressing on RLE  2)  Antibiotics:  None at this time  3)  Incentive Spirometry  4)  Acute blood loss anemia/Post -op anemia: Continue monitoring  5)  DVT prophylaxis:  Xarelto, foot /ankle pumps, SCD  6)  Internal Medicine and Infectious Disease following for medical management. Temperature shows slight decrease today. 7)  PT/OT - Bed-to-chair transfers, otherwise, NWB RLE. Continue to use Bone Stimulator  8) Incision Care: Routine Incision Care and Dressing Changes as necessary per Dr. David Jack protocol. Wound was assessed yesterday and there were no visible signs of infection. Cultures pending. Still need to complete Urinalysis   9) D/C planning: Pending. Patient would prefer to go to rehab/SNF on [address] [employer] near his home        Infectious Disease Consultation Note     46 y. o. male with medical co-morbidities including HTN, hyperlipidemia, ADAIR, seizure disorder, h/o atrial flutter on [facility], depression admitted to [facility] on 6/11/2021 for right ankle fracture.      He underwent Open reduction internal fixation trimalleolar ankle fracture on 6/11/2021.  While in PACU, he was observed to have tonic clonic seizure. He was complaining of aura prior to his seizing. He was given Versed and Ativan with resolution of his seizure. He was loaded with Keppra x1 . CT head was negative for acute finding. He has seizure disorder but has not been taking his seizure medications.  His noted to have history allergy to narcotic.  His did received fentanyl during his surgery.  He was subsequently improving and discharge was delayed since it was felt that he would benefit from skilled nursing facility placement.  Yesterday was noted to have high-grade fever.  Blood cultures, procalcitonin were sent.  Wound was examined by orthopedic surgery and no signs of infection were noted.  I have been consulted for further recommendations.     Patient denies any subjective fevers.  He does not notice the fever until it is measured.  No chills.  He denies any burning while urinating/constipation/diarrhea.  Denies increasing chest pain, shortness of breath.  Denies worsening pain in the right ankle surgical site.  Chest x-ray 6/14 no infiltrates.  Covid PCR done this a.m. negative     Physical Exam:     Vitals signs and nursing note reviewed. Constitutional:       General: He is not in acute distress.     Appearance: He is well-developed. HENT:      Head: Normocephalic. Eyes:      Conjunctiva/sclera: Conjunctivae normal.      Neck:      Musculoskeletal: Normal range of motion and neck supple. Cardiovascular:      Rate and Rhythm: Normal rate and regular rhythm  Chest:      Bilateral chest movements equal.  No audible wheezes  Abdominal:      General: There is no distension.      Palpations: Abdomen is soft.      Tenderness: There is no abdominal tenderness. There is no rebound. Musculoskeletal: Normal range of motion.      Right ankle surgical dressing not opened.  Trace pitting edema left lower extremity  Skin:     General: Skin is warm and dry.      Findings: No rash. Neurological:      Mental Status: He is alert and oriented to person, place, and time.      Cranial Nerves: No cranial nerve deficit.      Motor: No abnormal muscle tone.      Coordination: Coordination normal.   Psychiatric:         Behavior: Behavior normal.         Thought Content: Thought content normal.         Judgment: Judgment normal.      Assessment :      51 y. o. male with medical co-morbidities including HTN, hyperlipidemia, ADAIR, seizure disorder, h/o atrial flutter on [address], depression admitted to [facility] on 6/11/2021 for right ankle fracture.     Now with high grade fever     After obtaining a detailed history, exam; clinical probability of sepsis is low.  Fevers could be secondary to possible seizure yesterday. \"Yesterday RR was called because he was confused.  A nurse who saw the episode reported that he kept repeating a phrase and had a rhythmic movement of both hands. \"     No clinical radiological evidence of pneumonia.   Negative COVID-19 test  No evidence of bloodstream infection  No evidence of surgical site infection per Ortho report  Rule out evolving cystitis  Low procalcitonin argues against bacterial sepsis     Recommendations:     1.  Hold off antibiotics  2.  Obtain urine analysis, urine culture  3.  Follow-up neurology recommendations regarding seizures  4.  Follow-up results of blood culture 6/14     Hospitalist Progress Note:     Patient is sitting in a chair in no apparent distress, awake and alert, follows commands.  Patient denies any cough or shortness of breath.  Patient denies any abdominal pain or urinary complaints or diarrhea.  Patient denies any rash     Temp 100.7  Pulse 74  Resp 18  BP 97/59  O2 sat 90% RA     General:  Awake, alert  Cardiovascular:  S1S2+, RRR  Pulmonary:  CTA b/l  GI:  Soft, BS+, NT, ND  Extremities: Right ankle and leg in cast     Assessment/Plan:      1.  Breakthrough seizure, with underlying seizure disorder,   2.  Right ankle fracture, s/p ORIF (6/11/21),   3.  Hypertension   4.   Paroxysmal atrial flutter,   5.   ADAIR on CPAP   6.   Morbid obesity   7.   Depression   8.   Fever,, unclear etiology     On Depakote for seizures.  Neurology input noted  Hold off antibiotics per ID  PT OT  Pain control  On Xarelto per orthopedics for DVT prophylaxis  Continue lisinopril and Lopressor, monitor blood pressure  CPAP nightly  Nonweightbearing on right foot  Bowel regimen, incentive spirometry  Discussed with patient, discussed with ID     Full code  Disposition: Await skilled nursing facility        Case Management Note:     [facility] and spoke with [place] ([facility] The case number is [id number]. They have not received the fax from yesterday so will refax again today. Asked PT/OT to see patient asap today as pt has not had therapy since 6/12 and will need recent clinicals for the auth to be approved.           PT Note:     ASSESSMENT:  Co-treated with OT to maximize patient safety and participation in functional mobility.   Min A for supine to sit with HOB elevated. Noted to be incontinent of large amount of urine; patient unaware.  Seated EOB with supervision for balance to complete reaching tasks for clean-up. Educated on NWB RLE and use of AD. Initially refusing ww, then agreeable. Mod A x2 for sit to stand. Requires 3 attempts to achieve full standing. Min A x2 for bed to chair transfer with ww. Poor safety awareness with stand to sit. Seated in recliner with BLE elevated. Educated on need for RN assistance with mobility; verbalized understanding. Call bell in reach.         PLAN:  Patient continues to benefit from skilled intervention to address the above impairments.  Continue treatment per established plan of care. Discharge Recommendations:  Rehab  Further Equipment Recommendations for Discharge:  bariatric rolling walker         OT Note:     ASSESSMENT:  Pt co-treated w/PT to maximize safety w/RW training and functional transfer training. Pt and linens soiled upon entry. Pt requires increase time w/bed mobility and vc's for use of side rail to maneuver to EOB. Reviewed NWB RLE and importance of maintaining w/ADLs and functional transfers. Educated on energy conservation techniques w/ADLs and benefits of shower chair. Pt performs anterior fanny-care seated EOB and Poor dynamic standing balance requires Max Assist w/posterior fanny-care using RW for support.  Pt requires max vc's for hand placement and 2 person assist w/functional transfer to standing. Pt tolerates standing for fanny-care and requires max vc's for NWB RLE. Pt requires 2 person assist for safety w/functional transfer to chair and max vc's for hand placement for carryover w/functional transfer to [facility]. Pt anxious for d/c to rehab. Pt left in chair and reinforced importance of calling for assistance.      PLAN:  Patient continues to benefit from skilled intervention to address the above impairments.  Continue treatment per established plan of care. Discharge Recommendations:  Skilled Nursing Facility  Further Equipment Recommendations for Discharge:  extended tubseat transfer bench, rolling walker, wheelchair, and BSC  (all bariatric equipment)           WBC 7.7  Hgb 11.8  Hct 37.6  Plts 174     Influenza A, subtype H1   Not detected   Influenza A, subtype H3   Not detected   RSV by PCR   Not detected      Urine cx pending                          No imaging     Meds:  Lipitor 10mg po q hs  Vitamin D3 5,000 units po q day  Depakote 1,250 mg po 2x/day  Depakote 750mg po 2x/day  Colace 100mg po 2x/day  Ferrous sulfate 325 mg po q other day  Neurontin 300mg po 3x/day  Floranex 2 tabs po 2x/day  Keppra 1,000 mg IV q 12hrs  Lisinopril 30mg po q day  Lopressor 25 mg po 2x/day  Oxycodone IR 5 mg po q 4hrs prn x 2 doses  Protonix 40mg po q am  Xarelto 10mg po q am  Desyrel 100mg po q hs      6/13/21 Progress Notes by Danielle [place] RN       Review Entered Review Status   6/21/2021 11:13 In Primary      Criteria Review   Per Ortho     Patient seen at bedside     S/p below           Patient: Tigist Segal  YOB: 1970  MRN: [id number]     Date of Procedure: 6/11/2021      Pre-Op Diagnosis: S82.851A TRIMALLEOLAR ANKLE FRACTURE, RIGHT ANKLE     Post-Op Diagnosis: Same as preoperative diagnosis.        Procedure(s):  OPEN REDUCTION INTERNAL FIXATION TRIMALLEOLAR ANKLE FRACTURE WITHOUT FIXATION POSTERIOR LIP [address] [facility],  EXTERNAL BONE STIMULATION  [address]368      Surgeon(s):  Henry Perez MD     Surgical Assistant: Physician Assistant: (Unknown)  Surg Asst-1: Kinga Briggs  Sitting up with pain RLE           Medicine exploring transfer options     No CP, No SOB, (+) pain RLE poorly managed with current strength of oxycodone. Recommend increasing strength     Speech therapy, pending study on Monday     Moving digits all RLE distal     Cap refill < 2 seconds RLE     Soft cast intact well fitting RLE     Plan: , oxycodone 10-15mg Q4/prn pain SNF plans, elevate Right LE on 2 pillows        Hospitalist Progress Note:     Still feels unstable on his feet. He does not think he can handle going home. PT/OT reconsider for him to SNF   Still has pain in his right ankle  Has right chest pain with deep inspiration, this is chronic. No cough. No fever. Negative CXR  No further report of seizure.          Visit Vitals  BP 96/64   Pulse 66   Temp 98.8 °F (37.1 °C)   Resp 18      O2 sat 97% RA     General:  Cooperative, Not in acute distress, speaks in full sentence while in bed  HEENT: PERRL, EOMI, supple neck, no JVD, dry oral mucosa  Cardiovascular: S1S2 regular, no rub/gallop   Pulmonary: Clear air entry bilaterally, no wheezing, no crackle  GI:  Soft, non tender, non distended, +bs, no guarding   Extremities:  No pedal edema, +distal pulses appreciated   Right ankle in brace. Neuro: AOx3, moving all extremities, no gross deficit.         Assessment/Plan:   1.  Breakthrough seizure, with underlying seizure disorder, now resolved   2.  Right ankle fracture, s/p ORIF (6/11/21), still with significant pain with weight bearing   3.  Hypertension   4.   Paroxysmal atrial flutter, no Xarelto   5.   ADAIR on CPAP   6.   Morbid obesity   7.   Depression         Pain management. Can have narcotic prn.  Increase gabapentin   Seizure precaution   Resume all his home medications, resume Xarelto  CPAP QHS  ICS encourage   Wound care. Strict non weight bearing on right foot. Spoke with ortho.      Full code  Disposition: spoke with [facility] for placement. SNF option provided by [facility]        Case Management Note:     Per Dr. Liz Sanders, pt will need snf placement. Met with pt and he was agreeable to [address] sent to [facility] in [place]. [facility].     WBC 8.8  Hgb 11.7  Hct 37.3  Plts 190  Sodium   141   Potassium   3.6   Chloride   107   CO2   30   Anion gap   4   Glucose   102 (H)   BUN   17   Creatinine   0.67   BUN/Creatinine ratio   25 (H)   Calcium   9.1   Magnesium   2.3      No imaging     Meds:  Lipitor 10mg po q hs  Vitamin D3 5,000 units po q day  Depakote 750mg po 2x/day  Ferrous sulfate 325 mg po q other day  Neurontin 300mg po 3x/day  Floranex 2 tabs po 2x/day  Lisinopril 30mg po q day  Lopressor 25 mg po 2x/day  Morphine 2 mg IV q 2hrs prn x 3 doses  Oxycodone IR 5-10mg po q 4hrs prn x 4 doses  Protonix 40mg po q am  Xarelto 10mg po q am  Desyrel 100mg po q hs      6/13/21 Progress Notes by Patti Storey RN       Review Entered Review Status   6/21/2021 11:06 In Primary      Criteria Review   Per Ortho     Patient seen at bedside     S/p below           Patient: Ector Pastrana  YOB: 1970  MRN: [id number]     Date of Procedure: 6/11/2021      Pre-Op Diagnosis: S82.851A TRIMALLEOLAR ANKLE FRACTURE, RIGHT ANKLE     Post-Op Diagnosis: Same as preoperative diagnosis.       Procedure(s):  OPEN REDUCTION INTERNAL FIXATION TRIMALLEOLAR ANKLE FRACTURE WITHOUT FIXATION POSTERIOR LIP  48592  SYNDESMOTIC STABILIZATION 06722,  EXTERNAL BONE STIMULATION  33713         Hospitalist Progress Note:     Still feels unstable on his feet. He does not think he can handle going home. PT/OT reconsider for him to SNF   Still has pain in his right ankle  Has right chest pain with deep inspiration, this is chronic. No cough. No fever. Negative CXR  No further report of seizure.          Visit Vitals  /82   Pulse 75   Temp 97.9 °F (36.6 °C)   Resp 16   O2 sat 93% RA     General:  Cooperative, Not in acute distress, speaks in full sentence while in bed  HEENT: PERRL, EOMI, supple neck, no JVD, dry oral mucosa  Cardiovascular: S1S2 regular, no rub/gallop   Pulmonary: Clear air entry bilaterally, no wheezing, no crackle  GI:  Soft, non tender, non distended, +bs, no guarding   Extremities:  No pedal edema, +distal pulses appreciated   Right ankle in brace.   Neuro: AOx3, moving all extremities, no gross deficit.         Assessment/Plan:   1.  Breakthrough seizure, with underlying seizure disorder, now resolved   2.  Right ankle fracture, s/p ORIF (6/11/21), still with significant pain with weight bearing   3.  Hypertension   4.   Paroxysmal atrial flutter, no Xarelto   5.   ADAIR on CPAP   6.   Morbid obesity   7.   Depression         Chest xray this AM.   Seizure precaution   Resume all his home medications  PT/OT recommendation follow up for disposition  CPAP QHS  ICS encourage   Can switch off morphine to oral agent for discharge. Can increase gabapentin to 300mg TID   Wound care. Strict non weight bearing on right foot. Spoke with ortho. [address] Purvi Cassidy for [facility]     Full code        PT Note:    ASSESSMENT :  Based on the objective data described below, the patient presents to skilled PT with decrease I all areas of bed mobility, transfers and gait. He was found supine in bed and cleared for PT by nursing who requests OOB to chair to change out beds. He was co treat with OT to maximize safety and progression of mobility and completion of consult. He was agreeable for OOB to chair. He declined use of RW to aid with transfer preferring to use a [facility] to bear down on like he has been at home- informed patient we would use A X 2 for this morning for stand pivot bed to chair NWB R and he was agreeable. Supine to sit Min A and sit to stand to sit for a stand pivot bed to chair transfer strong Mod A X2 . Patient states he was maintaining NWB R. He was set up with the bone stimulator and R LE elevated. Nursing present at conclusion. Recommend lift team to assist with back to bed later. Recommend co treat with OT should they keep on caseload. His residence has [address] and due to his decrease I with functional mobility this is a concern for safe DC to home setting.        PLAN :  Frequency/Duration: Patient will be followed by physical therapy 1-2 times per day/4-7 days per week to address goals. Discharge Recommendations: rehab  Further Equipment Recommendations for Discharge: to be assessed for any other mobility devices,      OT Note:     ASSESSMENT :  Pt cleared to participate in OT evaluation by RN. Upon entering room, pt received in bed, alert, and agreeable to OT eval/treatment. Pt seen in conjunction with PT to maximize safety of patient and staff members. Pt immediately reports he is not supposed to put any weight on his RLE per MD. Based on the objective data described below, the patient presents with decreased endurance and functional activity tolerance, decreased functional mobility, increased pain, WB restrictions for RLE, decreased attention/concentration, limiting his safety and independence with ADLs. Pt is very talkative, required frequent redirection to task and VCs for safety precautions. Pt required Min A to maneuver to sit EOB in preparation for functional txfr training. Pt required Max A for LB dressing. Pt is adamantly refusing to attempt to use FWW for functional txfr with NWB to RLE. Educated pt on SPT technique while keeping RLE NWB. Pt verbalized understanding, required Mod Ax2 to safely maneuver to the recliner (simulating toilet txfr).          Plan:  Frequency/Duration: Patient will be followed by occupational therapy 1-2 times per day/4-7 days per week to address goals.   Discharge Recommendations: Rehab  Further Equipment Recommendations for Discharge: bedside commode and rolling walker     ST Note:     ASSESSMENT :  Based on the objective data described below, the patient presents with suspected mild pharyngeal dysphagia, in the setting of x1 day post op.      Chart Review and clearance obtained by RN. RN reports coughing with meals. Today, SLP conducted a Clinical Beside Swallow Evaluation, per MD orders. Pt A&Ox4 and able to follow commands. Pt reports odynophagia and coughing occasionally; he denies hx of GERD and dysphagia.  Speech/voice WNLS.  Oral mech examination revealed structures functional for speech and deglutition. Pt observed with thin liquids +/- straw via single sips and successive swallows with timely swallow initiation, adequate laryngeal elevation to palpation.  Pt demo'd acceptance of puree with timely oral prep.  Positive rotary chew and thorough oral clearance  was observed with regular solids. Pt demo'd sporadic  dry/ weak cough immediately at swallow initiation, almost like a gagging reflex with all po despite consistency/texture. No change in VQ, epiphora or rhinorrhea noted. It's     SLP RECS: Regular diet with thin liquids, meds per preference with general safe swallow precautions. ST will continue to follow.     Based on clinical findings, SLP recommends MBS next business day to further assess pharyngeal swallow to r/o aspiration.      Pt educated with regard to s/sx aspiration, aspiration risk, diet recs and role of SLP.  Pt able to verbalize understanding.  D/w RNRupa.     TREATMENT :  Skilled therapy initiated; Educated pt on aspiration precautions and importance of compensatory swallow techniques to decrease aspiration risk (decrease rate of intake & sip/bite size, upright @HOB for all po intake and ~30 minutes after po); verbalized comprehension. SLP to follow as indicated.      Patient will benefit from skilled intervention to address the above impairments.   Patient's rehabilitation potential is considered to be Good     PLAN :  Recommendations and Planned Interventions:  See above  Frequency/Duration: Patient will be followed by speech-language pathology 1-2 times per day/3-5 days per week to address goals.   Discharge Recommendations: Rehab     WBC 11.9  Hgb 12.3  Hct 39.8  Plts 223  Sodium   139   Potassium   4.2   Chloride   104   CO2   31   Anion gap   4   Glucose   118 (H)   BUN   16   Creatinine   0.80   BUN/Creatinine ratio   20   Calcium   9.1   Magnesium   2.3      CXR:  Limited exam without definite acute cardiopulmonary abnormality.     Meds:  Toradol 15 mg IM x 1 dose @ 9:00  Tylenol 650mg po q 6hrs prn x 1 dose  Lipitor 10mg po q hs  Depakote 750mg po 2x/day  Neurontin 100mg po 3 x/day x 1 dose  Neurontin 300mg po 3x/day  Floranex 2 tabs po 2x/day  Lisinopril 30mg po q day  Lopressor 25 mg po 2x/day  Morphine 2 mg IV q 2hrs prn x 3 doses  Oxycodone IR 5-10mg po q 4hrs prn x 2 doses  Protonix 40mg po q am  Xarelto 10mg po q am  Desyrel 100mg po q hs

## 2021-06-23 NOTE — ADT AUTH CERT NOTES
Comments  Comment     Last edited by  on [facility].    Patient Demographics    Patient Name   Jenna uTcker   [id number] Legal Sex   Male    1970 Address   [place] [address] Phone   [phone] (Home) *Preferred*   [phone] (Mobile)   Patient Demographics    Patient Name   Jenna Tucker   [id number] Legal Sex   Male    1970 Address   [address] [address] Phone   [phone] (Home) *Preferred*   [phone] (Mobile)   CSN:   [id number]   Admit Date: Admit Time Room Bed   2021  5:51  [[id number]] 01 [[id number]]   Attending Providers    Provider Pager From To   Brenda Judge MD  21   Clark Madrigal MD  06/11/21 06/15/21   Corey Starr MD  06/15/21 06/16/21   Emergency Contact(s)    Name Relation Home Work Mobile   [place]  and surgery update Girlfriend   [address] [facility]  Sister [phone]   Utilization Reviews       Temperature readings by Lisa Coreas RN       Review Entered Review Status   2021 16:45 In Primary      Criteria Review        21 1708 21 1948 21 0117   Vitals   Temp 98.8 °F (37.1 °C) 99 °F (37.2 °C) 100 °F (37.8 °C)        21 0459 21 0751 21 1118   Vitals   Temp 100 °F (37.8 °C) (!) 102 °F (38.9 °C) 97.8 °F (36.6 °C)        21 1611 21 17421 174   Vitals   Temp 100.3 °F (37.9 °C) (!) 101.4 °F (38.6 °C) (!) 101.4 °F (38.6 °C)        21 1744 21   Vitals   Temp (!) 101.4 °F (38.6 °C) (!) 102.9 °F (39.4 °C) (!) 101.5 °F (38.6 °C)        21 2137 21 2339 06/15/21 0434   Vitals   Temp 99.2 °F (37.3 °C) 99.1 °F (37.3 °C) (!) 100.7 °F (38.2 °C)        06/15/21 0813 06/15/21 1533 06/15/21 1941   Vitals   Temp 100.4 °F (38 °C) 97.9 °F (36.6 °C) 99.2 °F (37.3 °C)        21 0407 21 0829 21 1105   Vitals   Temp 97.4 °F (36.3 °C) 98.5 °F (36.9 °C) 99 °F (37.2 °C)        06/16/21 1527   Vitals   Temp 98 °F (36.7 °C)             DISCHARGE SUMMARY by Emmalene Leyden, RN       Review Entered Review Status   6/22/2021 16:40 In Primary      Criteria Review   DISCHARGE SUMMARY  DISCHARGE DIAGNOSES:     1.  Breakthrough seizure, with underlying seizure disorder,   2.  Right ankle fracture, s/p ORIF (6/11/21),   3.  Hypertension   4.   Paroxysmal atrial flutter,   5.   ADAIR on CPAP   6.   Morbid obesity   7.   Depression   8.   Fever,     CONSULTS CALLED: Orthopedics, ID, neurology        PROCEDURES DONE: Open reduction and internal fixation of right ankle fracture on June 11, 2021        COURSE IN THE HOSPITAL: This is a 75-year-old male with past medical history of hypertension dyslipidemia sleep apnea and seizure disorder and paroxysmal atrial flutter who underwent open reduction and internal fixation of an ankle fracture and and postoperatively was noted to have a tonic-clonic seizure.  Patient was admitted. Gala Morrow was continued on antiepileptic medications.  Orthopedics continue to follow the patient.  PT OT were consulted. Gala Morrow was continued on Xarelto 10 mg for DVT prophylaxis.  Patient was continued as nonweightbearing on right foot and lower extremity.  Subsequently patient had more seizures.  Neurology saw the patient and adjusted the patient's seizure medication regimen.  Patient was also running fevers and cultures have remained negative.  ID was consulted and do not recommend antibiotics.  Patient is afebrile today.  Case management was involved.  PT OT have recommended SNF. Daisy Ryan manager has advised me that the bed is available today for the patient. Gala Morrow will be transition to skilled nursing facility once a bed is available           Current Discharge Medication List                 START taking these medications     Details   gabapentin (NEURONTIN) 300 mg capsule Take 1 Capsule by mouth three (3) times daily. Max Daily Amount: 900 mg. Indications: acute pain following an operation  Qty: 30 Capsule, Refills: 0  Start date: 6/16/2021     Associated Diagnoses: Closed trimalleolar fracture of right ankle, initial encounter       acetaminophen (TYLENOL) 325 mg tablet Take 2 Tablets by mouth every six (6) hours as needed for Pain or Fever. Qty: 20 Tablet, Refills: 0  Start date: 6/16/2021       docusate sodium (COLACE) 100 mg capsule Take 1 Capsule by mouth two (2) times a day. Qty: 60 Capsule, Refills: 0  Start date: 6/16/2021       ferrous sulfate 325 mg (65 mg iron) tablet Take 1 Tablet by mouth every other day. Qty: 15 Tablet, Refills: 0  Start date: 6/17/2021       polyethylene glycol (MIRALAX) 17 gram packet Take 1 Packet by mouth daily as needed for Constipation. Qty: 15 Packet, Refills: 0  Start date: 6/16/2021       pantoprazole (PROTONIX) 40 mg tablet Take 1 Tablet by mouth Daily (before breakfast). Qty: 30 Tablet, Refills: 0  Start date: 6/16/2021       senna (SENOKOT) 8.6 mg tablet Take 1 Tablet by mouth daily. Qty: 30 Tablet, Refills: 0  Start date: 6/17/2021                     CONTINUE these medications which have CHANGED     Details   divalproex DR (DEPAKOTE) 250 mg tablet Take 5 Tablets by mouth two (2) times a day. Qty: 20 Tablet, Refills: 0  Start date: 6/16/2021       lisinopriL (PRINIVIL, ZESTRIL) 10 mg tablet Take 1 Tablet by mouth daily. Qty: 30 Tablet, Refills: 0  Start date: 6/17/2021       metoprolol tartrate (LOPRESSOR) 25 mg tablet Take 1 Tablet by mouth two (2) times a day. Indications: ventricular rate control in atrial fibrillation  Qty: 60 Tablet, Refills: 0  Start date: 6/16/2021       oxyCODONE IR (ROXICODONE) 5 mg immediate release tablet Take 1 Tablet by mouth every six (6) hours as needed for Pain for up to 7 days.  Max Daily Amount: 20 mg.  Qty: 20 Tablet, Refills: 0  Start date: 6/16/2021, End date: 6/23/2021     Associated Diagnoses: Closed displaced trimalleolar fracture of right ankle, initial encounter                   CONTINUE these medications which have NOT CHANGED     Details   rivaroxaban (Xarelto) 10 mg tablet TAKE ONE TABLET BY MOUTH PER DAY FOLLOWING SURGERY  Indications: treatment to prevent recurrence of a clot in a deep vein  Qty: 30 Tablet, Refills: 1  Start date: 6/11/2021       traZODone (DESYREL) 100 mg tablet trazodone 100 mg tablet   Take 1 tablet by mouth once daily       sertraline (ZOLOFT) 50 mg tablet Take 50 mg by mouth daily.       simvastatin (ZOCOR) 20 mg tablet Take 1 Tab by mouth daily.   Qty: 90 Tab, Refills: 3                   STOP taking these medications         furosemide (LASIX) 40 mg tablet Comments:   Reason for Stopping:            ibuprofen (MOTRIN) 800 mg tablet Comments:   Reason for Stopping:            cyclobenzaprine (FLEXERIL) 10 mg tablet Comments:   Reason for Stopping:            naproxen (NAPROSYN) 500 mg tablet Comments:   Reason for Stopping:            metoprolol succinate (TOPROL-XL) 25 mg XL tablet Comments:   Reason for Stopping:          DISCHARGE PHYSICAL EXAMINATION:  General:  Awake, alert  Cardiovascular:  S1S2+, RRR  Pulmonary:  CTA b/l  GI:  Soft, BS+, NT, ND  Extremities: Right foot and leg in cast        CONDITION ON DISCHARGE: Stable.        DISPOSITION: Skilled nursing facility     OTHER INSTRUCTIONS:  Diet- Cardiac  Activity - as tolerated  FALL PRECAUTIONS  ASPIRATION PRECAUTIONS  DECUBITUS PRECAUTIONS  SEIZURE PRECAUTIONS  Nonweightbearing on right foot and leg  Incentive Spirometry Q30 minutes when awake  PT, OT Evaluate and Treat  Check CBC, CMP, Mg in 3 days, results to supervising physician at the facility immediately  Notify supervising physician at facility immediately if patient has no BM for 2 consecutive days  CPAP nightly  F/u with PCP in 1 week  F/u with orthopedics in 1 week  Follow-up with neurology in 2 weeks         Ortho & ID notes 6/16 by Yoandy Pugh RN       Review Entered Review Status   6/22/2021 16:36 In Primary    Criteria Review      Infectious Disease progress Note  5/16      Reason: High-grade fever, evaluate for sepsis  Current abx : Cefazolin 6/11/2021      Assessment :      51 y. o. male with medical co-morbidities including HTN, hyperlipidemia, ADAIR, seizure disorder, h/o atrial flutter on [address], depression admitted to [facility] on 6/11/2021 for right ankle fracture.     Now with high grade fever     After obtaining a detailed history, exam; clinical probability of sepsis is low.  Fevers could be secondary to possible seizure yesterday. \"Yesterday RR was called because he was confused.  A nurse who saw the episode reported that he kept repeating a phrase and had a rhythmic movement of both hands. \"     No clinical radiological evidence of pneumonia. Negative COVID-19 test  No evidence of bloodstream infection  No evidence of surgical site infection per Ortho report  Low procalcitonin argues against bacterial sepsis     Resolved fevers off antibiotics.  No dysuria.  No significant pyuria noted on urinalysis 6/15 argues against cystitis     Recommendations:     1.  Hold off antibiotics  2.   Follow-up neurology recommendations regarding seizures  3.  Discharge planning per primary team        Above plan was discussed in details with patient, , dr. Pete Martinez. Please call me if any further questions or concerns. Will continue to participate in the care of this patient. HPI:     Feels fine. Patient denies any subjective fevers.   No chills.  He denies any burning while urinating/constipation/diarrhea.             ________________________________________________  06/16/21 0750  Orthopedic Surgery PROGRESS NOTE       LOS: 0 days      ASSESSMENT:          5 Days Post-Op s/p:      Procedure(s):  OPEN REDUCTION INTERNAL FIXATION TRIMALLEOLAR ANKLE FRACTURE WITH FIXATION POSTERIOR LIP, RIGHT ANKLE/C-ARM/SYNTHES ANKLE TRAUMA TRAY/DBM ALLOMATRIX  **GEN W/REGIONAL**         PLAN:         1)  Pain management - per pain protocol.    2)  Antibiotics:  None at this time  3)  Incentive Spirometry  4)  Acute blood loss anemia/Post -op anemia: Continue monitoring  5)  DVT prophylaxis:  Xarelto, foot /ankle pumps, SCD  6)  Internal Medicine and Infectious Disease following for medical management. Temperature shows slight decrease today. 7)  PT/OT - Bed-to-chair transfers, otherwise, NWB RLE. Continue to use Bone Stimulator  8) Incision Care: Routine Incision Care and Dressing Changes as necessary per Dr. Jonathon Canela protocol. Wound was assessed yesterday and there were no visible signs of infection. Cultures pending. Still need to complete Urinalysis   9) D/C planning: Pending. Patient would prefer to go to rehab/SNF on [address] near his home     Post operative prescriptions are on the patients chart for discharge     SUBJECTIVE:         Patient resting comfortably at this time. No NAD.      CHEST/ABDOMEN: Observation reveals: No audible wheezing from mouth.              No accessory use of chest muscles during breathing. Non tender abdomen     Examination of RLE reveals wound covered.      Incision/Dressings:  Dressings  are clean, dry and  Intact. Bone stimulator in place     Extremities:        No significant edema or embolic phenomena to the foot/toes or hands                               Lower extremities are warm and appear well perfused          PA OBS List Upgrade recommendation by Fidencio Lundberg RN       Review Entered Review Status   2021 16:31 In Primary      Criteria Review   We recommend that the following pt's hospitalization under OBSERVATION [104] status is upgraded to INPATIENT If you agree, please place a new ADMIT order in [address] [facility] as recommended.       Name: Hank Quintana   : 1970   JC# : [id number]  Insurance: [facility] [employer] Medicare     Clinical summary patient with right ankle fracture, s/p ORIF (21)  Vitals febrile, temperature 100.7  Labs and Imaging baseline  MCG criteria applies YES  Comments patient with right ankle fracture, status post ORIF 6/11/2021, work-up in progress, patient with breakthrough seizures, had a rapid response , patient with significant seizure, started on Keppra with load, patient with fever 100.7 °F, work-up in progress, needing medical optimization      This chart was reviewed at 7:34 AM 6/15/2021    Windy Sims MD   Physician [address] [place] [address]

## 2021-06-28 ENCOUNTER — OFFICE VISIT (OUTPATIENT)
Dept: ORTHOPEDIC SURGERY | Age: 51
End: 2021-06-28
Payer: MEDICARE

## 2021-06-28 VITALS — HEART RATE: 86 BPM | BODY MASS INDEX: 46.37 KG/M2 | HEIGHT: 75 IN | OXYGEN SATURATION: 96 % | RESPIRATION RATE: 15 BRPM

## 2021-06-28 DIAGNOSIS — S82.851A CLOSED TRIMALLEOLAR FRACTURE OF RIGHT ANKLE, INITIAL ENCOUNTER: ICD-10-CM

## 2021-06-28 DIAGNOSIS — M25.571 ACUTE RIGHT ANKLE PAIN: ICD-10-CM

## 2021-06-28 DIAGNOSIS — S82.851D CLOSED TRIMALLEOLAR FRACTURE OF RIGHT ANKLE WITH ROUTINE HEALING, SUBSEQUENT ENCOUNTER: Primary | ICD-10-CM

## 2021-06-28 PROCEDURE — 73610 X-RAY EXAM OF ANKLE: CPT | Performed by: PHYSICIAN ASSISTANT

## 2021-06-28 PROCEDURE — 99024 POSTOP FOLLOW-UP VISIT: CPT | Performed by: PHYSICIAN ASSISTANT

## 2021-06-28 RX ORDER — OXYCODONE HYDROCHLORIDE 5 MG/1
5-10 TABLET ORAL
Qty: 42 TABLET | Refills: 0 | Status: SHIPPED | OUTPATIENT
Start: 2021-06-28 | End: 2021-07-05
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
## 2021-07-06 ENCOUNTER — DOCUMENTATION ONLY (OUTPATIENT)
Dept: NEUROLOGY | Age: 51
End: 2021-07-06

## 2021-07-06 NOTE — PROGRESS NOTES
Attempted to call patient to schedule medication refill. Patient did not wish to make appointment at this time and requested to speak with Dr. Yocasta Coleman at his vv about medication.

## 2021-07-08 ENCOUNTER — VIRTUAL VISIT (OUTPATIENT)
Dept: NEUROLOGY | Age: 51
End: 2021-07-08
Payer: MEDICARE

## 2021-07-08 DIAGNOSIS — G40.109 LOCALIZATION-RELATED EPILEPSY (HCC): Primary | ICD-10-CM

## 2021-07-08 PROCEDURE — 1111F DSCHRG MED/CURRENT MED MERGE: CPT | Performed by: STUDENT IN AN ORGANIZED HEALTH CARE EDUCATION/TRAINING PROGRAM

## 2021-07-08 PROCEDURE — G9717 DOC PT DX DEP/BP F/U NT REQ: HCPCS | Performed by: STUDENT IN AN ORGANIZED HEALTH CARE EDUCATION/TRAINING PROGRAM

## 2021-07-08 PROCEDURE — G8756 NO BP MEASURE DOC: HCPCS | Performed by: STUDENT IN AN ORGANIZED HEALTH CARE EDUCATION/TRAINING PROGRAM

## 2021-07-08 PROCEDURE — 99213 OFFICE O/P EST LOW 20 MIN: CPT | Performed by: STUDENT IN AN ORGANIZED HEALTH CARE EDUCATION/TRAINING PROGRAM

## 2021-07-08 PROCEDURE — 3017F COLORECTAL CA SCREEN DOC REV: CPT | Performed by: STUDENT IN AN ORGANIZED HEALTH CARE EDUCATION/TRAINING PROGRAM

## 2021-07-08 PROCEDURE — G8427 DOCREV CUR MEDS BY ELIG CLIN: HCPCS | Performed by: STUDENT IN AN ORGANIZED HEALTH CARE EDUCATION/TRAINING PROGRAM

## 2021-07-08 RX ORDER — DIVALPROEX SODIUM 500 MG/1
500 TABLET, DELAYED RELEASE ORAL 2 TIMES DAILY
Qty: 60 TABLET | Refills: 5 | Status: SHIPPED | OUTPATIENT
Start: 2021-07-08 | End: 2021-07-30

## 2021-07-08 RX ORDER — DIVALPROEX SODIUM 250 MG/1
250 TABLET, DELAYED RELEASE ORAL 2 TIMES DAILY
Qty: 60 TABLET | Refills: 5 | Status: SHIPPED | OUTPATIENT
Start: 2021-07-08 | End: 2021-08-07

## 2021-07-08 NOTE — PROGRESS NOTES
Pacheco Murphy is a 46 y.o. male on virtual visit today for follow-up on Epilepsy. Patient reports no recent seizure activity. 1. Have you been to the ER, urgent care clinic since your last visit? Hospitalized since your last visit? No    2. Have you seen or consulted any other health care providers outside of the [address] [place] [address] since your last visit? Include any pap smears or colon screening. No    Mobile number [phone] will be used for today's visit.
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
## 2021-07-21 ENCOUNTER — OFFICE VISIT (OUTPATIENT)
Dept: ORTHOPEDIC SURGERY | Age: 51
End: 2021-07-21
Payer: MEDICARE

## 2021-07-21 VITALS — RESPIRATION RATE: 15 BRPM | HEART RATE: 78 BPM | HEIGHT: 75 IN | BODY MASS INDEX: 46.37 KG/M2 | OXYGEN SATURATION: 96 %

## 2021-07-21 DIAGNOSIS — S82.851D CLOSED TRIMALLEOLAR FRACTURE OF RIGHT ANKLE WITH ROUTINE HEALING, SUBSEQUENT ENCOUNTER: Primary | ICD-10-CM

## 2021-07-21 DIAGNOSIS — Z98.890 POST-OPERATIVE STATE: ICD-10-CM

## 2021-07-21 DIAGNOSIS — S82.851A CLOSED DISPLACED TRIMALLEOLAR FRACTURE OF RIGHT ANKLE, INITIAL ENCOUNTER: ICD-10-CM

## 2021-07-21 PROCEDURE — 73610 X-RAY EXAM OF ANKLE: CPT | Performed by: PHYSICIAN ASSISTANT

## 2021-07-21 PROCEDURE — 99024 POSTOP FOLLOW-UP VISIT: CPT | Performed by: PHYSICIAN ASSISTANT

## 2021-07-21 RX ORDER — OXYCODONE HYDROCHLORIDE 5 MG/1
5 TABLET ORAL
Qty: 20 TABLET | Refills: 0 | Status: SHIPPED | OUTPATIENT
Start: 2021-07-21 | End: 2021-08-25 | Stop reason: SDUPTHER
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
## 2021-08-17 ENCOUNTER — OFFICE VISIT (OUTPATIENT)
Dept: ORTHOPEDIC SURGERY | Age: 51
End: 2021-08-17
Payer: MEDICARE

## 2021-08-17 VITALS
TEMPERATURE: 97 F | RESPIRATION RATE: 16 BRPM | HEIGHT: 75 IN | HEART RATE: 86 BPM | BODY MASS INDEX: 39.17 KG/M2 | WEIGHT: 315 LBS | OXYGEN SATURATION: 98 %

## 2021-08-17 DIAGNOSIS — S82.851D CLOSED TRIMALLEOLAR FRACTURE OF RIGHT ANKLE WITH ROUTINE HEALING, SUBSEQUENT ENCOUNTER: Primary | ICD-10-CM

## 2021-08-17 DIAGNOSIS — Z98.890 POST-OPERATIVE STATE: ICD-10-CM

## 2021-08-17 DIAGNOSIS — S82.851A CLOSED DISPLACED TRIMALLEOLAR FRACTURE OF RIGHT ANKLE, INITIAL ENCOUNTER: ICD-10-CM

## 2021-08-17 PROCEDURE — 99024 POSTOP FOLLOW-UP VISIT: CPT | Performed by: ORTHOPAEDIC SURGERY

## 2021-08-17 PROCEDURE — 73610 X-RAY EXAM OF ANKLE: CPT | Performed by: ORTHOPAEDIC SURGERY

## 2021-09-09 ENCOUNTER — OFFICE VISIT (OUTPATIENT)
Dept: CARDIOLOGY CLINIC | Age: 51
End: 2021-09-09
Payer: MEDICARE

## 2021-09-09 VITALS
WEIGHT: 315 LBS | HEART RATE: 81 BPM | DIASTOLIC BLOOD PRESSURE: 90 MMHG | OXYGEN SATURATION: 97 % | SYSTOLIC BLOOD PRESSURE: 136 MMHG | HEIGHT: 75 IN | BODY MASS INDEX: 39.17 KG/M2

## 2021-09-09 DIAGNOSIS — Z98.890 H/O CARDIAC RADIOFREQUENCY ABLATION: ICD-10-CM

## 2021-09-09 DIAGNOSIS — R56.9 SEIZURE (HCC): ICD-10-CM

## 2021-09-09 DIAGNOSIS — I48.91 ATRIAL FIBRILLATION WITH RVR (HCC): ICD-10-CM

## 2021-09-09 DIAGNOSIS — Z91.89 AT RISK FOR INTRACRANIAL BLEEDING: ICD-10-CM

## 2021-09-09 DIAGNOSIS — I26.99 PULMONARY EMBOLISM, UNSPECIFIED CHRONICITY, UNSPECIFIED PULMONARY EMBOLISM TYPE, UNSPECIFIED WHETHER ACUTE COR PULMONALE PRESENT (HCC): ICD-10-CM

## 2021-09-09 DIAGNOSIS — I10 ESSENTIAL HYPERTENSION: ICD-10-CM

## 2021-09-09 DIAGNOSIS — I50.33 DIASTOLIC CHF, ACUTE ON CHRONIC (HCC): ICD-10-CM

## 2021-09-09 DIAGNOSIS — Z79.01 ON RIVAROXABAN THERAPY: ICD-10-CM

## 2021-09-09 DIAGNOSIS — R07.9 CHEST PAIN, UNSPECIFIED TYPE: Primary | ICD-10-CM

## 2021-09-09 DIAGNOSIS — R00.2 PALPITATIONS: ICD-10-CM

## 2021-09-09 PROCEDURE — G8755 DIAS BP > OR = 90: HCPCS | Performed by: INTERNAL MEDICINE

## 2021-09-09 PROCEDURE — G8427 DOCREV CUR MEDS BY ELIG CLIN: HCPCS | Performed by: INTERNAL MEDICINE

## 2021-09-09 PROCEDURE — 93000 ELECTROCARDIOGRAM COMPLETE: CPT | Performed by: INTERNAL MEDICINE

## 2021-09-09 PROCEDURE — G8752 SYS BP LESS 140: HCPCS | Performed by: INTERNAL MEDICINE

## 2021-09-09 PROCEDURE — G9717 DOC PT DX DEP/BP F/U NT REQ: HCPCS | Performed by: INTERNAL MEDICINE

## 2021-09-09 PROCEDURE — 99214 OFFICE O/P EST MOD 30 MIN: CPT | Performed by: INTERNAL MEDICINE

## 2021-09-09 PROCEDURE — 3017F COLORECTAL CA SCREEN DOC REV: CPT | Performed by: INTERNAL MEDICINE

## 2021-09-09 PROCEDURE — G8417 CALC BMI ABV UP PARAM F/U: HCPCS | Performed by: INTERNAL MEDICINE

## 2021-09-09 RX ORDER — METOPROLOL TARTRATE 25 MG/1
25 TABLET, FILM COATED ORAL 2 TIMES DAILY
Qty: 180 TABLET | Refills: 3 | Status: SHIPPED | OUTPATIENT
Start: 2021-09-09

## 2021-09-09 RX ORDER — LISINOPRIL 10 MG/1
10 TABLET ORAL DAILY
Qty: 90 TABLET | Refills: 3 | Status: SHIPPED | OUTPATIENT
Start: 2021-09-09 | End: 2022-04-19

## 2021-09-09 RX ORDER — SIMVASTATIN 20 MG/1
20 TABLET, FILM COATED ORAL DAILY
Qty: 90 TABLET | Refills: 3 | Status: SHIPPED | OUTPATIENT
Start: 2021-09-09 | End: 2022-04-19
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
## 2021-09-27 ENCOUNTER — OFFICE VISIT (OUTPATIENT)
Dept: ORTHOPEDIC SURGERY | Age: 51
End: 2021-09-27
Payer: MEDICARE

## 2021-09-27 VITALS
TEMPERATURE: 97.5 F | OXYGEN SATURATION: 83 % | BODY MASS INDEX: 39.17 KG/M2 | HEART RATE: 83 BPM | HEIGHT: 75 IN | WEIGHT: 315 LBS

## 2021-09-27 DIAGNOSIS — M25.571 ACUTE RIGHT ANKLE PAIN: ICD-10-CM

## 2021-09-27 DIAGNOSIS — S82.851D CLOSED TRIMALLEOLAR FRACTURE OF RIGHT ANKLE WITH ROUTINE HEALING, SUBSEQUENT ENCOUNTER: Primary | ICD-10-CM

## 2021-09-27 DIAGNOSIS — Z98.890 POST-OPERATIVE STATE: ICD-10-CM

## 2021-09-27 PROCEDURE — 99024 POSTOP FOLLOW-UP VISIT: CPT | Performed by: ORTHOPAEDIC SURGERY

## 2021-09-27 PROCEDURE — 73610 X-RAY EXAM OF ANKLE: CPT | Performed by: ORTHOPAEDIC SURGERY

## 2021-10-05 ENCOUNTER — APPOINTMENT (OUTPATIENT)
Dept: PHYSICAL THERAPY | Age: 51
End: 2021-10-05

## 2021-10-13 DIAGNOSIS — I48.91 ATRIAL FIBRILLATION WITH RVR (HCC): Primary | ICD-10-CM

## 2021-10-15 ENCOUNTER — VIRTUAL VISIT (OUTPATIENT)
Dept: NEUROLOGY | Age: 51
End: 2021-10-15
Payer: MEDICARE

## 2021-10-15 DIAGNOSIS — Z51.81 THERAPEUTIC DRUG MONITORING: ICD-10-CM

## 2021-10-15 DIAGNOSIS — G40.109 LOCALIZATION-RELATED EPILEPSY (HCC): ICD-10-CM

## 2021-10-15 DIAGNOSIS — G47.33 OSA (OBSTRUCTIVE SLEEP APNEA): ICD-10-CM

## 2021-10-15 DIAGNOSIS — G40.109 PARTIAL SYMPTOMATIC EPILEPSY WITH SIMPLE PARTIAL SEIZURES, NOT INTRACTABLE, WITHOUT STATUS EPILEPTICUS (HCC): Primary | ICD-10-CM

## 2021-10-15 PROCEDURE — G8427 DOCREV CUR MEDS BY ELIG CLIN: HCPCS | Performed by: NURSE PRACTITIONER

## 2021-10-15 PROCEDURE — 3017F COLORECTAL CA SCREEN DOC REV: CPT | Performed by: NURSE PRACTITIONER

## 2021-10-15 PROCEDURE — 99213 OFFICE O/P EST LOW 20 MIN: CPT | Performed by: NURSE PRACTITIONER

## 2021-10-15 PROCEDURE — G8756 NO BP MEASURE DOC: HCPCS | Performed by: NURSE PRACTITIONER

## 2021-10-15 PROCEDURE — G9717 DOC PT DX DEP/BP F/U NT REQ: HCPCS | Performed by: NURSE PRACTITIONER

## 2021-10-15 RX ORDER — DIVALPROEX SODIUM 500 MG/1
TABLET, DELAYED RELEASE ORAL
Qty: 180 TABLET | Refills: 2 | Status: SHIPPED | OUTPATIENT
Start: 2021-10-15 | End: 2022-03-08 | Stop reason: ALTCHOICE

## 2021-10-15 RX ORDER — TOPIRAMATE 25 MG/1
TABLET ORAL
Qty: 180 TABLET | Refills: 2 | Status: SHIPPED | OUTPATIENT
Start: 2021-10-15 | End: 2022-03-08 | Stop reason: SDUPTHER

## 2021-10-15 RX ORDER — DIVALPROEX SODIUM 250 MG/1
TABLET, DELAYED RELEASE ORAL
Qty: 180 TABLET | Refills: 2 | Status: SHIPPED | OUTPATIENT
Start: 2021-10-15 | End: 2022-03-08 | Stop reason: ALTCHOICE

## 2021-10-15 NOTE — PROGRESS NOTES
Erasmo Lennon is a 46 y.o. male who will be using a cell phone for today's [place]. 1. Have you been to the ER, urgent care clinic since your last visit? Hospitalized since your last visit? No    2. Have you seen or consulted any other health care providers outside of the [address] [place] [address] since your last visit? Include any pap smears or colon screening.  No     [phone]
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
## 2021-10-19 ENCOUNTER — APPOINTMENT (OUTPATIENT)
Dept: PHYSICAL THERAPY | Age: 51
End: 2021-10-19

## 2021-10-19 ENCOUNTER — OFFICE VISIT (OUTPATIENT)
Dept: ORTHOPEDIC SURGERY | Age: 51
End: 2021-10-19
Payer: MEDICARE

## 2021-10-19 VITALS
WEIGHT: 315 LBS | RESPIRATION RATE: 16 BRPM | BODY MASS INDEX: 39.17 KG/M2 | TEMPERATURE: 98.2 F | HEIGHT: 75 IN | HEART RATE: 78 BPM | OXYGEN SATURATION: 93 %

## 2021-10-19 DIAGNOSIS — M25.571 ACUTE RIGHT ANKLE PAIN: ICD-10-CM

## 2021-10-19 DIAGNOSIS — Z98.890 POST-OPERATIVE STATE: ICD-10-CM

## 2021-10-19 DIAGNOSIS — S82.851D CLOSED TRIMALLEOLAR FRACTURE OF RIGHT ANKLE WITH ROUTINE HEALING, SUBSEQUENT ENCOUNTER: Primary | ICD-10-CM

## 2021-10-19 PROCEDURE — G9717 DOC PT DX DEP/BP F/U NT REQ: HCPCS | Performed by: ORTHOPAEDIC SURGERY

## 2021-10-19 PROCEDURE — 3017F COLORECTAL CA SCREEN DOC REV: CPT | Performed by: ORTHOPAEDIC SURGERY

## 2021-10-19 PROCEDURE — G8427 DOCREV CUR MEDS BY ELIG CLIN: HCPCS | Performed by: ORTHOPAEDIC SURGERY

## 2021-10-19 PROCEDURE — 99214 OFFICE O/P EST MOD 30 MIN: CPT | Performed by: ORTHOPAEDIC SURGERY

## 2021-10-19 PROCEDURE — G8756 NO BP MEASURE DOC: HCPCS | Performed by: ORTHOPAEDIC SURGERY

## 2021-10-19 PROCEDURE — 73610 X-RAY EXAM OF ANKLE: CPT | Performed by: ORTHOPAEDIC SURGERY

## 2021-10-19 PROCEDURE — G8417 CALC BMI ABV UP PARAM F/U: HCPCS | Performed by: ORTHOPAEDIC SURGERY
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
## 2021-10-20 ENCOUNTER — HOSPITAL ENCOUNTER (OUTPATIENT)
Dept: LAB | Age: 51
Discharge: HOME | End: 2021-10-20
Payer: MEDICARE

## 2021-10-20 DIAGNOSIS — G40.109 PARTIAL SYMPTOMATIC EPILEPSY WITH SIMPLE PARTIAL SEIZURES, NOT INTRACTABLE, WITHOUT STATUS EPILEPTICUS (HCC): ICD-10-CM

## 2021-10-20 DIAGNOSIS — Z51.81 THERAPEUTIC DRUG MONITORING: ICD-10-CM

## 2021-10-20 LAB
ALBUMIN SERPL-MCNC: 3.1 G/DL (ref 3.4–5)
ALBUMIN/GLOB SERPL: 0.8 {RATIO} (ref 0.8–1.7)
ALP SERPL-CCNC: 82 U/L (ref 45–117)
ALT SERPL-CCNC: 38 U/L (ref 16–61)
ANION GAP SERPL CALC-SCNC: 5 MMOL/L (ref 3–18)
AST SERPL-CCNC: 42 U/L (ref 10–38)
BASOPHILS # BLD: 0 K/UL (ref 0–0.1)
BASOPHILS NFR BLD: 0 % (ref 0–2)
BILIRUB SERPL-MCNC: 0.5 MG/DL (ref 0.2–1)
BUN SERPL-MCNC: 17 MG/DL (ref 7–18)
BUN/CREAT SERPL: 22 (ref 12–20)
CALCIUM SERPL-MCNC: 9.4 MG/DL (ref 8.5–10.1)
CHLORIDE SERPL-SCNC: 105 MMOL/L (ref 100–111)
CO2 SERPL-SCNC: 31 MMOL/L (ref 21–32)
CREAT SERPL-MCNC: 0.78 MG/DL (ref 0.6–1.3)
DIFFERENTIAL METHOD BLD: ABNORMAL
EOSINOPHIL # BLD: 0.1 K/UL (ref 0–0.4)
EOSINOPHIL NFR BLD: 1 % (ref 0–5)
ERYTHROCYTE [DISTWIDTH] IN BLOOD BY AUTOMATED COUNT: 14 % (ref 11.6–14.5)
GLOBULIN SER CALC-MCNC: 4 G/DL (ref 2–4)
GLUCOSE SERPL-MCNC: 73 MG/DL (ref 74–99)
HCT VFR BLD AUTO: 42.7 % (ref 36–48)
HGB BLD-MCNC: 13.5 G/DL (ref 13–16)
LYMPHOCYTES # BLD: 2.7 K/UL (ref 0.9–3.6)
LYMPHOCYTES NFR BLD: 41 % (ref 21–52)
MCH RBC QN AUTO: 31.6 PG (ref 24–34)
MCHC RBC AUTO-ENTMCNC: 31.6 G/DL (ref 31–37)
MCV RBC AUTO: 100 FL (ref 78–100)
MONOCYTES # BLD: 0.7 K/UL (ref 0.05–1.2)
MONOCYTES NFR BLD: 11 % (ref 3–10)
NEUTS SEG # BLD: 3.2 K/UL (ref 1.8–8)
NEUTS SEG NFR BLD: 47 % (ref 40–73)
PLATELET # BLD AUTO: 185 K/UL (ref 135–420)
PMV BLD AUTO: 11.8 FL (ref 9.2–11.8)
POTASSIUM SERPL-SCNC: 4.1 MMOL/L (ref 3.5–5.5)
PROT SERPL-MCNC: 7.1 G/DL (ref 6.4–8.2)
RBC # BLD AUTO: 4.27 M/UL (ref 4.35–5.65)
SODIUM SERPL-SCNC: 141 MMOL/L (ref 136–145)
VALPROATE SERPL-MCNC: 76 UG/ML (ref 50–100)
WBC # BLD AUTO: 6.7 K/UL (ref 4.6–13.2)

## 2021-10-20 PROCEDURE — 80164 ASSAY DIPROPYLACETIC ACD TOT: CPT

## 2021-10-20 PROCEDURE — 80053 COMPREHEN METABOLIC PANEL: CPT

## 2021-10-20 PROCEDURE — 85025 COMPLETE CBC W/AUTO DIFF WBC: CPT

## 2021-10-20 PROCEDURE — 36415 COLL VENOUS BLD VENIPUNCTURE: CPT

## 2021-10-26 ENCOUNTER — TELEPHONE (OUTPATIENT)
Dept: NEUROLOGY | Age: 51
End: 2021-10-26

## 2021-10-26 NOTE — TELEPHONE ENCOUNTER
Called pt to discuss lab results. Left voicemail to call back the office but I will try to call back at a later date.

## 2021-10-28 ENCOUNTER — TELEPHONE (OUTPATIENT)
Dept: NEUROLOGY | Age: 51
End: 2021-10-28

## 2021-10-28 NOTE — TELEPHONE ENCOUNTER
Called patient back to discuss lab results. Valproic acid level was 76. CBC was fairly unremarkable and CMP fairly unremarkable with glucose of 73, mildly elevated AST of 42. He reports a seizure the day before yesterday. Roommate said he got up and ended up in his room. Bit his tongue on both sides. He has been taking the medications regularly. He asked about the Topamax. He did not think he received it yet from [facility] mail order pharmacy. He will check. He cannot see if the Topamax arrived, he is at his sister's house, will look when home. He is taking Depakote 750 mg BID. He reports weight gain on Depakote. He will call back in the morning and let us know if he has the Topamax and we will consider starting this or adjusting the Depakote.

## 2021-11-10 ENCOUNTER — HOSPITAL ENCOUNTER (OUTPATIENT)
Dept: PHYSICAL THERAPY | Age: 51
Discharge: HOME | End: 2021-11-10
Payer: MEDICARE

## 2021-11-10 PROCEDURE — 97161 PT EVAL LOW COMPLEX 20 MIN: CPT

## 2021-11-10 PROCEDURE — 97535 SELF CARE MNGMENT TRAINING: CPT

## 2021-11-10 PROCEDURE — 97110 THERAPEUTIC EXERCISES: CPT

## 2021-11-10 NOTE — PROGRESS NOTES
[facility]  [address]  [place], [address]  [phone] fax    Plan of Care/ Statement of Necessity for Physical Therapy Services    Patient name: Christine Alberts Start of Care: 11/10/2021   Referral source: Brenda Judge MD : 1970    Medical Diagnosis: Pain in right ankle and joints of right foot [M25.571]  Payor: Shanda Mercedes / Plan: [address] [place] [address] HMO / Product Type: Managed Care Medicare /  Onset Date:2021    Treatment Diagnosis: right foot/ankle pain   Prior Hospitalization: see medical history Provider#: [id number]   Medications: Verified on Patient summary List    Comorbidities: back pain, anxiety, depression, high blood pressure, obesity   Prior Level of Function: pt has had no prior ankle injuries and his ankle did not limit ADLs      The Plan of Care and following information is based on the information from the initial evaluation. Assessment/ key information: Pt is a 61-year-old man presenting to therapy with c/o right medial ankle and plantar foot pain that he attributes to a surgical procedure he had in 2021. Pt had a closed trimalleolar fx and did have surgical fixation at the lateral ankle. He has 4cm of swelling with figure 8 measurement today and significant B foot dryness. Pt is unable to achieve neutral ankle DF on either foot and demonstrates decreased B foot/ankle strength. Reduced ankle mobility present in all planes at the talocrural and subtalar joints. Pt has point tenderness along the plantar fascia and the gastroc of the right LE that would benefit from the Graston Technique. Pt endorses < 5 minute standing tolerance and that he remains sedentary most of the day for now. Signs and symptoms are consistent with post-surgical and post-fx healing with plantar fasciitis onset.       Patient will benefit from skilled PT services to modify and progress therapeutic interventions, address functional mobility deficits, address ROM deficits, address strength deficits, analyze and address soft tissue restrictions, analyze and cue movement patterns, analyze and modify body mechanics/ergonomics, assess and modify postural abnormalities, address imbalance/dizziness and instruct in home and community integration to attain remaining goals. Evaluation Complexity History MEDIUM  Complexity : 1-2 comorbidities / personal factors will impact the outcome/ POC ; Examination LOW Complexity : 1-2 Standardized tests and measures addressing body structure, function, activity limitation and / or participation in recreation  ;Presentation LOW Complexity : Stable, uncomplicated  ;Clinical Decision Making MEDIUM Complexity : FOTO score of 26-74  Overall Complexity Rating: LOW   Problem List: pain affecting function, decrease ROM, decrease strength, edema affecting function, impaired gait/ balance, decrease ADL/ functional abilitiies, decrease activity tolerance, decrease flexibility/ joint mobility and decrease transfer abilities   Treatment Plan may include any combination of the following: Therapeutic exercise, Therapeutic activities, Neuromuscular re-education, Physical agent/modality, Gait/balance training, Manual therapy, Aquatic therapy, Patient education, Self Care training, Functional mobility training, Home safety training and Stair training  Patient / Family readiness to learn indicated by: asking questions, trying to perform skills and interest  Persons(s) to be included in education: patient (P)  Barriers to Learning/Limitations: None  Patient Goal (s): make the pain much better  Patient Self Reported Health Status: fair  Rehabilitation Potential: fair    Short Term Goals: To be accomplished in 2 weeks:  1. Pt will report compliance with his HEP to maximize therapeutic success. 2. Pt will improve B ankle DF to PROM lacking 5 deg from neutral to improve tolerance to weightbearing. Long Term Goals:  To be accomplished in 4 weeks:  1. Pt will improve B ankle DF to PROM 5 deg to improve tolerance to weightbearing. 2. Pt will improve B ankle strength to at least 4/5 MMT to improve ease and stability with standing and ambulatory ADLs. 3. Pt will stand tandem B for 30 seconds on firm or compliant surface without LOB to reduce risk for falls. 4. Pt will report > 50% improvement in symptoms to improve QOL. 5. Pt will improve FOTO to 63, demonstrating improved functional capacity for ADLs. Frequency / Duration: Patient to be seen 2 times per week for 4 weeks. Patient/ Caregiver education and instruction: Diagnosis, prognosis, self care, activity modification and exercises   [x]  Plan of care has been reviewed with PTA    Certification Period: 11/10/2021 -- 12/09/2021  Beena Jaimes, PT 11/10/2021 4:06 PM    ________________________________________________________________________    I certify that the above Therapy Services are being furnished while the patient is under my care. I agree with the treatment plan and certify that this therapy is necessary.     [de-identified] Signature:____________________  Date:____________Time: _________     Gwen Barrera MD  Please sign and return to [facility] [address] [place] [address]  [address]  [place], [facility] [address]  [phone] fax

## 2021-11-10 NOTE — PROGRESS NOTES
PT DAILY TREATMENT NOTE     Patient Name: Beckie Cockayne  Date:11/10/2021  : 1970  [x]  Patient  Verified  Payor: Rosa Mishra / Plan: [address] [place] [address] HMO / Product Type: Managed Care Medicare /    In time:305pm  Out time:346pm  Total Treatment Time (min): 41  Visit #: 1 of 8    Medicare/BCBS Only   Total Timed Codes (min):  23 1:1 Treatment Time:  41       Treatment Area: Pain in right ankle and joints of right foot [M25.571]    SUBJECTIVE  Pain Level (0-10 scale): 9  Any medication changes, allergies to medications, adverse drug reactions, diagnosis change, or new procedure performed?: [x] No    [] Yes (see summary sheet for update)  Subjective functional status/changes:   [] No changes reported  See paper evaluation    OBJECTIVE     18 min [x]Eval                  []Re-Eval        15 min Therapeutic Exercise:  [] See flow sheet : HEP education   Rationale: increase ROM and increase strength to improve the patients ability to manage ADLs. 8 min Self Care/Home Management:  []  See flow sheet : pt education regarding relevant anatomy and pathology   Rationale: increase ROM and improve understanding  to improve the patients ability to manage self care. With   [] TE   [] TA   [] neuro   [] other: Patient Education: [x] Review HEP    [] Progressed/Changed HEP based on:   [] positioning   [] body mechanics   [] transfers   [] heat/ice application    [] other:      Other Objective/Functional Measures: see paper evaluation and POC     Pain Level (0-10 scale) post treatment: 9    ASSESSMENT/Changes in Function: Pt is a 61-year-old man presenting to therapy with c/o right medial ankle and plantar foot pain that he attributes to a surgical procedure he had in 2021. Pt had a closed trimalleolar fx and did have surgical fixation at the lateral ankle. He has 4cm of swelling with figure 8 measurement today and significant B foot dryness.  Pt is unable to achieve neutral ankle DF on either foot and demonstrates decreased B foot/ankle strength. Reduced ankle mobility present in all planes at the talocrural and subtalar joints. Pt has point tenderness along the plantar fascia and the gastroc of the right LE that would benefit from the Graston Technique. Pt endorses < 5 minute standing tolerance and that he remains sedentary most of the day for now. Signs and symptoms are consistent with post-surgical and post-fx healing with plantar fasciitis onset. Patient will benefit from skilled PT services to modify and progress therapeutic interventions, address functional mobility deficits, address ROM deficits, address strength deficits, analyze and address soft tissue restrictions, analyze and cue movement patterns, analyze and modify body mechanics/ergonomics, assess and modify postural abnormalities, address imbalance/dizziness and instruct in home and community integration to attain remaining goals. [x]  See Plan of Care  []  See progress note/recertification  []  See Discharge Summary         Progress towards goals / Updated goals:  Short Term Goals: To be accomplished in 2 weeks:  1. Pt will report compliance with his HEP to maximize therapeutic success. Eval: HEP assigned  2. Pt will improve B ankle DF to PROM lacking 5 deg from neutral to improve tolerance to weightbearing. Eval: lack 9 deg right, 7 deg left  Long Term Goals: To be accomplished in 4 weeks:  1. Pt will improve B ankle DF to PROM 5 deg to improve tolerance to weightbearing. Eval: lack 9 deg right, 7 deg left  2. Pt will improve B ankle strength to at least 4/5 MMT to improve ease and stability with standing and ambulatory ADLs. Eval: 3/5 right, 4-/5 left  3. Pt will stand tandem B for 30 seconds on firm or compliant surface without LOB to reduce risk for falls. Eval: NT  4. Pt will report > 50% improvement in symptoms to improve QOL. Eval: 0  5.  Pt will improve FOTO to 63, demonstrating improved functional capacity for ADLs.    Eval: 52    PLAN  []  Upgrade activities as tolerated     [x]  Continue plan of care  []  Update interventions per flow sheet       []  Discharge due to:_  []  Other:_      Agnes Anderson, PT 11/10/2021  3:58 PM    Future Appointments   Date Time Provider Kristine Baldwin   11/18/2021  2:30 PM [facility] BS AMB   12/16/2021  3:20 PM Monica Ledesma MD [facility] BS AMB   2/22/2022  3:45 PM Neda Cuba MD [facility] BS AMB

## 2021-11-17 ENCOUNTER — TELEPHONE (OUTPATIENT)
Dept: CARDIOLOGY CLINIC | Age: 51
End: 2021-11-17

## 2021-11-29 ENCOUNTER — HOSPITAL ENCOUNTER (OUTPATIENT)
Dept: PHYSICAL THERAPY | Age: 51
Discharge: HOME | End: 2021-11-29
Payer: MEDICARE

## 2021-11-29 PROCEDURE — 97535 SELF CARE MNGMENT TRAINING: CPT

## 2021-11-29 NOTE — PROGRESS NOTES
Physical Therapy Discharge Instructions      [facility]  [address]  [place], [address]  [phone] fax    Patient: Josefina Alonso  : 1970      Continue Home Exercise Program 2 times per day for 3 weeks, then decrease to 4 times per week      Continue with    [x] Ice  as needed     [x] Heat           Follow up with MD:     [] Upon completion of therapy     [x] As needed      Recommendations:     [x]   Return to activity with home program    [x]   Return to activity with the following modifications:       []Post Rehab Program    []Join Independent aquatic program     [x]Return to/join local gym      Lord Herman, SHAINA 2021 3:32 PM

## 2021-11-29 NOTE — PROGRESS NOTES
[facility]  [address] [facility] [address]  [place], [address]  [phone] fax    Physical Therapy Discharge Summary  Patient name: Ector Pastrana Start of Care: 11/10/2021   Referral source: Aminata Cuellar MD : 1970               Medical Diagnosis: Pain in right ankle and joints of right foot [M25.571]  Payor: Suresh Ringer / Plan: [address] [place] [address] HMO / Product Type: Managed Care Medicare /  Onset Date:2021               Treatment Diagnosis: right foot/ankle pain   Prior Hospitalization: see medical history Provider#: [id number]   Medications: Verified on Patient summary List    Comorbidities: back pain, anxiety, depression, high blood pressure, obesity   Prior Level of Function: pt has had no prior ankle injuries and his ankle did not limit ADLs             Visits from Start of Care: 2    Missed Visits: 0  Reporting Period : 11/10/2021 to 2021    Summary of Care:  Short Term Goals: To be accomplished in 2 weeks:  1. Pt will report compliance with his HEP to maximize therapeutic success.              Eval: HEP assigned              DC:  MET - has been completing as prescribed in addition to returning to recreational exercise through [employer]  2. Pt will improve B ankle DF to PROM lacking 5 deg from neutral to improve tolerance to weightbearing.              Eval: lack 9 deg right, 7 deg left              DC: MET - 3 degrees right, 7 degrees left     Long Term Goals: To be accomplished in 4 weeks:  1.  Pt will improve B ankle DF to PROM 5 deg to improve tolerance to weightbearing.              Eval: lack 9 deg right, 7 deg left               DC:  MET - 3 degrees right, 7 degrees left  2. Pt will improve B ankle strength to at least 4/5 MMT to improve ease and stability with standing and ambulatory ADLs.             Eval: 3/5 right, 4-/5 left               DC: MET - 4+/5 MMT aishwarya  3.  Pt will stand tandem B for 30 seconds on firm or compliant surface without LOB to reduce risk for falls.              Eval: NT              DC: MET - maintains tandem stance 30 seconds without LOB  4. Pt will report > 50% improvement in symptoms to improve QOL.             Eval: 0              DC: MET - reporting 99% improvement  5. Pt will improve FOTO to 63, demonstrating improved functional capacity for ADLs.             Eval: 46              DC: MET - 86    ASSESSMENT/RECOMMENDATIONS: Patient attended first follow up visit. States he does not believe he needs anymore physical therapy. Reports he has been completing HEP he was given on evaluation with good results in addition to recreational exercise through [employer]. In regards to pain levels, states he has been managing with Motrin but expects he will not require pharmaceutical intervention much longer. In regards to ankle edema, states he has had swelling in his legs chronically. Patient requesting and agreeable to discharg; states he is confident he can manage independently. Objective measures reflect subjective improvement as he has met all established goals. AROM has improved in addition to strength. FOTO score with significant improvement endorsing improved overall functional capacity. Patient reporting near 100% improvement. Patient discharged this date with HEP. [x]Discontinue therapy: [x]Patient has reached or is progressing toward set goals      []Patient is non-compliant or has abdicated      []Due to lack of appreciable progress towards set goals    Spencer Brody, PT 11/29/2021 3:28 PM    NOTE TO PHYSICIAN:  Please complete the following and fax to: [facility] at [phone]  . Retain this original for your records. If you are unable to process this request in   24 hours, please contact our office.      [] I have read the above report and request that my patient continue therapy with the following changes/special instructions:  [] I have read the above report and request that my patient be discharged from therapy    Physician's Signature:____________Date:_________TIME:________     Mu Redd MD  ** Signature, Date and Time must be completed for valid certification **

## 2021-11-29 NOTE — PROGRESS NOTES
PT DAILY TREATMENT NOTE     Patient Name: Annie Herrera  Date:2021  : 1970  [x]  Patient  Verified  Payor: Lupis  / Plan: [address] [place] [address] HMO / Product Type: Managed Care Medicare /    In time: 3:01 PM  Out time: 3:16 PM  Total Treatment Time (min): 15  Visit #: 2 of 8    Treatment Area: Pain in right ankle and joints of right foot [M25.571]    SUBJECTIVE  Pain Level (0-10 scale): 0  Any medication changes, allergies to medications, adverse drug reactions, diagnosis change, or new procedure performed?: [x] No    [] Yes (see summary sheet for update)  Subjective functional status/changes:   [] No changes reported  States he does not believe he needs anymore follow up visits. Reports he has been completing HEP he was given on evaluation with good results in addition to recreational exercise through [employer]. In regards to pain levels, states he has been managing with Motrin but expects he will not require pharmaceutical intervention much longer. In regards to ankle edema, states he has had swelling in his legs chronically. Patient agreeable to discharge and states he is confident he can manage independently. OBJECTIVE    10 min Self Care/Home Management: Review of HEP, education on importance of maintaining regular exercise to maintain improved status   Rationale: Promote carryover of learned management strategies  to improve the patients ability to maintain improved level of function long term. With   [] TE   [] TA   [] neuro   [] other: Patient Education: [x] Review HEP    [] Progressed/Changed HEP based on:   [] positioning   [] body mechanics   [] transfers   [] heat/ice application    [] other:      Other Objective/Functional Measures:   See Goals - Current status     Pain Level (0-10 scale) post treatment: 0    ASSESSMENT/Changes in Function: Patient attended first follow up visit however stating he does not feel he needs PT (see Subjective).  Objective measures reflect subjective improvement as he has met all established goals. AROM has improved in addition to strength. FOTO score with significant improvement endorsing improved overall functional capacity. Patient reporting near 100% improvement. Patient discharged this date with HEP. He departs the clinic in pleasant mood with all needs and concerns met. []  See Plan of Care  []  See progress note/recertification  [x]  See Discharge Summary         Progress towards goals / Updated goals:  Short Term Goals: To be accomplished in 2 weeks:  1. Pt will report compliance with his HEP to maximize therapeutic success. Eval: HEP assigned   Current: 11/29/2021 - MET - has been completing as prescribed in addition to returning to recreational exercise through [address]  2. Pt will improve B ankle DF to PROM lacking 5 deg from neutral to improve tolerance to weightbearing. Eval: lack 9 deg right, 7 deg left   Current: 11/29/2021 - MET - 3 degrees right, 7 degrees left    Long Term Goals: To be accomplished in 4 weeks:  1. Pt will improve B ankle DF to PROM 5 deg to improve tolerance to weightbearing. Eval: lack 9 deg right, 7 deg left    Current: 11/29/2021 - MET - 3 degrees right, 7 degrees left  2. Pt will improve B ankle strength to at least 4/5 MMT to improve ease and stability with standing and ambulatory ADLs. Eval: 3/5 right, 4-/5 left    Current: 11/29/2021 - MET - 4+/5 MMT aishwarya  3. Pt will stand tandem B for 30 seconds on firm or compliant surface without LOB to reduce risk for falls. Eval: NT   Current: 11/29/2021 - MET - maintains tandem stance 30 seconds without LOB  4. Pt will report > 50% improvement in symptoms to improve QOL. Eval: 0   Current: 11/29/2021 - MET - reporting 99% improvement  5. Pt will improve FOTO to 63, demonstrating improved functional capacity for ADLs.               Eval: 52    Current: 11/29/2021 - MET - 86     PLAN  []  Upgrade activities as tolerated     []  Continue plan of care  []  Update interventions per flow sheet       [x]  Discharge due to: patient reporting pre-morbid status, goals met  []  Other:_      Brooklyn Arzoladle, PT 11/29/2021  3:12 PM    Future Appointments   Date Time Provider Kristine Baldwin   12/16/2021  3:20 PM Gabbie Ledesma MD [facility] BS AMB   2/22/2022  3:45 PM Lilly Cuba MD [facility] BS AMB [facility] Pregnancy And Lactation Text: This medication has not been assigned a Pregnancy Risk Category but animal studies failed to show danger with the topical medication. It is unknown if the medication is excreted in breast milk.

## 2022-03-08 ENCOUNTER — OFFICE VISIT (OUTPATIENT)
Dept: NEUROLOGY | Age: 52
End: 2022-03-08
Payer: MEDICARE

## 2022-03-08 VITALS
BODY MASS INDEX: 39.17 KG/M2 | WEIGHT: 315 LBS | HEIGHT: 75 IN | HEART RATE: 71 BPM | OXYGEN SATURATION: 92 % | SYSTOLIC BLOOD PRESSURE: 126 MMHG | DIASTOLIC BLOOD PRESSURE: 66 MMHG | RESPIRATION RATE: 18 BRPM

## 2022-03-08 DIAGNOSIS — M89.9 DISORDER OF BONE, UNSPECIFIED: ICD-10-CM

## 2022-03-08 DIAGNOSIS — Z51.81 THERAPEUTIC DRUG MONITORING: ICD-10-CM

## 2022-03-08 DIAGNOSIS — G40.109 PARTIAL SYMPTOMATIC EPILEPSY WITH SIMPLE PARTIAL SEIZURES, NOT INTRACTABLE, WITHOUT STATUS EPILEPTICUS (HCC): Primary | ICD-10-CM

## 2022-03-08 DIAGNOSIS — G40.109 LOCALIZATION-RELATED EPILEPSY (HCC): ICD-10-CM

## 2022-03-08 DIAGNOSIS — G47.33 OSA (OBSTRUCTIVE SLEEP APNEA): ICD-10-CM

## 2022-03-08 PROCEDURE — G9717 DOC PT DX DEP/BP F/U NT REQ: HCPCS | Performed by: NURSE PRACTITIONER

## 2022-03-08 PROCEDURE — G8417 CALC BMI ABV UP PARAM F/U: HCPCS | Performed by: NURSE PRACTITIONER

## 2022-03-08 PROCEDURE — 3017F COLORECTAL CA SCREEN DOC REV: CPT | Performed by: NURSE PRACTITIONER

## 2022-03-08 PROCEDURE — G8427 DOCREV CUR MEDS BY ELIG CLIN: HCPCS | Performed by: NURSE PRACTITIONER

## 2022-03-08 PROCEDURE — G8752 SYS BP LESS 140: HCPCS | Performed by: NURSE PRACTITIONER

## 2022-03-08 PROCEDURE — G8754 DIAS BP LESS 90: HCPCS | Performed by: NURSE PRACTITIONER

## 2022-03-08 PROCEDURE — 99214 OFFICE O/P EST MOD 30 MIN: CPT | Performed by: NURSE PRACTITIONER

## 2022-03-08 RX ORDER — TOPIRAMATE 25 MG/1
25 TABLET ORAL 2 TIMES DAILY
Qty: 180 TABLET | Refills: 2 | Status: SHIPPED | OUTPATIENT
Start: 2022-03-08 | End: 2022-05-09 | Stop reason: SDUPTHER

## 2022-03-08 RX ORDER — DIVALPROEX SODIUM 500 MG/1
1000 TABLET, DELAYED RELEASE ORAL 2 TIMES DAILY
Qty: 360 TABLET | Refills: 2 | Status: SHIPPED | OUTPATIENT
Start: 2022-03-08 | End: 2022-09-14 | Stop reason: SDUPTHER

## 2022-03-08 NOTE — PATIENT INSTRUCTIONS
Patient instructions:  -Change Depakote to 1000 mg twice daily. (Take 2 of the 500 mg tabs twice daily). -topiramate/Topamax 25 mg twice daily.  -Labs in 2 weeks.   -Follow up in 2 months.
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
## 2022-03-16 ENCOUNTER — OFFICE VISIT (OUTPATIENT)
Dept: CARDIOLOGY CLINIC | Age: 52
End: 2022-03-16
Payer: MEDICARE

## 2022-03-16 VITALS
DIASTOLIC BLOOD PRESSURE: 80 MMHG | HEART RATE: 71 BPM | OXYGEN SATURATION: 96 % | BODY MASS INDEX: 39.17 KG/M2 | SYSTOLIC BLOOD PRESSURE: 136 MMHG | WEIGHT: 315 LBS | HEIGHT: 75 IN

## 2022-03-16 DIAGNOSIS — R06.02 SOB (SHORTNESS OF BREATH): ICD-10-CM

## 2022-03-16 DIAGNOSIS — Z98.890 H/O CARDIAC RADIOFREQUENCY ABLATION: ICD-10-CM

## 2022-03-16 DIAGNOSIS — Z79.01 ON RIVAROXABAN THERAPY: ICD-10-CM

## 2022-03-16 DIAGNOSIS — I48.91 ATRIAL FIBRILLATION WITH RVR (HCC): ICD-10-CM

## 2022-03-16 DIAGNOSIS — I71.9 AORTIC ANEURYSM WITHOUT RUPTURE, UNSPECIFIED PORTION OF AORTA (HCC): ICD-10-CM

## 2022-03-16 DIAGNOSIS — R07.9 CHEST PAIN, UNSPECIFIED TYPE: Primary | ICD-10-CM

## 2022-03-16 DIAGNOSIS — I50.32 DIASTOLIC CHF, CHRONIC (HCC): ICD-10-CM

## 2022-03-16 PROBLEM — R48.0 DYSLEXIA: Status: ACTIVE | Noted: 2022-03-16

## 2022-03-16 PROCEDURE — 99215 OFFICE O/P EST HI 40 MIN: CPT | Performed by: INTERNAL MEDICINE

## 2022-03-16 PROCEDURE — G8427 DOCREV CUR MEDS BY ELIG CLIN: HCPCS | Performed by: INTERNAL MEDICINE

## 2022-03-16 PROCEDURE — G8752 SYS BP LESS 140: HCPCS | Performed by: INTERNAL MEDICINE

## 2022-03-16 PROCEDURE — 3017F COLORECTAL CA SCREEN DOC REV: CPT | Performed by: INTERNAL MEDICINE

## 2022-03-16 PROCEDURE — G8754 DIAS BP LESS 90: HCPCS | Performed by: INTERNAL MEDICINE

## 2022-03-16 PROCEDURE — G8417 CALC BMI ABV UP PARAM F/U: HCPCS | Performed by: INTERNAL MEDICINE

## 2022-03-16 PROCEDURE — 93000 ELECTROCARDIOGRAM COMPLETE: CPT | Performed by: INTERNAL MEDICINE

## 2022-03-16 PROCEDURE — G9717 DOC PT DX DEP/BP F/U NT REQ: HCPCS | Performed by: INTERNAL MEDICINE
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
## 2022-03-16 NOTE — PATIENT INSTRUCTIONS
If you have not heard from the central scheduler to schedule your testing in 48 hours, please call [phone].

## 2022-03-16 NOTE — PROGRESS NOTES
Apple Galo presents today for   Chief Complaint   Patient presents with    Follow-up     3 months     Shortness of Breath     exertional        Apple Galo preferred language for health care discussion is english/other. Is someone accompanying this pt? no    Is the patient using any DME equipment during [address]? no    Depression Screening:  3 most recent PHQ Screens 3/16/2022   PHQ Not Done -   Little interest or pleasure in doing things Not at all   Feeling down, depressed, irritable, or hopeless Not at all   Total Score PHQ 2 0       Learning Assessment:  Learning Assessment 10/24/2019   PRIMARY LEARNER Patient   PRIMARY LANGUAGE ENGLISH   LEARNER PREFERENCE PRIMARY DEMONSTRATION   ANSWERED BY Patient   RELATIONSHIP SELF       Abuse Screening:  Abuse Screening Questionnaire 1/7/2021   Do you ever feel afraid of your partner? N   Are you in a relationship with someone who physically or mentally threatens you? N   Is it safe for you to go home? -       Fall Risk  Fall Risk Assessment, last 12 mths 1/7/2021   Able to walk? Yes   Fall in past 12 months? 0   Do you feel unsteady? 0   Are you worried about falling 0       Pt currently taking Anticoagulant therapy? Xarelto    Coordination of Care:  1. Have you been to the ER, urgent care clinic since your last visit? Hospitalized since your last visit? no    2. Have you seen or consulted any other health care providers outside of the [address] [place] [address] since your last visit? Include any pap smears or colon screening.  no

## 2022-03-19 PROBLEM — G40.909 SEIZURE DISORDER (HCC): Status: ACTIVE | Noted: 2021-06-11

## 2022-03-19 PROBLEM — I10 HYPERTENSION: Status: ACTIVE | Noted: 2019-04-07

## 2022-03-19 PROBLEM — R56.9 SEIZURE (HCC): Status: ACTIVE | Noted: 2019-04-07

## 2022-03-19 PROBLEM — E66.01 MORBID OBESITY (HCC): Status: ACTIVE | Noted: 2019-04-07

## 2022-03-19 PROBLEM — F31.9 BIPOLAR DISORDER, UNSPECIFIED (HCC): Status: ACTIVE | Noted: 2018-06-26

## 2022-03-19 PROBLEM — I48.91 ATRIAL FIBRILLATION WITH RVR (HCC): Status: ACTIVE | Noted: 2019-04-07

## 2022-03-20 PROBLEM — M79.606 LEG PAIN: Status: ACTIVE | Noted: 2019-04-07

## 2022-03-20 PROBLEM — S09.90XA HEAD INJURY: Status: ACTIVE | Noted: 2019-04-07

## 2022-03-20 PROBLEM — I48.91 NEW ONSET A-FIB (HCC): Status: ACTIVE | Noted: 2019-04-07

## 2022-03-20 PROBLEM — G40.109: Status: ACTIVE | Noted: 2017-05-09

## 2022-03-24 PROBLEM — R48.0 DYSLEXIA: Status: ACTIVE | Noted: 2022-03-16
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
## 2022-04-16 ENCOUNTER — HOSPITAL ENCOUNTER (OUTPATIENT)
Dept: LAB | Age: 52
Discharge: HOME | End: 2022-04-16
Payer: MEDICARE

## 2022-04-16 DIAGNOSIS — Z51.81 THERAPEUTIC DRUG MONITORING: ICD-10-CM

## 2022-04-16 DIAGNOSIS — M89.9 DISORDER OF BONE, UNSPECIFIED: ICD-10-CM

## 2022-04-16 DIAGNOSIS — G40.109 PARTIAL SYMPTOMATIC EPILEPSY WITH SIMPLE PARTIAL SEIZURES, NOT INTRACTABLE, WITHOUT STATUS EPILEPTICUS (HCC): ICD-10-CM

## 2022-04-16 LAB
25(OH)D3 SERPL-MCNC: 14.9 NG/ML (ref 30–100)
ALBUMIN SERPL-MCNC: 3 G/DL (ref 3.4–5)
ALBUMIN/GLOB SERPL: 0.8 {RATIO} (ref 0.8–1.7)
ALP SERPL-CCNC: 92 U/L (ref 45–117)
ALT SERPL-CCNC: 39 U/L (ref 16–61)
ANION GAP SERPL CALC-SCNC: 6 MMOL/L (ref 3–18)
AST SERPL-CCNC: 48 U/L (ref 10–38)
BASOPHILS # BLD: 0 K/UL (ref 0–0.1)
BASOPHILS NFR BLD: 1 % (ref 0–2)
BILIRUB SERPL-MCNC: 0.4 MG/DL (ref 0.2–1)
BUN SERPL-MCNC: 10 MG/DL (ref 7–18)
BUN/CREAT SERPL: 14 (ref 12–20)
CALCIUM SERPL-MCNC: 8.7 MG/DL (ref 8.5–10.1)
CHLORIDE SERPL-SCNC: 110 MMOL/L (ref 100–111)
CO2 SERPL-SCNC: 26 MMOL/L (ref 21–32)
CREAT SERPL-MCNC: 0.73 MG/DL (ref 0.6–1.3)
DIFFERENTIAL METHOD BLD: ABNORMAL
EOSINOPHIL # BLD: 0.1 K/UL (ref 0–0.4)
EOSINOPHIL NFR BLD: 1 % (ref 0–5)
ERYTHROCYTE [DISTWIDTH] IN BLOOD BY AUTOMATED COUNT: 13.7 % (ref 11.6–14.5)
GLOBULIN SER CALC-MCNC: 3.8 G/DL (ref 2–4)
GLUCOSE SERPL-MCNC: 107 MG/DL (ref 74–99)
HCT VFR BLD AUTO: 45.2 % (ref 36–48)
HGB BLD-MCNC: 14.3 G/DL (ref 13–16)
IMM GRANULOCYTES # BLD AUTO: 0 K/UL (ref 0–0.04)
IMM GRANULOCYTES NFR BLD AUTO: 1 % (ref 0–0.5)
LYMPHOCYTES # BLD: 2.3 K/UL (ref 0.9–3.6)
LYMPHOCYTES NFR BLD: 42 % (ref 21–52)
MCH RBC QN AUTO: 32.2 PG (ref 24–34)
MCHC RBC AUTO-ENTMCNC: 31.6 G/DL (ref 31–37)
MCV RBC AUTO: 101.8 FL (ref 78–100)
MONOCYTES # BLD: 0.6 K/UL (ref 0.05–1.2)
MONOCYTES NFR BLD: 12 % (ref 3–10)
NEUTS SEG # BLD: 2.4 K/UL (ref 1.8–8)
NEUTS SEG NFR BLD: 44 % (ref 40–73)
NRBC # BLD: 0 K/UL (ref 0–0.01)
NRBC BLD-RTO: 0 PER 100 WBC
PLATELET # BLD AUTO: 178 K/UL (ref 135–420)
PMV BLD AUTO: 11.8 FL (ref 9.2–11.8)
POTASSIUM SERPL-SCNC: 4 MMOL/L (ref 3.5–5.5)
PROT SERPL-MCNC: 6.8 G/DL (ref 6.4–8.2)
RBC # BLD AUTO: 4.44 M/UL (ref 4.35–5.65)
SODIUM SERPL-SCNC: 142 MMOL/L (ref 136–145)
VALPROATE SERPL-MCNC: 92 UG/ML (ref 50–100)
WBC # BLD AUTO: 5.5 K/UL (ref 4.6–13.2)

## 2022-04-16 PROCEDURE — 80164 ASSAY DIPROPYLACETIC ACD TOT: CPT

## 2022-04-16 PROCEDURE — 82306 VITAMIN D 25 HYDROXY: CPT

## 2022-04-16 PROCEDURE — 85025 COMPLETE CBC W/AUTO DIFF WBC: CPT

## 2022-04-16 PROCEDURE — 80201 ASSAY OF TOPIRAMATE: CPT

## 2022-04-16 PROCEDURE — 36415 COLL VENOUS BLD VENIPUNCTURE: CPT

## 2022-04-16 PROCEDURE — 80053 COMPREHEN METABOLIC PANEL: CPT

## 2022-04-18 LAB — TOPIRAMATE SERPL-MCNC: 2 UG/ML (ref 2–25)

## 2022-04-19 ENCOUNTER — OFFICE VISIT (OUTPATIENT)
Dept: CARDIOLOGY CLINIC | Age: 52
End: 2022-04-19
Payer: MEDICARE

## 2022-04-19 VITALS
DIASTOLIC BLOOD PRESSURE: 90 MMHG | BODY MASS INDEX: 39.17 KG/M2 | HEART RATE: 77 BPM | WEIGHT: 315 LBS | SYSTOLIC BLOOD PRESSURE: 140 MMHG | HEIGHT: 75 IN | OXYGEN SATURATION: 96 %

## 2022-04-19 DIAGNOSIS — E78.00 HYPERCHOLESTEROLEMIA: ICD-10-CM

## 2022-04-19 DIAGNOSIS — I50.32 DIASTOLIC CHF, CHRONIC (HCC): ICD-10-CM

## 2022-04-19 DIAGNOSIS — Z98.890 H/O CARDIAC RADIOFREQUENCY ABLATION: ICD-10-CM

## 2022-04-19 DIAGNOSIS — I10 ESSENTIAL HYPERTENSION: Primary | ICD-10-CM

## 2022-04-19 DIAGNOSIS — I71.9 AORTIC ANEURYSM WITHOUT RUPTURE, UNSPECIFIED PORTION OF AORTA (HCC): ICD-10-CM

## 2022-04-19 PROCEDURE — G8753 SYS BP > OR = 140: HCPCS | Performed by: NURSE PRACTITIONER

## 2022-04-19 PROCEDURE — 3017F COLORECTAL CA SCREEN DOC REV: CPT | Performed by: NURSE PRACTITIONER

## 2022-04-19 PROCEDURE — 99214 OFFICE O/P EST MOD 30 MIN: CPT | Performed by: NURSE PRACTITIONER

## 2022-04-19 PROCEDURE — G8417 CALC BMI ABV UP PARAM F/U: HCPCS | Performed by: NURSE PRACTITIONER

## 2022-04-19 PROCEDURE — G8427 DOCREV CUR MEDS BY ELIG CLIN: HCPCS | Performed by: NURSE PRACTITIONER

## 2022-04-19 PROCEDURE — G9717 DOC PT DX DEP/BP F/U NT REQ: HCPCS | Performed by: NURSE PRACTITIONER

## 2022-04-19 PROCEDURE — G8755 DIAS BP > OR = 90: HCPCS | Performed by: NURSE PRACTITIONER

## 2022-04-19 RX ORDER — LISINOPRIL 10 MG/1
10 TABLET ORAL 2 TIMES DAILY
Qty: 180 TABLET | Refills: 3 | Status: SHIPPED | OUTPATIENT
Start: 2022-04-19

## 2022-04-19 NOTE — PATIENT INSTRUCTIONS
Increase lisinopril to 10mg twice a day  Continue metoprolol tartrate 25mg twice a day  Continue your topiramate (Topamax) and divalproex (Depakote) as prescribed twice a day  Follow-up with Kateyrna Whiteside in one month for another blood pressure check, May 17, 2022 @ 3:30pm    CT scan of the chest is scheduled for Monday, May 16, 2022 @ 9:00am  Nuclear stress test is scheduled for Monday, May 16, 2022 @ 9:30am
Bed in lowest position, wheels locked, appropriate side rails in place/Call bell, personal items and telephone in reach/Instruct patient to call for assistance before getting out of bed or chair/Non-slip footwear when patient is out of bed/[place] to call system/Physically safe environment - no spills, clutter or unnecessary equipment/Purposeful Proactive Rounding/Room/bathroom lighting operational, light cord in reach

## 2022-04-19 NOTE — PROGRESS NOTES
Maryjane Trammell presents today for   Chief Complaint   Patient presents with    Follow-up     1 month follow up       Maryjane Trammell preferred language for health care discussion is english/other. Is someone accompanying this pt? no    Is the patient using any DME equipment during [address]? no    Depression Screening:  3 most recent PHQ Screens 4/19/2022   PHQ Not Done -   Little interest or pleasure in doing things Not at all   Feeling down, depressed, irritable, or hopeless Not at all   Total Score PHQ 2 0       Learning Assessment:  Learning Assessment 4/19/2022   PRIMARY LEARNER Patient   PRIMARY LANGUAGE ENGLISH   LEARNER PREFERENCE PRIMARY DEMONSTRATION   ANSWERED BY patient   RELATIONSHIP SELF       Abuse Screening:  Abuse Screening Questionnaire 4/19/2022   Do you ever feel afraid of your partner? N   Are you in a relationship with someone who physically or mentally threatens you? N   Is it safe for you to go home? Y       Fall Risk  Fall Risk Assessment, last 12 mths 1/7/2021   Able to walk? Yes   Fall in past 12 months? 0   Do you feel unsteady? 0   Are you worried about falling 0           Pt currently taking Anticoagulant therapy? no    Pt currently taking Antiplatelet therapy ? no      Coordination of Care:  1. Have you been to the ER, urgent care clinic since your last visit? Hospitalized since your last visit? no    2. Have you seen or consulted any other health care providers outside of the [address] [place] [address] since your last visit? Include any pap smears or colon screening.  no

## 2022-04-22 ENCOUNTER — TELEPHONE (OUTPATIENT)
Dept: NEUROLOGY | Age: 52
End: 2022-04-22

## 2022-04-22 RX ORDER — MELATONIN
1000 DAILY
Qty: 30 TABLET | Refills: 5 | Status: SHIPPED | OUTPATIENT
Start: 2022-04-22

## 2022-04-22 NOTE — TELEPHONE ENCOUNTER
----- Message from Manasa Reardon NP sent at 4/22/2022  3:46 PM EDT -----  Please advise patient lab results. CBC and comprehensive metabolic panel fairly stable. Depakote level on Topamax levels were therapeutic. Vitamin D level was low. It was 14. 9.  should be . Please see if he is taking any vitamin D supplement. If not, start taking vitamin D 1000 units daily. I will order it.   Thank you.  ----- Message -----  From: [facility] [employer]  Sent: 4/16/2022   3:15 PM EDT  To: Manasa Reardon NP

## 2022-04-22 NOTE — TELEPHONE ENCOUNTER
Spoke with patient verified name and  regarding Vitamin D level results. Patient reports he has been taking Vitamin D everyday. Patient made aware of amount needed per NP Holland's order.

## 2022-05-09 ENCOUNTER — OFFICE VISIT (OUTPATIENT)
Dept: NEUROLOGY | Age: 52
End: 2022-05-09
Payer: MEDICARE

## 2022-05-09 VITALS
RESPIRATION RATE: 16 BRPM | WEIGHT: 315 LBS | OXYGEN SATURATION: 90 % | DIASTOLIC BLOOD PRESSURE: 82 MMHG | HEART RATE: 66 BPM | BODY MASS INDEX: 46.66 KG/M2 | SYSTOLIC BLOOD PRESSURE: 138 MMHG

## 2022-05-09 DIAGNOSIS — G47.33 OSA (OBSTRUCTIVE SLEEP APNEA): Primary | ICD-10-CM

## 2022-05-09 DIAGNOSIS — G40.109 PARTIAL SYMPTOMATIC EPILEPSY WITH SIMPLE PARTIAL SEIZURES, NOT INTRACTABLE, WITHOUT STATUS EPILEPTICUS (HCC): ICD-10-CM

## 2022-05-09 PROCEDURE — G8754 DIAS BP LESS 90: HCPCS | Performed by: NURSE PRACTITIONER

## 2022-05-09 PROCEDURE — G8427 DOCREV CUR MEDS BY ELIG CLIN: HCPCS | Performed by: NURSE PRACTITIONER

## 2022-05-09 PROCEDURE — G8417 CALC BMI ABV UP PARAM F/U: HCPCS | Performed by: NURSE PRACTITIONER

## 2022-05-09 PROCEDURE — G8752 SYS BP LESS 140: HCPCS | Performed by: NURSE PRACTITIONER

## 2022-05-09 PROCEDURE — 99213 OFFICE O/P EST LOW 20 MIN: CPT | Performed by: NURSE PRACTITIONER

## 2022-05-09 PROCEDURE — G9717 DOC PT DX DEP/BP F/U NT REQ: HCPCS | Performed by: NURSE PRACTITIONER

## 2022-05-09 PROCEDURE — 3017F COLORECTAL CA SCREEN DOC REV: CPT | Performed by: NURSE PRACTITIONER

## 2022-05-09 RX ORDER — TOPIRAMATE 25 MG/1
25 TABLET ORAL 2 TIMES DAILY
Qty: 270 TABLET | Refills: 2 | Status: SHIPPED | OUTPATIENT
Start: 2022-05-09 | End: 2022-09-14 | Stop reason: SDUPTHER

## 2022-05-09 NOTE — PATIENT INSTRUCTIONS
Patient instructions:  -Continue Depakote 1000 mg twice daily  -Take Topamax 25 mg twice daily. Monitor headaches.  After at least 1 week, can increase to 25 mg in AM and 50 mg in PM.  -Continue vitamin D supplement  -Sleep specialist referral to Dr. Yao Rosario at [address] [place] [address] [phone].  -Follow up in 3 months
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
## 2022-05-09 NOTE — PROGRESS NOTES
Aleah Aaron is a 45 y.o. male who is in the office as a follow up. 1. Have you been to the ER, urgent care clinic since your last visit? Hospitalized since your last visit? No    2. Have you seen or consulted any other health care providers outside of the [address] [place] [address] since your last visit? Include any pap smears or colon screening.  No
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
## 2022-05-16 ENCOUNTER — HOSPITAL ENCOUNTER (OUTPATIENT)
Dept: CT IMAGING | Age: 52
Discharge: HOME | End: 2022-05-16
Attending: INTERNAL MEDICINE
Payer: MEDICARE

## 2022-05-16 ENCOUNTER — HOSPITAL ENCOUNTER (OUTPATIENT)
Dept: NON INVASIVE DIAGNOSTICS | Age: 52
Discharge: HOME | End: 2022-05-16
Attending: INTERNAL MEDICINE
Payer: MEDICARE

## 2022-05-16 ENCOUNTER — HOSPITAL ENCOUNTER (OUTPATIENT)
Dept: NUCLEAR MEDICINE | Age: 52
Discharge: HOME | End: 2022-05-16
Attending: INTERNAL MEDICINE
Payer: MEDICARE

## 2022-05-16 VITALS
BODY MASS INDEX: 39.17 KG/M2 | HEIGHT: 75 IN | DIASTOLIC BLOOD PRESSURE: 94 MMHG | WEIGHT: 315 LBS | SYSTOLIC BLOOD PRESSURE: 134 MMHG

## 2022-05-16 VITALS
HEIGHT: 75 IN | WEIGHT: 315 LBS | DIASTOLIC BLOOD PRESSURE: 75 MMHG | BODY MASS INDEX: 39.17 KG/M2 | SYSTOLIC BLOOD PRESSURE: 118 MMHG

## 2022-05-16 DIAGNOSIS — I50.32 DIASTOLIC CHF, CHRONIC (HCC): ICD-10-CM

## 2022-05-16 DIAGNOSIS — R07.9 CHEST PAIN, UNSPECIFIED TYPE: ICD-10-CM

## 2022-05-16 DIAGNOSIS — Z98.890 H/O CARDIAC RADIOFREQUENCY ABLATION: ICD-10-CM

## 2022-05-16 DIAGNOSIS — R06.02 SOB (SHORTNESS OF BREATH): ICD-10-CM

## 2022-05-16 DIAGNOSIS — I71.9 AORTIC ANEURYSM WITHOUT RUPTURE, UNSPECIFIED PORTION OF AORTA (HCC): ICD-10-CM

## 2022-05-16 LAB
CREAT UR-MCNC: 0.8 MG/DL (ref 0.6–1.3)
NUC STRESS EJECTION FRACTION: 57 %
STRESS BASELINE DIAS BP: 94 MMHG
STRESS BASELINE HR: 65 BPM
STRESS BASELINE ST DEPRESSION: 0 MM
STRESS BASELINE SYS BP: 134 MMHG
STRESS ESTIMATED WORKLOAD: 1 METS
STRESS EXERCISE DUR MIN: 4 MIN
STRESS EXERCISE DUR SEC: 0 SEC
STRESS PEAK DIAS BP: 94 MMHG
STRESS PEAK SYS BP: 134 MMHG
STRESS PERCENT HR ACHIEVED: 51 %
STRESS POST PEAK HR: 84 BPM
STRESS RATE PRESSURE PRODUCT: NORMAL BPM*MMHG
STRESS ST DEPRESSION: 0 MM
STRESS TARGET HR: 166 BPM
TID: 1.12

## 2022-05-16 PROCEDURE — 74011250636 HC RX REV CODE- 250/636: Performed by: INTERNAL MEDICINE

## 2022-05-16 PROCEDURE — 82565 ASSAY OF CREATININE: CPT

## 2022-05-16 PROCEDURE — A9502 TC99M TETROFOSMIN: HCPCS

## 2022-05-16 PROCEDURE — 74011000636 HC RX REV CODE- 636: Performed by: INTERNAL MEDICINE

## 2022-05-16 PROCEDURE — 71260 CT THORAX DX C+: CPT

## 2022-05-16 PROCEDURE — 93017 CV STRESS TEST TRACING ONLY: CPT

## 2022-05-16 RX ORDER — SODIUM CHLORIDE 9 MG/ML
250 INJECTION, SOLUTION INTRAVENOUS ONCE
Status: COMPLETED | OUTPATIENT
Start: 2022-05-16 | End: 2022-05-16

## 2022-05-16 RX ADMIN — SODIUM CHLORIDE 250 ML: 900 INJECTION, SOLUTION INTRAVENOUS at 11:00

## 2022-05-16 RX ADMIN — IOPAMIDOL 88 ML: 612 INJECTION, SOLUTION INTRAVENOUS at 09:19

## 2022-05-16 RX ADMIN — REGADENOSON 0.4 MG: 0.08 INJECTION, SOLUTION INTRAVENOUS at 11:00

## 2022-05-17 ENCOUNTER — OFFICE VISIT (OUTPATIENT)
Dept: CARDIOLOGY CLINIC | Age: 52
End: 2022-05-17
Payer: MEDICARE

## 2022-05-17 VITALS
OXYGEN SATURATION: 95 % | HEIGHT: 75 IN | HEART RATE: 72 BPM | DIASTOLIC BLOOD PRESSURE: 86 MMHG | WEIGHT: 315 LBS | SYSTOLIC BLOOD PRESSURE: 136 MMHG | BODY MASS INDEX: 39.17 KG/M2

## 2022-05-17 DIAGNOSIS — I71.9 AORTIC ANEURYSM WITHOUT RUPTURE, UNSPECIFIED PORTION OF AORTA (HCC): ICD-10-CM

## 2022-05-17 DIAGNOSIS — I10 ESSENTIAL HYPERTENSION: Primary | ICD-10-CM

## 2022-05-17 DIAGNOSIS — I50.32 DIASTOLIC CHF, CHRONIC (HCC): ICD-10-CM

## 2022-05-17 DIAGNOSIS — E78.00 HYPERCHOLESTEROLEMIA: ICD-10-CM

## 2022-05-17 PROCEDURE — 99214 OFFICE O/P EST MOD 30 MIN: CPT | Performed by: NURSE PRACTITIONER

## 2022-05-17 PROCEDURE — G8754 DIAS BP LESS 90: HCPCS | Performed by: NURSE PRACTITIONER

## 2022-05-17 PROCEDURE — G8417 CALC BMI ABV UP PARAM F/U: HCPCS | Performed by: NURSE PRACTITIONER

## 2022-05-17 PROCEDURE — G8427 DOCREV CUR MEDS BY ELIG CLIN: HCPCS | Performed by: NURSE PRACTITIONER

## 2022-05-17 PROCEDURE — G9717 DOC PT DX DEP/BP F/U NT REQ: HCPCS | Performed by: NURSE PRACTITIONER

## 2022-05-17 PROCEDURE — G8752 SYS BP LESS 140: HCPCS | Performed by: NURSE PRACTITIONER

## 2022-05-17 PROCEDURE — 3017F COLORECTAL CA SCREEN DOC REV: CPT | Performed by: NURSE PRACTITIONER

## 2022-05-17 NOTE — PROGRESS NOTES
Michelle Otto presents today for   Chief Complaint   Patient presents with    Follow-up     1 month       Michelle Otto preferred language for health care discussion is english/other. Is someone accompanying this pt? no    Is the patient using any DME equipment during [address]? no    Depression Screening:  3 most recent PHQ Screens 5/17/2022   PHQ Not Done -   Little interest or pleasure in doing things Not at all   Feeling down, depressed, irritable, or hopeless Not at all   Total Score PHQ 2 0       Learning Assessment:  Learning Assessment 5/17/2022   PRIMARY LEARNER Patient   PRIMARY LANGUAGE ENGLISH   LEARNER PREFERENCE PRIMARY DEMONSTRATION   ANSWERED BY patient   RELATIONSHIP SELF       Abuse Screening:  Abuse Screening Questionnaire 5/17/2022   Do you ever feel afraid of your partner? N   Are you in a relationship with someone who physically or mentally threatens you? N   Is it safe for you to go home? Y       Fall Risk  Fall Risk Assessment, last 12 mths 1/7/2021   Able to walk? Yes   Fall in past 12 months? 0   Do you feel unsteady? 0   Are you worried about falling 0           Pt currently taking Anticoagulant therapy? no    Pt currently taking Antiplatelet therapy ? no      Coordination of Care:  1. Have you been to the ER, urgent care clinic since your last visit? Hospitalized since your last visit? no    2. Have you seen or consulted any other health care providers outside of the [address] [place] [address] since your last visit? Include any pap smears or colon screening.  no  \

## 2022-05-17 NOTE — PATIENT INSTRUCTIONS
Continue present medication regimen  Follow-up with Dr. Vladislav Duran as scheduled and as needed  Continue dietary modifications (low sodium diet, portion control, heart healthy diet

## 2022-05-18 ENCOUNTER — TELEPHONE (OUTPATIENT)
Dept: CARDIOLOGY CLINIC | Age: 52
End: 2022-05-18

## 2022-05-18 NOTE — TELEPHONE ENCOUNTER
Called pt to schedule an appt with Dr Shelley Angela to discuss his stress test results, and he said to contact his girlfriend Charline Walker to arrange with her the appt because she is his transportation.  Called Ms Li, made appt for June 1 at 3 pm.

## 2022-05-18 NOTE — TELEPHONE ENCOUNTER
----- Message from Henrry Miller MD sent at 5/18/2022  8:02 AM EDT -----  Regarding: Results  Please make sure patient sees me in the next few weeks to discuss equivocal/borderline stress test.  ----- Message -----  From: Parviz Barnes MD  Sent: 5/16/2022   3:19 PM EDT  To: Henrry Miller MD

## 2022-06-25 ENCOUNTER — HOSPITAL ENCOUNTER (OUTPATIENT)
Dept: LAB | Age: 52
Discharge: HOME | End: 2022-06-25
Payer: MEDICARE

## 2022-06-25 LAB
ALBUMIN SERPL-MCNC: 3.2 G/DL (ref 3.4–5)
ALBUMIN/GLOB SERPL: 0.7 {RATIO} (ref 0.8–1.7)
ALP SERPL-CCNC: 112 U/L (ref 45–117)
ALT SERPL-CCNC: 26 U/L (ref 16–61)
ANION GAP SERPL CALC-SCNC: 6 MMOL/L (ref 3–18)
AST SERPL-CCNC: 28 U/L (ref 10–38)
BASOPHILS # BLD: 0 K/UL (ref 0–0.1)
BASOPHILS NFR BLD: 1 % (ref 0–2)
BILIRUB SERPL-MCNC: 0.5 MG/DL (ref 0.2–1)
BUN SERPL-MCNC: 8 MG/DL (ref 7–18)
BUN/CREAT SERPL: 10 (ref 12–20)
CALCIUM SERPL-MCNC: 9.1 MG/DL (ref 8.5–10.1)
CHLORIDE SERPL-SCNC: 104 MMOL/L (ref 100–111)
CHOLEST SERPL-MCNC: 203 MG/DL
CO2 SERPL-SCNC: 31 MMOL/L (ref 21–32)
CREAT SERPL-MCNC: 0.8 MG/DL (ref 0.6–1.3)
DIFFERENTIAL METHOD BLD: ABNORMAL
EOSINOPHIL # BLD: 0.1 K/UL (ref 0–0.4)
EOSINOPHIL NFR BLD: 1 % (ref 0–5)
ERYTHROCYTE [DISTWIDTH] IN BLOOD BY AUTOMATED COUNT: 12.9 % (ref 11.6–14.5)
GLOBULIN SER CALC-MCNC: 4.4 G/DL (ref 2–4)
GLUCOSE SERPL-MCNC: 94 MG/DL (ref 74–99)
HCT VFR BLD AUTO: 46.9 % (ref 36–48)
HDLC SERPL-MCNC: 58 MG/DL (ref 40–60)
HDLC SERPL: 3.5 {RATIO} (ref 0–5)
HGB BLD-MCNC: 14.8 G/DL (ref 13–16)
IMM GRANULOCYTES # BLD AUTO: 0 K/UL (ref 0–0.04)
IMM GRANULOCYTES NFR BLD AUTO: 0 % (ref 0–0.5)
LDLC SERPL CALC-MCNC: 116.8 MG/DL (ref 0–100)
LIPID PROFILE,FLP: ABNORMAL
LYMPHOCYTES # BLD: 2.7 K/UL (ref 0.9–3.6)
LYMPHOCYTES NFR BLD: 50 % (ref 21–52)
MCH RBC QN AUTO: 31.9 PG (ref 24–34)
MCHC RBC AUTO-ENTMCNC: 31.6 G/DL (ref 31–37)
MCV RBC AUTO: 101.1 FL (ref 78–100)
MONOCYTES # BLD: 0.6 K/UL (ref 0.05–1.2)
MONOCYTES NFR BLD: 12 % (ref 3–10)
NEUTS SEG # BLD: 2 K/UL (ref 1.8–8)
NEUTS SEG NFR BLD: 37 % (ref 40–73)
NRBC # BLD: 0 K/UL (ref 0–0.01)
NRBC BLD-RTO: 0 PER 100 WBC
PLATELET # BLD AUTO: 167 K/UL (ref 135–420)
PMV BLD AUTO: 11.9 FL (ref 9.2–11.8)
POTASSIUM SERPL-SCNC: 4.3 MMOL/L (ref 3.5–5.5)
PROT SERPL-MCNC: 7.6 G/DL (ref 6.4–8.2)
RBC # BLD AUTO: 4.64 M/UL (ref 4.35–5.65)
SODIUM SERPL-SCNC: 141 MMOL/L (ref 136–145)
T4 FREE SERPL-MCNC: 1.1 NG/DL (ref 0.7–1.5)
TRIGL SERPL-MCNC: 141 MG/DL (ref ?–150)
TSH SERPL DL<=0.05 MIU/L-ACNC: 5.4 UIU/ML (ref 0.36–3.74)
VLDLC SERPL CALC-MCNC: 28.2 MG/DL
WBC # BLD AUTO: 5.5 K/UL (ref 4.6–13.2)

## 2022-06-25 PROCEDURE — 85025 COMPLETE CBC W/AUTO DIFF WBC: CPT

## 2022-06-25 PROCEDURE — 80061 LIPID PANEL: CPT

## 2022-06-25 PROCEDURE — 36415 COLL VENOUS BLD VENIPUNCTURE: CPT

## 2022-06-25 PROCEDURE — 80053 COMPREHEN METABOLIC PANEL: CPT

## 2022-06-25 PROCEDURE — 84439 ASSAY OF FREE THYROXINE: CPT

## 2022-09-14 ENCOUNTER — OFFICE VISIT (OUTPATIENT)
Dept: NEUROLOGY | Age: 52
End: 2022-09-14
Payer: MEDICARE

## 2022-09-14 VITALS
OXYGEN SATURATION: 93 % | BODY MASS INDEX: 39.17 KG/M2 | WEIGHT: 315 LBS | DIASTOLIC BLOOD PRESSURE: 72 MMHG | HEART RATE: 69 BPM | SYSTOLIC BLOOD PRESSURE: 124 MMHG | HEIGHT: 75 IN | RESPIRATION RATE: 18 BRPM

## 2022-09-14 DIAGNOSIS — S09.90XS HEADACHES DUE TO OLD HEAD INJURY: ICD-10-CM

## 2022-09-14 DIAGNOSIS — G44.309 HEADACHES DUE TO OLD HEAD INJURY: ICD-10-CM

## 2022-09-14 DIAGNOSIS — G40.109 PARTIAL SYMPTOMATIC EPILEPSY WITH SIMPLE PARTIAL SEIZURES, NOT INTRACTABLE, WITHOUT STATUS EPILEPTICUS (HCC): Primary | ICD-10-CM

## 2022-09-14 DIAGNOSIS — G47.33 OSA (OBSTRUCTIVE SLEEP APNEA): ICD-10-CM

## 2022-09-14 DIAGNOSIS — G40.109 LOCALIZATION-RELATED EPILEPSY (HCC): ICD-10-CM

## 2022-09-14 PROCEDURE — 99214 OFFICE O/P EST MOD 30 MIN: CPT | Performed by: NURSE PRACTITIONER

## 2022-09-14 PROCEDURE — 3017F COLORECTAL CA SCREEN DOC REV: CPT | Performed by: NURSE PRACTITIONER

## 2022-09-14 PROCEDURE — G8417 CALC BMI ABV UP PARAM F/U: HCPCS | Performed by: NURSE PRACTITIONER

## 2022-09-14 PROCEDURE — G9717 DOC PT DX DEP/BP F/U NT REQ: HCPCS | Performed by: NURSE PRACTITIONER

## 2022-09-14 PROCEDURE — G8427 DOCREV CUR MEDS BY ELIG CLIN: HCPCS | Performed by: NURSE PRACTITIONER

## 2022-09-14 PROCEDURE — G8752 SYS BP LESS 140: HCPCS | Performed by: NURSE PRACTITIONER

## 2022-09-14 PROCEDURE — G8754 DIAS BP LESS 90: HCPCS | Performed by: NURSE PRACTITIONER

## 2022-09-14 RX ORDER — DIVALPROEX SODIUM 500 MG/1
1000 TABLET, DELAYED RELEASE ORAL 2 TIMES DAILY
Qty: 360 TABLET | Refills: 2 | Status: SHIPPED | OUTPATIENT
Start: 2022-09-14

## 2022-09-14 RX ORDER — TOPIRAMATE 25 MG/1
TABLET ORAL
Qty: 270 TABLET | Refills: 2 | Status: SHIPPED | OUTPATIENT
Start: 2022-09-14
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
## 2022-10-19 ENCOUNTER — OFFICE VISIT (OUTPATIENT)
Dept: ORTHOPEDIC SURGERY | Age: 52
End: 2022-10-19
Payer: MEDICARE

## 2022-10-19 VITALS
OXYGEN SATURATION: 97 % | HEIGHT: 75 IN | BODY MASS INDEX: 39.17 KG/M2 | HEART RATE: 74 BPM | WEIGHT: 315 LBS | RESPIRATION RATE: 18 BRPM | TEMPERATURE: 97.7 F

## 2022-10-19 DIAGNOSIS — M54.50 LUMBAR PAIN: Primary | ICD-10-CM

## 2022-10-19 PROCEDURE — G8417 CALC BMI ABV UP PARAM F/U: HCPCS | Performed by: NURSE PRACTITIONER

## 2022-10-19 PROCEDURE — 3017F COLORECTAL CA SCREEN DOC REV: CPT | Performed by: NURSE PRACTITIONER

## 2022-10-19 PROCEDURE — G8756 NO BP MEASURE DOC: HCPCS | Performed by: NURSE PRACTITIONER

## 2022-10-19 PROCEDURE — G8427 DOCREV CUR MEDS BY ELIG CLIN: HCPCS | Performed by: NURSE PRACTITIONER

## 2022-10-19 PROCEDURE — 99214 OFFICE O/P EST MOD 30 MIN: CPT | Performed by: NURSE PRACTITIONER

## 2022-10-19 PROCEDURE — G9717 DOC PT DX DEP/BP F/U NT REQ: HCPCS | Performed by: NURSE PRACTITIONER

## 2022-10-19 RX ORDER — MELOXICAM 15 MG/1
15 TABLET ORAL DAILY
Qty: 90 TABLET | Refills: 0 | Status: SHIPPED | OUTPATIENT
Start: 2022-10-19

## 2022-10-19 NOTE — PROGRESS NOTES
Brennon Ceballos presents today for   Chief Complaint   Patient presents with    Back Pain       Is someone accompanying this pt? no    Is the patient using any DME equipment during OV? no    Depression Screening:  3 most recent PHQ Screens 5/17/2022   PHQ Not Done -   Little interest or pleasure in doing things Not at all   Feeling down, depressed, irritable, or hopeless Not at all   Total Score PHQ 2 0       Learning Assessment:  Learning Assessment 5/17/2022   PRIMARY LEARNER Patient   PRIMARY LANGUAGE ENGLISH   LEARNER PREFERENCE PRIMARY DEMONSTRATION   ANSWERED BY patient   RELATIONSHIP SELF       Abuse Screening:  Abuse Screening Questionnaire 5/17/2022   Do you ever feel afraid of your partner? N   Are you in a relationship with someone who physically or mentally threatens you? N   Is it safe for you to go home? Y       Fall Risk  Fall Risk Assessment, last 12 mths 1/7/2021   Able to walk? Yes   Fall in past 12 months? 0   Do you feel unsteady? 0   Are you worried about falling 0       OPIOID RISK TOOL  No flowsheet data found. Coordination of Care:  1. Have you been to the ER, urgent care clinic since your last visit? no  Hospitalized since your last visit? no    2. Have you seen or consulted any other health care providers outside of the [address] [place] [address] since your last visit? no Include any pap smears or colon screening.  no
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
## 2022-10-20 ENCOUNTER — TELEPHONE (OUTPATIENT)
Dept: PHYSICAL THERAPY | Age: 52
End: 2022-10-20

## 2022-12-28 DIAGNOSIS — M54.50 LUMBAR PAIN: ICD-10-CM

## 2022-12-30 RX ORDER — MELATONIN
1000 DAILY
Qty: 90 TABLET | Refills: 0 | Status: SHIPPED | OUTPATIENT
Start: 2022-12-30

## 2023-01-09 RX ORDER — MELOXICAM 15 MG/1
TABLET ORAL
Qty: 90 TABLET | Refills: 0 | Status: SHIPPED | OUTPATIENT
Start: 2023-01-09

## 2023-01-19 ENCOUNTER — TELEPHONE (OUTPATIENT)
Dept: NEUROLOGY | Age: 53
End: 2023-01-19

## 2023-01-19 NOTE — TELEPHONE ENCOUNTER
Pt was referred for sleep study to Dr. Deion Murrell.  Provider is requesting referral be faxed to [phone]

## 2023-01-28 ENCOUNTER — HOSPITAL ENCOUNTER (EMERGENCY)
Age: 53
Discharge: HOME | End: 2023-01-28
Attending: STUDENT IN AN ORGANIZED HEALTH CARE EDUCATION/TRAINING PROGRAM
Payer: MEDICARE

## 2023-01-28 ENCOUNTER — APPOINTMENT (OUTPATIENT)
Dept: GENERAL RADIOLOGY | Age: 53
End: 2023-01-28
Attending: STUDENT IN AN ORGANIZED HEALTH CARE EDUCATION/TRAINING PROGRAM
Payer: MEDICARE

## 2023-01-28 VITALS
SYSTOLIC BLOOD PRESSURE: 133 MMHG | DIASTOLIC BLOOD PRESSURE: 76 MMHG | WEIGHT: 315 LBS | RESPIRATION RATE: 21 BRPM | HEART RATE: 67 BPM | OXYGEN SATURATION: 96 % | BODY MASS INDEX: 39.17 KG/M2 | HEIGHT: 75 IN | TEMPERATURE: 98 F

## 2023-01-28 DIAGNOSIS — R60.9 EDEMA, UNSPECIFIED TYPE: Primary | ICD-10-CM

## 2023-01-28 LAB
ANION GAP SERPL CALC-SCNC: 6 MMOL/L (ref 3–18)
BASOPHILS # BLD: 0 K/UL (ref 0–0.1)
BASOPHILS NFR BLD: 0 % (ref 0–2)
BNP SERPL-MCNC: 144 PG/ML (ref 0–900)
BUN SERPL-MCNC: 11 MG/DL (ref 7–18)
BUN/CREAT SERPL: 15 (ref 12–20)
CALCIUM SERPL-MCNC: 8.7 MG/DL (ref 8.5–10.1)
CHLORIDE SERPL-SCNC: 109 MMOL/L (ref 100–111)
CO2 SERPL-SCNC: 26 MMOL/L (ref 21–32)
CREAT SERPL-MCNC: 0.72 MG/DL (ref 0.6–1.3)
DIFFERENTIAL METHOD BLD: ABNORMAL
EOSINOPHIL # BLD: 0.1 K/UL (ref 0–0.4)
EOSINOPHIL NFR BLD: 1 % (ref 0–5)
ERYTHROCYTE [DISTWIDTH] IN BLOOD BY AUTOMATED COUNT: 13 % (ref 11.6–14.5)
GLUCOSE SERPL-MCNC: 85 MG/DL (ref 74–99)
HCT VFR BLD AUTO: 43.7 % (ref 36–48)
HGB BLD-MCNC: 14 G/DL (ref 13–16)
IMM GRANULOCYTES # BLD AUTO: 0 K/UL (ref 0–0.04)
IMM GRANULOCYTES NFR BLD AUTO: 1 % (ref 0–0.5)
LYMPHOCYTES # BLD: 2.5 K/UL (ref 0.9–3.6)
LYMPHOCYTES NFR BLD: 47 % (ref 21–52)
MAGNESIUM SERPL-MCNC: 2 MG/DL (ref 1.6–2.6)
MCH RBC QN AUTO: 32.8 PG (ref 24–34)
MCHC RBC AUTO-ENTMCNC: 32 G/DL (ref 31–37)
MCV RBC AUTO: 102.3 FL (ref 78–100)
MONOCYTES # BLD: 0.6 K/UL (ref 0.05–1.2)
MONOCYTES NFR BLD: 11 % (ref 3–10)
NEUTS SEG # BLD: 2.1 K/UL (ref 1.8–8)
NEUTS SEG NFR BLD: 39 % (ref 40–73)
NRBC # BLD: 0 K/UL (ref 0–0.01)
NRBC BLD-RTO: 0 PER 100 WBC
PLATELET # BLD AUTO: 144 K/UL (ref 135–420)
PMV BLD AUTO: 11.7 FL (ref 9.2–11.8)
POTASSIUM SERPL-SCNC: 4.4 MMOL/L (ref 3.5–5.5)
RBC # BLD AUTO: 4.27 M/UL (ref 4.35–5.65)
SODIUM SERPL-SCNC: 141 MMOL/L (ref 136–145)
WBC # BLD AUTO: 5.3 K/UL (ref 4.6–13.2)

## 2023-01-28 PROCEDURE — 83880 ASSAY OF NATRIURETIC PEPTIDE: CPT

## 2023-01-28 PROCEDURE — 96374 THER/PROPH/DIAG INJ IV PUSH: CPT

## 2023-01-28 PROCEDURE — 85025 COMPLETE CBC W/AUTO DIFF WBC: CPT

## 2023-01-28 PROCEDURE — 80048 BASIC METABOLIC PNL TOTAL CA: CPT

## 2023-01-28 PROCEDURE — 93005 ELECTROCARDIOGRAM TRACING: CPT

## 2023-01-28 PROCEDURE — 74011250636 HC RX REV CODE- 250/636: Performed by: STUDENT IN AN ORGANIZED HEALTH CARE EDUCATION/TRAINING PROGRAM

## 2023-01-28 PROCEDURE — 99285 EMERGENCY DEPT VISIT HI MDM: CPT

## 2023-01-28 PROCEDURE — 71045 X-RAY EXAM CHEST 1 VIEW: CPT

## 2023-01-28 PROCEDURE — 83735 ASSAY OF MAGNESIUM: CPT

## 2023-01-28 RX ORDER — FUROSEMIDE 40 MG/1
40 TABLET ORAL 2 TIMES DAILY
COMMUNITY

## 2023-01-28 RX ORDER — FUROSEMIDE 10 MG/ML
40 INJECTION INTRAMUSCULAR; INTRAVENOUS ONCE
Status: COMPLETED | OUTPATIENT
Start: 2023-01-28 | End: 2023-01-28

## 2023-01-28 RX ADMIN — FUROSEMIDE 40 MG: 10 INJECTION, SOLUTION INTRAMUSCULAR; INTRAVENOUS at 16:42
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
## 2023-01-28 NOTE — DISCHARGE INSTRUCTIONS
Decrease the amount of fluid that you are drinking. Try to cut all salt in your diet including processed foods. Keep your legs elevated above the level of your heart when you are sitting down. Try to increase your activity by walking around more often. If you are sitting down with your legs elevated try and flutter your toes to pump the fluid out of your legs. If symptoms continue to worsen or you start experiencing chest pain or shortness of breath associated with it and please return to the emergency department.

## 2023-01-28 NOTE — ED TRIAGE NOTES
Pt c/o bilateral pedal edema \"for a long time\" , worse recently. Taking Lasix 40mg 2 times daily which he states he has been taking as prescribed. Denies sob. Pt reports h/o DVT; no longer taking anticoagulants.

## 2023-01-29 LAB
ATRIAL RATE: 69 BPM
CALCULATED P AXIS, ECG09: -27 DEGREES
CALCULATED R AXIS, ECG10: 10 DEGREES
CALCULATED T AXIS, ECG11: -2 DEGREES
DIAGNOSIS, 93000: NORMAL
P-R INTERVAL, ECG05: 124 MS
Q-T INTERVAL, ECG07: 394 MS
QRS DURATION, ECG06: 78 MS
QTC CALCULATION (BEZET), ECG08: 422 MS
VENTRICULAR RATE, ECG03: 69 BPM

## 2023-02-14 DIAGNOSIS — G47.33 OSA (OBSTRUCTIVE SLEEP APNEA): Primary | ICD-10-CM

## 2023-04-20 ENCOUNTER — OFFICE VISIT (OUTPATIENT)
Age: 53
End: 2023-04-20
Payer: COMMERCIAL

## 2023-04-20 VITALS
RESPIRATION RATE: 18 BRPM | HEIGHT: 75 IN | OXYGEN SATURATION: 93 % | SYSTOLIC BLOOD PRESSURE: 108 MMHG | HEART RATE: 62 BPM | DIASTOLIC BLOOD PRESSURE: 60 MMHG | BODY MASS INDEX: 39.17 KG/M2 | WEIGHT: 315 LBS

## 2023-04-20 DIAGNOSIS — Z51.81 THERAPEUTIC DRUG MONITORING: ICD-10-CM

## 2023-04-20 DIAGNOSIS — R79.89 LOW VITAMIN D LEVEL: ICD-10-CM

## 2023-04-20 DIAGNOSIS — G40.109 LOCALIZATION-RELATED (FOCAL) (PARTIAL) SYMPTOMATIC EPILEPSY AND EPILEPTIC SYNDROMES WITH SIMPLE PARTIAL SEIZURES, NOT INTRACTABLE, WITHOUT STATUS EPILEPTICUS (HCC): Primary | ICD-10-CM

## 2023-04-20 DIAGNOSIS — G44.329 CHRONIC POST-TRAUMATIC HEADACHE, NOT INTRACTABLE: ICD-10-CM

## 2023-04-20 PROCEDURE — 3074F SYST BP LT 130 MM HG: CPT | Performed by: NURSE PRACTITIONER

## 2023-04-20 PROCEDURE — 3078F DIAST BP <80 MM HG: CPT | Performed by: NURSE PRACTITIONER

## 2023-04-20 PROCEDURE — 99214 OFFICE O/P EST MOD 30 MIN: CPT | Performed by: NURSE PRACTITIONER

## 2023-04-20 RX ORDER — DIVALPROEX SODIUM 500 MG/1
1000 TABLET, DELAYED RELEASE ORAL 2 TIMES DAILY
Qty: 360 TABLET | Refills: 3 | Status: SHIPPED | OUTPATIENT
Start: 2023-04-20

## 2023-04-20 RX ORDER — TOPIRAMATE 25 MG/1
TABLET ORAL
Qty: 90 TABLET | Refills: 6 | Status: SHIPPED | OUTPATIENT
Start: 2023-04-20

## 2023-04-20 NOTE — PROGRESS NOTES
[address] [address]. [facility] [place], [address]  Office:  [phone]  Fax: [phone]  Chief Complaint   Patient presents with    Follow-up       HPI: Vernon Valdes presents in follow-up for seizures. 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 He has had 5 surgeries over the years as a child. He has generalized tonic-clonic seizures starting when he was in high school. He was treated over the years successfully with brand-name Dilantin. He also has a medical history of atrial fibrillation/atrial flutter and had an ablation. He also has a history of depression. In 2018 he was tapered off the Dilantin and Depakote was started. He was seen here on 5/4/2022. He reported that the headaches were more often. He was taking the Topamax when he gets a headache. The headaches come and go. He was usually taking 1 Topamax in the morning. He increased the Depakote and feels fine on the increased dose. He was satisfied with staying on the medication although wonders whether it is contributing to weight gain. Taking vitamin D 1000 units daily. Weight was stable since the prior visit. He healed well from ankle surgery, no longer in PT. He was going to see the sleep specialist.  He uses the CPAP and it helps him sleep. The Topamax was to be increased. Depakote was continued to the same. Recommended to check with PCP regarding hepatic enzymes which are mildly elevated. Sleep specialist referral for his diagnosis of DALIA. He was last seen here on 9/14/2022. He continued on Depakote DR 1000 mg twice daily. They sent him a different looking Depakote from the pharmacy which was Depakote  mg tabs. He believes his last seizure was around 1 month prior when he woke up wet with urinary incontinence. Was not off the medication at that time. He finds that they occur when he gets upset.   He reported doing much

## 2023-08-05 RX ORDER — DIVALPROEX SODIUM 500 MG/1
1000 TABLET, DELAYED RELEASE ORAL 2 TIMES DAILY
Qty: 360 TABLET | Refills: 1 | Status: SHIPPED | OUTPATIENT
Start: 2023-08-05

## 2023-08-29 ENCOUNTER — OFFICE VISIT (OUTPATIENT)
Age: 53
End: 2023-08-29
Payer: MEDICARE

## 2023-08-29 VITALS
HEART RATE: 77 BPM | DIASTOLIC BLOOD PRESSURE: 70 MMHG | SYSTOLIC BLOOD PRESSURE: 120 MMHG | HEIGHT: 75 IN | BODY MASS INDEX: 39.17 KG/M2 | WEIGHT: 315 LBS | OXYGEN SATURATION: 94 % | RESPIRATION RATE: 18 BRPM

## 2023-08-29 DIAGNOSIS — R79.89 LOW VITAMIN D LEVEL: ICD-10-CM

## 2023-08-29 DIAGNOSIS — G40.109 LOCALIZATION-RELATED (FOCAL) (PARTIAL) SYMPTOMATIC EPILEPSY AND EPILEPTIC SYNDROMES WITH SIMPLE PARTIAL SEIZURES, NOT INTRACTABLE, WITHOUT STATUS EPILEPTICUS (HCC): Primary | ICD-10-CM

## 2023-08-29 DIAGNOSIS — L98.9 SKIN ABNORMALITY: ICD-10-CM

## 2023-08-29 PROCEDURE — 3078F DIAST BP <80 MM HG: CPT | Performed by: NURSE PRACTITIONER

## 2023-08-29 PROCEDURE — G8428 CUR MEDS NOT DOCUMENT: HCPCS | Performed by: NURSE PRACTITIONER

## 2023-08-29 PROCEDURE — 4004F PT TOBACCO SCREEN RCVD TLK: CPT | Performed by: NURSE PRACTITIONER

## 2023-08-29 PROCEDURE — 3074F SYST BP LT 130 MM HG: CPT | Performed by: NURSE PRACTITIONER

## 2023-08-29 PROCEDURE — 99213 OFFICE O/P EST LOW 20 MIN: CPT | Performed by: NURSE PRACTITIONER

## 2023-08-29 PROCEDURE — 3017F COLORECTAL CA SCREEN DOC REV: CPT | Performed by: NURSE PRACTITIONER

## 2023-08-29 PROCEDURE — G8417 CALC BMI ABV UP PARAM F/U: HCPCS | Performed by: NURSE PRACTITIONER

## 2023-08-29 RX ORDER — DIVALPROEX SODIUM 500 MG/1
1000 TABLET, DELAYED RELEASE ORAL 2 TIMES DAILY
Qty: 360 TABLET | Refills: 1 | Status: SHIPPED | OUTPATIENT
Start: 2023-08-29

## 2023-08-29 NOTE — PROGRESS NOTES
[address]  [address] [facility]. [address] [place] [address]  Office:  [phone]  Fax: [phone]  Chief Complaint   Patient presents with    Seizures    Follow-up       HPI: Maliha Gallegos presents in follow-up for seizures. 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 He has had 5 surgeries over the years as a child. He has generalized tonic-clonic seizures starting when he was in high school. He was treated over the years successfully with brand-name Dilantin. He also has a medical history of atrial fibrillation/atrial flutter and had an ablation. He also has a history of depression. In 2018 he was tapered off the Dilantin and Depakote was started. He has had headaches in the past and has taken Topamax for headaches. The headaches come and go. He has a diagnosis of sleep apnea and was on CPAP but more recently cannot tolerate it. He was seeing a mental health provider at a [address]. He is [place]. He has been concerned about weight gain on Depakote. He was last seen here on 4/20/2023. Wanted to continue the Depakote versus changing to another antiseizure medication due to weight gain. We considered restarting the Topamax due to headaches. He was now taking just 25 mg nightly. He was on vitamin D supplement. Not tolerating the CPAP. He has had some weight loss through exercise. He presents today in follow-up. He reports he had seizures which were 2 weeks ago. Reports he thinks he had 2. They were in his sleep. He bit both sides of his mouth. Also reports he had blood clots in the lungs on left side. Not went to [facility] ER and had blood clots found and transferred to [facility]. Was having pain to left side. Says he needs the medication (Depakote). There was a prescription on 8/5/23 (3 month supply plus 1 refill).   Has lost weight through diet and limiting soda and

## 2023-11-29 ENCOUNTER — OFFICE VISIT (OUTPATIENT)
Age: 53
End: 2023-11-29
Payer: MEDICARE

## 2023-11-29 VITALS
DIASTOLIC BLOOD PRESSURE: 80 MMHG | RESPIRATION RATE: 18 BRPM | WEIGHT: 315 LBS | SYSTOLIC BLOOD PRESSURE: 130 MMHG | BODY MASS INDEX: 39.17 KG/M2 | HEART RATE: 74 BPM | OXYGEN SATURATION: 94 % | HEIGHT: 75 IN

## 2023-11-29 DIAGNOSIS — S09.90XA INJURY OF HEAD, INITIAL ENCOUNTER: ICD-10-CM

## 2023-11-29 DIAGNOSIS — G40.109 LOCALIZATION-RELATED (FOCAL) (PARTIAL) SYMPTOMATIC EPILEPSY AND EPILEPTIC SYNDROMES WITH SIMPLE PARTIAL SEIZURES, NOT INTRACTABLE, WITHOUT STATUS EPILEPTICUS (HCC): Primary | ICD-10-CM

## 2023-11-29 DIAGNOSIS — G47.33 OSA (OBSTRUCTIVE SLEEP APNEA): ICD-10-CM

## 2023-11-29 DIAGNOSIS — R79.89 LOW VITAMIN D LEVEL: ICD-10-CM

## 2023-11-29 DIAGNOSIS — G44.329 CHRONIC POST-TRAUMATIC HEADACHE, NOT INTRACTABLE: ICD-10-CM

## 2023-11-29 DIAGNOSIS — Z51.81 THERAPEUTIC DRUG MONITORING: ICD-10-CM

## 2023-11-29 DIAGNOSIS — E55.9 VITAMIN D INSUFFICIENCY: ICD-10-CM

## 2023-11-29 PROCEDURE — G8417 CALC BMI ABV UP PARAM F/U: HCPCS | Performed by: NURSE PRACTITIONER

## 2023-11-29 PROCEDURE — 99214 OFFICE O/P EST MOD 30 MIN: CPT | Performed by: NURSE PRACTITIONER

## 2023-11-29 PROCEDURE — 3074F SYST BP LT 130 MM HG: CPT | Performed by: NURSE PRACTITIONER

## 2023-11-29 PROCEDURE — 3017F COLORECTAL CA SCREEN DOC REV: CPT | Performed by: NURSE PRACTITIONER

## 2023-11-29 PROCEDURE — G8484 FLU IMMUNIZE NO ADMIN: HCPCS | Performed by: NURSE PRACTITIONER

## 2023-11-29 PROCEDURE — 1036F TOBACCO NON-USER: CPT | Performed by: NURSE PRACTITIONER

## 2023-11-29 PROCEDURE — 3078F DIAST BP <80 MM HG: CPT | Performed by: NURSE PRACTITIONER

## 2023-11-29 PROCEDURE — G8427 DOCREV CUR MEDS BY ELIG CLIN: HCPCS | Performed by: NURSE PRACTITIONER

## 2023-11-29 RX ORDER — DIVALPROEX SODIUM 500 MG/1
1000 TABLET, DELAYED RELEASE ORAL 2 TIMES DAILY
Qty: 360 TABLET | Refills: 1 | Status: SHIPPED | OUTPATIENT
Start: 2023-11-29

## 2023-11-29 RX ORDER — LEVETIRACETAM 500 MG/1
500 TABLET ORAL 2 TIMES DAILY
Qty: 180 TABLET | Refills: 1 | Status: SHIPPED | OUTPATIENT
Start: 2023-11-29

## 2023-11-29 NOTE — PATIENT INSTRUCTIONS
Patient instructions:  -sleep specialist referral: follow up with Dr. [facility]- [phone]  -continue Depakote DR 1000 mg twice daily  -start Keppra 500 mg twice daily  -CT head

## 2023-11-29 NOTE — PROGRESS NOTES
[address] [place] [address] Drive  [address] [facility]. [address] [place] [address]  Office:  [phone]  Fax: [phone]  Chief Complaint   Patient presents with    Seizures    Follow-up       HPI: Jona Corona presents in follow-up for seizures. 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 He has had 5 surgeries over the years as a child. He has generalized tonic-clonic seizures starting when he was in high school. He was treated over the years successfully with brand-name Dilantin. He also has a medical history of atrial fibrillation/atrial flutter and had an ablation. He also has a history of depression. In 2018 he was tapered off the Dilantin and Depakote was started. He has had headaches in the past and has taken Topamax for headaches. The headaches come and go. He has a diagnosis of sleep apnea and was on CPAP but more recently cannot tolerate it. He was seeing a mental health provider at a [address]. He is [place]. He has been concerned about weight gain on Depakote. He was last seen here on 8/29/2023. He was on a regimen of Depakote ER 1000 mg twice daily. At the last visit, he reported he had 2 seizures in his sleep 2 weeks ago. He reported he was out of medication at that time. It is unclear how he did not have the medication because there was a refill from August 5 which was for 3 months +1 refill. He believed he had medication waiting at the pharmacy. Provided refills. He has history of low vitamin D level but has not been taking the vitamin D supplement. Plan was to recheck vitamin D level and check hepatic function panel. VPA level in July was in the therapeutic range. He was referred to dermatology for abnormal skin on his legs. Advised to discuss anxiety with his PCP whom he was going to be seeing soon. He presents today in follow-up. Reports he had a seizure the other night.   Must

## 2023-12-06 ENCOUNTER — TELEPHONE (OUTPATIENT)
Age: 53
End: 2023-12-06

## 2023-12-06 NOTE — TELEPHONE ENCOUNTER
Patient called regarding an vitamin medication that was supposed to been sent in. Also stated he has not heard from the dermatology office he was referred to.
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
## 2024-01-31 ENCOUNTER — OFFICE VISIT (OUTPATIENT)
Age: 54
End: 2024-01-31
Payer: MEDICARE

## 2024-01-31 VITALS
RESPIRATION RATE: 20 BRPM | DIASTOLIC BLOOD PRESSURE: 80 MMHG | HEIGHT: 74 IN | SYSTOLIC BLOOD PRESSURE: 122 MMHG | TEMPERATURE: 98.8 F | HEART RATE: 84 BPM | BODY MASS INDEX: 40.43 KG/M2 | WEIGHT: 315 LBS

## 2024-01-31 DIAGNOSIS — R25.1 TREMOR: ICD-10-CM

## 2024-01-31 DIAGNOSIS — R79.89 LOW VITAMIN D LEVEL: ICD-10-CM

## 2024-01-31 DIAGNOSIS — G40.109 LOCALIZATION-RELATED (FOCAL) (PARTIAL) SYMPTOMATIC EPILEPSY AND EPILEPTIC SYNDROMES WITH SIMPLE PARTIAL SEIZURES, NOT INTRACTABLE, WITHOUT STATUS EPILEPTICUS (HCC): ICD-10-CM

## 2024-01-31 PROCEDURE — 99213 OFFICE O/P EST LOW 20 MIN: CPT

## 2024-01-31 PROCEDURE — 3017F COLORECTAL CA SCREEN DOC REV: CPT | Performed by: NURSE PRACTITIONER

## 2024-01-31 PROCEDURE — 3079F DIAST BP 80-89 MM HG: CPT | Performed by: NURSE PRACTITIONER

## 2024-01-31 PROCEDURE — G8484 FLU IMMUNIZE NO ADMIN: HCPCS | Performed by: NURSE PRACTITIONER

## 2024-01-31 PROCEDURE — G8427 DOCREV CUR MEDS BY ELIG CLIN: HCPCS | Performed by: NURSE PRACTITIONER

## 2024-01-31 PROCEDURE — 99213 OFFICE O/P EST LOW 20 MIN: CPT | Performed by: NURSE PRACTITIONER

## 2024-01-31 PROCEDURE — 1036F TOBACCO NON-USER: CPT | Performed by: NURSE PRACTITIONER

## 2024-01-31 PROCEDURE — 3074F SYST BP LT 130 MM HG: CPT | Performed by: NURSE PRACTITIONER

## 2024-01-31 PROCEDURE — G8417 CALC BMI ABV UP PARAM F/U: HCPCS | Performed by: NURSE PRACTITIONER

## 2024-01-31 RX ORDER — DIVALPROEX SODIUM 500 MG/1
1000 TABLET, DELAYED RELEASE ORAL 2 TIMES DAILY
Qty: 360 TABLET | Refills: 2 | Status: SHIPPED | OUTPATIENT
Start: 2024-01-31

## 2024-01-31 RX ORDER — LEVETIRACETAM 500 MG/1
500 TABLET ORAL 2 TIMES DAILY
Qty: 180 TABLET | Refills: 2 | Status: SHIPPED | OUTPATIENT
Start: 2024-01-31
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
## 2024-01-31 NOTE — PATIENT INSTRUCTIONS
Patient instructions:  -continue divalproex (Depakote DR) 2 of the 500 mg tabs twice daily for total of 1000 mg twice daily  -continue levetiracetam (Keppra) 500 mg twice daily  -labs: CBC, CMP, vitamin D level, valproic acid (Depakote) level, TSH, levetiracetam (Keppra) level   Please have the labs done if they were not done with PCP  -CT head-- call scheduling

## 2024-02-19 ENCOUNTER — CLINICAL DOCUMENTATION (OUTPATIENT)
Age: 54
End: 2024-02-19

## 2024-02-19 DIAGNOSIS — R71.8 ELEVATED MCV: Primary | ICD-10-CM

## 2024-02-19 NOTE — PROGRESS NOTES
Labs received from PCP office.  CBC, CMP, and TSH were included with the labs.  TSH normal.  CMP normal except mildly elevated potassium of 5.12.  CBC showed RBC mildly low, MCV elevated, platelet count mildly low, 143.  Asked that the AED levels and vitamin D level be done.  Also can repeat the CBC and CMP.  Also add b12 and folate.

## 2024-03-02 ENCOUNTER — HOSPITAL ENCOUNTER (OUTPATIENT)
Facility: HOSPITAL | Age: 54
Discharge: HOME | End: 2024-03-05

## 2024-03-02 LAB — SENTARA SPECIMEN COLLECTION: NORMAL

## 2024-03-05 LAB
A/G RATIO: 1.3 RATIO (ref 1.1–2.6)
ALBUMIN SERPL-MCNC: 4.2 G/DL (ref 3.5–5)
ALP BLD-CCNC: 87 U/L (ref 25–115)
ALT SERPL-CCNC: 49 U/L (ref 5–40)
ANION GAP SERPL CALCULATED.3IONS-SCNC: 19 MMOL/L (ref 3–15)
AST SERPL-CCNC: 80 U/L (ref 10–37)
BILIRUB SERPL-MCNC: 0.6 MG/DL (ref 0.2–1.2)
BUN BLDV-MCNC: 20 MG/DL (ref 6–22)
CALCIUM SERPL-MCNC: 9.7 MG/DL (ref 8.4–10.5)
CHLORIDE BLD-SCNC: 99 MMOL/L (ref 98–110)
CO2: 25 MMOL/L (ref 20–32)
CREAT SERPL-MCNC: 0.9 MG/DL (ref 0.5–1.2)
FOLATE: >20 NG/ML
GLOBULIN: 3.3 G/DL (ref 2–4)
GLOMERULAR FILTRATION RATE: >60 ML/MIN/1.73 SQ.M.
GLUCOSE: 25 MG/DL (ref 70–99)
HCT VFR BLD CALC: 50.3 % (ref 39.3–51.6)
HEMOGLOBIN: 15 G/DL (ref 13.1–17.2)
LEVETIRACETAM: 2.7 MCG/ML (ref 6–46)
MCH RBC QN AUTO: 33 PG (ref 26–34)
MCHC RBC AUTO-ENTMCNC: 30 G/DL (ref 31–36)
MCV RBC AUTO: 111 FL (ref 80–95)
PDW BLD-RTO: 14.2 % (ref 10–15.5)
PLATELET # BLD: 148 K/UL (ref 140–440)
PMV BLD AUTO: 12.2 FL (ref 9–13)
POTASSIUM SERPL-SCNC: 5.6 MMOL/L (ref 3.5–5.5)
RBC: 4.52 M/UL (ref 3.8–5.8)
SODIUM BLD-SCNC: 143 MMOL/L (ref 133–145)
T4 FREE: 1.1 NG/DL (ref 0.9–1.8)
TOTAL PROTEIN: 7.5 G/DL (ref 6.4–8.3)
TSH SERPL DL<=0.05 MIU/L-ACNC: 3.39 MCU/ML (ref 0.27–4.2)
VALPROIC ACID LEVEL: 87 MCG/ML (ref 50–100)
VITAMIN B-12: >2000 PG/ML (ref 211–911)
VITAMIN D 25-HYDROXY: 54 NG/ML (ref 32–100)
WBC: 5.6 K/UL (ref 4–11)

## 2024-03-06 ENCOUNTER — TELEPHONE (OUTPATIENT)
Age: 54
End: 2024-03-06

## 2024-03-06 DIAGNOSIS — E16.2 HYPOGLYCEMIA: Primary | ICD-10-CM

## 2024-03-06 NOTE — TELEPHONE ENCOUNTER
Patient was found to have hypoglycemia with glucose of 25 on labs from 3/2.  Labs had been sent out.  Called patient and discussed.  Patient fasted that morning before labs.  He was asymptomatic.  Reports he felt normal.  He ate after getting labs done.  He feels normal at this time and denies signs of hypoglycemia.  Reports he has been drinking a lot of apple juice and orange juice.  He has a hard time getting to the lab to repeat or seeing PCP sooner (has appt with PCP scheduled for 3/13) due to transportation.  Recommended having his girlfriend bring him to the lab to repeat if he can or better yet an urgent care because the labs were sent out and took days to result.  Called PCP office and left message to be given to one of the providers (PCP is out at this time).  Potassium was 5.6.  Liver enzymes slightly elevated.

## 2024-03-12 ENCOUNTER — HOSPITAL ENCOUNTER (EMERGENCY)
Facility: HOSPITAL | Age: 54
Discharge: HOME | End: 2024-03-12
Payer: MEDICARE

## 2024-03-12 ENCOUNTER — APPOINTMENT (OUTPATIENT)
Facility: HOSPITAL | Age: 54
End: 2024-03-12
Payer: MEDICARE

## 2024-03-12 VITALS
HEART RATE: 79 BPM | OXYGEN SATURATION: 97 % | BODY MASS INDEX: 39.91 KG/M2 | RESPIRATION RATE: 16 BRPM | TEMPERATURE: 97.1 F | DIASTOLIC BLOOD PRESSURE: 86 MMHG | HEIGHT: 74 IN | WEIGHT: 311 LBS | SYSTOLIC BLOOD PRESSURE: 137 MMHG

## 2024-03-12 DIAGNOSIS — M25.562 ACUTE PAIN OF LEFT KNEE: ICD-10-CM

## 2024-03-12 DIAGNOSIS — S86.912A STRAIN OF LEFT KNEE, INITIAL ENCOUNTER: Primary | ICD-10-CM

## 2024-03-12 PROCEDURE — 82962 GLUCOSE BLOOD TEST: CPT

## 2024-03-12 PROCEDURE — 73562 X-RAY EXAM OF KNEE 3: CPT

## 2024-03-12 PROCEDURE — 99283 EMERGENCY DEPT VISIT LOW MDM: CPT

## 2024-03-12 RX ORDER — IBUPROFEN 600 MG/1
600 TABLET ORAL EVERY 6 HOURS PRN
Qty: 30 TABLET | Refills: 0 | Status: SHIPPED | OUTPATIENT
Start: 2024-03-12

## 2024-03-12 ASSESSMENT — PAIN DESCRIPTION - LOCATION: LOCATION: KNEE

## 2024-03-12 ASSESSMENT — PAIN - FUNCTIONAL ASSESSMENT: PAIN_FUNCTIONAL_ASSESSMENT: 0-10

## 2024-03-12 ASSESSMENT — LIFESTYLE VARIABLES: HOW OFTEN DO YOU HAVE A DRINK CONTAINING ALCOHOL: NEVER

## 2024-03-12 ASSESSMENT — PAIN SCALES - GENERAL: PAINLEVEL_OUTOF10: 10

## 2024-03-12 ASSESSMENT — PAIN DESCRIPTION - ORIENTATION: ORIENTATION: LEFT

## 2024-03-12 NOTE — ED PROVIDER NOTES
social history, allergies reviewed with the patient with significant items noted above.    Review of Systems       Review of Systems   Musculoskeletal:         Left knee pain       Physical Exam   /86   Pulse 79   Temp 97.1 °F (36.2 °C) (Tympanic)   Resp 16   Ht 1.88 m (6' 2\")   Wt (!) 141.1 kg (311 lb)   SpO2 97%   BMI 39.93 kg/m²       Physical Exam  Vitals and nursing note reviewed.   Constitutional:       General: He is not in acute distress.     Appearance: Normal appearance. He is not ill-appearing, toxic-appearing or diaphoretic.   HENT:      Head: Normocephalic and atraumatic.   Cardiovascular:      Rate and Rhythm: Normal rate.      Heart sounds: Normal heart sounds.   Pulmonary:      Effort: Pulmonary effort is normal.      Breath sounds: Normal breath sounds.   Musculoskeletal:         General: Normal range of motion.      Cervical back: Normal range of motion and neck supple.      Right lower leg: No edema.      Left lower leg: No edema.   Skin:     General: Skin is warm.      Findings: No rash.   Neurological:      Mental Status: He is alert and oriented to person, place, and time.      GCS: GCS eye subscore is 4. GCS verbal subscore is 5. GCS motor subscore is 6.       SCREENINGS                 Diagnostic Study Results     Labs -  No results found for this or any previous visit (from the past 12 hour(s)).    EKG: All EKG's are interpreted by the Emergency Department Physician who either signs or Co-signs this chart in the absence of a cardiologist.    RADIOLOGY:   Non-plain film images such as CT, Ultrasound and MRI are read by the radiologist. Plain radiographic images are visualized and preliminarily interpreted by the emergency physician with the below findings:    Radiologic Studies -   XR KNEE LEFT (3 VIEWS)    (Results Pending)       The laboratory results, imaging results, and other diagnostic exams were reviewed in the EMR.    Medical Decision Making   I am the first provider for

## 2024-03-12 NOTE — DISCHARGE INSTRUCTIONS
Blood sugar within normal limits.    X-rays normal no fractures or dislocations.   Take medication as prescribed. Follow-up with your primary care physician within 2 days for reassessment. Bring the results from this visit with you for their review. Return to the ED immediately for any new, worsening, or persistent symptoms, including fever, vomiting, chest pain, shortness of breath, or any other medical concerns.

## 2024-03-12 NOTE — ED NOTES
Patient discharged at this time. This RN reviewed discharge instructions at this time with the patient.  patient verbalized understanding and does not have any questions. Pt ambulatory upon discharge, & in stable condition. Pt armband removed & shredded.

## 2024-03-13 ENCOUNTER — OFFICE VISIT (OUTPATIENT)
Age: 54
End: 2024-03-13
Payer: MEDICARE

## 2024-03-13 VITALS
WEIGHT: 315 LBS | SYSTOLIC BLOOD PRESSURE: 100 MMHG | DIASTOLIC BLOOD PRESSURE: 74 MMHG | BODY MASS INDEX: 42.88 KG/M2 | HEART RATE: 57 BPM | OXYGEN SATURATION: 94 %

## 2024-03-13 DIAGNOSIS — E78.5 DYSLIPIDEMIA: ICD-10-CM

## 2024-03-13 DIAGNOSIS — I10 PRIMARY HYPERTENSION: ICD-10-CM

## 2024-03-13 DIAGNOSIS — R07.9 CHEST PAIN, UNSPECIFIED TYPE: Primary | ICD-10-CM

## 2024-03-13 LAB — GLUCOSE BLD STRIP.AUTO-MCNC: 89 MG/DL (ref 70–110)

## 2024-03-13 PROCEDURE — G8428 CUR MEDS NOT DOCUMENT: HCPCS | Performed by: INTERNAL MEDICINE

## 2024-03-13 PROCEDURE — 3017F COLORECTAL CA SCREEN DOC REV: CPT | Performed by: INTERNAL MEDICINE

## 2024-03-13 PROCEDURE — 99214 OFFICE O/P EST MOD 30 MIN: CPT | Performed by: INTERNAL MEDICINE

## 2024-03-13 PROCEDURE — 3078F DIAST BP <80 MM HG: CPT | Performed by: INTERNAL MEDICINE

## 2024-03-13 PROCEDURE — G8484 FLU IMMUNIZE NO ADMIN: HCPCS | Performed by: INTERNAL MEDICINE

## 2024-03-13 PROCEDURE — G8417 CALC BMI ABV UP PARAM F/U: HCPCS | Performed by: INTERNAL MEDICINE

## 2024-03-13 PROCEDURE — 1036F TOBACCO NON-USER: CPT | Performed by: INTERNAL MEDICINE

## 2024-03-13 PROCEDURE — 3074F SYST BP LT 130 MM HG: CPT | Performed by: INTERNAL MEDICINE
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
## 2024-03-14 ENCOUNTER — TELEPHONE (OUTPATIENT)
Age: 54
End: 2024-03-14

## 2024-03-14 NOTE — TELEPHONE ENCOUNTER
Spoke with patient.  He wanted to inform that his glucose was now normal.  Went to the ER on 3/12 and point-of-care glucose was 89.  He said he went because he must have slipped out of bed and he hurt his knee.  Denies having a seizure last week.  He had a cardiology appointment yesterday but primary care visit is not until 4/9.

## 2024-03-29 ENCOUNTER — HOSPITAL ENCOUNTER (EMERGENCY)
Facility: HOSPITAL | Age: 54
Discharge: HOME | End: 2024-03-29
Attending: EMERGENCY MEDICINE
Payer: MEDICARE

## 2024-03-29 ENCOUNTER — APPOINTMENT (OUTPATIENT)
Facility: HOSPITAL | Age: 54
End: 2024-03-29
Payer: MEDICARE

## 2024-03-29 VITALS
WEIGHT: 315 LBS | RESPIRATION RATE: 27 BRPM | BODY MASS INDEX: 40.43 KG/M2 | DIASTOLIC BLOOD PRESSURE: 83 MMHG | HEART RATE: 68 BPM | SYSTOLIC BLOOD PRESSURE: 142 MMHG | OXYGEN SATURATION: 95 % | HEIGHT: 74 IN

## 2024-03-29 DIAGNOSIS — R56.9 SEIZURE (HCC): Primary | ICD-10-CM

## 2024-03-29 LAB
ANION GAP SERPL CALC-SCNC: 7 MMOL/L (ref 3–18)
APPEARANCE UR: CLEAR
BACTERIA URNS QL MICRO: NEGATIVE /HPF
BASOPHILS # BLD: 0 K/UL (ref 0–0.1)
BASOPHILS NFR BLD: 0 % (ref 0–2)
BILIRUB UR QL: NEGATIVE
BUN SERPL-MCNC: 15 MG/DL (ref 7–18)
BUN/CREAT SERPL: 21 (ref 12–20)
CALCIUM SERPL-MCNC: 9 MG/DL (ref 8.5–10.1)
CHLORIDE SERPL-SCNC: 105 MMOL/L (ref 100–111)
CO2 SERPL-SCNC: 28 MMOL/L (ref 21–32)
COLOR UR: YELLOW
CREAT SERPL-MCNC: 0.73 MG/DL (ref 0.6–1.3)
DIFFERENTIAL METHOD BLD: ABNORMAL
EKG DIAGNOSIS: NORMAL
EKG Q-T INTERVAL: 408 MS
EKG QRS DURATION: 98 MS
EKG QTC CALCULATION (BAZETT): 431 MS
EKG R AXIS: 17 DEGREES
EKG T AXIS: 121 DEGREES
EKG VENTRICULAR RATE: 67 BPM
EOSINOPHIL # BLD: 0 K/UL (ref 0–0.4)
EOSINOPHIL NFR BLD: 1 % (ref 0–5)
EPITH CASTS URNS QL MICRO: NEGATIVE /LPF (ref 0–5)
ERYTHROCYTE [DISTWIDTH] IN BLOOD BY AUTOMATED COUNT: 13.2 % (ref 11.6–14.5)
GLUCOSE SERPL-MCNC: 130 MG/DL (ref 74–99)
GLUCOSE UR STRIP.AUTO-MCNC: NEGATIVE MG/DL
HCT VFR BLD AUTO: 46.9 % (ref 36–48)
HGB BLD-MCNC: 14.9 G/DL (ref 13–16)
HGB UR QL STRIP: NEGATIVE
IMM GRANULOCYTES # BLD AUTO: 0 K/UL (ref 0–0.04)
IMM GRANULOCYTES NFR BLD AUTO: 1 % (ref 0–0.5)
KETONES UR QL STRIP.AUTO: NEGATIVE MG/DL
LACTATE SERPL-SCNC: 2.6 MMOL/L (ref 0.4–2)
LEUKOCYTE ESTERASE UR QL STRIP.AUTO: NEGATIVE
LYMPHOCYTES # BLD: 1.8 K/UL (ref 0.9–3.6)
LYMPHOCYTES NFR BLD: 38 % (ref 21–52)
MCH RBC QN AUTO: 32.7 PG (ref 24–34)
MCHC RBC AUTO-ENTMCNC: 31.8 G/DL (ref 31–37)
MCV RBC AUTO: 103.1 FL (ref 78–100)
MONOCYTES # BLD: 0.4 K/UL (ref 0.05–1.2)
MONOCYTES NFR BLD: 8 % (ref 3–10)
MUCOUS THREADS URNS QL MICRO: ABNORMAL /LPF
NEUTS SEG # BLD: 2.5 K/UL (ref 1.8–8)
NEUTS SEG NFR BLD: 53 % (ref 40–73)
NITRITE UR QL STRIP.AUTO: NEGATIVE
NRBC # BLD: 0 K/UL (ref 0–0.01)
NRBC BLD-RTO: 0 PER 100 WBC
PH UR STRIP: 5.5 (ref 5–8)
PLATELET # BLD AUTO: 145 K/UL (ref 135–420)
PMV BLD AUTO: 11.5 FL (ref 9.2–11.8)
POTASSIUM SERPL-SCNC: 3.9 MMOL/L (ref 3.5–5.5)
PROCALCITONIN SERPL-MCNC: <0.05 NG/ML
PROT UR STRIP-MCNC: 100 MG/DL
RBC # BLD AUTO: 4.55 M/UL (ref 4.35–5.65)
RBC #/AREA URNS HPF: NEGATIVE /HPF (ref 0–5)
SODIUM SERPL-SCNC: 140 MMOL/L (ref 136–145)
SP GR UR REFRACTOMETRY: 1.02 (ref 1–1.03)
UROBILINOGEN UR QL STRIP.AUTO: 1 EU/DL (ref 0.2–1)
VALPROATE SERPL-MCNC: 103 UG/ML (ref 50–100)
WBC # BLD AUTO: 4.7 K/UL (ref 4.6–13.2)
WBC URNS QL MICRO: NEGATIVE /HPF (ref 0–4)

## 2024-03-29 PROCEDURE — 2580000003 HC RX 258

## 2024-03-29 PROCEDURE — 96375 TX/PRO/DX INJ NEW DRUG ADDON: CPT

## 2024-03-29 PROCEDURE — 93005 ELECTROCARDIOGRAM TRACING: CPT

## 2024-03-29 PROCEDURE — 80164 ASSAY DIPROPYLACETIC ACD TOT: CPT

## 2024-03-29 PROCEDURE — 93010 ELECTROCARDIOGRAM REPORT: CPT | Performed by: INTERNAL MEDICINE

## 2024-03-29 PROCEDURE — 84146 ASSAY OF PROLACTIN: CPT

## 2024-03-29 PROCEDURE — 99284 EMERGENCY DEPT VISIT MOD MDM: CPT

## 2024-03-29 PROCEDURE — 96361 HYDRATE IV INFUSION ADD-ON: CPT

## 2024-03-29 PROCEDURE — 70450 CT HEAD/BRAIN W/O DYE: CPT

## 2024-03-29 PROCEDURE — 96374 THER/PROPH/DIAG INJ IV PUSH: CPT

## 2024-03-29 PROCEDURE — 84145 PROCALCITONIN (PCT): CPT

## 2024-03-29 PROCEDURE — 83605 ASSAY OF LACTIC ACID: CPT

## 2024-03-29 PROCEDURE — 6360000002 HC RX W HCPCS: Performed by: EMERGENCY MEDICINE

## 2024-03-29 PROCEDURE — 81001 URINALYSIS AUTO W/SCOPE: CPT

## 2024-03-29 PROCEDURE — 6360000002 HC RX W HCPCS

## 2024-03-29 PROCEDURE — 85025 COMPLETE CBC W/AUTO DIFF WBC: CPT

## 2024-03-29 PROCEDURE — 80048 BASIC METABOLIC PNL TOTAL CA: CPT

## 2024-03-29 RX ORDER — KETOROLAC TROMETHAMINE 15 MG/ML
15 INJECTION, SOLUTION INTRAMUSCULAR; INTRAVENOUS ONCE
Status: COMPLETED | OUTPATIENT
Start: 2024-03-29 | End: 2024-03-29

## 2024-03-29 RX ORDER — PROCHLORPERAZINE EDISYLATE 5 MG/ML
10 INJECTION INTRAMUSCULAR; INTRAVENOUS
Status: COMPLETED | OUTPATIENT
Start: 2024-03-29 | End: 2024-03-29

## 2024-03-29 RX ORDER — LEVETIRACETAM 500 MG/5ML
1000 INJECTION, SOLUTION, CONCENTRATE INTRAVENOUS
Status: COMPLETED | OUTPATIENT
Start: 2024-03-29 | End: 2024-03-29

## 2024-03-29 RX ORDER — DIPHENHYDRAMINE HYDROCHLORIDE 50 MG/ML
12.5 INJECTION INTRAMUSCULAR; INTRAVENOUS
Status: COMPLETED | OUTPATIENT
Start: 2024-03-29 | End: 2024-03-29

## 2024-03-29 RX ORDER — SODIUM CHLORIDE, SODIUM LACTATE, POTASSIUM CHLORIDE, AND CALCIUM CHLORIDE .6; .31; .03; .02 G/100ML; G/100ML; G/100ML; G/100ML
1000 INJECTION, SOLUTION INTRAVENOUS ONCE
Status: COMPLETED | OUTPATIENT
Start: 2024-03-29 | End: 2024-03-29

## 2024-03-29 RX ADMIN — PROCHLORPERAZINE EDISYLATE 10 MG: 5 INJECTION INTRAMUSCULAR; INTRAVENOUS at 13:32

## 2024-03-29 RX ADMIN — DIPHENHYDRAMINE HYDROCHLORIDE 12.5 MG: 50 INJECTION, SOLUTION INTRAMUSCULAR; INTRAVENOUS at 13:32

## 2024-03-29 RX ADMIN — LEVETIRACETAM 1000 MG: 100 INJECTION, SOLUTION INTRAVENOUS at 12:03

## 2024-03-29 RX ADMIN — SODIUM CHLORIDE, POTASSIUM CHLORIDE, SODIUM LACTATE AND CALCIUM CHLORIDE 1000 ML: 600; 310; 30; 20 INJECTION, SOLUTION INTRAVENOUS at 11:19

## 2024-03-29 RX ADMIN — KETOROLAC TROMETHAMINE 15 MG: 15 INJECTION, SOLUTION INTRAMUSCULAR; INTRAVENOUS at 11:18

## 2024-03-29 ASSESSMENT — PAIN - FUNCTIONAL ASSESSMENT: PAIN_FUNCTIONAL_ASSESSMENT: 0-10

## 2024-03-29 ASSESSMENT — PAIN SCALES - GENERAL: PAINLEVEL_OUTOF10: 10

## 2024-03-29 ASSESSMENT — PAIN DESCRIPTION - ORIENTATION: ORIENTATION: ANTERIOR

## 2024-03-29 ASSESSMENT — PAIN DESCRIPTION - LOCATION: LOCATION: HEAD

## 2024-03-29 NOTE — ED NOTES
Assumed care of pt in rm 10. Pt here for \"seizure like activity. Hx noted. Pt is A Ox 4, lung sounds clear, abd obese, soft, skin intact. 20 ga PIV started to L AC, labs drawn and sent. Pt placed on monitor.

## 2024-03-29 NOTE — ED TRIAGE NOTES
Pt arrives via EMS from home c/o seizure x1. . Last seizure was a month ago. Pt on Depakote. Pt alert and oriented on arrival. C/o HA in the front of his head.     Hx seizures, brain surgery

## 2024-03-29 NOTE — DISCHARGE INSTRUCTIONS
Given evaluated emergency department for symptoms related to your known history of seizures.  Your physical exam, lab work was all suggestive that you do not have disease requiring inpatient hospitalization at this time.  Your CT head did not demonstrate any evidence of brain bleed or significant midline shifting of your brain contents.  You had no evidence of infection or electrolyte disturbance.  He stated to me that you had not taken your morning dose of Keppra.  Your valproate levels are within normal limits.  It is important that you continue to take your Keppra and valproate as prescribed to reduce the risk of having seizure like activity.  Please return to the emergency department if you develop any worsening fever, chills, headache, intractable seizure-like activity, or any other symptoms that are significant to you.

## 2024-03-29 NOTE — ED PROVIDER NOTES
therapeutic levels.  CT head demonstrates postsurgical changes, right frontal parietal encephalomalacia consistent with prior Cts. EKG without evidence of acute coronary syndrome changes.  Lactate is mildly elevated 2.6, however this may be consistent with epileptiform activity.  Procalcitonin negative, argues against infection.  Prolactin send out, informed by lab that results will not be returned today.  Patient was observed in the emergency department for several hours without recrudescence of epileptiform activity.  Headache was treated with Reglan, Benadryl, Toradol, IV fluids.  States that headache is improved however still present.  I advised patient to take his Topamax at home, he verbalized understanding.-Advised patient to take his morning dose of Keppra more regularly to reduce the incidence of epileptiform activity.  Gave patient's and return precautions.  He verbalized understanding.  He was discharged from the emergency department without further incident      I am the first provider for this patient.    I reviewed the vital signs, available nursing notes, past medical history, past surgical history, family history and social history.    Patient Vitals for the past 12 hrs:   Pulse Resp BP SpO2   03/29/24 1130 68 27 (!) 142/83 95 %   03/29/24 1115 68 24 (!) 161/89 94 %   03/29/24 1100 68 27 (!) 162/95 99 %   03/29/24 1054 67 15 (!) 178/89 97 %       Vital Signs-Reviewed the patient's vital signs.    Pulse Oximetry Analysis, Cardiac Monitor, 12 lead ekg:      Interpreted by the EP.    Records Reviewed: Nursing notes reviewed (Time of Review: 6:01 PM)    Procedures:   Critical Care Time: 0  If critical care time is note it is exclusive of any separately billable procedures.     Aspirin: No--not medically indicated    Diagnosis     Disposition: DISPOSITION Decision To Discharge 03/29/2024 01:49:57 PM       DISCHARGE MEDICATIONS:  Discharge Medication List as of 3/29/2024  2:31 PM             Details

## 2024-03-30 LAB — PROLACTIN SERPL-MCNC: 24.3 NG/ML

## 2024-06-24 ENCOUNTER — APPOINTMENT (OUTPATIENT)
Facility: HOSPITAL | Age: 54
End: 2024-06-24
Payer: MEDICARE

## 2024-06-24 ENCOUNTER — HOSPITAL ENCOUNTER (EMERGENCY)
Facility: HOSPITAL | Age: 54
Discharge: HOME | End: 2024-06-25
Payer: MEDICARE

## 2024-06-24 DIAGNOSIS — G40.919 BREAKTHROUGH SEIZURE (HCC): Primary | ICD-10-CM

## 2024-06-24 LAB
ALBUMIN SERPL-MCNC: 2.8 G/DL (ref 3.4–5)
ALBUMIN/GLOB SERPL: 0.6 (ref 0.8–1.7)
ALP SERPL-CCNC: 72 U/L (ref 45–117)
ALT SERPL-CCNC: 12 U/L (ref 16–61)
AMPHET UR QL SCN: NEGATIVE
ANION GAP SERPL CALC-SCNC: 7 MMOL/L (ref 3–18)
APPEARANCE UR: CLEAR
AST SERPL-CCNC: 14 U/L (ref 10–38)
BARBITURATES UR QL SCN: NEGATIVE
BASOPHILS # BLD: 0 K/UL (ref 0–0.1)
BASOPHILS NFR BLD: 1 % (ref 0–2)
BENZODIAZ UR QL: NEGATIVE
BILIRUB SERPL-MCNC: 0.4 MG/DL (ref 0.2–1)
BILIRUB UR QL: NEGATIVE
BUN SERPL-MCNC: 12 MG/DL (ref 7–18)
BUN/CREAT SERPL: 16 (ref 12–20)
CALCIUM SERPL-MCNC: 9.4 MG/DL (ref 8.5–10.1)
CANNABINOIDS UR QL SCN: NEGATIVE
CHLORIDE SERPL-SCNC: 111 MMOL/L (ref 100–111)
CO2 SERPL-SCNC: 26 MMOL/L (ref 21–32)
COCAINE UR QL SCN: NEGATIVE
COLOR UR: YELLOW
CREAT SERPL-MCNC: 0.76 MG/DL (ref 0.6–1.3)
DIFFERENTIAL METHOD BLD: ABNORMAL
EOSINOPHIL # BLD: 0.1 K/UL (ref 0–0.4)
EOSINOPHIL NFR BLD: 2 % (ref 0–5)
EPITH CASTS URNS QL MICRO: ABNORMAL /LPF (ref 0–5)
ERYTHROCYTE [DISTWIDTH] IN BLOOD BY AUTOMATED COUNT: 14.2 % (ref 11.6–14.5)
ETHANOL SERPL-MCNC: <3 MG/DL (ref 0–3)
GLOBULIN SER CALC-MCNC: 4.5 G/DL (ref 2–4)
GLUCOSE SERPL-MCNC: 98 MG/DL (ref 74–99)
GLUCOSE UR STRIP.AUTO-MCNC: NEGATIVE MG/DL
HCT VFR BLD AUTO: 43 % (ref 36–48)
HGB BLD-MCNC: 13.3 G/DL (ref 13–16)
HGB UR QL STRIP: NEGATIVE
HYALINE CASTS URNS QL MICRO: ABNORMAL /LPF (ref 0–2)
IMM GRANULOCYTES # BLD AUTO: 0 K/UL (ref 0–0.04)
IMM GRANULOCYTES NFR BLD AUTO: 1 % (ref 0–0.5)
KETONES UR QL STRIP.AUTO: ABNORMAL MG/DL
LEUKOCYTE ESTERASE UR QL STRIP.AUTO: NEGATIVE
LYMPHOCYTES # BLD: 2.6 K/UL (ref 0.9–3.6)
LYMPHOCYTES NFR BLD: 44 % (ref 21–52)
Lab: NORMAL
MAGNESIUM SERPL-MCNC: 2 MG/DL (ref 1.6–2.6)
MCH RBC QN AUTO: 31.1 PG (ref 24–34)
MCHC RBC AUTO-ENTMCNC: 30.9 G/DL (ref 31–37)
MCV RBC AUTO: 100.5 FL (ref 78–100)
METHADONE UR QL: NEGATIVE
MONOCYTES # BLD: 0.5 K/UL (ref 0.05–1.2)
MONOCYTES NFR BLD: 9 % (ref 3–10)
MUCOUS THREADS URNS QL MICRO: ABNORMAL /LPF
NEUTS SEG # BLD: 2.7 K/UL (ref 1.8–8)
NEUTS SEG NFR BLD: 45 % (ref 40–73)
NITRITE UR QL STRIP.AUTO: NEGATIVE
NRBC # BLD: 0 K/UL (ref 0–0.01)
NRBC BLD-RTO: 0 PER 100 WBC
OPIATES UR QL: NEGATIVE
PCP UR QL: NEGATIVE
PH UR STRIP: 6.5 (ref 5–8)
PLATELET # BLD AUTO: 190 K/UL (ref 135–420)
PMV BLD AUTO: 10.7 FL (ref 9.2–11.8)
POTASSIUM SERPL-SCNC: 3.8 MMOL/L (ref 3.5–5.5)
PROT SERPL-MCNC: 7.3 G/DL (ref 6.4–8.2)
PROT UR STRIP-MCNC: ABNORMAL MG/DL
RBC # BLD AUTO: 4.28 M/UL (ref 4.35–5.65)
RBC #/AREA URNS HPF: ABNORMAL /HPF (ref 0–5)
SODIUM SERPL-SCNC: 144 MMOL/L (ref 136–145)
SP GR UR REFRACTOMETRY: 1.02 (ref 1–1.03)
UROBILINOGEN UR QL STRIP.AUTO: 2 EU/DL (ref 0.2–1)
VALPROATE SERPL-MCNC: 90 UG/ML (ref 50–100)
WBC # BLD AUTO: 6 K/UL (ref 4.6–13.2)
WBC URNS QL MICRO: ABNORMAL /HPF (ref 0–4)

## 2024-06-24 PROCEDURE — 80164 ASSAY DIPROPYLACETIC ACD TOT: CPT

## 2024-06-24 PROCEDURE — 81001 URINALYSIS AUTO W/SCOPE: CPT

## 2024-06-24 PROCEDURE — 6360000002 HC RX W HCPCS: Performed by: PHYSICIAN ASSISTANT

## 2024-06-24 PROCEDURE — 73560 X-RAY EXAM OF KNEE 1 OR 2: CPT

## 2024-06-24 PROCEDURE — 99284 EMERGENCY DEPT VISIT MOD MDM: CPT

## 2024-06-24 PROCEDURE — 82077 ASSAY SPEC XCP UR&BREATH IA: CPT

## 2024-06-24 PROCEDURE — 80053 COMPREHEN METABOLIC PANEL: CPT

## 2024-06-24 PROCEDURE — 72100 X-RAY EXAM L-S SPINE 2/3 VWS: CPT

## 2024-06-24 PROCEDURE — 85025 COMPLETE CBC W/AUTO DIFF WBC: CPT

## 2024-06-24 PROCEDURE — 90471 IMMUNIZATION ADMIN: CPT | Performed by: PHYSICIAN ASSISTANT

## 2024-06-24 PROCEDURE — 83735 ASSAY OF MAGNESIUM: CPT

## 2024-06-24 PROCEDURE — 80307 DRUG TEST PRSMV CHEM ANLYZR: CPT

## 2024-06-24 PROCEDURE — 90715 TDAP VACCINE 7 YRS/> IM: CPT | Performed by: PHYSICIAN ASSISTANT

## 2024-06-24 PROCEDURE — 70450 CT HEAD/BRAIN W/O DYE: CPT

## 2024-06-24 PROCEDURE — 80177 DRUG SCRN QUAN LEVETIRACETAM: CPT

## 2024-06-24 PROCEDURE — 72125 CT NECK SPINE W/O DYE: CPT

## 2024-06-24 RX ADMIN — TETANUS TOXOID, REDUCED DIPHTHERIA TOXOID AND ACELLULAR PERTUSSIS VACCINE, ADSORBED 0.5 ML: 5; 2.5; 8; 8; 2.5 SUSPENSION INTRAMUSCULAR at 20:50

## 2024-06-24 ASSESSMENT — PAIN DESCRIPTION - LOCATION: LOCATION: BACK

## 2024-06-24 ASSESSMENT — ENCOUNTER SYMPTOMS
ABDOMINAL PAIN: 0
SORE THROAT: 0
RHINORRHEA: 0
VOMITING: 0
STRIDOR: 0
SHORTNESS OF BREATH: 0
EYE REDNESS: 0
BACK PAIN: 1
EYE DISCHARGE: 0
WHEEZING: 0
CONSTIPATION: 0
DIARRHEA: 0
COUGH: 0
NAUSEA: 0

## 2024-06-24 ASSESSMENT — PAIN SCALES - GENERAL: PAINLEVEL_OUTOF10: 10

## 2024-06-24 ASSESSMENT — LIFESTYLE VARIABLES
HOW OFTEN DO YOU HAVE A DRINK CONTAINING ALCOHOL: NEVER
HOW MANY STANDARD DRINKS CONTAINING ALCOHOL DO YOU HAVE ON A TYPICAL DAY: PATIENT DOES NOT DRINK

## 2024-06-24 NOTE — ED TRIAGE NOTES
Patient presents to the ED via EMS from home for evaluation after having a witnessed seizure. Per medics, \"He has a history of seizures. He has only been home for 5 days from rehab. Family states they were getting him changed when he had a seizure, lasting about 2 minutes. When we arrived patient was postictal.\"     On arrival to ER, patient A&Ox4

## 2024-06-24 NOTE — FLOWSHEET NOTE
06/24/24 1940   Labs   Lab Type Initial set of blood work   Collection Site Left Antecubital   Number of Attempts 1   Date Collected 06/24/24   Time Collected 1924   Tube Color(s) Drawn [place];Blue;Dark Green;Purple;Red;Yellow

## 2024-06-24 NOTE — ED NOTES
Patient given warm blankets and a pillow. Patient is currently resting in a position of comfort, vss and NAD. Respirations appear even and unlabored. Care ongoing   Eisenmenger syndrome

## 2024-06-25 VITALS
HEIGHT: 74 IN | TEMPERATURE: 98.2 F | HEART RATE: 71 BPM | BODY MASS INDEX: 40.43 KG/M2 | SYSTOLIC BLOOD PRESSURE: 123 MMHG | OXYGEN SATURATION: 97 % | WEIGHT: 315 LBS | RESPIRATION RATE: 28 BRPM | DIASTOLIC BLOOD PRESSURE: 88 MMHG
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
## 2024-06-26 LAB — LEVETIRACETAM SERPL-MCNC: 19 UG/ML (ref 10–40)

## 2024-10-01 ENCOUNTER — OFFICE VISIT (OUTPATIENT)
Age: 54
End: 2024-10-01
Payer: MEDICARE

## 2024-10-01 VITALS
BODY MASS INDEX: 40.43 KG/M2 | RESPIRATION RATE: 18 BRPM | DIASTOLIC BLOOD PRESSURE: 69 MMHG | TEMPERATURE: 98.2 F | HEIGHT: 74 IN | WEIGHT: 315 LBS | HEART RATE: 60 BPM | OXYGEN SATURATION: 96 % | SYSTOLIC BLOOD PRESSURE: 109 MMHG

## 2024-10-01 DIAGNOSIS — R26.9 GAIT DIFFICULTY: ICD-10-CM

## 2024-10-01 DIAGNOSIS — M47.816 FACET ARTHROPATHY, LUMBAR: Primary | ICD-10-CM

## 2024-10-01 DIAGNOSIS — M51.369 DEGENERATION OF INTERVERTEBRAL DISC OF LUMBAR REGION, UNSPECIFIED WHETHER PAIN PRESENT: ICD-10-CM

## 2024-10-01 PROCEDURE — G8427 DOCREV CUR MEDS BY ELIG CLIN: HCPCS | Performed by: NURSE PRACTITIONER

## 2024-10-01 PROCEDURE — 3078F DIAST BP <80 MM HG: CPT | Performed by: NURSE PRACTITIONER

## 2024-10-01 PROCEDURE — 99213 OFFICE O/P EST LOW 20 MIN: CPT | Performed by: NURSE PRACTITIONER

## 2024-10-01 PROCEDURE — 1036F TOBACCO NON-USER: CPT | Performed by: NURSE PRACTITIONER

## 2024-10-01 PROCEDURE — 3074F SYST BP LT 130 MM HG: CPT | Performed by: NURSE PRACTITIONER

## 2024-10-01 PROCEDURE — G8484 FLU IMMUNIZE NO ADMIN: HCPCS | Performed by: NURSE PRACTITIONER

## 2024-10-01 PROCEDURE — 3017F COLORECTAL CA SCREEN DOC REV: CPT | Performed by: NURSE PRACTITIONER

## 2024-10-01 PROCEDURE — G8417 CALC BMI ABV UP PARAM F/U: HCPCS | Performed by: NURSE PRACTITIONER

## 2024-10-01 RX ORDER — QUETIAPINE FUMARATE 25 MG/1
TABLET, FILM COATED ORAL
COMMUNITY

## 2024-10-01 RX ORDER — ATORVASTATIN CALCIUM 40 MG/1
TABLET, FILM COATED ORAL
COMMUNITY
Start: 2024-04-16

## 2024-10-01 RX ORDER — FUROSEMIDE 20 MG
TABLET ORAL
COMMUNITY

## 2024-10-01 RX ORDER — TADALAFIL 5 MG/1
TABLET ORAL
COMMUNITY
Start: 2020-09-30

## 2024-10-01 RX ORDER — LACOSAMIDE 200 MG/1
TABLET ORAL
COMMUNITY
Start: 2024-04-15

## 2024-10-01 RX ORDER — APIXABAN 2.5 MG/1
TABLET, FILM COATED ORAL
COMMUNITY

## 2024-10-01 RX ORDER — ARIPIPRAZOLE 10 MG/1
TABLET ORAL
COMMUNITY

## 2024-10-01 RX ORDER — CYCLOBENZAPRINE HCL 10 MG
TABLET ORAL
COMMUNITY

## 2024-10-01 RX ORDER — RIVAROXABAN 10 MG/1
TABLET, FILM COATED ORAL
COMMUNITY

## 2024-10-01 RX ORDER — TOPIRAMATE 200 MG/1
TABLET, FILM COATED ORAL
COMMUNITY
Start: 2024-04-15

## 2024-10-01 RX ORDER — POTASSIUM CHLORIDE 20 MEQ/15ML
SOLUTION ORAL
COMMUNITY
Start: 2024-07-19

## 2024-10-01 RX ORDER — TRAMADOL HYDROCHLORIDE 50 MG/1
TABLET ORAL
COMMUNITY
Start: 2024-01-30

## 2024-10-01 RX ORDER — BUMETANIDE 2 MG/1
TABLET ORAL
COMMUNITY

## 2024-10-01 RX ORDER — ENOXAPARIN SODIUM 150 MG/ML
INJECTION SUBCUTANEOUS
COMMUNITY

## 2024-10-01 RX ORDER — VALPROIC ACID 250 MG/5ML
SOLUTION ORAL
COMMUNITY
Start: 2024-08-27

## 2024-10-01 RX ORDER — LIDOCAINE 50 MG/G
PATCH TOPICAL
COMMUNITY
Start: 2024-09-17

## 2024-10-01 NOTE — PROGRESS NOTES
Ras Colesjared Bedoya presents today for   Chief Complaint   Patient presents with    Back Problem    Pain    Back Pain       Is someone accompanying this pt? no    Is the patient using any DME equipment during OV? no    Depression Screening:       No data to display                Learning Assessment:  Failed to redirect to the Timeline version of the [employer] SmartLink.    Abuse Screening:       No data to display                Fall Risk  Failed to redirect to the Timeline version of the [employer] SmartLink.    OPIOID RISK TOOL  Failed to redirect to the Timeline version of the [employer] [place] SmartLink.    Coordination of Care:  1. Have you been to the ER, urgent care clinic since your last visit? no  Hospitalized since your last visit? no    2. Have you seen or consulted any other health care providers outside of the [facility] since your last visit? no Include any pap smears or colon screening. no

## 2024-10-01 NOTE — PROGRESS NOTES
Chief complaint   Chief Complaint   Patient presents with    Back Problem    Pain    Back Pain       History of Present Illness:  Ras Bedoya is a  54 y.o.  male who comes in today after last being seen on October 19, 2022.  He was recently hospitalized and had to be intubated due to his severe seizure disorder.  He has had a CVA in the past.  He comes in today stating he continues to have low back pain.  It does not radiate into his buttock or legs.  He is having difficulty walking due to multiple issues.  He had an x-ray done June 24, 2024 that showed advanced facet arthropathy and moderate degenerative disc disease.  He has been on Motrin and Mobic in the past but is no longer able to take these medications due to being on a blood thinner.  He states he was sent here to get a rolling walker ordered and to get home health physical therapy done.  His cousin is with him today.  He lives with his sister and has to have someone with him at all times due to his seizure disorder.  He has great difficulty getting out of the home.  He denies fever bowel bladder dysfunction.      Physical Exam: Patient is a 54-year-old male well-developed well-nourished who is alert and oriented with a normal mood and affect.  He presents in a wheelchair today.  He was able to get from sitting to standing on his own.  He was not really able to take any steps since he did not have a walker.  He states he does have a cane at home.  He has 4 out of 5 strength bilateral lower extremities but the right leg being slightly weaker.  Negative straight leg raise.      Assessment and Plan: This is a patient who has lumbar advanced facet arthropathy and moderate degenerative disc disease that gives him chronic back pain without radicular pain.  I ordered home health physical therapy for him for his back pain, strengthening and gait training.  Also ordered a rolling walker with a seat and hand brakes.  I have given him that order to take to a

## 2024-10-03 ENCOUNTER — TELEPHONE (OUTPATIENT)
Age: 54
End: 2024-10-03

## 2024-10-03 NOTE — TELEPHONE ENCOUNTER
Maddie from [facility] wanted to confirm that they will be seeing the patient \"1 week 1\" and \"2 weeks 6\".  If we have any questions she can be reached directly at [phone].

## 2024-10-03 NOTE — TELEPHONE ENCOUNTER
I order home health PT    Pt needs physical therapy for back pain, strengthening and gait training  Evaluate and treat            I do not know what they are talking about.

## 2024-10-04 NOTE — TELEPHONE ENCOUNTER
Attempted to contact Maddie regarding her message. She was not able to be reached. A message was left asking her to contact the office regarding her message. The number to the office was provided.

## 2024-10-31 ENCOUNTER — TELEPHONE (OUTPATIENT)
Age: 54
End: 2024-10-31

## 2024-10-31 NOTE — TELEPHONE ENCOUNTER
Called [phone] sister answered phone Kendy bose. Check to see if she was on HIPAA form and she was not. Had clinical supervisor verify. I explained I was unable to speak to her at this time because she wasn't on the HIPAA form 9/27/21. She stated she wasn't understanding why she not on the form. He been with her since June. \"I do all his medical decisions\"  I apologized and stated I can't speak about this matter without his permission. May I please speak to jaminolivia bose. Well, I have the phone right now he not with me and I don't know when he will call back. I stated thank you can you please let him know [facility] called and he can reach us at [phone] thanks and have a great day. She ended the call.

## 2024-10-31 NOTE — TELEPHONE ENCOUNTER
Mis from [facility] called in and stated that patient fell yesterday 10/30. Patient stated that his pain is 8 out of 10. Patient's sister stated that she is unable to set up pain management.

## 2024-11-27 ENCOUNTER — TELEPHONE (OUTPATIENT)
Age: 54
End: 2024-11-27

## 2024-11-27 NOTE — TELEPHONE ENCOUNTER
Camryn w/ [facility]  called to state pt was admitted to  today.    Plan of care is for disease and medication management. Pt will receive OT and P/T to evaluate pt at home.    Pt recently hospitalized because of a seizure and pt was at [facility], then pt was sent to rehab. Pt d/c from rehab on 1/25 and now [facility]  is following him.    Callback for Camryn # [phone]

## 2024-11-29 ENCOUNTER — TELEPHONE (OUTPATIENT)
Age: 54
End: 2024-11-29

## 2024-11-29 DIAGNOSIS — M51.369 DEGENERATION OF INTERVERTEBRAL DISC OF LUMBAR REGION, UNSPECIFIED WHETHER PAIN PRESENT: ICD-10-CM

## 2024-11-29 DIAGNOSIS — M47.816 FACET ARTHROPATHY, LUMBAR: Primary | ICD-10-CM

## 2024-11-29 DIAGNOSIS — R26.9 GAIT DIFFICULTY: ICD-10-CM

## 2024-11-29 NOTE — TELEPHONE ENCOUNTER
[facility] calling and would like to know if a prescription can be written for the patient to have a rolling walker and a tub bench, and also states the patient will be starting Physical Therapy next week.        [facility]   [phone]

## 2024-11-29 NOTE — TELEPHONE ENCOUNTER
Called patient identified by two verifiers. Explained  provider will be out tilt Tuesday and ill forward message. She verbalized understanding and thanked for the call

## 2024-11-30 NOTE — TELEPHONE ENCOUNTER
I gave him an order for the rolling walker at the visit.  Please pend up the order for the tub bench.

## 2024-12-03 NOTE — TELEPHONE ENCOUNTER
Please see previous messages.    Home Care nurse Gabby Nicolas also called , while she was with the patient.     Gabby and the patient , both on the line at the same time are asking that the orders for the Rolling Walker and Tub Bench be faxed to [facility] in [place].    [facility] at [address] [place] [address] in [place]   Tel. [phone]  Fax ( they did not know the fax # ).    Gabby Nicolas tel. [phone].

## 2024-12-04 NOTE — TELEPHONE ENCOUNTER
Faxed received confirmation page fax #[phone]     Called patient received voicemail left message to call the office back. # provided.

## 2024-12-10 ENCOUNTER — TELEPHONE (OUTPATIENT)
Age: 54
End: 2024-12-10

## 2024-12-10 NOTE — TELEPHONE ENCOUNTER
Varsha from [facility] wants NP Laurel to know that the patient's back pain level is at a level of 8 to 10. Varsha states that the patient is up moving and walking, but patient states he only get pain relief when he is laying down.     Patient tel [phone]    Varsha from [facility] tel [phone]

## 2024-12-10 NOTE — TELEPHONE ENCOUNTER
Eli w/ [facility] called with O/T report that pt originally no pain in back and after walking pt began to have pain in his back.    Pt's pain is at a 10 level today which began after walking pt and O/T are limited with what they can do today w/ pt.    Pt was unable to pain management today because pt did not have his ID and they would not see pt due to that. Pt has to call Pain management when he finds his ID and then call them for another appt after he finds ID.    Pt will be using Lidocaine patch to help w/ the pain until he can be seen.    Eli will not see pt again until Thursday 12/12.    callback # [phone]    Call pt's sister, Carter, w/ any recommendations at [phone]

## 2024-12-11 NOTE — TELEPHONE ENCOUNTER
Attempted to contact Varsha regarding her message. She was not able to be reached. A message was left for her asking her to contact the office at her convenience. The number to the office was provided.

## 2025-03-19 ENCOUNTER — OFFICE VISIT (OUTPATIENT)
Age: 55
End: 2025-03-19
Payer: MEDICARE

## 2025-03-19 VITALS
OXYGEN SATURATION: 96 % | BODY MASS INDEX: 40.43 KG/M2 | WEIGHT: 315 LBS | HEIGHT: 74 IN | SYSTOLIC BLOOD PRESSURE: 102 MMHG | HEART RATE: 64 BPM | DIASTOLIC BLOOD PRESSURE: 60 MMHG

## 2025-03-19 DIAGNOSIS — I10 PRIMARY HYPERTENSION: ICD-10-CM

## 2025-03-19 DIAGNOSIS — R06.02 SHORTNESS OF BREATH: ICD-10-CM

## 2025-03-19 DIAGNOSIS — I50.32 CHRONIC DIASTOLIC CONGESTIVE HEART FAILURE (HCC): Primary | ICD-10-CM

## 2025-03-19 DIAGNOSIS — G40.909 SEIZURE DISORDER (HCC): ICD-10-CM

## 2025-03-19 DIAGNOSIS — R00.2 PALPITATION: ICD-10-CM

## 2025-03-19 PROCEDURE — 99215 OFFICE O/P EST HI 40 MIN: CPT | Performed by: INTERNAL MEDICINE

## 2025-03-19 PROCEDURE — 3017F COLORECTAL CA SCREEN DOC REV: CPT | Performed by: INTERNAL MEDICINE

## 2025-03-19 PROCEDURE — G8417 CALC BMI ABV UP PARAM F/U: HCPCS | Performed by: INTERNAL MEDICINE

## 2025-03-19 PROCEDURE — 1036F TOBACCO NON-USER: CPT | Performed by: INTERNAL MEDICINE

## 2025-03-19 PROCEDURE — 3074F SYST BP LT 130 MM HG: CPT | Performed by: INTERNAL MEDICINE

## 2025-03-19 PROCEDURE — 3078F DIAST BP <80 MM HG: CPT | Performed by: INTERNAL MEDICINE

## 2025-03-19 PROCEDURE — G8428 CUR MEDS NOT DOCUMENT: HCPCS | Performed by: INTERNAL MEDICINE

## 2025-03-19 RX ORDER — RIVAROXABAN 10 MG/1
10 TABLET, FILM COATED ORAL
Qty: 90 TABLET | Refills: 3 | Status: SHIPPED | OUTPATIENT
Start: 2025-03-19

## 2025-03-19 RX ORDER — LISINOPRIL 10 MG/1
10 TABLET ORAL 2 TIMES DAILY
Qty: 180 TABLET | Refills: 3 | Status: SHIPPED | OUTPATIENT
Start: 2025-03-19

## 2025-03-19 RX ORDER — FUROSEMIDE 20 MG/1
20 TABLET ORAL 2 TIMES DAILY
Qty: 180 TABLET | Refills: 3 | Status: SHIPPED | OUTPATIENT
Start: 2025-03-19

## 2025-03-19 RX ORDER — METOPROLOL TARTRATE 25 MG/1
25 TABLET, FILM COATED ORAL 2 TIMES DAILY
Qty: 180 TABLET | Refills: 3 | Status: SHIPPED | OUTPATIENT
Start: 2025-03-19

## 2025-03-19 RX ORDER — LEVETIRACETAM 750 MG/1
750 TABLET ORAL 2 TIMES DAILY
COMMUNITY
Start: 2025-02-22

## 2025-03-19 RX ORDER — ATORVASTATIN CALCIUM 40 MG/1
40 TABLET, FILM COATED ORAL DAILY
Qty: 90 TABLET | Refills: 3 | Status: SHIPPED | OUTPATIENT
Start: 2025-03-19
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
## 2025-03-27 ENCOUNTER — HOSPITAL ENCOUNTER (OUTPATIENT)
Facility: HOSPITAL | Age: 55
Setting detail: SPECIMEN
Discharge: HOME | End: 2025-03-30

## 2025-03-27 LAB — LABCORP SPECIMEN COLLECTION: NORMAL

## 2025-03-27 PROCEDURE — 99001 SPECIMEN HANDLING PT-LAB: CPT

## 2025-03-28 LAB
ALBUMIN SERPL-MCNC: 3.7 G/DL (ref 3.8–4.9)
ALP SERPL-CCNC: 90 IU/L (ref 44–121)
ALT SERPL-CCNC: 10 IU/L (ref 0–44)
AST SERPL-CCNC: 20 IU/L (ref 0–40)
BILIRUB DIRECT SERPL-MCNC: 0.1 MG/DL (ref 0–0.4)
BILIRUB SERPL-MCNC: 0.3 MG/DL (ref 0–1.2)
BUN SERPL-MCNC: 12 MG/DL (ref 6–24)
BUN/CREAT SERPL: 14 (ref 9–20)
CALCIUM SERPL-MCNC: 9.7 MG/DL (ref 8.7–10.2)
CHLORIDE SERPL-SCNC: 106 MMOL/L (ref 96–106)
CO2 SERPL-SCNC: 24 MMOL/L (ref 20–29)
CREAT SERPL-MCNC: 0.85 MG/DL (ref 0.76–1.27)
EGFRCR SERPLBLD CKD-EPI 2021: 102 ML/MIN/1.73
GLOBULIN SER CALC-MCNC: 3.4 G/DL (ref 1.5–4.5)
GLUCOSE SERPL-MCNC: 96 MG/DL (ref 70–99)
MAGNESIUM SERPL-MCNC: 2.1 MG/DL (ref 1.6–2.3)
POTASSIUM SERPL-SCNC: 4.9 MMOL/L (ref 3.5–5.2)
PROT SERPL-MCNC: 7.1 G/DL (ref 6–8.5)
SODIUM SERPL-SCNC: 142 MMOL/L (ref 134–144)

## 2025-04-17 ENCOUNTER — OFFICE VISIT (OUTPATIENT)
Age: 55
End: 2025-04-17
Payer: MEDICAID

## 2025-04-17 VITALS
OXYGEN SATURATION: 95 % | BODY MASS INDEX: 40.43 KG/M2 | HEIGHT: 74 IN | HEART RATE: 79 BPM | DIASTOLIC BLOOD PRESSURE: 80 MMHG | SYSTOLIC BLOOD PRESSURE: 114 MMHG | WEIGHT: 315 LBS

## 2025-04-17 DIAGNOSIS — I50.30 HEART FAILURE WITH PRESERVED EJECTION FRACTION, UNSPECIFIED HF CHRONICITY (HCC): ICD-10-CM

## 2025-04-17 DIAGNOSIS — M79.669 PAIN OF LOWER LEG, UNSPECIFIED LATERALITY: Primary | ICD-10-CM

## 2025-04-17 PROCEDURE — 3079F DIAST BP 80-89 MM HG: CPT | Performed by: PHYSICIAN ASSISTANT

## 2025-04-17 PROCEDURE — 3074F SYST BP LT 130 MM HG: CPT | Performed by: PHYSICIAN ASSISTANT

## 2025-04-17 PROCEDURE — 99214 OFFICE O/P EST MOD 30 MIN: CPT | Performed by: PHYSICIAN ASSISTANT

## 2025-04-17 RX ORDER — FUROSEMIDE 40 MG/1
40 TABLET ORAL DAILY
Qty: 30 TABLET | Refills: 1 | Status: SHIPPED | OUTPATIENT
Start: 2025-04-17
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
## 2025-06-04 ENCOUNTER — OFFICE VISIT (OUTPATIENT)
Age: 55
End: 2025-06-04
Payer: MEDICARE

## 2025-06-04 VITALS
OXYGEN SATURATION: 96 % | BODY MASS INDEX: 40.43 KG/M2 | HEART RATE: 71 BPM | WEIGHT: 315 LBS | HEIGHT: 74 IN | SYSTOLIC BLOOD PRESSURE: 114 MMHG | DIASTOLIC BLOOD PRESSURE: 70 MMHG

## 2025-06-04 DIAGNOSIS — I71.9 AORTIC ANEURYSM WITHOUT RUPTURE, UNSPECIFIED PORTION OF AORTA: Primary | ICD-10-CM

## 2025-06-04 DIAGNOSIS — I10 PRIMARY HYPERTENSION: ICD-10-CM

## 2025-06-04 DIAGNOSIS — R06.02 SHORTNESS OF BREATH: ICD-10-CM

## 2025-06-04 DIAGNOSIS — R07.9 CHEST PAIN, UNSPECIFIED TYPE: ICD-10-CM

## 2025-06-04 DIAGNOSIS — I50.32 CHRONIC DIASTOLIC CONGESTIVE HEART FAILURE (HCC): ICD-10-CM

## 2025-06-04 DIAGNOSIS — E78.5 DYSLIPIDEMIA: ICD-10-CM

## 2025-06-04 PROCEDURE — 3017F COLORECTAL CA SCREEN DOC REV: CPT | Performed by: INTERNAL MEDICINE

## 2025-06-04 PROCEDURE — 1036F TOBACCO NON-USER: CPT | Performed by: INTERNAL MEDICINE

## 2025-06-04 PROCEDURE — 99214 OFFICE O/P EST MOD 30 MIN: CPT | Performed by: INTERNAL MEDICINE

## 2025-06-04 PROCEDURE — G8427 DOCREV CUR MEDS BY ELIG CLIN: HCPCS | Performed by: INTERNAL MEDICINE

## 2025-06-04 PROCEDURE — G8417 CALC BMI ABV UP PARAM F/U: HCPCS | Performed by: INTERNAL MEDICINE

## 2025-06-04 PROCEDURE — 3078F DIAST BP <80 MM HG: CPT | Performed by: INTERNAL MEDICINE

## 2025-06-04 PROCEDURE — 3074F SYST BP LT 130 MM HG: CPT | Performed by: INTERNAL MEDICINE

## 2025-06-04 NOTE — PROGRESS NOTES
History   reports that he has never smoked. He has never used smokeless tobacco.   reports no history of alcohol use.    Family History  family history includes Heart Disease in his mother; Substance Abuse in his father.    Review of Systems  Except as stated above include:  Constitutional: Negative for fever, chills and malaise/fatigue.   HEENT: No congestion or recent URI.  Gastrointestinal: No nausea, vomiting, abdominal pain, bloody stools.  Pulmonary:  Negative except as stated above.  Cardiac:  Negative except as stated above.  Musculoskeletal: Negative except as stated above.  Neurological:  No localized symptoms.  Endocrine:  Negative except as stated above.    PHYSICAL EXAM  BP Readings from Last 3 Encounters:   06/04/25 114/70   04/17/25 114/80   03/27/25 102/60     Pulse Readings from Last 3 Encounters:   06/04/25 71   04/17/25 79   03/19/25 64     General:   Well developed, well groomed.    Head/Neck:   No obvious jugular venous distention     No obvious carotid pulsations.      No evidence of xanthelasma.  Lungs:   No respiratory distress.      Good respiratory effort  Heart:  No obvious pulsations  Abdomen:   No guarding  Extremities:   Well perfused    No significant edema.    Neurological:   Alert and oriented to person, place, time.      No focal neurological deficit visually.  Skin:   No obvious rash

## 2025-06-05 ENCOUNTER — HOSPITAL ENCOUNTER (EMERGENCY)
Facility: HOSPITAL | Age: 55
Discharge: HOME | End: 2025-06-05
Payer: MEDICARE

## 2025-06-05 ENCOUNTER — APPOINTMENT (OUTPATIENT)
Facility: HOSPITAL | Age: 55
End: 2025-06-05
Payer: MEDICARE

## 2025-06-05 VITALS
TEMPERATURE: 99.1 F | HEART RATE: 66 BPM | SYSTOLIC BLOOD PRESSURE: 110 MMHG | OXYGEN SATURATION: 97 % | DIASTOLIC BLOOD PRESSURE: 87 MMHG | RESPIRATION RATE: 18 BRPM

## 2025-06-05 DIAGNOSIS — L03.115 CELLULITIS OF RIGHT LOWER LEG: Primary | ICD-10-CM

## 2025-06-05 DIAGNOSIS — Z86.718 HISTORY OF DVT IN ADULTHOOD: ICD-10-CM

## 2025-06-05 DIAGNOSIS — M79.89 LEG SWELLING: ICD-10-CM

## 2025-06-05 LAB
ALBUMIN SERPL-MCNC: 3.3 G/DL (ref 3.4–5)
ALBUMIN/GLOB SERPL: 0.7 (ref 0.8–1.7)
ALP SERPL-CCNC: 81 U/L (ref 45–117)
ALT SERPL-CCNC: 11 U/L (ref 10–50)
ANION GAP SERPL CALC-SCNC: 12 MMOL/L (ref 3–18)
AST SERPL-CCNC: 21 U/L (ref 10–38)
BASOPHILS # BLD: 0.02 K/UL (ref 0–0.1)
BASOPHILS NFR BLD: 0.2 % (ref 0–2)
BILIRUB SERPL-MCNC: 0.5 MG/DL (ref 0.2–1)
BUN SERPL-MCNC: 17 MG/DL (ref 6–23)
BUN/CREAT SERPL: 21 (ref 12–20)
CALCIUM SERPL-MCNC: 9.8 MG/DL (ref 8.5–10.1)
CHLORIDE SERPL-SCNC: 108 MMOL/L (ref 98–107)
CO2 SERPL-SCNC: 22 MMOL/L (ref 21–32)
CREAT SERPL-MCNC: 0.8 MG/DL (ref 0.6–1.3)
DIFFERENTIAL METHOD BLD: ABNORMAL
EOSINOPHIL # BLD: 0.01 K/UL (ref 0–0.4)
EOSINOPHIL NFR BLD: 0.1 % (ref 0–5)
ERYTHROCYTE [DISTWIDTH] IN BLOOD BY AUTOMATED COUNT: 14 % (ref 11.6–14.5)
GLOBULIN SER CALC-MCNC: 4.4 G/DL (ref 2–4)
GLUCOSE SERPL-MCNC: 94 MG/DL (ref 74–108)
HCT VFR BLD AUTO: 44.9 % (ref 36–48)
HGB BLD-MCNC: 13.7 G/DL (ref 13–16)
IMM GRANULOCYTES # BLD AUTO: 0.03 K/UL (ref 0–0.04)
IMM GRANULOCYTES NFR BLD AUTO: 0.3 % (ref 0–0.5)
INR PPP: 1.2 (ref 0.9–1.1)
LYMPHOCYTES # BLD: 1.82 K/UL (ref 0.9–3.6)
LYMPHOCYTES NFR BLD: 16.9 % (ref 21–52)
MCH RBC QN AUTO: 31 PG (ref 24–34)
MCHC RBC AUTO-ENTMCNC: 30.5 G/DL (ref 31–37)
MCV RBC AUTO: 101.6 FL (ref 78–100)
MONOCYTES # BLD: 0.99 K/UL (ref 0.05–1.2)
MONOCYTES NFR BLD: 9.2 % (ref 3–10)
NEUTS SEG # BLD: 7.93 K/UL (ref 1.8–8)
NEUTS SEG NFR BLD: 73.3 % (ref 40–73)
NRBC # BLD: 0 K/UL (ref 0–0.01)
NRBC BLD-RTO: 0 PER 100 WBC
NT PRO BNP: 83 PG/ML (ref 36–900)
PLATELET # BLD AUTO: 159 K/UL (ref 135–420)
PMV BLD AUTO: 12.1 FL (ref 9.2–11.8)
POTASSIUM SERPL-SCNC: 3.9 MMOL/L (ref 3.5–5.5)
PROT SERPL-MCNC: 7.7 G/DL (ref 6.4–8.2)
PROTHROMBIN TIME: 15.2 SEC (ref 11.9–14.9)
RBC # BLD AUTO: 4.42 M/UL (ref 4.35–5.65)
SODIUM SERPL-SCNC: 142 MMOL/L (ref 136–145)
WBC # BLD AUTO: 10.8 K/UL (ref 4.6–13.2)

## 2025-06-05 PROCEDURE — 93970 EXTREMITY STUDY: CPT

## 2025-06-05 PROCEDURE — 85610 PROTHROMBIN TIME: CPT

## 2025-06-05 PROCEDURE — 6370000000 HC RX 637 (ALT 250 FOR IP)

## 2025-06-05 PROCEDURE — 80053 COMPREHEN METABOLIC PANEL: CPT

## 2025-06-05 PROCEDURE — 99284 EMERGENCY DEPT VISIT MOD MDM: CPT

## 2025-06-05 PROCEDURE — 85025 COMPLETE CBC W/AUTO DIFF WBC: CPT

## 2025-06-05 PROCEDURE — 93970 EXTREMITY STUDY: CPT | Performed by: INTERNAL MEDICINE

## 2025-06-05 PROCEDURE — 94761 N-INVAS EAR/PLS OXIMETRY MLT: CPT

## 2025-06-05 PROCEDURE — 83880 ASSAY OF NATRIURETIC PEPTIDE: CPT

## 2025-06-05 RX ORDER — SULFAMETHOXAZOLE AND TRIMETHOPRIM 800; 160 MG/1; MG/1
1 TABLET ORAL ONCE
Status: COMPLETED | OUTPATIENT
Start: 2025-06-05 | End: 2025-06-05

## 2025-06-05 RX ORDER — SULFAMETHOXAZOLE AND TRIMETHOPRIM 800; 160 MG/1; MG/1
1 TABLET ORAL 2 TIMES DAILY
Qty: 20 TABLET | Refills: 0 | Status: SHIPPED | OUTPATIENT
Start: 2025-06-05 | End: 2025-06-15

## 2025-06-05 RX ADMIN — SULFAMETHOXAZOLE AND TRIMETHOPRIM 1 TABLET: 800; 160 TABLET ORAL at 19:49

## 2025-06-05 ASSESSMENT — PAIN - FUNCTIONAL ASSESSMENT: PAIN_FUNCTIONAL_ASSESSMENT: 0-10

## 2025-06-05 ASSESSMENT — PAIN SCALES - GENERAL: PAINLEVEL_OUTOF10: 0

## 2025-06-05 NOTE — DISCHARGE INSTRUCTIONS
You presented to the emergency department lower extremity swelling.  We discussed lab and diagnostic studies.  We did discuss judgment of cellulitis with antibiotics to be taken as directed.  Please continue to monitor the swelling and redness in your leg and follow-up outpatient with your PCP.  Please return to the ER sooner for new or worsening symptoms.  Continue to take your blood thinner as prescribed

## 2025-06-05 NOTE — ED TRIAGE NOTES
Pt arrived in the ER complaining of bilateral leg swelling. Hx of seizures, HTN, BPD, arrhythmia, thromboembolus.

## 2025-06-08 NOTE — ED PROVIDER NOTES
[facility] EMERGENCY DEPARTMENT  EMERGENCY DEPARTMENT ENCOUNTER        Pt Name: Ras Bedoya  MRN: [id number]  Birthdate 1970  Date of evaluation: 6/5/2025  Provider: Isabela Hopkins PA-C  PCP: Shun Sosa MD  Note Started: 1:58 PM EDT 6/8/25      SUPRIYA. I have evaluated this patient.        CHIEF COMPLAINT       Chief Complaint   Patient presents with    Leg Swelling       HISTORY OF PRESENT ILLNESS: 1 or more Elements     History From: Patient             Chief Complaint: Leg swelling    Ras Bedoya is a 55 y.o. male who presents to the emergency department for evaluation of bilateral lower extremity swelling.  States the right leg does feel worse than the left.  He states the swelling has been present over the past few months.  He states he did not have any fall, injury, trauma.  He states that he has a history of DVT but he has been compliant with his medication patient is currently on Xarelto.  He states his DVTs were greater than 10 years ago.  He denies any chest pain, shortness of breath    Nursing Notes were all reviewed and agreed with or any disagreements were addressed in the HPI.    REVIEW OF SYSTEMS :      Review of Systems   Constitutional: Negative.    HENT: Negative.     Eyes: Negative.    Respiratory: Negative.     Cardiovascular:  Positive for leg swelling.   Gastrointestinal: Negative.    Endocrine: Negative.    Genitourinary: Negative.    Musculoskeletal:  Positive for arthralgias.   Neurological: Negative.    Psychiatric/Behavioral: Negative.         Positives and Pertinent negatives as per HPI.     SURGICAL HISTORY     Past Surgical History:   Procedure Laterality Date    ANKLE FRACTURE SURGERY Right 08/2021    CARDIAC SURGERY N/A 11/11/2019    ABLATION A-FLUTTER/with VICTORIANO prior performed by Preston Alfred MD at [facility] CARDIAC CATH LAB    COMPRE ELECTROPHYSIOL XM W/LEFT ATRIAL PACNG/REC N/A 11/11/2019    Lt Atrial Pace & Record During Ep Study

## 2025-06-17 NOTE — PROGRESS NOTES
Brennon Ceballos presents today for   Chief Complaint   Patient presents with    Seizure       Is someone accompanying this pt? no    Is the patient using any DME equipment during OV? no    Depression Screening:  3 most recent PHQ Screens 1/7/2021   PHQ Not Done -   Little interest or pleasure in doing things Not at all   Feeling down, depressed, irritable, or hopeless Not at all   Total Score PHQ 2 0       Learning Assessment:  Learning Assessment 10/24/2019   PRIMARY LEARNER Patient   PRIMARY LANGUAGE ENGLISH   LEARNER PREFERENCE PRIMARY DEMONSTRATION   ANSWERED BY Patient   RELATIONSHIP SELF       Abuse Screening:  Abuse Screening Questionnaire 1/7/2021   Do you ever feel afraid of your partner? N   Are you in a relationship with someone who physically or mentally threatens you? N   Is it safe for you to go home? -       Fall Risk  Fall Risk Assessment, last 12 mths 1/7/2021   Able to walk? Yes   Fall in past 12 months? 0   Do you feel unsteady? 0   Are you worried about falling 0         Coordination of Care:  1. Have you been to the ER, urgent care clinic since your last visit? Hospitalized since your last visit? no    2. Have you seen or consulted any other health care providers outside of the [address] [place] [address] since your last visit? Include any pap smears or colon screening.  no 

## 2025-06-18 DIAGNOSIS — I50.30 HEART FAILURE WITH PRESERVED EJECTION FRACTION, UNSPECIFIED HF CHRONICITY (HCC): ICD-10-CM

## 2025-06-18 DIAGNOSIS — I50.32 CHRONIC DIASTOLIC CONGESTIVE HEART FAILURE (HCC): ICD-10-CM

## 2025-06-18 DIAGNOSIS — G40.109 LOCALIZATION-RELATED (FOCAL) (PARTIAL) SYMPTOMATIC EPILEPSY AND EPILEPTIC SYNDROMES WITH SIMPLE PARTIAL SEIZURES, NOT INTRACTABLE, WITHOUT STATUS EPILEPTICUS (HCC): ICD-10-CM

## 2025-06-18 DIAGNOSIS — I10 PRIMARY HYPERTENSION: Primary | ICD-10-CM

## 2025-06-18 RX ORDER — FUROSEMIDE 40 MG/1
40 TABLET ORAL DAILY
Qty: 30 TABLET | Refills: 3 | Status: SHIPPED | OUTPATIENT
Start: 2025-06-18

## 2025-06-30 DIAGNOSIS — I50.32 CHRONIC DIASTOLIC CONGESTIVE HEART FAILURE (HCC): ICD-10-CM

## 2025-06-30 DIAGNOSIS — I50.30 HEART FAILURE WITH PRESERVED EJECTION FRACTION, UNSPECIFIED HF CHRONICITY (HCC): ICD-10-CM

## 2025-06-30 DIAGNOSIS — I10 PRIMARY HYPERTENSION: ICD-10-CM

## 2025-07-01 RX ORDER — DIVALPROEX SODIUM 500 MG/1
1000 TABLET, DELAYED RELEASE ORAL 2 TIMES DAILY
Qty: 120 TABLET | Refills: 1 | Status: SHIPPED | OUTPATIENT
Start: 2025-07-01

## 2025-07-02 RX ORDER — FUROSEMIDE 40 MG/1
40 TABLET ORAL DAILY
Qty: 30 TABLET | Refills: 7 | Status: SHIPPED | OUTPATIENT
Start: 2025-07-02

## 2025-07-09 ENCOUNTER — HOSPITAL ENCOUNTER (OUTPATIENT)
Facility: HOSPITAL | Age: 55
Discharge: HOME | End: 2025-07-12
Attending: INTERNAL MEDICINE
Payer: MEDICARE

## 2025-07-09 ENCOUNTER — HOSPITAL ENCOUNTER (OUTPATIENT)
Facility: HOSPITAL | Age: 55
Discharge: HOME | End: 2025-07-11
Attending: INTERNAL MEDICINE
Payer: MEDICARE

## 2025-07-09 VITALS
WEIGHT: 315 LBS | SYSTOLIC BLOOD PRESSURE: 149 MMHG | DIASTOLIC BLOOD PRESSURE: 62 MMHG | HEART RATE: 63 BPM | BODY MASS INDEX: 40.43 KG/M2 | HEIGHT: 74 IN

## 2025-07-09 DIAGNOSIS — I71.9 AORTIC ANEURYSM WITHOUT RUPTURE, UNSPECIFIED PORTION OF AORTA: ICD-10-CM

## 2025-07-09 DIAGNOSIS — R07.9 CHEST PAIN, UNSPECIFIED TYPE: ICD-10-CM

## 2025-07-09 DIAGNOSIS — R06.02 SHORTNESS OF BREATH: ICD-10-CM

## 2025-07-09 LAB
ECHO BSA: 2.81 M2
NUC STRESS EJECTION FRACTION: 66 %
STRESS BASELINE DIAS BP: 92 MMHG
STRESS BASELINE HR: 60 BPM
STRESS BASELINE ST DEPRESSION: 0 MM
STRESS BASELINE SYS BP: 149 MMHG
STRESS ESTIMATED WORKLOAD: 1 METS
STRESS PEAK DIAS BP: 92 MMHG
STRESS PEAK SYS BP: 149 MMHG
STRESS PERCENT HR ACHIEVED: 55 %
STRESS POST PEAK HR: 90 BPM
STRESS RATE PRESSURE PRODUCT: NORMAL BPM*MMHG
STRESS TARGET HR: 163 BPM
TID: 0.87

## 2025-07-09 PROCEDURE — 78452 HT MUSCLE IMAGE SPECT MULT: CPT | Performed by: INTERNAL MEDICINE

## 2025-07-09 PROCEDURE — 93016 CV STRESS TEST SUPVJ ONLY: CPT | Performed by: INTERNAL MEDICINE

## 2025-07-09 PROCEDURE — 93018 CV STRESS TEST I&R ONLY: CPT | Performed by: INTERNAL MEDICINE

## 2025-07-09 PROCEDURE — 6360000004 HC RX CONTRAST MEDICATION: Performed by: INTERNAL MEDICINE

## 2025-07-09 PROCEDURE — 93017 CV STRESS TEST TRACING ONLY: CPT

## 2025-07-09 PROCEDURE — 3430000000 HC RX DIAGNOSTIC RADIOPHARMACEUTICAL: Performed by: INTERNAL MEDICINE

## 2025-07-09 PROCEDURE — 71260 CT THORAX DX C+: CPT

## 2025-07-09 PROCEDURE — A9502 TC99M TETROFOSMIN: HCPCS | Performed by: INTERNAL MEDICINE

## 2025-07-09 PROCEDURE — 6360000002 HC RX W HCPCS: Performed by: INTERNAL MEDICINE

## 2025-07-09 RX ORDER — IOPAMIDOL 612 MG/ML
80 INJECTION, SOLUTION INTRAVASCULAR
Status: COMPLETED | OUTPATIENT
Start: 2025-07-09 | End: 2025-07-09

## 2025-07-09 RX ORDER — REGADENOSON 0.08 MG/ML
0.4 INJECTION, SOLUTION INTRAVENOUS
Status: COMPLETED | OUTPATIENT
Start: 2025-07-09 | End: 2025-07-09

## 2025-07-09 RX ADMIN — TETROFOSMIN 10.9 MILLICURIE: 1.38 INJECTION, POWDER, LYOPHILIZED, FOR SOLUTION INTRAVENOUS at 08:45

## 2025-07-09 RX ADMIN — REGADENOSON 0.4 MG: 0.08 INJECTION, SOLUTION INTRAVENOUS at 10:39

## 2025-07-09 RX ADMIN — TETROFOSMIN 33 MILLICURIE: 1.38 INJECTION, POWDER, LYOPHILIZED, FOR SOLUTION INTRAVENOUS at 10:40

## 2025-07-09 RX ADMIN — IOPAMIDOL 80 ML: 612 INJECTION, SOLUTION INTRAVENOUS at 09:18

## 2025-07-10 ENCOUNTER — RESULTS FOLLOW-UP (OUTPATIENT)
Dept: CARDIOLOGY | Facility: HOSPITAL | Age: 55
End: 2025-07-10

## 2025-07-29 NOTE — TELEPHONE ENCOUNTER
----- Message from Dr. Preston Alfred MD sent at 7/10/2025  7:58 AM EDT -----  Please schedule patient to see Evangelina in next few weeks to discuss abnormal stress test, possible heart cath. Thanks

## 2025-08-12 ENCOUNTER — OFFICE VISIT (OUTPATIENT)
Age: 55
End: 2025-08-12
Payer: MEDICARE

## 2025-08-12 VITALS
BODY MASS INDEX: 41.98 KG/M2 | SYSTOLIC BLOOD PRESSURE: 115 MMHG | TEMPERATURE: 97.5 F | HEART RATE: 56 BPM | DIASTOLIC BLOOD PRESSURE: 71 MMHG | WEIGHT: 315 LBS | OXYGEN SATURATION: 96 %

## 2025-08-12 DIAGNOSIS — E55.9 VITAMIN D DEFICIENCY: ICD-10-CM

## 2025-08-12 DIAGNOSIS — R41.89 COGNITIVE IMPAIRMENT: ICD-10-CM

## 2025-08-12 DIAGNOSIS — Z51.81 THERAPEUTIC DRUG MONITORING: ICD-10-CM

## 2025-08-12 DIAGNOSIS — G40.109 LOCALIZATION-RELATED (FOCAL) (PARTIAL) SYMPTOMATIC EPILEPSY AND EPILEPTIC SYNDROMES WITH SIMPLE PARTIAL SEIZURES, NOT INTRACTABLE, WITHOUT STATUS EPILEPTICUS (HCC): Primary | ICD-10-CM

## 2025-08-12 PROCEDURE — 3074F SYST BP LT 130 MM HG: CPT | Performed by: NURSE PRACTITIONER

## 2025-08-12 PROCEDURE — G8417 CALC BMI ABV UP PARAM F/U: HCPCS | Performed by: NURSE PRACTITIONER

## 2025-08-12 PROCEDURE — 3078F DIAST BP <80 MM HG: CPT | Performed by: NURSE PRACTITIONER

## 2025-08-12 PROCEDURE — G8428 CUR MEDS NOT DOCUMENT: HCPCS | Performed by: NURSE PRACTITIONER

## 2025-08-12 PROCEDURE — 3017F COLORECTAL CA SCREEN DOC REV: CPT | Performed by: NURSE PRACTITIONER

## 2025-08-12 PROCEDURE — 99215 OFFICE O/P EST HI 40 MIN: CPT | Performed by: NURSE PRACTITIONER

## 2025-08-12 PROCEDURE — 1036F TOBACCO NON-USER: CPT | Performed by: NURSE PRACTITIONER

## 2025-08-12 RX ORDER — DIVALPROEX SODIUM 500 MG/1
1000 TABLET, DELAYED RELEASE ORAL 2 TIMES DAILY
Qty: 120 TABLET | Refills: 1 | Status: SHIPPED | OUTPATIENT
Start: 2025-08-12 | End: 2025-08-12

## 2025-08-12 RX ORDER — LACOSAMIDE 200 MG/1
200 TABLET ORAL 2 TIMES DAILY
Qty: 60 TABLET | Refills: 3 | Status: SHIPPED | OUTPATIENT
Start: 2025-08-12 | End: 2025-12-12

## 2025-08-12 RX ORDER — DIVALPROEX SODIUM 500 MG/1
1000 TABLET, DELAYED RELEASE ORAL 2 TIMES DAILY
Qty: 120 TABLET | Refills: 3 | Status: SHIPPED | OUTPATIENT
Start: 2025-08-12

## 2025-08-12 RX ORDER — TOPIRAMATE 200 MG/1
200 TABLET, FILM COATED ORAL 2 TIMES DAILY
Qty: 60 TABLET | Refills: 3 | Status: SHIPPED | OUTPATIENT
Start: 2025-08-12

## 2025-08-12 RX ORDER — LEVETIRACETAM 750 MG/1
750 TABLET ORAL 2 TIMES DAILY
Qty: 60 TABLET | Refills: 3 | Status: SHIPPED | OUTPATIENT
Start: 2025-08-12

## 2025-08-13 ENCOUNTER — CLINICAL DOCUMENTATION (OUTPATIENT)
Age: 55
End: 2025-08-13

## 2025-08-15 ENCOUNTER — OFFICE VISIT (OUTPATIENT)
Age: 55
End: 2025-08-15
Payer: MEDICARE

## 2025-08-15 VITALS
SYSTOLIC BLOOD PRESSURE: 110 MMHG | WEIGHT: 315 LBS | OXYGEN SATURATION: 97 % | HEIGHT: 74 IN | HEART RATE: 58 BPM | BODY MASS INDEX: 40.43 KG/M2 | DIASTOLIC BLOOD PRESSURE: 60 MMHG

## 2025-08-15 DIAGNOSIS — R94.39 ABNORMAL STRESS TEST: ICD-10-CM

## 2025-08-15 DIAGNOSIS — R07.9 CHEST PAIN, UNSPECIFIED TYPE: Primary | ICD-10-CM

## 2025-08-15 PROCEDURE — 3074F SYST BP LT 130 MM HG: CPT | Performed by: INTERNAL MEDICINE

## 2025-08-15 PROCEDURE — G8428 CUR MEDS NOT DOCUMENT: HCPCS | Performed by: INTERNAL MEDICINE

## 2025-08-15 PROCEDURE — 3078F DIAST BP <80 MM HG: CPT | Performed by: INTERNAL MEDICINE

## 2025-08-15 PROCEDURE — G8417 CALC BMI ABV UP PARAM F/U: HCPCS | Performed by: INTERNAL MEDICINE

## 2025-08-15 PROCEDURE — 3017F COLORECTAL CA SCREEN DOC REV: CPT | Performed by: INTERNAL MEDICINE

## 2025-08-15 PROCEDURE — 1036F TOBACCO NON-USER: CPT | Performed by: INTERNAL MEDICINE

## 2025-08-15 PROCEDURE — 99215 OFFICE O/P EST HI 40 MIN: CPT | Performed by: INTERNAL MEDICINE

## 2025-08-15 RX ORDER — PREGABALIN 75 MG/1
75 CAPSULE ORAL 2 TIMES DAILY
COMMUNITY
Start: 2025-06-30

## 2025-08-19 ENCOUNTER — CLINICAL DOCUMENTATION (OUTPATIENT)
Age: 55
End: 2025-08-19

## 2025-08-20 ENCOUNTER — CLINICAL DOCUMENTATION (OUTPATIENT)
Age: 55
End: 2025-08-20

## 2025-08-27 ENCOUNTER — HOSPITAL ENCOUNTER (OUTPATIENT)
Facility: HOSPITAL | Age: 55
Setting detail: SPECIMEN
Discharge: HOME | End: 2025-08-30

## 2025-08-27 LAB — LABCORP SPECIMEN COLLECTION: NORMAL

## 2025-08-27 PROCEDURE — 99001 SPECIMEN HANDLING PT-LAB: CPT

## 2025-08-28 LAB
BASOPHILS # BLD AUTO: 0 X10E3/UL (ref 0–0.2)
BASOPHILS NFR BLD AUTO: 0 %
BUN SERPL-MCNC: 16 MG/DL (ref 6–24)
BUN/CREAT SERPL: 19 (ref 9–20)
CALCIUM SERPL-MCNC: 9.2 MG/DL (ref 8.7–10.2)
CHLORIDE SERPL-SCNC: 110 MMOL/L (ref 96–106)
CO2 SERPL-SCNC: 21 MMOL/L (ref 20–29)
CREAT SERPL-MCNC: 0.86 MG/DL (ref 0.76–1.27)
EGFRCR SERPLBLD CKD-EPI 2021: 101 ML/MIN/1.73
EOSINOPHIL # BLD AUTO: 0.1 X10E3/UL (ref 0–0.4)
EOSINOPHIL NFR BLD AUTO: 1 %
ERYTHROCYTE [DISTWIDTH] IN BLOOD BY AUTOMATED COUNT: 13.2 % (ref 11.6–15.4)
GLUCOSE SERPL-MCNC: 78 MG/DL (ref 70–99)
HCT VFR BLD AUTO: 44.2 % (ref 37.5–51)
HGB BLD-MCNC: 14.1 G/DL (ref 13–17.7)
IMM GRANULOCYTES # BLD AUTO: 0 X10E3/UL (ref 0–0.1)
IMM GRANULOCYTES NFR BLD AUTO: 0 %
LYMPHOCYTES # BLD AUTO: 3.4 X10E3/UL (ref 0.7–3.1)
LYMPHOCYTES NFR BLD AUTO: 57 %
MCH RBC QN AUTO: 31.8 PG (ref 26.6–33)
MCHC RBC AUTO-ENTMCNC: 31.9 G/DL (ref 31.5–35.7)
MCV RBC AUTO: 100 FL (ref 79–97)
MONOCYTES # BLD AUTO: 0.5 X10E3/UL (ref 0.1–0.9)
MONOCYTES NFR BLD AUTO: 8 %
NEUTROPHILS # BLD AUTO: 2 X10E3/UL (ref 1.4–7)
NEUTROPHILS NFR BLD AUTO: 34 %
PLATELET # BLD AUTO: 150 X10E3/UL (ref 150–450)
POTASSIUM SERPL-SCNC: 3.9 MMOL/L (ref 3.5–5.2)
RBC # BLD AUTO: 4.44 X10E6/UL (ref 4.14–5.8)
SODIUM SERPL-SCNC: 145 MMOL/L (ref 134–144)
SPECIMEN STATUS REPORT: NORMAL
WBC # BLD AUTO: 6 X10E3/UL (ref 3.4–10.8)

## 2025-09-02 ENCOUNTER — HOSPITAL ENCOUNTER (OUTPATIENT)
Facility: HOSPITAL | Age: 55
Setting detail: OUTPATIENT SURGERY
Discharge: HOME | End: 2025-09-02
Attending: INTERNAL MEDICINE | Admitting: INTERNAL MEDICINE
Payer: MEDICARE

## 2025-09-02 VITALS
BODY MASS INDEX: 42.88 KG/M2 | RESPIRATION RATE: 15 BRPM | DIASTOLIC BLOOD PRESSURE: 90 MMHG | HEIGHT: 74 IN | SYSTOLIC BLOOD PRESSURE: 120 MMHG | HEART RATE: 46 BPM | OXYGEN SATURATION: 99 %

## 2025-09-02 DIAGNOSIS — R07.9 CHEST PAIN, UNSPECIFIED TYPE: ICD-10-CM

## 2025-09-02 DIAGNOSIS — R93.1 ABNORMAL ECHOCARDIOGRAM: ICD-10-CM

## 2025-09-02 LAB
INR PPP: 1.3 (ref 0.9–1.2)
PROTHROMBIN TIME: 16.7 SEC (ref 12–15.1)

## 2025-09-02 PROCEDURE — 2709999900 HC NON-CHARGEABLE SUPPLY: Performed by: INTERNAL MEDICINE

## 2025-09-02 PROCEDURE — 93458 L HRT ARTERY/VENTRICLE ANGIO: CPT | Performed by: INTERNAL MEDICINE

## 2025-09-02 PROCEDURE — 76000 FLUOROSCOPY <1 HR PHYS/QHP: CPT | Performed by: INTERNAL MEDICINE

## 2025-09-02 PROCEDURE — 2500000003 HC RX 250 WO HCPCS: Performed by: INTERNAL MEDICINE

## 2025-09-02 PROCEDURE — C1894 INTRO/SHEATH, NON-LASER: HCPCS | Performed by: INTERNAL MEDICINE

## 2025-09-02 PROCEDURE — 99152 MOD SED SAME PHYS/QHP 5/>YRS: CPT | Performed by: INTERNAL MEDICINE

## 2025-09-02 PROCEDURE — C1713 ANCHOR/SCREW BN/BN,TIS/BN: HCPCS | Performed by: INTERNAL MEDICINE

## 2025-09-02 PROCEDURE — 7100000010 HC PHASE II RECOVERY - FIRST 15 MIN: Performed by: INTERNAL MEDICINE

## 2025-09-02 PROCEDURE — 6370000000 HC RX 637 (ALT 250 FOR IP): Performed by: INTERNAL MEDICINE

## 2025-09-02 PROCEDURE — 6360000002 HC RX W HCPCS: Performed by: INTERNAL MEDICINE

## 2025-09-02 PROCEDURE — 85610 PROTHROMBIN TIME: CPT

## 2025-09-02 PROCEDURE — 7100000011 HC PHASE II RECOVERY - ADDTL 15 MIN: Performed by: INTERNAL MEDICINE

## 2025-09-02 PROCEDURE — 6360000004 HC RX CONTRAST MEDICATION: Performed by: INTERNAL MEDICINE

## 2025-09-02 PROCEDURE — C1769 GUIDE WIRE: HCPCS | Performed by: INTERNAL MEDICINE

## 2025-09-02 RX ORDER — DIPHENHYDRAMINE HCL 25 MG
CAPSULE ORAL PRN
Status: DISCONTINUED | OUTPATIENT
Start: 2025-09-02 | End: 2025-09-02 | Stop reason: HOSPADM

## 2025-09-02 RX ORDER — HEPARIN SODIUM 1000 [USP'U]/ML
INJECTION, SOLUTION INTRAVENOUS; SUBCUTANEOUS PRN
Status: DISCONTINUED | OUTPATIENT
Start: 2025-09-02 | End: 2025-09-02 | Stop reason: HOSPADM

## 2025-09-02 RX ORDER — IOPAMIDOL 612 MG/ML
INJECTION, SOLUTION INTRAVASCULAR PRN
Status: DISCONTINUED | OUTPATIENT
Start: 2025-09-02 | End: 2025-09-02 | Stop reason: HOSPADM

## 2025-09-02 RX ORDER — MIDAZOLAM HYDROCHLORIDE 1 MG/ML
INJECTION, SOLUTION INTRAMUSCULAR; INTRAVENOUS PRN
Status: DISCONTINUED | OUTPATIENT
Start: 2025-09-02 | End: 2025-09-02 | Stop reason: HOSPADM

## 2025-09-02 RX ORDER — SODIUM CHLORIDE 0.9 % (FLUSH) 0.9 %
5-40 SYRINGE (ML) INJECTION EVERY 12 HOURS SCHEDULED
Status: CANCELLED | OUTPATIENT
Start: 2025-09-02

## 2025-09-02 RX ORDER — SODIUM CHLORIDE 9 MG/ML
INJECTION, SOLUTION INTRAVENOUS PRN
Status: DISCONTINUED | OUTPATIENT
Start: 2025-09-02 | End: 2025-09-02

## 2025-09-02 RX ORDER — ACETAMINOPHEN 325 MG/1
650 TABLET ORAL EVERY 4 HOURS PRN
Status: CANCELLED | OUTPATIENT
Start: 2025-09-02

## 2025-09-02 RX ORDER — ASPIRIN 81 MG/1
81 TABLET, CHEWABLE ORAL ONCE
Status: COMPLETED | OUTPATIENT
Start: 2025-09-02 | End: 2025-09-02

## 2025-09-02 RX ORDER — VERAPAMIL HYDROCHLORIDE 2.5 MG/ML
INJECTION INTRAVENOUS PRN
Status: DISCONTINUED | OUTPATIENT
Start: 2025-09-02 | End: 2025-09-02 | Stop reason: HOSPADM

## 2025-09-02 RX ORDER — SODIUM CHLORIDE 0.9 % (FLUSH) 0.9 %
5-40 SYRINGE (ML) INJECTION PRN
Status: CANCELLED | OUTPATIENT
Start: 2025-09-02

## 2025-09-02 RX ORDER — SODIUM CHLORIDE 9 MG/ML
INJECTION, SOLUTION INTRAVENOUS CONTINUOUS
Status: CANCELLED | OUTPATIENT
Start: 2025-09-02

## 2025-09-02 RX ORDER — SODIUM CHLORIDE 9 MG/ML
INJECTION, SOLUTION INTRAVENOUS PRN
Status: CANCELLED | OUTPATIENT
Start: 2025-09-02

## 2025-09-02 RX ORDER — FENTANYL CITRATE 50 UG/ML
INJECTION, SOLUTION INTRAMUSCULAR; INTRAVENOUS PRN
Status: DISCONTINUED | OUTPATIENT
Start: 2025-09-02 | End: 2025-09-02 | Stop reason: HOSPADM

## 2025-09-02 RX ADMIN — ASPIRIN 81 MG: 81 TABLET, CHEWABLE ORAL at 10:37

## 2025-09-02 ASSESSMENT — PAIN SCALES - GENERAL: PAINLEVEL_OUTOF10: 3

## 2025-09-02 ASSESSMENT — PAIN DESCRIPTION - LOCATION: LOCATION: BACK

## 2025-09-03 LAB
BASOPHILS # BLD AUTO: 0 X10E3/UL (ref 0–0.2)
BASOPHILS NFR BLD AUTO: 0 %
BUN SERPL-MCNC: 16 MG/DL (ref 6–24)
BUN/CREAT SERPL: 19 (ref 9–20)
CALCIUM SERPL-MCNC: 9.2 MG/DL (ref 8.7–10.2)
CHLORIDE SERPL-SCNC: 110 MMOL/L (ref 96–106)
CO2 SERPL-SCNC: 21 MMOL/L (ref 20–29)
CREAT SERPL-MCNC: 0.86 MG/DL (ref 0.76–1.27)
EGFRCR SERPLBLD CKD-EPI 2021: 101 ML/MIN/1.73
EOSINOPHIL # BLD AUTO: 0.1 X10E3/UL (ref 0–0.4)
EOSINOPHIL NFR BLD AUTO: 1 %
ERYTHROCYTE [DISTWIDTH] IN BLOOD BY AUTOMATED COUNT: 13.2 % (ref 11.6–15.4)
GLUCOSE SERPL-MCNC: 78 MG/DL (ref 70–99)
HCT VFR BLD AUTO: 44.2 % (ref 37.5–51)
HGB BLD-MCNC: 14.1 G/DL (ref 13–17.7)
IMM GRANULOCYTES # BLD AUTO: 0 X10E3/UL (ref 0–0.1)
IMM GRANULOCYTES NFR BLD AUTO: 0 %
INR PPP: 1 (ref 0.9–1.2)
LYMPHOCYTES # BLD AUTO: 3.4 X10E3/UL (ref 0.7–3.1)
LYMPHOCYTES NFR BLD AUTO: 57 %
MCH RBC QN AUTO: 31.8 PG (ref 26.6–33)
MCHC RBC AUTO-ENTMCNC: 31.9 G/DL (ref 31.5–35.7)
MCV RBC AUTO: 100 FL (ref 79–97)
MONOCYTES # BLD AUTO: 0.5 X10E3/UL (ref 0.1–0.9)
MONOCYTES NFR BLD AUTO: 8 %
NEUTROPHILS # BLD AUTO: 2 X10E3/UL (ref 1.4–7)
NEUTROPHILS NFR BLD AUTO: 34 %
PLATELET # BLD AUTO: 150 X10E3/UL (ref 150–450)
POTASSIUM SERPL-SCNC: 3.9 MMOL/L (ref 3.5–5.2)
PROTHROMBIN TIME: 10.7 SEC (ref 9.1–12)
RBC # BLD AUTO: 4.44 X10E6/UL (ref 4.14–5.8)
SODIUM SERPL-SCNC: 145 MMOL/L (ref 134–144)
SPECIMEN STATUS REPORT: NORMAL
SPECIMEN STATUS REPORT: NORMAL
WBC # BLD AUTO: 6 X10E3/UL (ref 3.4–10.8)

## (undated) DEVICE — BIT DRL L110MM DIA3.5MM QUIK CPL W/O STP REUSE

## (undated) DEVICE — KIT CLN UP BON SECOURS MARYV

## (undated) DEVICE — PADDING CAST SOF-ROL 4INX4YD -- NS

## (undated) DEVICE — BAND COMPR L24CM REG CLR PLAS HEMSTAT EXT HK AND LOOP RETEN

## (undated) DEVICE — PACK SURG BSHR TOT KNEE LF

## (undated) DEVICE — SUPPORT WRST COMPR W/ HK MBRACE

## (undated) DEVICE — KENDALL RADIOLUCENT FOAM MONITORING ELECTRODE RECTANGULAR SHAPE: Brand: KENDALL

## (undated) DEVICE — INTRODUCER SHTH 6FR CANN L11CM DIL TIP 35MM GRN TUNGSTEN

## (undated) DEVICE — PROCEDURE KIT FLUID MGMT 10 FR CUST MAINFOLD

## (undated) DEVICE — ZIMMER® STERILE DISPOSABLE TOURNIQUET CUFF WITH PROTECTIVE SLEEVE AND PLC, DUAL PORT, SINGLE BLADDER, 34 IN. (86 CM)

## (undated) DEVICE — SOLUTION IV 1000ML 0.9% SOD CHL

## (undated) DEVICE — TUBE SET IRR PUMP THERMALCOOL -- SMARTABLATE

## (undated) DEVICE — STERILE POLYISOPRENE POWDER-FREE SURGICAL GLOVES: Brand: PROTEXIS

## (undated) DEVICE — CATHETER ELECTROPHYSIOLOGY CRD 2 5 MM 6 FRX120 CM SUPREME

## (undated) DEVICE — INTRODUCER SHTH 7FR CANN L11CM DIL TIP 35MM ORNG TUNGSTEN

## (undated) DEVICE — INTRODUCER SHTH 8FR CANN L11CM DIL TIP 35MM BLU TUNGSTEN

## (undated) DEVICE — PAD,ABDOMINAL,8"X10",ST,LF: Brand: MEDLINE

## (undated) DEVICE — BLANKET WRM AD W50XL85.8IN PACU FULL BODY FORC AIR

## (undated) DEVICE — SET FLD ADMIN 3 W STPCOCK FIX FEM L BOR 1IN

## (undated) DEVICE — ANGIOGRAPHY KIT CUST VASC

## (undated) DEVICE — Device

## (undated) DEVICE — TRANSDUCER PRESSURE MONITOR SET W/ VENT TRUWAVE DISP

## (undated) DEVICE — DRAPE XR C ARM 41X74IN LF --

## (undated) DEVICE — PADS  DEFIB  ADULT

## (undated) DEVICE — GUIDEWIRE VASC L150CM DIA0.035IN FLX END L7CM J 3MM PTFE

## (undated) DEVICE — SOFT SILICONE HYDROCELLULAR SACRUM DRESSING WITH LOCK AWAY LAYER: Brand: ALLEVYN LIFE SACRUM (LARGE) PACK OF 10

## (undated) DEVICE — DRESSING,GAUZE,XEROFORM,CURAD,1"X8",ST: Brand: CURAD

## (undated) DEVICE — PACK PROCEDURE SURG VASC CATH 161 MMC LF

## (undated) DEVICE — KIT INTRO 6FR L10CM MINI WIRE L45CM DIA0.021IN NDL 21GA ANT

## (undated) DEVICE — UNIDIRECTIONAL STEERABLE DIAGNOSTIC CATHETER: Brand: EP XT™

## (undated) DEVICE — GARMENT,MEDLINE,DVT,INT,CALF,LG, GEN2: Brand: MEDLINE

## (undated) DEVICE — INTENDED FOR TISSUE SEPARATION, AND OTHER PROCEDURES THAT REQUIRE A SHARP SURGICAL BLADE TO PUNCTURE OR CUT.: Brand: BARD-PARKER ® STAINLESS STEEL BLADES

## (undated) DEVICE — CATHETER ABLAT 8FR L115CM 1-6-2MM SPC TIP 3.5MM FJ CRV

## (undated) DEVICE — BANDAGE COMPR EXSANGUATION SGL LAYERED NO CLSR 9FT LEN 4IN W

## (undated) DEVICE — SPONGE GZ 16 PLY WVN COT 4INX4IN STERIL

## (undated) DEVICE — TOWEL SURG W17XL27IN BLU COT STD PREWASHED DELINTED 4 PER STRL PK

## (undated) DEVICE — SUTURE VCRL SZ 3-0 L27IN ABSRB UD L26MM SH 1/2 CIR J416H

## (undated) DEVICE — INTENDED FOR TISSUE SEPARATION, AND OTHER PROCEDURES THAT REQUIRE A SHARP SURGICAL BLADE TO PUNCTURE OR CUT.: Brand: BARD-PARKER SAFETY BLADES SIZE 10, STERILE

## (undated) DEVICE — GUIDEWIRE VASC L145CM HI TORQ VERSACORE FLPY

## (undated) DEVICE — BANDAGE COMPR FOAM 4 IN X 5 YD CO FLX LF2

## (undated) DEVICE — SUTURE MCRYL SZ 3-0 L27IN ABSRB UD L26MM SH 1/2 CIR Y416H

## (undated) DEVICE — PATCH CARTO 3 EXT REF --

## (undated) DEVICE — Z DISCONTINUED BY MEDLINE USE 2711682 TRAY SKIN PREP DRY W/ PREM GLV

## (undated) DEVICE — BANDAGE COBAN 4 IN COMPR W4INXL5YD FOAM COHESIVE QUIK STK SELF ADH SFT

## (undated) DEVICE — GAUZE,SPONGE,4"X4",16PLY,STRL,LF,10/TRAY: Brand: MEDLINE

## (undated) DEVICE — REM POLYHESIVE ADULT PATIENT RETURN ELECTRODE: Brand: VALLEYLAB

## (undated) DEVICE — LIMB HOLDER, WRIST/ANKLE: Brand: DEROYAL

## (undated) DEVICE — DECANTER FLD L9IN WHT ASEP TRNSF

## (undated) DEVICE — CATHETER ANGIO 5FR L110CM COR NYL POLYUR S STL RAD TIGER 4

## (undated) DEVICE — PREP SKN CHLRAPRP APL 26ML STR --

## (undated) DEVICE — SUTURE VCRL SZ 2-0 L27IN ABSRB UD L26MM SH 1/2 CIR J417H

## (undated) DEVICE — PADDING CAST SOF-ROL 6INX4YD --

## (undated) DEVICE — 2.5MM DRILL BIT/QC/GOLD/110MM

## (undated) DEVICE — DRAPE BRACH ANGIO W38XL44IN POLYPR SMS FAB

## (undated) DEVICE — WRAP COMPR W4INXL5YD NONSTERILE TAN SELF ADH COBAN

## (undated) DEVICE — C-ARMOR C-ARM EQUIPMENT COVERS CLEAR STERILE UNIVERSAL FIT 12 PER CASE: Brand: C-ARMOR

## (undated) DEVICE — MEDI-TRACE CADENCE ADULT, DEFIBRILLATION ELECTRODE -RTS  (10 PR/PK) - PHYSIO-CONTROL: Brand: MEDI-TRACE CADENCE

## (undated) DEVICE — 3 ML SYRINGE WITH HYPODERMIC SAFETY NEEDLE: Brand: MAGELLAN